# Patient Record
Sex: FEMALE | Race: OTHER | NOT HISPANIC OR LATINO
[De-identification: names, ages, dates, MRNs, and addresses within clinical notes are randomized per-mention and may not be internally consistent; named-entity substitution may affect disease eponyms.]

---

## 2023-04-10 PROBLEM — Z00.00 ENCOUNTER FOR PREVENTIVE HEALTH EXAMINATION: Status: ACTIVE | Noted: 2023-04-10

## 2023-04-11 ENCOUNTER — APPOINTMENT (OUTPATIENT)
Dept: HEMATOLOGY ONCOLOGY | Facility: CLINIC | Age: 43
End: 2023-04-11
Payer: COMMERCIAL

## 2023-04-11 ENCOUNTER — LABORATORY RESULT (OUTPATIENT)
Age: 43
End: 2023-04-11

## 2023-04-11 VITALS
OXYGEN SATURATION: 98 % | DIASTOLIC BLOOD PRESSURE: 83 MMHG | WEIGHT: 146 LBS | SYSTOLIC BLOOD PRESSURE: 122 MMHG | HEART RATE: 83 BPM | RESPIRATION RATE: 18 BRPM | HEIGHT: 68.11 IN | TEMPERATURE: 98.1 F | BODY MASS INDEX: 22.13 KG/M2

## 2023-04-11 LAB
ALBUMIN SERPL ELPH-MCNC: 3.7 G/DL
ALP BLD-CCNC: 76 U/L
ALT SERPL-CCNC: 25 U/L
ANION GAP SERPL CALC-SCNC: 11 MMOL/L
AST SERPL-CCNC: 29 U/L
BILIRUB SERPL-MCNC: 0.9 MG/DL
BUN SERPL-MCNC: 9 MG/DL
CALCIUM SERPL-MCNC: 9.9 MG/DL
CHLORIDE SERPL-SCNC: 106 MMOL/L
CO2 SERPL-SCNC: 26 MMOL/L
CREAT SERPL-MCNC: 1 MG/DL
EGFR: 71 ML/MIN/1.73M2
GLUCOSE SERPL-MCNC: 99 MG/DL
LDH SERPL-CCNC: 165 U/L
POTASSIUM SERPL-SCNC: 4.4 MMOL/L
PROT SERPL-MCNC: 8 G/DL
SODIUM SERPL-SCNC: 143 MMOL/L

## 2023-04-11 PROCEDURE — 99205 OFFICE O/P NEW HI 60 MIN: CPT

## 2023-04-11 NOTE — HISTORY OF PRESENT ILLNESS
[Disease: _____________________] : Disease: [unfilled] [M: ___] : M[unfilled] [AJCC Stage: ____] : AJCC Stage: [unfilled] [de-identified] : DIAGNOSIS:  M1a, stage IV, metastatic uveal melanoma, with liver involvement\par \par MOLECULAR FINDINGS:\par HLA A*02:01 and A*23:01\par NY-ESO-1 negative\par \par CURRENT THERAPY:\par Tebentafusp since 03/12/2023\par \par PRIOR THERAPIES:\par None\par \par HISTORY OF PRESENT ILLNESS:\par Mrs. Hoff is a 42 year old female with a history of primary uveal melanoma, now with newly diagnosed metastatic disease with liver only involvement.\par \par She initially presented in January 2018 with vision changes in her right eye, with later loss of temporal and upper visual field vision.  She was evaluated by Dr. Konstantin Barros at the [facility] and was found to have a subretinal mass in her right eye with a greated basal diameter of 14.43 mm and a maximal height of 7 mm.  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  She was treated with Ru-106 brachytherapy from 01/30/2018 until 02/06/2018.  She was subsequently placed on expectant observation for systemic recurrence.\par \par She did well until, on 01/29/2023, she underwent an abdominal ultrasound which was significant for a new focal liver lesion measuring 3 cm.  A subsequent chest, abdominal and pelvic CT scan, performed on 01/30/2023, demonstrated a segment 7 liver lesion with arterial enhancement and other subcentimeter hepatic lesions measuring up to 4 mm of unclear etiology.  \par \par On 02/01/2023, she underwent a liver biopsy, with pathology consistent with melanoma.  Immunohistochemistry was positive for SOX10 and MART1.  An abdominal MRI, performed on 02/16/2023, demonstrated a dominant segment 7 lesion in the liver, with multiple other smaller subcentimeter lesions bilaterally.\par \par She was evaluated at the [facility] on 02/20/2023 by Dr. Miguelito Truong, with recommendations for HLA testing and possible tebentafusp if appropriate or combination checkpoint blockade.  She was found to be HLA A*02:01 positive and initiated therapy with tebentafusp on 03/12/2023, and she has now completed her first three inpatient doses, with her most recent dose administered on 03/26/2023.  She experienced mild rash and periorbital edema, with IV fluids administered for mild hypotension with her first dose, but otherwise tolerated therapy without difficulty.\par \par She now presents to clinic today for a discussion of further management options.\par \par PAST MEDICAL HISTORY:\par None\par \par SOCIAL HISTORY:\par The patient has recently moved from [facility] and is now living in [place].  Her partner currently resides in the [place] but is considering relocating.  She works in the [employer].\par \par FAMILY HISTORY:\par Her maternal grandfather had mucosal melanoma arising from the stomach.\par

## 2023-04-11 NOTE — ASSESSMENT
[FreeTextEntry1] : This is a 42 year old HLA  positive female now with an M1a, stage IV, uveal melanoma with liver involvement.  She has initiated therapy with tebentafusp at [facility] but has recently moved to [place].\par \par I had a long discussion with the patient regarding her diagnosis, clinical course, prognosis and management options.  I once again reviewed systemic and regional treatment options available both as standard of care and on clinical trials.  I reviewed the data supporting the use of tebentafusp, the challenges associated with predicting benefit based upon imaging alone, and the risks of toxicity following an extended treatment break.\par \par After a long discussion, all of her questions and concerns were answered to her satisfaction.  We will plan on full restaging studies now as well as ctDNA testing.  We will arrange for her to resume therapy with tebentafusp.  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  I anticipate treatment next Tuesday morning.\par

## 2023-04-11 NOTE — PHYSICAL EXAM
Med rec updated and complete. Allergies reviewed. Met with pt   At bedside and dicussed current medications. No antibiotic use   In last 14 days.  Pt is currently on Ibrance. Medication currently  At bedside.  [facility]     [Fully active, able to carry on all pre-disease performance without restriction] : Status 0 - Fully active, able to carry on all pre-disease performance without restriction [Normal] : well developed, well nourished, in no acute distress complains of pain/discomfort

## 2023-04-14 ENCOUNTER — RESULT REVIEW (OUTPATIENT)
Age: 43
End: 2023-04-14

## 2023-04-17 ENCOUNTER — OUTPATIENT (OUTPATIENT)
Dept: OUTPATIENT SERVICES | Facility: HOSPITAL | Age: 43
LOS: 1 days | End: 2023-04-17

## 2023-04-17 ENCOUNTER — TRANSCRIPTION ENCOUNTER (OUTPATIENT)
Age: 43
End: 2023-04-17

## 2023-04-17 ENCOUNTER — APPOINTMENT (OUTPATIENT)
Dept: MRI IMAGING | Facility: CLINIC | Age: 43
End: 2023-04-17
Payer: COMMERCIAL

## 2023-04-17 ENCOUNTER — NON-APPOINTMENT (OUTPATIENT)
Age: 43
End: 2023-04-17

## 2023-04-17 PROCEDURE — 72193 CT PELVIS W/DYE: CPT | Mod: 26

## 2023-04-17 PROCEDURE — 74183 MRI ABD W/O CNTR FLWD CNTR: CPT | Mod: 26

## 2023-04-17 PROCEDURE — 71260 CT THORAX DX C+: CPT | Mod: 26

## 2023-04-17 RX ADMIN — SODIUM CHLORIDE 500 MILLILITER(S): 9 INJECTION INTRAMUSCULAR; INTRAVENOUS; SUBCUTANEOUS at 15:35

## 2023-04-18 ENCOUNTER — APPOINTMENT (OUTPATIENT)
Dept: INFUSION THERAPY | Facility: CLINIC | Age: 43
End: 2023-04-18

## 2023-04-18 ENCOUNTER — OUTPATIENT (OUTPATIENT)
Dept: OUTPATIENT SERVICES | Facility: HOSPITAL | Age: 43
LOS: 1 days | End: 2023-04-18
Payer: COMMERCIAL

## 2023-04-18 VITALS
OXYGEN SATURATION: 97 % | TEMPERATURE: 99 F | HEART RATE: 90 BPM | HEIGHT: 67 IN | DIASTOLIC BLOOD PRESSURE: 62 MMHG | RESPIRATION RATE: 15 BRPM | WEIGHT: 154.98 LBS | SYSTOLIC BLOOD PRESSURE: 102 MMHG

## 2023-04-18 VITALS
WEIGHT: 149.91 LBS | DIASTOLIC BLOOD PRESSURE: 72 MMHG | TEMPERATURE: 98 F | HEIGHT: 67 IN | RESPIRATION RATE: 18 BRPM | OXYGEN SATURATION: 98 % | SYSTOLIC BLOOD PRESSURE: 106 MMHG | HEART RATE: 78 BPM

## 2023-04-18 DIAGNOSIS — C69.90 MALIGNANT NEOPLASM OF UNSPECIFIED SITE OF UNSPECIFIED EYE: ICD-10-CM

## 2023-04-18 LAB
ALBUMIN SERPL ELPH-MCNC: 3.3 G/DL — SIGNIFICANT CHANGE UP (ref 3.3–5)
ALP SERPL-CCNC: 64 U/L — SIGNIFICANT CHANGE UP (ref 40–120)
ALT FLD-CCNC: 18 U/L — SIGNIFICANT CHANGE UP (ref 10–45)
ANION GAP SERPL CALC-SCNC: 5 MMOL/L — SIGNIFICANT CHANGE UP (ref 5–17)
AST SERPL-CCNC: 30 U/L — SIGNIFICANT CHANGE UP (ref 10–40)
BILIRUB SERPL-MCNC: 0.7 MG/DL — SIGNIFICANT CHANGE UP (ref 0.2–1.2)
BUN SERPL-MCNC: 8 MG/DL — SIGNIFICANT CHANGE UP (ref 7–23)
CALCIUM SERPL-MCNC: 8.6 MG/DL — SIGNIFICANT CHANGE UP (ref 8.4–10.5)
CHLORIDE SERPL-SCNC: 110 MMOL/L — HIGH (ref 96–108)
CO2 SERPL-SCNC: 25 MMOL/L — SIGNIFICANT CHANGE UP (ref 22–31)
CREAT SERPL-MCNC: 0.8 MG/DL — SIGNIFICANT CHANGE UP (ref 0.5–1.3)
EGFR: 93 ML/MIN/1.73M2 — SIGNIFICANT CHANGE UP
GLUCOSE SERPL-MCNC: 96 MG/DL — SIGNIFICANT CHANGE UP (ref 70–99)
HCT VFR BLD CALC: 40.1 % — SIGNIFICANT CHANGE UP (ref 34.5–45)
HGB BLD-MCNC: 13.5 G/DL — SIGNIFICANT CHANGE UP (ref 11.5–15.5)
LYMPHOCYTES # BLD AUTO: 1.8 K/UL — SIGNIFICANT CHANGE UP (ref 1–3.3)
LYMPHOCYTES # BLD AUTO: 20.8 % — SIGNIFICANT CHANGE UP (ref 13–44)
MCHC RBC-ENTMCNC: 29.9 PG — SIGNIFICANT CHANGE UP (ref 27–34)
MCHC RBC-ENTMCNC: 33.7 GM/DL — SIGNIFICANT CHANGE UP (ref 32–36)
MCV RBC AUTO: 88.7 FL — SIGNIFICANT CHANGE UP (ref 80–100)
NEUTROPHILS # BLD AUTO: 6.2 K/UL — SIGNIFICANT CHANGE UP (ref 1.8–7.4)
NEUTROPHILS NFR BLD AUTO: 69.9 % — SIGNIFICANT CHANGE UP (ref 43–77)
PLATELET # BLD AUTO: 299 K/UL — SIGNIFICANT CHANGE UP (ref 150–400)
POTASSIUM SERPL-MCNC: 4.1 MMOL/L — SIGNIFICANT CHANGE UP (ref 3.5–5.3)
POTASSIUM SERPL-SCNC: 4.1 MMOL/L — SIGNIFICANT CHANGE UP (ref 3.5–5.3)
PROT SERPL-MCNC: 7.5 G/DL — SIGNIFICANT CHANGE UP (ref 6–8.3)
RBC # BLD: 4.52 M/UL — SIGNIFICANT CHANGE UP (ref 3.8–5.2)
RBC # FLD: 13.4 % — SIGNIFICANT CHANGE UP (ref 10.3–14.5)
SODIUM SERPL-SCNC: 140 MMOL/L — SIGNIFICANT CHANGE UP (ref 135–145)
WBC # BLD: 8.8 K/UL — SIGNIFICANT CHANGE UP (ref 3.8–10.5)
WBC # FLD AUTO: 8.8 K/UL — SIGNIFICANT CHANGE UP (ref 3.8–10.5)

## 2023-04-18 PROCEDURE — 96413 CHEMO IV INFUSION 1 HR: CPT

## 2023-04-18 PROCEDURE — 85025 COMPLETE CBC W/AUTO DIFF WBC: CPT

## 2023-04-18 PROCEDURE — 80053 COMPREHEN METABOLIC PANEL: CPT

## 2023-04-18 PROCEDURE — 36415 COLL VENOUS BLD VENIPUNCTURE: CPT

## 2023-04-18 RX ORDER — ACETAMINOPHEN 500 MG
650 TABLET ORAL ONCE
Refills: 0 | Status: COMPLETED | OUTPATIENT
Start: 2023-04-18 | End: 2023-04-18

## 2023-04-18 RX ORDER — DIPHENHYDRAMINE HCL 50 MG
25 CAPSULE ORAL ONCE
Refills: 0 | Status: COMPLETED | OUTPATIENT
Start: 2023-04-18 | End: 2023-04-18

## 2023-04-18 RX ORDER — SODIUM CHLORIDE 9 MG/ML
500 INJECTION INTRAMUSCULAR; INTRAVENOUS; SUBCUTANEOUS ONCE
Refills: 0 | Status: COMPLETED | OUTPATIENT
Start: 2023-04-18 | End: 2023-04-18

## 2023-04-18 RX ADMIN — Medication 25 MILLIGRAM(S): at 11:39

## 2023-04-18 RX ADMIN — SODIUM CHLORIDE 100 MILLILITER(S): 9 INJECTION INTRAMUSCULAR; INTRAVENOUS; SUBCUTANEOUS at 10:34

## 2023-04-18 RX ADMIN — Medication 650 MILLIGRAM(S): at 13:09

## 2023-04-18 RX ADMIN — Medication 650 MILLIGRAM(S): at 11:39

## 2023-04-19 ENCOUNTER — NON-APPOINTMENT (OUTPATIENT)
Age: 43
End: 2023-04-19

## 2023-04-25 ENCOUNTER — APPOINTMENT (OUTPATIENT)
Dept: INFUSION THERAPY | Facility: CLINIC | Age: 43
End: 2023-04-25

## 2023-04-25 ENCOUNTER — OUTPATIENT (OUTPATIENT)
Dept: OUTPATIENT SERVICES | Facility: HOSPITAL | Age: 43
LOS: 1 days | End: 2023-04-25
Payer: COMMERCIAL

## 2023-04-25 ENCOUNTER — LABORATORY RESULT (OUTPATIENT)
Age: 43
End: 2023-04-25

## 2023-04-25 ENCOUNTER — APPOINTMENT (OUTPATIENT)
Dept: HEMATOLOGY ONCOLOGY | Facility: CLINIC | Age: 43
End: 2023-04-25
Payer: COMMERCIAL

## 2023-04-25 VITALS
BODY MASS INDEX: 22.43 KG/M2 | RESPIRATION RATE: 18 BRPM | HEIGHT: 68 IN | HEART RATE: 77 BPM | TEMPERATURE: 98 F | SYSTOLIC BLOOD PRESSURE: 117 MMHG | OXYGEN SATURATION: 96 % | DIASTOLIC BLOOD PRESSURE: 85 MMHG | WEIGHT: 148 LBS

## 2023-04-25 VITALS
DIASTOLIC BLOOD PRESSURE: 72 MMHG | HEART RATE: 72 BPM | SYSTOLIC BLOOD PRESSURE: 112 MMHG | RESPIRATION RATE: 18 BRPM | OXYGEN SATURATION: 98 % | TEMPERATURE: 98 F

## 2023-04-25 VITALS
OXYGEN SATURATION: 97 % | SYSTOLIC BLOOD PRESSURE: 117 MMHG | WEIGHT: 147.93 LBS | RESPIRATION RATE: 18 BRPM | TEMPERATURE: 98 F | HEART RATE: 77 BPM | DIASTOLIC BLOOD PRESSURE: 85 MMHG | HEIGHT: 67 IN

## 2023-04-25 DIAGNOSIS — C69.90 MALIGNANT NEOPLASM OF UNSPECIFIED SITE OF UNSPECIFIED EYE: ICD-10-CM

## 2023-04-25 PROCEDURE — 99213 OFFICE O/P EST LOW 20 MIN: CPT

## 2023-04-25 PROCEDURE — 96413 CHEMO IV INFUSION 1 HR: CPT

## 2023-04-25 NOTE — PHYSICAL EXAM
[Fully active, able to carry on all pre-disease performance without restriction] : Status 0 - Fully active, able to carry on all pre-disease performance without restriction [Normal] : affect appropriate [de-identified] : Some dry appearing skin particularly on the hands

## 2023-04-25 NOTE — HISTORY OF PRESENT ILLNESS
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 Hoff is a 42 year old female with a history of primary uveal melanoma, now with newly diagnosed metastatic disease with liver only involvement who presents for continued management of her disease.  She is HLA  positive.\par \par Briefly, she initially presented in January 2018 with vision changes in her right eye, with later loss of temporal and upper visual field vision.  She was evaluated by Dr. Konstantin Barros at the [facility] and was found to have a subretinal mass in her right eye with a greated basal diameter of 14.43 mm and a maximal height of 7 mm.  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  She was treated with Ru-106 brachytherapy from 01/30/2018 until 02/06/2018.  She was subsequently placed on expectant observation for systemic recurrence.\par \par She did well until, on 01/29/2023, she underwent an abdominal ultrasound which was significant for a new focal liver lesion measuring 3 cm.  A subsequent chest, abdominal and pelvic CT scan, performed on 01/30/2023, demonstrated a segment 7 liver lesion with arterial enhancement and other subcentimeter hepatic lesions measuring up to 4 mm of unclear etiology.  On 02/01/2023, she underwent a liver biopsy, with pathology consistent with melanoma.  Immunohistochemistry was positive for SOX10 and MART1.  An abdominal MRI, performed on 02/16/2023, demonstrated a dominant segment 7 lesion in the liver, with multiple other smaller subcentimeter lesions bilaterally.\par \par She was evaluated at the [facility] on 02/20/2023 by Dr. Miguelito Truong, with recommendations for HLA testing and possible tebentafusp if appropriate or combination checkpoint blockade.  She was found to be HLA A*02:01 positive and initiated therapy with tebentafusp on 03/12/2023, and she has now completed her first three inpatient doses, with her most recent dose administered on 03/26/2023.  She experienced mild rash and periorbital edema, with IV fluids administered for mild hypotension with her first dose, but otherwise tolerated therapy without difficulty.\par \par Last week, she resumed therapy with tebentafusp without significant hypotension. She did develop chills, low grade fevers, and cutaneous toxicities which resolved by Sunday.  Today she feels well.  She notes that one of her eye lashes may have turned white.\par \par On 04/17/2023, she underwent a new baseline chest and pelvis CT as well as a liver MRI.  The MRI demonstrated metastatic lesions affecting both lobes of the liver, with some worsening when compared with the prior scans from 02/16/2023 and with the largest lesion now measuring 4.5 x 3.5 cm in segment 7.\par \par PAST MEDICAL HISTORY:\par None\par \par SOCIAL HISTORY:\par The patient has recently moved from [facility] and is now living in [place].  Her partner currently resides in the [place] but is considering relocating.  She works in the [employer].\par \par FAMILY HISTORY:\par Her maternal grandfather had mucosal melanoma arising from the stomach.\par  [de-identified] : She presents today for weekly tebentafusp infusion.  She has some chills and low grade fever, skin redness/itching the day after last week's infusion.  Her 4/18/23 CT chest and pelvis showed 4.9 x 4.5 cm infiltrative mass within the posterior right lobe of the liver.  4/14/23 MRI abdomen showed numerous liver metastasis in the left and right lobes of liver.  FoundationOne CDx liquid is pending.

## 2023-04-25 NOTE — ASSESSMENT
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  She has initiated therapy with tebentafusp at [facility] but has more recently moved to [place] and has resumed therapy here.\par \par I reviewed the results of the imaging studies as well as ctDNA testing.  At this time, we will continue therapy with tebentafusp. We will have her undergo repeat imaging studies in approximately 6 weeks time, prior to a planned trip to [place].  She will return to clinic for her next dose of therapy next week.\par

## 2023-04-26 ENCOUNTER — NON-APPOINTMENT (OUTPATIENT)
Age: 43
End: 2023-04-26

## 2023-04-28 LAB
ALBUMIN SERPL ELPH-MCNC: 3.4 G/DL
ALP BLD-CCNC: 71 U/L
ALT SERPL-CCNC: 18 U/L
ANION GAP SERPL CALC-SCNC: 9 MMOL/L
AST SERPL-CCNC: 29 U/L
BILIRUB SERPL-MCNC: 0.7 MG/DL
BUN SERPL-MCNC: 9 MG/DL
CALCIUM SERPL-MCNC: 9.7 MG/DL
CHLORIDE SERPL-SCNC: 108 MMOL/L
CO2 SERPL-SCNC: 25 MMOL/L
CREAT SERPL-MCNC: 0.7 MG/DL
EGFR: 109 ML/MIN/1.73M2
GLUCOSE SERPL-MCNC: 98 MG/DL
POTASSIUM SERPL-SCNC: 4.1 MMOL/L
PROT SERPL-MCNC: 7.4 G/DL
SODIUM SERPL-SCNC: 142 MMOL/L

## 2023-05-02 ENCOUNTER — APPOINTMENT (OUTPATIENT)
Dept: INFUSION THERAPY | Facility: CLINIC | Age: 43
End: 2023-05-02

## 2023-05-02 ENCOUNTER — APPOINTMENT (OUTPATIENT)
Dept: HEMATOLOGY ONCOLOGY | Facility: CLINIC | Age: 43
End: 2023-05-02
Payer: COMMERCIAL

## 2023-05-02 ENCOUNTER — OUTPATIENT (OUTPATIENT)
Dept: OUTPATIENT SERVICES | Facility: HOSPITAL | Age: 43
LOS: 1 days | End: 2023-05-02
Payer: COMMERCIAL

## 2023-05-02 VITALS
TEMPERATURE: 99 F | OXYGEN SATURATION: 97 % | RESPIRATION RATE: 16 BRPM | SYSTOLIC BLOOD PRESSURE: 121 MMHG | HEART RATE: 68 BPM | DIASTOLIC BLOOD PRESSURE: 78 MMHG

## 2023-05-02 VITALS
OXYGEN SATURATION: 98 % | TEMPERATURE: 98.6 F | SYSTOLIC BLOOD PRESSURE: 110 MMHG | HEART RATE: 84 BPM | HEIGHT: 68 IN | BODY MASS INDEX: 22.88 KG/M2 | DIASTOLIC BLOOD PRESSURE: 74 MMHG | RESPIRATION RATE: 18 BRPM | WEIGHT: 151 LBS

## 2023-05-02 DIAGNOSIS — C69.90 MALIGNANT NEOPLASM OF UNSPECIFIED SITE OF UNSPECIFIED EYE: ICD-10-CM

## 2023-05-02 PROCEDURE — 99213 OFFICE O/P EST LOW 20 MIN: CPT

## 2023-05-02 PROCEDURE — 96413 CHEMO IV INFUSION 1 HR: CPT

## 2023-05-02 NOTE — PHYSICAL EXAM
[Fully active, able to carry on all pre-disease performance without restriction] : Status 0 - Fully active, able to carry on all pre-disease performance without restriction [Normal] : affect appropriate [de-identified] : Some dry appearing skin particularly on the hands

## 2023-05-02 NOTE — ASSESSMENT
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  She has initiated therapy with tebentafusp at [facility] but has more recently moved to [place] and has resumed therapy here.\par \par  At this time, we will continue therapy with tebentafusp. We will have her undergo repeat imaging studies in approximately 5 weeks time, prior to a planned trip to [place].  She will return to clinic for her next dose of therapy next week with pretreatment laboratory work performed.\par

## 2023-05-02 NOTE — HISTORY OF PRESENT ILLNESS
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 Hoff is a 42 year old female with a history of primary uveal melanoma, now with newly diagnosed metastatic disease with liver only involvement who presents for continued management of her disease.  She is HLA  positive.\par \par Briefly, she initially presented in January 2018 with vision changes in her right eye, with later loss of temporal and upper visual field vision.  She was evaluated by Dr. Konstantin Barros at the [facility] and was found to have a subretinal mass in her right eye with a greated basal diameter of 14.43 mm and a maximal height of 7 mm.  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  She was treated with Ru-106 brachytherapy from 01/30/2018 until 02/06/2018.  She was subsequently placed on expectant observation for systemic recurrence.\par \par She did well until, on 01/29/2023, she underwent an abdominal ultrasound which was significant for a new focal liver lesion measuring 3 cm.  A subsequent chest, abdominal and pelvic CT scan, performed on 01/30/2023, demonstrated a segment 7 liver lesion with arterial enhancement and other subcentimeter hepatic lesions measuring up to 4 mm of unclear etiology.  On 02/01/2023, she underwent a liver biopsy, with pathology consistent with melanoma.  Immunohistochemistry was positive for SOX10 and MART1.  An abdominal MRI, performed on 02/16/2023, demonstrated a dominant segment 7 lesion in the liver, with multiple other smaller subcentimeter lesions bilaterally.\par \par She was evaluated at the [facility] on 02/20/2023 by Dr. Miguelito Truong, with recommendations for HLA testing and possible tebentafusp if appropriate or combination checkpoint blockade.  She was found to be HLA A*02:01 positive and initiated therapy with tebentafusp on 03/12/2023, and she has now completed her first three inpatient doses, with her most recent dose administered on 03/26/2023.  She experienced mild rash and periorbital edema, with IV fluids administered for mild hypotension with her first dose, but otherwise tolerated therapy without difficulty.\par \par Last week, she resumed therapy with tebentafusp without significant hypotension. She did develop chills, low grade fevers, and cutaneous toxicities which resolved by Sunday.  Today she feels well.  She notes that one of her eye lashes may have turned white.\par \par On 04/17/2023, she underwent a new baseline chest and pelvis CT as well as a liver MRI.  The MRI demonstrated metastatic lesions affecting both lobes of the liver, with some worsening when compared with the prior scans from 02/16/2023 and with the largest lesion now measuring 4.5 x 3.5 cm in segment 7.\par \par She presents today for weekly tebentafusp infusion.  She has some fevers and chills beginning approximately 6 hours after infusion last week, with some skin toxicity that is now resolved.  \par \par PAST MEDICAL HISTORY:\par None\par \par SOCIAL HISTORY:\par The patient has recently moved from [place] and is now living in [place].  Her partner currently resides in the [place] but is considering relocating.  She works in the [employer].\par \par FAMILY HISTORY:\par Her maternal grandfather had mucosal melanoma arising from the stomach.\par

## 2023-05-09 ENCOUNTER — OUTPATIENT (OUTPATIENT)
Dept: OUTPATIENT SERVICES | Facility: HOSPITAL | Age: 43
LOS: 1 days | End: 2023-05-09
Payer: COMMERCIAL

## 2023-05-09 ENCOUNTER — APPOINTMENT (OUTPATIENT)
Dept: INFUSION THERAPY | Facility: CLINIC | Age: 43
End: 2023-05-09

## 2023-05-09 ENCOUNTER — APPOINTMENT (OUTPATIENT)
Dept: HEMATOLOGY ONCOLOGY | Facility: CLINIC | Age: 43
End: 2023-05-09
Payer: COMMERCIAL

## 2023-05-09 VITALS
HEART RATE: 70 BPM | HEIGHT: 67 IN | WEIGHT: 147.93 LBS | DIASTOLIC BLOOD PRESSURE: 77 MMHG | OXYGEN SATURATION: 99 % | SYSTOLIC BLOOD PRESSURE: 121 MMHG | RESPIRATION RATE: 18 BRPM | TEMPERATURE: 98 F

## 2023-05-09 VITALS
HEART RATE: 78 BPM | SYSTOLIC BLOOD PRESSURE: 124 MMHG | DIASTOLIC BLOOD PRESSURE: 74 MMHG | OXYGEN SATURATION: 99 % | TEMPERATURE: 98 F | RESPIRATION RATE: 18 BRPM

## 2023-05-09 VITALS
SYSTOLIC BLOOD PRESSURE: 110 MMHG | DIASTOLIC BLOOD PRESSURE: 71 MMHG | HEART RATE: 89 BPM | HEIGHT: 68 IN | OXYGEN SATURATION: 97 % | RESPIRATION RATE: 18 BRPM | WEIGHT: 152 LBS | TEMPERATURE: 98 F | BODY MASS INDEX: 23.04 KG/M2

## 2023-05-09 DIAGNOSIS — C69.90 MALIGNANT NEOPLASM OF UNSPECIFIED SITE OF UNSPECIFIED EYE: ICD-10-CM

## 2023-05-09 LAB
ALBUMIN SERPL ELPH-MCNC: 3.5 G/DL — SIGNIFICANT CHANGE UP (ref 3.3–5)
ALP SERPL-CCNC: 58 U/L — SIGNIFICANT CHANGE UP (ref 40–120)
ALT FLD-CCNC: 15 U/L — SIGNIFICANT CHANGE UP (ref 10–45)
ANION GAP SERPL CALC-SCNC: 1 MMOL/L — LOW (ref 5–17)
AST SERPL-CCNC: 25 U/L — SIGNIFICANT CHANGE UP (ref 10–40)
BILIRUB SERPL-MCNC: 0.9 MG/DL — SIGNIFICANT CHANGE UP (ref 0.2–1.2)
BUN SERPL-MCNC: 8 MG/DL — SIGNIFICANT CHANGE UP (ref 7–23)
CALCIUM SERPL-MCNC: 9.7 MG/DL — SIGNIFICANT CHANGE UP (ref 8.4–10.5)
CHLORIDE SERPL-SCNC: 109 MMOL/L — HIGH (ref 96–108)
CO2 SERPL-SCNC: 25 MMOL/L — SIGNIFICANT CHANGE UP (ref 22–31)
CREAT SERPL-MCNC: 1 MG/DL — SIGNIFICANT CHANGE UP (ref 0.5–1.3)
EGFR: 71 ML/MIN/1.73M2 — SIGNIFICANT CHANGE UP
GLUCOSE SERPL-MCNC: 112 MG/DL — HIGH (ref 70–99)
HCT VFR BLD CALC: 36.9 % — SIGNIFICANT CHANGE UP (ref 34.5–45)
HGB BLD-MCNC: 12.7 G/DL — SIGNIFICANT CHANGE UP (ref 11.5–15.5)
LYMPHOCYTES # BLD AUTO: 2.4 K/UL — SIGNIFICANT CHANGE UP (ref 1–3.3)
LYMPHOCYTES # BLD AUTO: 32.9 % — SIGNIFICANT CHANGE UP (ref 13–44)
MCHC RBC-ENTMCNC: 30.3 PG — SIGNIFICANT CHANGE UP (ref 27–34)
MCHC RBC-ENTMCNC: 34.4 GM/DL — SIGNIFICANT CHANGE UP (ref 32–36)
MCV RBC AUTO: 88.1 FL — SIGNIFICANT CHANGE UP (ref 80–100)
NEUTROPHILS # BLD AUTO: 4.2 K/UL — SIGNIFICANT CHANGE UP (ref 1.8–7.4)
NEUTROPHILS NFR BLD AUTO: 58.8 % — SIGNIFICANT CHANGE UP (ref 43–77)
PLATELET # BLD AUTO: 396 K/UL — SIGNIFICANT CHANGE UP (ref 150–400)
POTASSIUM SERPL-MCNC: 4.2 MMOL/L — SIGNIFICANT CHANGE UP (ref 3.5–5.3)
POTASSIUM SERPL-SCNC: 4.2 MMOL/L — SIGNIFICANT CHANGE UP (ref 3.5–5.3)
PROT SERPL-MCNC: 7 G/DL — SIGNIFICANT CHANGE UP (ref 6–8.3)
RBC # BLD: 4.19 M/UL — SIGNIFICANT CHANGE UP (ref 3.8–5.2)
RBC # FLD: 13.1 % — SIGNIFICANT CHANGE UP (ref 10.3–14.5)
SODIUM SERPL-SCNC: 135 MMOL/L — SIGNIFICANT CHANGE UP (ref 135–145)
WBC # BLD: 7.2 K/UL — SIGNIFICANT CHANGE UP (ref 3.8–10.5)
WBC # FLD AUTO: 7.2 K/UL — SIGNIFICANT CHANGE UP (ref 3.8–10.5)

## 2023-05-09 PROCEDURE — 36415 COLL VENOUS BLD VENIPUNCTURE: CPT

## 2023-05-09 PROCEDURE — 96413 CHEMO IV INFUSION 1 HR: CPT

## 2023-05-09 PROCEDURE — 99213 OFFICE O/P EST LOW 20 MIN: CPT

## 2023-05-09 PROCEDURE — 80053 COMPREHEN METABOLIC PANEL: CPT

## 2023-05-09 PROCEDURE — 85025 COMPLETE CBC W/AUTO DIFF WBC: CPT

## 2023-05-09 NOTE — ASSESSMENT
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  She has initiated therapy with tebentafusp at [facility] but has more recently moved to [place] and has resumed therapy here.\par \par  At this time, we will continue therapy with tebentafusp. We will have her undergo repeat imaging studies in approximately 4 weeks time, prior to a planned trip to [place].  She will return to clinic for her next dose of therapy next week with pretreatment laboratory work performed.\par

## 2023-05-09 NOTE — PHYSICAL EXAM
[Fully active, able to carry on all pre-disease performance without restriction] : Status 0 - Fully active, able to carry on all pre-disease performance without restriction [Normal] : affect appropriate [de-identified] : Some dry appearing skin particularly on the hands

## 2023-05-09 NOTE — HISTORY OF PRESENT ILLNESS
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 Hoff is a 42 year old female with a history of primary uveal melanoma, now with newly diagnosed metastatic disease with liver only involvement who presents for continued management of her disease.  She is HLA  positive.\par \par Briefly, she initially presented in January 2018 with vision changes in her right eye, with later loss of temporal and upper visual field vision.  She was evaluated by Dr. Konstantin Barros at the [facility] and was found to have a subretinal mass in her right eye with a greated basal diameter of 14.43 mm and a maximal height of 7 mm.  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  She was treated with Ru-106 brachytherapy from 01/30/2018 until 02/06/2018.  She was subsequently placed on expectant observation for systemic recurrence.\par \par She did well until, on 01/29/2023, she underwent an abdominal ultrasound which was significant for a new focal liver lesion measuring 3 cm.  A subsequent chest, abdominal and pelvic CT scan, performed on 01/30/2023, demonstrated a segment 7 liver lesion with arterial enhancement and other subcentimeter hepatic lesions measuring up to 4 mm of unclear etiology.  On 02/01/2023, she underwent a liver biopsy, with pathology consistent with melanoma.  Immunohistochemistry was positive for SOX10 and MART1.  An abdominal MRI, performed on 02/16/2023, demonstrated a dominant segment 7 lesion in the liver, with multiple other smaller subcentimeter lesions bilaterally.\par \par She was evaluated at the [facility] on 02/20/2023 by Dr. Miguelito Truong, with recommendations for HLA testing and possible tebentafusp if appropriate or combination checkpoint blockade.  She was found to be HLA A*02:01 positive and initiated therapy with tebentafusp on 03/12/2023, and she has now completed her first three inpatient doses, with her most recent dose administered on 03/26/2023.  She experienced mild rash and periorbital edema, with IV fluids administered for mild hypotension with her first dose, but otherwise tolerated therapy without difficulty.\par \par Last week, she resumed therapy with tebentafusp without significant hypotension. She did develop chills, low grade fevers, and cutaneous toxicities which resolved by Sunday.  Today she feels well.  She notes that one of her eye lashes may have turned white.\par \par On 04/17/2023, she underwent a new baseline chest and pelvis CT as well as a liver MRI.  The MRI demonstrated metastatic lesions affecting both lobes of the liver, with some worsening when compared with the prior scans from 02/16/2023 and with the largest lesion now measuring 4.5 x 3.5 cm in segment 7.\par \par She presents today for weekly tebentafusp infusion.  She has some fevers and chills beginning approximately 6 hours after infusion last week, with no skin toxicity.  \par \par PAST MEDICAL HISTORY:\par None\par \par SOCIAL HISTORY:\par The patient has recently moved from [place] and is now living in [place].  Her partner currently resides in the [place] but is considering relocating.  She works in the [employer].\par \par FAMILY HISTORY:\par Her maternal grandfather had mucosal melanoma arising from the stomach.\par

## 2023-05-16 ENCOUNTER — OUTPATIENT (OUTPATIENT)
Dept: OUTPATIENT SERVICES | Facility: HOSPITAL | Age: 43
LOS: 1 days | End: 2023-05-16
Payer: COMMERCIAL

## 2023-05-16 ENCOUNTER — APPOINTMENT (OUTPATIENT)
Dept: INFUSION THERAPY | Facility: CLINIC | Age: 43
End: 2023-05-16

## 2023-05-16 ENCOUNTER — APPOINTMENT (OUTPATIENT)
Dept: HEMATOLOGY ONCOLOGY | Facility: CLINIC | Age: 43
End: 2023-05-16
Payer: COMMERCIAL

## 2023-05-16 VITALS
RESPIRATION RATE: 18 BRPM | WEIGHT: 148 LBS | OXYGEN SATURATION: 97 % | HEART RATE: 85 BPM | BODY MASS INDEX: 22.43 KG/M2 | SYSTOLIC BLOOD PRESSURE: 101 MMHG | DIASTOLIC BLOOD PRESSURE: 70 MMHG | TEMPERATURE: 98.1 F | HEIGHT: 68 IN

## 2023-05-16 VITALS
HEIGHT: 67 IN | RESPIRATION RATE: 18 BRPM | HEART RATE: 85 BPM | DIASTOLIC BLOOD PRESSURE: 70 MMHG | TEMPERATURE: 98 F | SYSTOLIC BLOOD PRESSURE: 121 MMHG | WEIGHT: 147.93 LBS | OXYGEN SATURATION: 97 %

## 2023-05-16 DIAGNOSIS — C69.90 MALIGNANT NEOPLASM OF UNSPECIFIED SITE OF UNSPECIFIED EYE: ICD-10-CM

## 2023-05-16 LAB
ALBUMIN SERPL ELPH-MCNC: 3.9 G/DL — SIGNIFICANT CHANGE UP (ref 3.3–5)
ALP SERPL-CCNC: 62 U/L — SIGNIFICANT CHANGE UP (ref 40–120)
ALT FLD-CCNC: 21 U/L — SIGNIFICANT CHANGE UP (ref 10–45)
ANION GAP SERPL CALC-SCNC: 5 MMOL/L — SIGNIFICANT CHANGE UP (ref 5–17)
AST SERPL-CCNC: 27 U/L — SIGNIFICANT CHANGE UP (ref 10–40)
BILIRUB SERPL-MCNC: 0.7 MG/DL — SIGNIFICANT CHANGE UP (ref 0.2–1.2)
BUN SERPL-MCNC: 9 MG/DL — SIGNIFICANT CHANGE UP (ref 7–23)
CALCIUM SERPL-MCNC: 10 MG/DL — SIGNIFICANT CHANGE UP (ref 8.4–10.5)
CHLORIDE SERPL-SCNC: 106 MMOL/L — SIGNIFICANT CHANGE UP (ref 96–108)
CO2 SERPL-SCNC: 29 MMOL/L — SIGNIFICANT CHANGE UP (ref 22–31)
CREAT SERPL-MCNC: 0.9 MG/DL — SIGNIFICANT CHANGE UP (ref 0.5–1.3)
EGFR: 81 ML/MIN/1.73M2 — SIGNIFICANT CHANGE UP
GLUCOSE SERPL-MCNC: 107 MG/DL — HIGH (ref 70–99)
HCT VFR BLD CALC: 37.8 % — SIGNIFICANT CHANGE UP (ref 34.5–45)
HGB BLD-MCNC: 12.6 G/DL — SIGNIFICANT CHANGE UP (ref 11.5–15.5)
LYMPHOCYTES # BLD AUTO: 2.4 K/UL — SIGNIFICANT CHANGE UP (ref 1–3.3)
LYMPHOCYTES # BLD AUTO: 32.6 % — SIGNIFICANT CHANGE UP (ref 13–44)
MCHC RBC-ENTMCNC: 29.7 PG — SIGNIFICANT CHANGE UP (ref 27–34)
MCHC RBC-ENTMCNC: 33.3 GM/DL — SIGNIFICANT CHANGE UP (ref 32–36)
MCV RBC AUTO: 89.2 FL — SIGNIFICANT CHANGE UP (ref 80–100)
NEUTROPHILS # BLD AUTO: 4.2 K/UL — SIGNIFICANT CHANGE UP (ref 1.8–7.4)
NEUTROPHILS NFR BLD AUTO: 57.7 % — SIGNIFICANT CHANGE UP (ref 43–77)
PLATELET # BLD AUTO: 346 K/UL — SIGNIFICANT CHANGE UP (ref 150–400)
POTASSIUM SERPL-MCNC: 4.7 MMOL/L — SIGNIFICANT CHANGE UP (ref 3.5–5.3)
POTASSIUM SERPL-SCNC: 4.7 MMOL/L — SIGNIFICANT CHANGE UP (ref 3.5–5.3)
PROT SERPL-MCNC: 7.4 G/DL — SIGNIFICANT CHANGE UP (ref 6–8.3)
RBC # BLD: 4.24 M/UL — SIGNIFICANT CHANGE UP (ref 3.8–5.2)
RBC # FLD: 13.2 % — SIGNIFICANT CHANGE UP (ref 10.3–14.5)
SODIUM SERPL-SCNC: 140 MMOL/L — SIGNIFICANT CHANGE UP (ref 135–145)
WBC # BLD: 7.3 K/UL — SIGNIFICANT CHANGE UP (ref 3.8–10.5)
WBC # FLD AUTO: 7.3 K/UL — SIGNIFICANT CHANGE UP (ref 3.8–10.5)

## 2023-05-16 PROCEDURE — 96413 CHEMO IV INFUSION 1 HR: CPT

## 2023-05-16 PROCEDURE — 85025 COMPLETE CBC W/AUTO DIFF WBC: CPT

## 2023-05-16 PROCEDURE — 80053 COMPREHEN METABOLIC PANEL: CPT

## 2023-05-16 PROCEDURE — 99213 OFFICE O/P EST LOW 20 MIN: CPT

## 2023-05-16 PROCEDURE — 36415 COLL VENOUS BLD VENIPUNCTURE: CPT

## 2023-05-16 NOTE — RESULTS/DATA
Patient calling about refills for his thiamine and folic acid and wondering if he should just take his last 2 pills and not refill. Writer advised him to take the medicines as prescribed and if not instructed to stop them by his physician he should continue to take the medicine. Pt will request refills through his pharmacy.    KARLENE YUSUF RN      Reason for Disposition    Patient has refills remaining on their prescription    Additional Information    Negative: New-onset or worsening symptoms, see that protocol (e.g., diarrhea, runny nose, sore throat)    Negative: Medicine question not related to refill or renewal    Negative: Caller (e.g., patient or pharmacist) requesting information about a new medicine    Negative: Caller requesting information unrelated to medicine    Negative: Prescription refill request for ESSENTIAL medicine (i.e., likelihood of harm to patient if not taken) and triager unable to refill per department policy    Negative: Prescription not at pharmacy and was prescribed by PCP recently  (Exception: triager has access to EMR and prescription is recorded there. Go to Home Care and confirm for pharmacy.)    Negative: Pharmacy calling with prescription questions and triager unable to answer question    Negative: Caller requesting a CONTROLLED substance prescription refill (e.g., narcotics, ADHD medicines)    Negative: Prescription refill request for NON-ESSENTIAL medicine (i.e., no harm to patient if med not taken) and triager unable to refill per department policy    Negative: Caller has NON-URGENT medicine question about med that PCP prescribed and triager unable to answer question    Negative: Prescription request for new medicine (not a refill)    Negative: Prescription prescribed recently is not at pharmacy and triager has access to patient's EMR and prescription is recorded in the EMR    Negative: Caller requesting a prescription renewal (no refills left), no triage required, and  triager able to renew prescription per department policy    Protocols used: MEDICATION REFILL AND RENEWAL CALL-A-OH       [FreeTextEntry1] : I reviewed the recent imaging studies and agree with the above noted findings.

## 2023-05-16 NOTE — HISTORY OF PRESENT ILLNESS
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 Hoff is a 42 year old female with a history of primary uveal melanoma, now with newly diagnosed metastatic disease with liver only involvement who presents for continued management of her disease.  She is HLA  positive.\par \par Briefly, she initially presented in January 2018 with vision changes in her right eye, with later loss of temporal and upper visual field vision.  She was evaluated by Dr. Konstantin Barros at the [facility] and was found to have a subretinal mass in her right eye with a greated basal diameter of 14.43 mm and a maximal height of 7 mm.  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  She was treated with Ru-106 brachytherapy from 01/30/2018 until 02/06/2018.  She was subsequently placed on expectant observation for systemic recurrence.\par \par She did well until, on 01/29/2023, she underwent an abdominal ultrasound which was significant for a new focal liver lesion measuring 3 cm.  A subsequent chest, abdominal and pelvic CT scan, performed on 01/30/2023, demonstrated a segment 7 liver lesion with arterial enhancement and other subcentimeter hepatic lesions measuring up to 4 mm of unclear etiology.  On 02/01/2023, she underwent a liver biopsy, with pathology consistent with melanoma.  Immunohistochemistry was positive for SOX10 and MART1.  An abdominal MRI, performed on 02/16/2023, demonstrated a dominant segment 7 lesion in the liver, with multiple other smaller subcentimeter lesions bilaterally.\par \par She was evaluated at the [facility] on 02/20/2023 by Dr. Miguelito Truong, with recommendations for HLA testing and possible tebentafusp if appropriate or combination checkpoint blockade.  She was found to be HLA A*02:01 positive and initiated therapy with tebentafusp on 03/12/2023, and she has now completed her first three inpatient doses, with her most recent dose administered on 03/26/2023.  She experienced mild rash and periorbital edema, with IV fluids administered for mild hypotension with her first dose, but otherwise tolerated therapy without difficulty.\par \par On 04/17/2023, she underwent a new baseline chest and pelvis CT as well as a liver MRI.  The MRI demonstrated metastatic lesions affecting both lobes of the liver, with some worsening when compared with the prior scans from 02/16/2023 and with the largest lesion now measuring 4.5 x 3.5 cm in segment 7.\par \par She presents today for weekly tebentafusp infusion.  She tolerated last weeks therapy well.\par \par PAST MEDICAL HISTORY:\par None\par \par SOCIAL HISTORY:\par The patient has recently moved from [facility] and is now living in [place].  Her partner currently resides in the [place] but is considering relocating.  She works in the [employer].\par \par FAMILY HISTORY:\par Her maternal grandfather had mucosal melanoma arising from the stomach.\par

## 2023-05-16 NOTE — PHYSICAL EXAM
[Fully active, able to carry on all pre-disease performance without restriction] : Status 0 - Fully active, able to carry on all pre-disease performance without restriction [Normal] : affect appropriate [de-identified] : Some dry appearing skin particularly on the hands

## 2023-05-16 NOTE — ASSESSMENT
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  She has initiated therapy with tebentafusp at [facility] but has more recently moved to [place] and has resumed therapy here.\par \par  At this time, we will continue therapy with tebentafusp. We will have her undergo repeat imaging studies in approximately 4 weeks time, prior to a planned trip to [place].  She will return to clinic for her next dose of therapy next week with pretreatment laboratory work performed.\par

## 2023-05-23 ENCOUNTER — APPOINTMENT (OUTPATIENT)
Dept: HEMATOLOGY ONCOLOGY | Facility: CLINIC | Age: 43
End: 2023-05-23
Payer: COMMERCIAL

## 2023-05-23 ENCOUNTER — OUTPATIENT (OUTPATIENT)
Dept: OUTPATIENT SERVICES | Facility: HOSPITAL | Age: 43
LOS: 1 days | End: 2023-05-23
Payer: COMMERCIAL

## 2023-05-23 ENCOUNTER — APPOINTMENT (OUTPATIENT)
Dept: INFUSION THERAPY | Facility: CLINIC | Age: 43
End: 2023-05-23

## 2023-05-23 VITALS
HEART RATE: 74 BPM | OXYGEN SATURATION: 99 % | DIASTOLIC BLOOD PRESSURE: 70 MMHG | SYSTOLIC BLOOD PRESSURE: 102 MMHG | HEIGHT: 67 IN | RESPIRATION RATE: 17 BRPM | WEIGHT: 148.59 LBS | TEMPERATURE: 98 F

## 2023-05-23 DIAGNOSIS — C69.90 MALIGNANT NEOPLASM OF UNSPECIFIED SITE OF UNSPECIFIED EYE: ICD-10-CM

## 2023-05-23 DIAGNOSIS — Z80.9 FAMILY HISTORY OF MALIGNANT NEOPLASM, UNSPECIFIED: ICD-10-CM

## 2023-05-23 DIAGNOSIS — Z78.9 OTHER SPECIFIED HEALTH STATUS: ICD-10-CM

## 2023-05-23 LAB
ALBUMIN SERPL ELPH-MCNC: 3.6 G/DL — SIGNIFICANT CHANGE UP (ref 3.3–5)
ALP SERPL-CCNC: 55 U/L — SIGNIFICANT CHANGE UP (ref 40–120)
ALT FLD-CCNC: 18 U/L — SIGNIFICANT CHANGE UP (ref 10–45)
ANION GAP SERPL CALC-SCNC: 7 MMOL/L — SIGNIFICANT CHANGE UP (ref 5–17)
AST SERPL-CCNC: 25 U/L — SIGNIFICANT CHANGE UP (ref 10–40)
BILIRUB SERPL-MCNC: 0.6 MG/DL — SIGNIFICANT CHANGE UP (ref 0.2–1.2)
BUN SERPL-MCNC: 9 MG/DL — SIGNIFICANT CHANGE UP (ref 7–23)
CALCIUM SERPL-MCNC: 9.7 MG/DL — SIGNIFICANT CHANGE UP (ref 8.4–10.5)
CHLORIDE SERPL-SCNC: 107 MMOL/L — SIGNIFICANT CHANGE UP (ref 96–108)
CO2 SERPL-SCNC: 26 MMOL/L — SIGNIFICANT CHANGE UP (ref 22–31)
CREAT SERPL-MCNC: 0.8 MG/DL — SIGNIFICANT CHANGE UP (ref 0.5–1.3)
EGFR: 93 ML/MIN/1.73M2 — SIGNIFICANT CHANGE UP
GLUCOSE SERPL-MCNC: 92 MG/DL — SIGNIFICANT CHANGE UP (ref 70–99)
HCT VFR BLD CALC: 39.4 % — SIGNIFICANT CHANGE UP (ref 34.5–45)
HGB BLD-MCNC: 13.3 G/DL — SIGNIFICANT CHANGE UP (ref 11.5–15.5)
LYMPHOCYTES # BLD AUTO: 2.5 K/UL — SIGNIFICANT CHANGE UP (ref 1–3.3)
LYMPHOCYTES # BLD AUTO: 39.4 % — SIGNIFICANT CHANGE UP (ref 13–44)
MCHC RBC-ENTMCNC: 30.2 PG — SIGNIFICANT CHANGE UP (ref 27–34)
MCHC RBC-ENTMCNC: 33.8 GM/DL — SIGNIFICANT CHANGE UP (ref 32–36)
MCV RBC AUTO: 89.5 FL — SIGNIFICANT CHANGE UP (ref 80–100)
NEUTROPHILS # BLD AUTO: 3.3 K/UL — SIGNIFICANT CHANGE UP (ref 1.8–7.4)
NEUTROPHILS NFR BLD AUTO: 51.9 % — SIGNIFICANT CHANGE UP (ref 43–77)
PLATELET # BLD AUTO: 352 K/UL — SIGNIFICANT CHANGE UP (ref 150–400)
POTASSIUM SERPL-MCNC: 4.4 MMOL/L — SIGNIFICANT CHANGE UP (ref 3.5–5.3)
POTASSIUM SERPL-SCNC: 4.4 MMOL/L — SIGNIFICANT CHANGE UP (ref 3.5–5.3)
PROT SERPL-MCNC: 7.2 G/DL — SIGNIFICANT CHANGE UP (ref 6–8.3)
RBC # BLD: 4.4 M/UL — SIGNIFICANT CHANGE UP (ref 3.8–5.2)
RBC # FLD: 13 % — SIGNIFICANT CHANGE UP (ref 10.3–14.5)
SODIUM SERPL-SCNC: 140 MMOL/L — SIGNIFICANT CHANGE UP (ref 135–145)
WBC # BLD: 6.3 K/UL — SIGNIFICANT CHANGE UP (ref 3.8–10.5)
WBC # FLD AUTO: 6.3 K/UL — SIGNIFICANT CHANGE UP (ref 3.8–10.5)

## 2023-05-23 PROCEDURE — 85025 COMPLETE CBC W/AUTO DIFF WBC: CPT

## 2023-05-23 PROCEDURE — 36415 COLL VENOUS BLD VENIPUNCTURE: CPT

## 2023-05-23 PROCEDURE — 80053 COMPREHEN METABOLIC PANEL: CPT

## 2023-05-23 PROCEDURE — 99213 OFFICE O/P EST LOW 20 MIN: CPT

## 2023-05-23 PROCEDURE — 96413 CHEMO IV INFUSION 1 HR: CPT

## 2023-05-23 NOTE — HISTORY OF PRESENT ILLNESS
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 Hoff is a 42 year old female with a history of primary uveal melanoma, now with newly diagnosed metastatic disease with liver only involvement who presents for continued management of her disease.  She is HLA  positive.\par \par Briefly, she initially presented in January 2018 with vision changes in her right eye, with later loss of temporal and upper visual field vision.  She was evaluated by Dr. Konstantin Barros at the [facility] and was found to have a subretinal mass in her right eye with a greated basal diameter of 14.43 mm and a maximal height of 7 mm.  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  She was treated with Ru-106 brachytherapy from 01/30/2018 until 02/06/2018.  She was subsequently placed on expectant observation for systemic recurrence.\par \par She did well until, on 01/29/2023, she underwent an abdominal ultrasound which was significant for a new focal liver lesion measuring 3 cm.  A subsequent chest, abdominal and pelvic CT scan, performed on 01/30/2023, demonstrated a segment 7 liver lesion with arterial enhancement and other subcentimeter hepatic lesions measuring up to 4 mm of unclear etiology.  On 02/01/2023, she underwent a liver biopsy, with pathology consistent with melanoma.  Immunohistochemistry was positive for SOX10 and MART1.  An abdominal MRI, performed on 02/16/2023, demonstrated a dominant segment 7 lesion in the liver, with multiple other smaller subcentimeter lesions bilaterally.\par \par She was evaluated at the [facility] on 02/20/2023 by Dr. Miguelito Truong, with recommendations for HLA testing and possible tebentafusp if appropriate or combination checkpoint blockade.  She was found to be HLA A*02:01 positive and initiated therapy with tebentafusp on 03/12/2023, and she has now completed her first three inpatient doses, with her most recent dose administered on 03/26/2023.  She experienced mild rash and periorbital edema, with IV fluids administered for mild hypotension with her first dose, but otherwise tolerated therapy without difficulty.\par \par On 04/17/2023, she underwent a new baseline chest and pelvis CT as well as a liver MRI.  The MRI demonstrated metastatic lesions affecting both lobes of the liver, with some worsening when compared with the prior scans from 02/16/2023 and with the largest lesion now measuring 4.5 x 3.5 cm in segment 7.\par \par She presents today for weekly tebentafusp infusion.  She tolerated last weeks therapy well. Mild redness of the feet.\par \par PAST MEDICAL HISTORY:\par None\par \par SOCIAL HISTORY:\par The patient has recently moved from [facility] and is now living in [place].  Her partner currently resides in the [place] but is considering relocating.  She works in the [employer].\par \par FAMILY HISTORY:\par Her maternal grandfather had mucosal melanoma arising from the stomach.\par

## 2023-05-23 NOTE — REVIEW OF SYSTEMS
[Diarrhea: Grade 0] : Diarrhea: Grade 0 [Negative] : Allergic/Immunologic [de-identified] : mild redness of the feet

## 2023-05-23 NOTE — ASSESSMENT
[FreeTextEntry1] : This is a 42 year old HLA  positive female now with an M1a, stage IV, uveal melanoma with liver involvement.  She has initiated therapy with tebentafusp at [facility] but has recently moved to [place].\par \par I had a long discussion with the patient regarding her diagnosis, clinical course, prognosis and management options.  I once again reviewed systemic and regional treatment options available both as standard of care and on clinical trials.  I reviewed the data supporting the use of tebentafusp, the challenges associated with predicting benefit based upon imaging alone, and the risks of toxicity following an extended treatment break.  After a long discussion, all of her questions and concerns were answered to her satisfaction.  \par \par She has been on weekly tebentafusp, tolerating it well.  Reviewed CBC/CMP today.  F/u 1 week with treatment. Plan for re-imaging studies on June 12th. \par

## 2023-05-30 ENCOUNTER — APPOINTMENT (OUTPATIENT)
Dept: INFUSION THERAPY | Facility: CLINIC | Age: 43
End: 2023-05-30

## 2023-05-30 ENCOUNTER — APPOINTMENT (OUTPATIENT)
Dept: HEMATOLOGY ONCOLOGY | Facility: CLINIC | Age: 43
End: 2023-05-30
Payer: COMMERCIAL

## 2023-05-30 ENCOUNTER — OUTPATIENT (OUTPATIENT)
Dept: OUTPATIENT SERVICES | Facility: HOSPITAL | Age: 43
LOS: 1 days | End: 2023-05-30
Payer: COMMERCIAL

## 2023-05-30 VITALS
OXYGEN SATURATION: 99 % | SYSTOLIC BLOOD PRESSURE: 114 MMHG | RESPIRATION RATE: 18 BRPM | HEART RATE: 83 BPM | DIASTOLIC BLOOD PRESSURE: 74 MMHG | HEIGHT: 67 IN | WEIGHT: 147.93 LBS | TEMPERATURE: 99 F

## 2023-05-30 VITALS
RESPIRATION RATE: 18 BRPM | TEMPERATURE: 98.1 F | OXYGEN SATURATION: 98 % | HEIGHT: 68 IN | DIASTOLIC BLOOD PRESSURE: 76 MMHG | HEART RATE: 84 BPM | SYSTOLIC BLOOD PRESSURE: 115 MMHG | WEIGHT: 147 LBS | BODY MASS INDEX: 22.28 KG/M2

## 2023-05-30 DIAGNOSIS — C69.90 MALIGNANT NEOPLASM OF UNSPECIFIED SITE OF UNSPECIFIED EYE: ICD-10-CM

## 2023-05-30 LAB
ALBUMIN SERPL ELPH-MCNC: 3.6 G/DL — SIGNIFICANT CHANGE UP (ref 3.3–5)
ALP SERPL-CCNC: 64 U/L — SIGNIFICANT CHANGE UP (ref 40–120)
ALT FLD-CCNC: 23 U/L — SIGNIFICANT CHANGE UP (ref 10–45)
ANION GAP SERPL CALC-SCNC: 2 MMOL/L — LOW (ref 5–17)
AST SERPL-CCNC: 22 U/L — SIGNIFICANT CHANGE UP (ref 10–40)
BILIRUB SERPL-MCNC: 0.8 MG/DL — SIGNIFICANT CHANGE UP (ref 0.2–1.2)
BUN SERPL-MCNC: 11 MG/DL — SIGNIFICANT CHANGE UP (ref 7–23)
CALCIUM SERPL-MCNC: 9.7 MG/DL — SIGNIFICANT CHANGE UP (ref 8.4–10.5)
CHLORIDE SERPL-SCNC: 108 MMOL/L — SIGNIFICANT CHANGE UP (ref 96–108)
CO2 SERPL-SCNC: 28 MMOL/L — SIGNIFICANT CHANGE UP (ref 22–31)
CREAT SERPL-MCNC: 0.6 MG/DL — SIGNIFICANT CHANGE UP (ref 0.5–1.3)
EGFR: 113 ML/MIN/1.73M2 — SIGNIFICANT CHANGE UP
GLUCOSE SERPL-MCNC: 97 MG/DL — SIGNIFICANT CHANGE UP (ref 70–99)
HCT VFR BLD CALC: 37.8 % — SIGNIFICANT CHANGE UP (ref 34.5–45)
HGB BLD-MCNC: 12.7 G/DL — SIGNIFICANT CHANGE UP (ref 11.5–15.5)
LYMPHOCYTES # BLD AUTO: 2.7 K/UL — SIGNIFICANT CHANGE UP (ref 1–3.3)
LYMPHOCYTES # BLD AUTO: 36.9 % — SIGNIFICANT CHANGE UP (ref 13–44)
MCHC RBC-ENTMCNC: 29.5 PG — SIGNIFICANT CHANGE UP (ref 27–34)
MCHC RBC-ENTMCNC: 33.6 GM/DL — SIGNIFICANT CHANGE UP (ref 32–36)
MCV RBC AUTO: 87.7 FL — SIGNIFICANT CHANGE UP (ref 80–100)
NEUTROPHILS # BLD AUTO: 3.8 K/UL — SIGNIFICANT CHANGE UP (ref 1.8–7.4)
NEUTROPHILS NFR BLD AUTO: 52.5 % — SIGNIFICANT CHANGE UP (ref 43–77)
PLATELET # BLD AUTO: 345 K/UL — SIGNIFICANT CHANGE UP (ref 150–400)
POTASSIUM SERPL-MCNC: 4.4 MMOL/L — SIGNIFICANT CHANGE UP (ref 3.5–5.3)
POTASSIUM SERPL-SCNC: 4.4 MMOL/L — SIGNIFICANT CHANGE UP (ref 3.5–5.3)
PROT SERPL-MCNC: 7.4 G/DL — SIGNIFICANT CHANGE UP (ref 6–8.3)
RBC # BLD: 4.31 M/UL — SIGNIFICANT CHANGE UP (ref 3.8–5.2)
RBC # FLD: 13 % — SIGNIFICANT CHANGE UP (ref 10.3–14.5)
SODIUM SERPL-SCNC: 138 MMOL/L — SIGNIFICANT CHANGE UP (ref 135–145)
WBC # BLD: 7.3 K/UL — SIGNIFICANT CHANGE UP (ref 3.8–10.5)
WBC # FLD AUTO: 7.3 K/UL — SIGNIFICANT CHANGE UP (ref 3.8–10.5)

## 2023-05-30 PROCEDURE — 36415 COLL VENOUS BLD VENIPUNCTURE: CPT

## 2023-05-30 PROCEDURE — 96413 CHEMO IV INFUSION 1 HR: CPT

## 2023-05-30 PROCEDURE — 80053 COMPREHEN METABOLIC PANEL: CPT

## 2023-05-30 PROCEDURE — 85025 COMPLETE CBC W/AUTO DIFF WBC: CPT

## 2023-05-30 PROCEDURE — 99213 OFFICE O/P EST LOW 20 MIN: CPT

## 2023-05-30 NOTE — ASSESSMENT
[FreeTextEntry1] : This is a 42 year old HLA  positive female now with an M1a, stage IV, uveal melanoma with liver involvement.  She has initiated therapy with tebentafusp at [facility] but has recently moved to [place].\par \par I had a long discussion with the patient regarding her diagnosis, clinical course, prognosis and management options.  I once again reviewed systemic and regional treatment options available both as standard of care and on clinical trials.  I reviewed the data supporting the use of tebentafusp, the challenges associated with predicting benefit based upon imaging alone, and the risks of toxicity following an extended treatment break.  After a long discussion, all of her questions and concerns were answered to her satisfaction.  \par \par She has been on weekly tebentafusp, tolerating it well.  Reviewed CBC/CMP today.  F/u 1 week with treatment. Plan for re-imaging studies on June 12th. She will be traveling from June 20th - July 3rd.  We will plan to dose her on Monday June 19th prior to her departure.\par

## 2023-05-30 NOTE — HISTORY OF PRESENT ILLNESS
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 Hoff is a 42 year old female with a history of primary uveal melanoma, now with newly diagnosed metastatic disease with liver only involvement who presents for continued management of her disease.  She is HLA  positive.\par \par Briefly, she initially presented in January 2018 with vision changes in her right eye, with later loss of temporal and upper visual field vision.  She was evaluated by Dr. Konstantin Barros at the [facility] and was found to have a subretinal mass in her right eye with a greated basal diameter of 14.43 mm and a maximal height of 7 mm.  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  She was treated with Ru-106 brachytherapy from 01/30/2018 until 02/06/2018.  She was subsequently placed on expectant observation for systemic recurrence.\par \par She did well until, on 01/29/2023, she underwent an abdominal ultrasound which was significant for a new focal liver lesion measuring 3 cm.  A subsequent chest, abdominal and pelvic CT scan, performed on 01/30/2023, demonstrated a segment 7 liver lesion with arterial enhancement and other subcentimeter hepatic lesions measuring up to 4 mm of unclear etiology.  On 02/01/2023, she underwent a liver biopsy, with pathology consistent with melanoma.  Immunohistochemistry was positive for SOX10 and MART1.  An abdominal MRI, performed on 02/16/2023, demonstrated a dominant segment 7 lesion in the liver, with multiple other smaller subcentimeter lesions bilaterally.\par \par She was evaluated at the [facility] on 02/20/2023 by Dr. Miguelito Truong, with recommendations for HLA testing and possible tebentafusp if appropriate or combination checkpoint blockade.  She was found to be HLA A*02:01 positive and initiated therapy with tebentafusp on 03/12/2023, and she has now completed her first three inpatient doses, with her most recent dose administered on 03/26/2023.  She experienced mild rash and periorbital edema, with IV fluids administered for mild hypotension with her first dose, but otherwise tolerated therapy without difficulty.\par \par On 04/17/2023, she underwent a new baseline chest and pelvis CT as well as a liver MRI.  The MRI demonstrated metastatic lesions affecting both lobes of the liver, with some worsening when compared with the prior scans from 02/16/2023 and with the largest lesion now measuring 4.5 x 3.5 cm in segment 7.\par \par She presents today for weekly tebentafusp infusion.  \par \par PAST MEDICAL HISTORY:\par None\par \par SOCIAL HISTORY:\par The patient has recently moved from [facility] and is now living in [place].  Her partner currently resides in the [place] but is considering relocating.  She works in the [employer].\par \par FAMILY HISTORY:\par Her maternal grandfather had mucosal melanoma arising from the stomach.\par

## 2023-05-30 NOTE — REVIEW OF SYSTEMS
[Diarrhea: Grade 0] : Diarrhea: Grade 0 [Negative] : Allergic/Immunologic [de-identified] : mild redness of the feet

## 2023-05-30 NOTE — PHYSICAL EXAM
[Normal] : affect appropriate [Fully active, able to carry on all pre-disease performance without restriction] : Status 0 - Fully active, able to carry on all pre-disease performance without restriction [de-identified] : dry skin

## 2023-06-06 ENCOUNTER — APPOINTMENT (OUTPATIENT)
Dept: HEMATOLOGY ONCOLOGY | Facility: CLINIC | Age: 43
End: 2023-06-06
Payer: COMMERCIAL

## 2023-06-06 ENCOUNTER — APPOINTMENT (OUTPATIENT)
Dept: INFUSION THERAPY | Facility: CLINIC | Age: 43
End: 2023-06-06

## 2023-06-06 ENCOUNTER — OUTPATIENT (OUTPATIENT)
Dept: OUTPATIENT SERVICES | Facility: HOSPITAL | Age: 43
LOS: 1 days | End: 2023-06-06
Payer: COMMERCIAL

## 2023-06-06 VITALS
TEMPERATURE: 99 F | WEIGHT: 147.93 LBS | HEART RATE: 82 BPM | OXYGEN SATURATION: 98 % | SYSTOLIC BLOOD PRESSURE: 117 MMHG | RESPIRATION RATE: 18 BRPM | HEIGHT: 67 IN | DIASTOLIC BLOOD PRESSURE: 77 MMHG

## 2023-06-06 VITALS
HEART RATE: 76 BPM | OXYGEN SATURATION: 99 % | TEMPERATURE: 99 F | DIASTOLIC BLOOD PRESSURE: 76 MMHG | RESPIRATION RATE: 18 BRPM | SYSTOLIC BLOOD PRESSURE: 114 MMHG

## 2023-06-06 DIAGNOSIS — C69.90 MALIGNANT NEOPLASM OF UNSPECIFIED SITE OF UNSPECIFIED EYE: ICD-10-CM

## 2023-06-06 PROCEDURE — 99213 OFFICE O/P EST LOW 20 MIN: CPT

## 2023-06-06 PROCEDURE — 96413 CHEMO IV INFUSION 1 HR: CPT

## 2023-06-06 NOTE — REVIEW OF SYSTEMS
[Diarrhea: Grade 0] : Diarrhea: Grade 0 [Negative] : Allergic/Immunologic [de-identified] : mild redness of the feet

## 2023-06-06 NOTE — HISTORY OF PRESENT ILLNESS
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 Hoff is a 42 year old female with a history of primary uveal melanoma, now with newly diagnosed metastatic disease with liver only involvement who presents for continued management of her disease.  She is HLA  positive.\par \par Briefly, she initially presented in January 2018 with vision changes in her right eye, with later loss of temporal and upper visual field vision.  She was evaluated by Dr. Konstantin Barros at the [facility] and was found to have a subretinal mass in her right eye with a greated basal diameter of 14.43 mm and a maximal height of 7 mm.  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  She was treated with Ru-106 brachytherapy from 01/30/2018 until 02/06/2018.  She was subsequently placed on expectant observation for systemic recurrence.\par \par She did well until, on 01/29/2023, she underwent an abdominal ultrasound which was significant for a new focal liver lesion measuring 3 cm.  A subsequent chest, abdominal and pelvic CT scan, performed on 01/30/2023, demonstrated a segment 7 liver lesion with arterial enhancement and other subcentimeter hepatic lesions measuring up to 4 mm of unclear etiology.  On 02/01/2023, she underwent a liver biopsy, with pathology consistent with melanoma.  Immunohistochemistry was positive for SOX10 and MART1.  An abdominal MRI, performed on 02/16/2023, demonstrated a dominant segment 7 lesion in the liver, with multiple other smaller subcentimeter lesions bilaterally.\par \par She was evaluated at the [facility] on 02/20/2023 by Dr. Miguelito Truong, with recommendations for HLA testing and possible tebentafusp if appropriate or combination checkpoint blockade.  She was found to be HLA A*02:01 positive and initiated therapy with tebentafusp on 03/12/2023, and she has now completed her first three inpatient doses, with her most recent dose administered on 03/26/2023.  She experienced mild rash and periorbital edema, with IV fluids administered for mild hypotension with her first dose, but otherwise tolerated therapy without difficulty.\par \par On 04/17/2023, she underwent a new baseline chest and pelvis CT as well as a liver MRI.  The MRI demonstrated metastatic lesions affecting both lobes of the liver, with some worsening when compared with the prior scans from 02/16/2023 and with the largest lesion now measuring 4.5 x 3.5 cm in segment 7.\par \par She presents today for weekly tebentafusp infusion.  She is doing well.  She typically develops low grade fever around 6 hours after infusion.  She goes to sleep and it resolves.\par \par PAST MEDICAL HISTORY:\par None\par \par SOCIAL HISTORY:\par The patient has recently moved from [facility] and is now living in [place].  Her partner currently resides in the [place] but is considering relocating.  She works in the [employer].\par \par FAMILY HISTORY:\par Her maternal grandfather had mucosal melanoma arising from the stomach.\par

## 2023-06-06 NOTE — ASSESSMENT
[FreeTextEntry1] : This is a 42 year old HLA  positive female now with an M1a, stage IV, uveal melanoma with liver involvement.  She has initiated therapy with tebentafusp at [facility] but has recently moved to [place].\par \par I had a long discussion with the patient regarding her diagnosis, clinical course, prognosis and management options.  I once again reviewed systemic and regional treatment options available both as standard of care and on clinical trials.  I reviewed the data supporting the use of tebentafusp, the challenges associated with predicting benefit based upon imaging alone, and the risks of toxicity following an extended treatment break.  After a long discussion, all of her questions and concerns were answered to her satisfaction.  \par \par She has been on weekly tebentafusp, tolerating it well.  F/u 1 week with treatment. Plan for re-imaging studies on June 12th. She will be traveling from June 20th - July 3rd.  We will plan to dose her on Monday June 19th prior to her departure.\par

## 2023-06-12 ENCOUNTER — APPOINTMENT (OUTPATIENT)
Dept: CT IMAGING | Facility: CLINIC | Age: 43
End: 2023-06-12
Payer: COMMERCIAL

## 2023-06-12 ENCOUNTER — OUTPATIENT (OUTPATIENT)
Dept: OUTPATIENT SERVICES | Facility: HOSPITAL | Age: 43
LOS: 1 days | End: 2023-06-12

## 2023-06-12 ENCOUNTER — APPOINTMENT (OUTPATIENT)
Dept: MRI IMAGING | Facility: CLINIC | Age: 43
End: 2023-06-12
Payer: COMMERCIAL

## 2023-06-12 PROCEDURE — 71260 CT THORAX DX C+: CPT | Mod: 26

## 2023-06-12 PROCEDURE — 74183 MRI ABD W/O CNTR FLWD CNTR: CPT | Mod: 26

## 2023-06-12 PROCEDURE — 74177 CT ABD & PELVIS W/CONTRAST: CPT | Mod: 26

## 2023-06-13 ENCOUNTER — OUTPATIENT (OUTPATIENT)
Dept: OUTPATIENT SERVICES | Facility: HOSPITAL | Age: 43
LOS: 1 days | End: 2023-06-13
Payer: COMMERCIAL

## 2023-06-13 ENCOUNTER — APPOINTMENT (OUTPATIENT)
Dept: INFUSION THERAPY | Facility: CLINIC | Age: 43
End: 2023-06-13

## 2023-06-13 ENCOUNTER — APPOINTMENT (OUTPATIENT)
Dept: HEMATOLOGY ONCOLOGY | Facility: CLINIC | Age: 43
End: 2023-06-13

## 2023-06-13 VITALS
OXYGEN SATURATION: 100 % | RESPIRATION RATE: 16 BRPM | TEMPERATURE: 98 F | DIASTOLIC BLOOD PRESSURE: 77 MMHG | SYSTOLIC BLOOD PRESSURE: 119 MMHG | HEART RATE: 59 BPM

## 2023-06-13 VITALS
SYSTOLIC BLOOD PRESSURE: 110 MMHG | HEART RATE: 72 BPM | WEIGHT: 147.05 LBS | DIASTOLIC BLOOD PRESSURE: 73 MMHG | OXYGEN SATURATION: 97 % | HEIGHT: 67 IN | RESPIRATION RATE: 16 BRPM | TEMPERATURE: 98 F

## 2023-06-13 VITALS
WEIGHT: 147 LBS | RESPIRATION RATE: 18 BRPM | TEMPERATURE: 98.1 F | SYSTOLIC BLOOD PRESSURE: 110 MMHG | OXYGEN SATURATION: 97 % | DIASTOLIC BLOOD PRESSURE: 73 MMHG | HEIGHT: 68 IN | HEART RATE: 72 BPM | BODY MASS INDEX: 22.28 KG/M2

## 2023-06-13 DIAGNOSIS — C69.90 MALIGNANT NEOPLASM OF UNSPECIFIED SITE OF UNSPECIFIED EYE: ICD-10-CM

## 2023-06-13 PROCEDURE — 96413 CHEMO IV INFUSION 1 HR: CPT

## 2023-06-19 ENCOUNTER — OUTPATIENT (OUTPATIENT)
Dept: OUTPATIENT SERVICES | Facility: HOSPITAL | Age: 43
LOS: 1 days | End: 2023-06-19
Payer: COMMERCIAL

## 2023-06-19 ENCOUNTER — APPOINTMENT (OUTPATIENT)
Dept: HEMATOLOGY ONCOLOGY | Facility: CLINIC | Age: 43
End: 2023-06-19
Payer: COMMERCIAL

## 2023-06-19 ENCOUNTER — APPOINTMENT (OUTPATIENT)
Dept: INFUSION THERAPY | Facility: CLINIC | Age: 43
End: 2023-06-19

## 2023-06-19 VITALS
WEIGHT: 147.05 LBS | RESPIRATION RATE: 18 BRPM | TEMPERATURE: 98 F | SYSTOLIC BLOOD PRESSURE: 112 MMHG | HEIGHT: 67 IN | OXYGEN SATURATION: 99 % | HEART RATE: 70 BPM | DIASTOLIC BLOOD PRESSURE: 72 MMHG

## 2023-06-19 VITALS
HEART RATE: 80 BPM | SYSTOLIC BLOOD PRESSURE: 110 MMHG | DIASTOLIC BLOOD PRESSURE: 77 MMHG | OXYGEN SATURATION: 99 % | TEMPERATURE: 98.9 F

## 2023-06-19 DIAGNOSIS — C69.90 MALIGNANT NEOPLASM OF UNSPECIFIED SITE OF UNSPECIFIED EYE: ICD-10-CM

## 2023-06-19 LAB
ALBUMIN SERPL ELPH-MCNC: 3.5 G/DL — SIGNIFICANT CHANGE UP (ref 3.3–5)
ALP SERPL-CCNC: 56 U/L — SIGNIFICANT CHANGE UP (ref 40–120)
ALT FLD-CCNC: 19 U/L — SIGNIFICANT CHANGE UP (ref 10–45)
ANION GAP SERPL CALC-SCNC: 4 MMOL/L — LOW (ref 5–17)
AST SERPL-CCNC: 25 U/L — SIGNIFICANT CHANGE UP (ref 10–40)
BILIRUB SERPL-MCNC: 0.8 MG/DL — SIGNIFICANT CHANGE UP (ref 0.2–1.2)
BUN SERPL-MCNC: 9 MG/DL — SIGNIFICANT CHANGE UP (ref 7–23)
CALCIUM SERPL-MCNC: 9 MG/DL — SIGNIFICANT CHANGE UP (ref 8.4–10.5)
CHLORIDE SERPL-SCNC: 107 MMOL/L — SIGNIFICANT CHANGE UP (ref 96–108)
CO2 SERPL-SCNC: 28 MMOL/L — SIGNIFICANT CHANGE UP (ref 22–31)
CREAT SERPL-MCNC: 1.1 MG/DL — SIGNIFICANT CHANGE UP (ref 0.5–1.3)
EGFR: 64 ML/MIN/1.73M2 — SIGNIFICANT CHANGE UP
GLUCOSE SERPL-MCNC: 91 MG/DL — SIGNIFICANT CHANGE UP (ref 70–99)
HCT VFR BLD CALC: 38.5 % — SIGNIFICANT CHANGE UP (ref 34.5–45)
HGB BLD-MCNC: 13.1 G/DL — SIGNIFICANT CHANGE UP (ref 11.5–15.5)
LYMPHOCYTES # BLD AUTO: 2.6 K/UL — SIGNIFICANT CHANGE UP (ref 1–3.3)
LYMPHOCYTES # BLD AUTO: 35.8 % — SIGNIFICANT CHANGE UP (ref 13–44)
MCHC RBC-ENTMCNC: 30 PG — SIGNIFICANT CHANGE UP (ref 27–34)
MCHC RBC-ENTMCNC: 34 GM/DL — SIGNIFICANT CHANGE UP (ref 32–36)
MCV RBC AUTO: 88.1 FL — SIGNIFICANT CHANGE UP (ref 80–100)
NEUTROPHILS # BLD AUTO: 4.3 K/UL — SIGNIFICANT CHANGE UP (ref 1.8–7.4)
NEUTROPHILS NFR BLD AUTO: 60.1 % — SIGNIFICANT CHANGE UP (ref 43–77)
PLATELET # BLD AUTO: 334 K/UL — SIGNIFICANT CHANGE UP (ref 150–400)
POTASSIUM SERPL-MCNC: 4.8 MMOL/L — SIGNIFICANT CHANGE UP (ref 3.5–5.3)
POTASSIUM SERPL-SCNC: 4.8 MMOL/L — SIGNIFICANT CHANGE UP (ref 3.5–5.3)
PROT SERPL-MCNC: 7.6 G/DL — SIGNIFICANT CHANGE UP (ref 6–8.3)
RBC # BLD: 4.37 M/UL — SIGNIFICANT CHANGE UP (ref 3.8–5.2)
RBC # FLD: 12.9 % — SIGNIFICANT CHANGE UP (ref 10.3–14.5)
SODIUM SERPL-SCNC: 139 MMOL/L — SIGNIFICANT CHANGE UP (ref 135–145)
WBC # BLD: 7.2 K/UL — SIGNIFICANT CHANGE UP (ref 3.8–10.5)
WBC # FLD AUTO: 7.2 K/UL — SIGNIFICANT CHANGE UP (ref 3.8–10.5)

## 2023-06-19 PROCEDURE — 36415 COLL VENOUS BLD VENIPUNCTURE: CPT

## 2023-06-19 PROCEDURE — 99214 OFFICE O/P EST MOD 30 MIN: CPT

## 2023-06-19 PROCEDURE — 80053 COMPREHEN METABOLIC PANEL: CPT

## 2023-06-19 PROCEDURE — 85025 COMPLETE CBC W/AUTO DIFF WBC: CPT

## 2023-06-19 NOTE — HISTORY OF PRESENT ILLNESS
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 Hoff is a 42 year old female with a history of primary uveal melanoma, now with newly diagnosed metastatic disease with liver only involvement who presents for continued management of her disease.  She is HLA  positive.\par \par Briefly, she initially presented in January 2018 with vision changes in her right eye, with later loss of temporal and upper visual field vision.  She was evaluated by Dr. Konstantin Barros at the [facility] and was found to have a subretinal mass in her right eye with a greated basal diameter of 14.43 mm and a maximal height of 7 mm.  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  She was treated with Ru-106 brachytherapy from 01/30/2018 until 02/06/2018.  She was subsequently placed on expectant observation for systemic recurrence.\par \par She did well until, on 01/29/2023, she underwent an abdominal ultrasound which was significant for a new focal liver lesion measuring 3 cm.  A subsequent chest, abdominal and pelvic CT scan, performed on 01/30/2023, demonstrated a segment 7 liver lesion with arterial enhancement and other subcentimeter hepatic lesions measuring up to 4 mm of unclear etiology.  On 02/01/2023, she underwent a liver biopsy, with pathology consistent with melanoma.  Immunohistochemistry was positive for SOX10 and MART1.  An abdominal MRI, performed on 02/16/2023, demonstrated a dominant segment 7 lesion in the liver, with multiple other smaller subcentimeter lesions bilaterally.\par \par She was evaluated at the [facility] on 02/20/2023 by Dr. Miguelito Truong, with recommendations for HLA testing and possible tebentafusp if appropriate or combination checkpoint blockade.  She was found to be HLA A*02:01 positive and initiated therapy with tebentafusp on 03/12/2023, and she has now completed her first three inpatient doses, with her most recent dose administered on 03/26/2023.  She experienced mild rash and periorbital edema, with IV fluids administered for mild hypotension with her first dose, but otherwise tolerated therapy without difficulty.\par \par On 04/17/2023, she underwent a new baseline chest and pelvis CT as well as a liver MRI.  The MRI demonstrated metastatic lesions affecting both lobes of the liver, with some worsening when compared with the prior scans from 02/16/2023 and with the largest lesion now measuring 4.5 x 3.5 cm in segment 7.\par \par She presents today for weekly tebentafusp infusion.  She is doing well.  She typically develops low grade fever around 6 hours after infusion.  She goes to sleep and it resolves.  She had mild redness in the feet which went away.  She had MRI abdomen and CT CAP on 6/12/23, showing stable disease.\par \par PAST MEDICAL HISTORY:\par None\par \par SOCIAL HISTORY:\par The patient has recently moved from [facility] and is now living in [place].  Her partner currently resides in the [place] but is considering relocating.  She works in the [employer].\par \par FAMILY HISTORY:\par Her maternal grandfather had mucosal melanoma arising from the stomach.\par

## 2023-06-19 NOTE — ASSESSMENT
[FreeTextEntry1] : This is a 42 year old HLA  positive female now with an M1a, stage IV, uveal melanoma with liver involvement.  She has initiated therapy with tebentafusp at [facility] but has recently moved to [place].\par \par I had a long discussion with the patient regarding her diagnosis, clinical course, prognosis and management options.  I once again reviewed systemic and regional treatment options available both as standard of care and on clinical trials.  I reviewed the data supporting the use of tebentafusp, the challenges associated with predicting benefit based upon imaging alone, and the risks of toxicity following an extended treatment break.  After a long discussion, all of her questions and concerns were answered to her satisfaction.  \par \par She has been on weekly tebentafusp, tolerating it well.  6/12/23 imaging showed stable disease. CBC/CMP from today reviewed.  She will be traveling from June 20th - July 3rd.  She will return 7/5 for her next tebentafusp infusion.\par

## 2023-06-19 NOTE — REVIEW OF SYSTEMS
[Diarrhea: Grade 0] : Diarrhea: Grade 0 [Negative] : Allergic/Immunologic [de-identified] : transient mild redness of the feet

## 2023-07-05 ENCOUNTER — OUTPATIENT (OUTPATIENT)
Dept: OUTPATIENT SERVICES | Facility: HOSPITAL | Age: 43
LOS: 1 days | End: 2023-07-05
Payer: COMMERCIAL

## 2023-07-05 ENCOUNTER — APPOINTMENT (OUTPATIENT)
Dept: HEMATOLOGY ONCOLOGY | Facility: CLINIC | Age: 43
End: 2023-07-05
Payer: COMMERCIAL

## 2023-07-05 ENCOUNTER — APPOINTMENT (OUTPATIENT)
Dept: INFUSION THERAPY | Facility: CLINIC | Age: 43
End: 2023-07-05

## 2023-07-05 VITALS
WEIGHT: 147.05 LBS | HEART RATE: 70 BPM | RESPIRATION RATE: 18 BRPM | OXYGEN SATURATION: 99 % | TEMPERATURE: 97 F | DIASTOLIC BLOOD PRESSURE: 74 MMHG | SYSTOLIC BLOOD PRESSURE: 114 MMHG

## 2023-07-05 VITALS
DIASTOLIC BLOOD PRESSURE: 74 MMHG | TEMPERATURE: 98 F | SYSTOLIC BLOOD PRESSURE: 101 MMHG | OXYGEN SATURATION: 99 % | WEIGHT: 147.05 LBS | HEIGHT: 67 IN | RESPIRATION RATE: 18 BRPM

## 2023-07-05 DIAGNOSIS — C69.90 MALIGNANT NEOPLASM OF UNSPECIFIED SITE OF UNSPECIFIED EYE: ICD-10-CM

## 2023-07-05 PROCEDURE — 99214 OFFICE O/P EST MOD 30 MIN: CPT

## 2023-07-05 PROCEDURE — 96413 CHEMO IV INFUSION 1 HR: CPT

## 2023-07-05 NOTE — REVIEW OF SYSTEMS
[Diarrhea: Grade 0] : Diarrhea: Grade 0 [Negative] : Allergic/Immunologic [de-identified] : skin changes on the left index finger

## 2023-07-05 NOTE — PHYSICAL EXAM
[Fully active, able to carry on all pre-disease performance without restriction] : Status 0 - Fully active, able to carry on all pre-disease performance without restriction [Normal] : affect appropriate [de-identified] : mild hypopigmentation with macules on the left index finger

## 2023-07-05 NOTE — HISTORY OF PRESENT ILLNESS
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 Hoff is a 42 year old female with a history of primary uveal melanoma, now with newly diagnosed metastatic disease with liver only involvement who presents for continued management of her disease.  She is HLA  positive.\par \par Briefly, she initially presented in January 2018 with vision changes in her right eye, with later loss of temporal and upper visual field vision.  She was evaluated by Dr. Konstantin Barros at the [facility] and was found to have a subretinal mass in her right eye with a greated basal diameter of 14.43 mm and a maximal height of 7 mm.  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  She was treated with Ru-106 brachytherapy from 01/30/2018 until 02/06/2018.  She was subsequently placed on expectant observation for systemic recurrence.\par \par She did well until, on 01/29/2023, she underwent an abdominal ultrasound which was significant for a new focal liver lesion measuring 3 cm.  A subsequent chest, abdominal and pelvic CT scan, performed on 01/30/2023, demonstrated a segment 7 liver lesion with arterial enhancement and other subcentimeter hepatic lesions measuring up to 4 mm of unclear etiology.  On 02/01/2023, she underwent a liver biopsy, with pathology consistent with melanoma.  Immunohistochemistry was positive for SOX10 and MART1.  An abdominal MRI, performed on 02/16/2023, demonstrated a dominant segment 7 lesion in the liver, with multiple other smaller subcentimeter lesions bilaterally.\par \par She was evaluated at the [facility] on 02/20/2023 by Dr. Miguelito Truong, with recommendations for HLA testing and possible tebentafusp if appropriate or combination checkpoint blockade.  She was found to be HLA A*02:01 positive and initiated therapy with tebentafusp on 03/12/2023, and she has now completed her first three inpatient doses, with her most recent dose administered on 03/26/2023.  She experienced mild rash and periorbital edema, with IV fluids administered for mild hypotension with her first dose, but otherwise tolerated therapy without difficulty.\par \par On 04/17/2023, she underwent a new baseline chest and pelvis CT as well as a liver MRI.  The MRI demonstrated metastatic lesions affecting both lobes of the liver, with some worsening when compared with the prior scans from 02/16/2023 and with the largest lesion now measuring 4.5 x 3.5 cm in segment 7.\par \par She presents today for weekly tebentafusp infusion.  She is doing well.  She typically develops low grade fever around 6 hours after infusion.  She goes to sleep and it resolves.  She had MRI abdomen and CT CAP on 6/12/23, showing stable disease.  She has noted vitiligo like skin changes around her left index finger for the past week.\par \par PAST MEDICAL HISTORY:\par None\par \par SOCIAL HISTORY:\par The patient has recently moved from [facility] and is now living in [place].  Her partner currently resides in the [place] but is considering relocating.  She works in the [employer].\par \par FAMILY HISTORY:\par Her maternal grandfather had mucosal melanoma arising from the stomach.\par

## 2023-07-06 ENCOUNTER — RESULT REVIEW (OUTPATIENT)
Age: 43
End: 2023-07-06

## 2023-07-11 ENCOUNTER — OUTPATIENT (OUTPATIENT)
Dept: OUTPATIENT SERVICES | Facility: HOSPITAL | Age: 43
LOS: 1 days | End: 2023-07-11
Payer: COMMERCIAL

## 2023-07-11 ENCOUNTER — APPOINTMENT (OUTPATIENT)
Dept: HEMATOLOGY ONCOLOGY | Facility: CLINIC | Age: 43
End: 2023-07-11
Payer: COMMERCIAL

## 2023-07-11 ENCOUNTER — APPOINTMENT (OUTPATIENT)
Dept: INFUSION THERAPY | Facility: CLINIC | Age: 43
End: 2023-07-11

## 2023-07-11 VITALS
HEART RATE: 75 BPM | TEMPERATURE: 98 F | DIASTOLIC BLOOD PRESSURE: 72 MMHG | SYSTOLIC BLOOD PRESSURE: 112 MMHG | OXYGEN SATURATION: 98 % | RESPIRATION RATE: 18 BRPM

## 2023-07-11 VITALS
HEART RATE: 76 BPM | HEIGHT: 67 IN | WEIGHT: 149.91 LBS | TEMPERATURE: 98 F | DIASTOLIC BLOOD PRESSURE: 67 MMHG | RESPIRATION RATE: 18 BRPM | OXYGEN SATURATION: 98 % | SYSTOLIC BLOOD PRESSURE: 101 MMHG

## 2023-07-11 DIAGNOSIS — C69.90 MALIGNANT NEOPLASM OF UNSPECIFIED SITE OF UNSPECIFIED EYE: ICD-10-CM

## 2023-07-11 LAB
ALBUMIN SERPL ELPH-MCNC: 3.5 G/DL — SIGNIFICANT CHANGE UP (ref 3.3–5)
ALP SERPL-CCNC: 59 U/L — SIGNIFICANT CHANGE UP (ref 40–120)
ALT FLD-CCNC: 23 U/L — SIGNIFICANT CHANGE UP (ref 10–45)
ANION GAP SERPL CALC-SCNC: 5 MMOL/L — SIGNIFICANT CHANGE UP (ref 5–17)
AST SERPL-CCNC: 23 U/L — SIGNIFICANT CHANGE UP (ref 10–40)
BILIRUB SERPL-MCNC: 0.7 MG/DL — SIGNIFICANT CHANGE UP (ref 0.2–1.2)
BUN SERPL-MCNC: 11 MG/DL — SIGNIFICANT CHANGE UP (ref 7–23)
CALCIUM SERPL-MCNC: 9.6 MG/DL — SIGNIFICANT CHANGE UP (ref 8.4–10.5)
CHLORIDE SERPL-SCNC: 110 MMOL/L — HIGH (ref 96–108)
CO2 SERPL-SCNC: 28 MMOL/L — SIGNIFICANT CHANGE UP (ref 22–31)
CREAT SERPL-MCNC: 0.8 MG/DL — SIGNIFICANT CHANGE UP (ref 0.5–1.3)
EGFR: 93 ML/MIN/1.73M2 — SIGNIFICANT CHANGE UP
GLUCOSE SERPL-MCNC: 96 MG/DL — SIGNIFICANT CHANGE UP (ref 70–99)
HCT VFR BLD CALC: 38.8 % — SIGNIFICANT CHANGE UP (ref 34.5–45)
HGB BLD-MCNC: 12.9 G/DL — SIGNIFICANT CHANGE UP (ref 11.5–15.5)
LYMPHOCYTES # BLD AUTO: 2.3 K/UL — SIGNIFICANT CHANGE UP (ref 1–3.3)
LYMPHOCYTES # BLD AUTO: 34.2 % — SIGNIFICANT CHANGE UP (ref 13–44)
MCHC RBC-ENTMCNC: 29.6 PG — SIGNIFICANT CHANGE UP (ref 27–34)
MCHC RBC-ENTMCNC: 33.2 GM/DL — SIGNIFICANT CHANGE UP (ref 32–36)
MCV RBC AUTO: 89 FL — SIGNIFICANT CHANGE UP (ref 80–100)
NEUTROPHILS # BLD AUTO: 3.5 K/UL — SIGNIFICANT CHANGE UP (ref 1.8–7.4)
NEUTROPHILS NFR BLD AUTO: 51.4 % — SIGNIFICANT CHANGE UP (ref 43–77)
PLATELET # BLD AUTO: 311 K/UL — SIGNIFICANT CHANGE UP (ref 150–400)
POTASSIUM SERPL-MCNC: 4.8 MMOL/L — SIGNIFICANT CHANGE UP (ref 3.5–5.3)
POTASSIUM SERPL-SCNC: 4.8 MMOL/L — SIGNIFICANT CHANGE UP (ref 3.5–5.3)
PROT SERPL-MCNC: 7.6 G/DL — SIGNIFICANT CHANGE UP (ref 6–8.3)
RBC # BLD: 4.36 M/UL — SIGNIFICANT CHANGE UP (ref 3.8–5.2)
RBC # FLD: 12.9 % — SIGNIFICANT CHANGE UP (ref 10.3–14.5)
SODIUM SERPL-SCNC: 143 MMOL/L — SIGNIFICANT CHANGE UP (ref 135–145)
WBC # BLD: 6.8 K/UL — SIGNIFICANT CHANGE UP (ref 3.8–10.5)
WBC # FLD AUTO: 6.8 K/UL — SIGNIFICANT CHANGE UP (ref 3.8–10.5)

## 2023-07-11 PROCEDURE — 85025 COMPLETE CBC W/AUTO DIFF WBC: CPT

## 2023-07-11 PROCEDURE — 96413 CHEMO IV INFUSION 1 HR: CPT

## 2023-07-11 PROCEDURE — 99214 OFFICE O/P EST MOD 30 MIN: CPT

## 2023-07-11 PROCEDURE — 80053 COMPREHEN METABOLIC PANEL: CPT

## 2023-07-11 PROCEDURE — 36415 COLL VENOUS BLD VENIPUNCTURE: CPT

## 2023-07-12 NOTE — PHYSICAL EXAM
[Fully active, able to carry on all pre-disease performance without restriction] : Status 0 - Fully active, able to carry on all pre-disease performance without restriction [Normal] : affect appropriate [de-identified] : mild hypopigmentation with macules on the left index finger

## 2023-07-12 NOTE — ASSESSMENT
[FreeTextEntry1] : This is a 43 year old HLA  positive female now with an M1a, stage IV, uveal melanoma with liver involvement. She initiated therapy with tebentafusp at [facility] but then moved to [place].\par \par I had a long discussion with the patient regarding her diagnosis, clinical course, prognosis and management options. I once again reviewed systemic and regional treatment options available both as standard of care and on clinical trials. I reviewed the data supporting the use of tebentafusp, the challenges associated with predicting benefit based upon imaging alone, and the risks of toxicity following an extended treatment break. After a long discussion, all of her questions and concerns were answered to her satisfaction. \par \par She has been on weekly tebentafusp, tolerating it well. Reviewed CBC/CMP from today, WNL.  6/12/23 imaging showed stable disease. Plan [facility] liquid biopsy next week and repeat imaging in mid August.\par  \par

## 2023-07-12 NOTE — HISTORY OF PRESENT ILLNESS
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 Hoff is a 43 year old female with a history of primary uveal melanoma, now with newly diagnosed metastatic disease with liver only involvement who presents for continued management of her disease.  She is HLA  positive.\par \par Briefly, she initially presented in January 2018 with vision changes in her right eye, with later loss of temporal and upper visual field vision.  She was evaluated by Dr. 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  She was treated with Ru-106 brachytherapy from 01/30/2018 until 02/06/2018.  She was subsequently placed on expectant observation for systemic recurrence.\par \par She did well until, on 01/29/2023, she underwent an abdominal ultrasound which was significant for a new focal liver lesion measuring 3 cm.  A subsequent chest, abdominal and pelvic CT scan, performed on 01/30/2023, demonstrated a segment 7 liver lesion with arterial enhancement and other subcentimeter hepatic lesions measuring up to 4 mm of unclear etiology.  On 02/01/2023, she underwent a liver biopsy, with pathology consistent with melanoma.  Immunohistochemistry was positive for SOX10 and MART1.  An abdominal MRI, performed on 02/16/2023, demonstrated a dominant segment 7 lesion in the liver, with multiple other smaller subcentimeter lesions bilaterally.\par \par She was evaluated at the [facility] on 02/20/2023 by Dr. Miguelito Truong, with recommendations for HLA testing and possible tebentafusp if appropriate or combination checkpoint blockade.  She was found to be HLA A*02:01 positive and initiated therapy with tebentafusp on 03/12/2023, and she has now completed her first three inpatient doses, with her most recent dose administered on 03/26/2023.  She experienced mild rash and periorbital edema, with IV fluids administered for mild hypotension with her first dose, but otherwise tolerated therapy without difficulty.\par \par On 04/17/2023, she underwent a new baseline chest and pelvis CT as well as a liver MRI.  The MRI demonstrated metastatic lesions affecting both lobes of the liver, with some worsening when compared with the prior scans from 02/16/2023 and with the largest lesion now measuring 4.5 x 3.5 cm in segment 7.\par \par She presents today for weekly tebentafusp infusion.  She is doing well.  She typically develops low grade fever around 6 hours after infusion.  She goes to sleep and it resolves.  She had MRI abdomen and CT CAP on 6/12/23, showing stable disease.  She has noted vitiligo like skin changes around her left index finger.  She had transient erythema of the hands, feet and chest.\par \par PAST MEDICAL HISTORY:\par None\par \par SOCIAL HISTORY:\par The patient has recently moved from [place] and is now living in [place].  Her partner currently resides in the [place] but is considering relocating.  She works in the [employer].\par \par FAMILY HISTORY:\par Her maternal grandfather had mucosal melanoma arising from the stomach.\par

## 2023-07-12 NOTE — REVIEW OF SYSTEMS
[Diarrhea: Grade 0] : Diarrhea: Grade 0 [Negative] : Allergic/Immunologic [de-identified] : skin changes on the left index finger 2

## 2023-07-18 ENCOUNTER — APPOINTMENT (OUTPATIENT)
Dept: INFUSION THERAPY | Facility: CLINIC | Age: 43
End: 2023-07-18

## 2023-07-18 ENCOUNTER — APPOINTMENT (OUTPATIENT)
Dept: HEMATOLOGY ONCOLOGY | Facility: CLINIC | Age: 43
End: 2023-07-18
Payer: COMMERCIAL

## 2023-07-18 ENCOUNTER — OUTPATIENT (OUTPATIENT)
Dept: OUTPATIENT SERVICES | Facility: HOSPITAL | Age: 43
LOS: 1 days | End: 2023-07-18
Payer: COMMERCIAL

## 2023-07-18 VITALS
DIASTOLIC BLOOD PRESSURE: 72 MMHG | HEIGHT: 67 IN | OXYGEN SATURATION: 98 % | SYSTOLIC BLOOD PRESSURE: 112 MMHG | WEIGHT: 149.03 LBS | TEMPERATURE: 98 F | HEART RATE: 97 BPM | RESPIRATION RATE: 18 BRPM

## 2023-07-18 VITALS
SYSTOLIC BLOOD PRESSURE: 112 MMHG | OXYGEN SATURATION: 98 % | WEIGHT: 149 LBS | TEMPERATURE: 98.5 F | HEART RATE: 97 BPM | HEIGHT: 68 IN | BODY MASS INDEX: 22.58 KG/M2 | DIASTOLIC BLOOD PRESSURE: 72 MMHG | RESPIRATION RATE: 18 BRPM

## 2023-07-18 VITALS
HEART RATE: 75 BPM | DIASTOLIC BLOOD PRESSURE: 75 MMHG | OXYGEN SATURATION: 99 % | TEMPERATURE: 99 F | RESPIRATION RATE: 18 BRPM | SYSTOLIC BLOOD PRESSURE: 112 MMHG

## 2023-07-18 DIAGNOSIS — C69.90 MALIGNANT NEOPLASM OF UNSPECIFIED SITE OF UNSPECIFIED EYE: ICD-10-CM

## 2023-07-18 PROCEDURE — 96413 CHEMO IV INFUSION 1 HR: CPT

## 2023-07-18 PROCEDURE — 99213 OFFICE O/P EST LOW 20 MIN: CPT

## 2023-07-18 NOTE — ASSESSMENT
[FreeTextEntry1] : This is a 43 year old HLA  positive female now with an M1a, stage IV, uveal melanoma with liver involvement. She initiated therapy with tebentafusp at [facility] but then moved to [place].\par \par I had a long discussion with the patient regarding her diagnosis, clinical course, prognosis and management options. I once again reviewed systemic and regional treatment options available both as standard of care and on clinical trials. I reviewed the data supporting the use of tebentafusp, the challenges associated with predicting benefit based upon imaging alone, and the risks of toxicity following an extended treatment break. After a long discussion, all of her questions and concerns were answered to her satisfaction. \par \par She has been on weekly tebentafusp, tolerating it well. Reviewed CBC/CMP from today, WNL.  6/12/23 imaging showed stable disease. Plan [facility] liquid biopsy next week and repeat imaging in mid August.\par  \par

## 2023-07-18 NOTE — REVIEW OF SYSTEMS
[Diarrhea: Grade 0] : Diarrhea: Grade 0 [Negative] : Allergic/Immunologic [de-identified] : skin changes on the left index finger

## 2023-07-18 NOTE — HISTORY OF PRESENT ILLNESS
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 Hoff is a 43 year old female with a history of primary uveal melanoma, now with newly diagnosed metastatic disease with liver only involvement who presents for continued management of her disease.  She is HLA  positive.\par \par Briefly, she initially presented in January 2018 with vision changes in her right eye, with later loss of temporal and upper visual field vision.  She was evaluated by Dr. Konstantin Barros at the [facility] and was found to have a subretinal mass in her right eye with a greated basal diameter of 14.43 mm and a maximal height of 7 mm.  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  She was treated with Ru-106 brachytherapy from 01/30/2018 until 02/06/2018.  She was subsequently placed on expectant observation for systemic recurrence.\par \par She did well until, on 01/29/2023, she underwent an abdominal ultrasound which was significant for a new focal liver lesion measuring 3 cm.  A subsequent chest, abdominal and pelvic CT scan, performed on 01/30/2023, demonstrated a segment 7 liver lesion with arterial enhancement and other subcentimeter hepatic lesions measuring up to 4 mm of unclear etiology.  On 02/01/2023, she underwent a liver biopsy, with pathology consistent with melanoma.  Immunohistochemistry was positive for SOX10 and MART1.  An abdominal MRI, performed on 02/16/2023, demonstrated a dominant segment 7 lesion in the liver, with multiple other smaller subcentimeter lesions bilaterally.\par \par She was evaluated at the [facility] on 02/20/2023 by Dr. Miguelito Truong, with recommendations for HLA testing and possible tebentafusp if appropriate or combination checkpoint blockade.  She was found to be HLA A*02:01 positive and initiated therapy with tebentafusp on 03/12/2023, and she has now completed her first three inpatient doses, with her most recent dose administered on 03/26/2023.  She experienced mild rash and periorbital edema, with IV fluids administered for mild hypotension with her first dose, but otherwise tolerated therapy without difficulty.\par \par On 04/17/2023, she underwent a new baseline chest and pelvis CT as well as a liver MRI.  The MRI demonstrated metastatic lesions affecting both lobes of the liver, with some worsening when compared with the prior scans from 02/16/2023 and with the largest lesion now measuring 4.5 x 3.5 cm in segment 7.\par \par She presents today for weekly tebentafusp infusion.  She is doing well.  She typically develops low grade fever around 6 hours after infusion.  She goes to sleep and it resolves.  She had MRI abdomen and CT CAP on 6/12/23, showing stable disease.  She has noted vitiligo like skin changes around her left index finger.  She had transient erythema of the hands, feet and chest.\par \par PAST MEDICAL HISTORY:\par None\par \par SOCIAL HISTORY:\par The patient has recently moved from [place] and is now living in [place].  Her partner currently resides in the [place] but is considering relocating.  She works in the [employer].\par \par FAMILY HISTORY:\par Her maternal grandfather had mucosal melanoma arising from the stomach.\par

## 2023-07-18 NOTE — PHYSICAL EXAM
[Fully active, able to carry on all pre-disease performance without restriction] : Status 0 - Fully active, able to carry on all pre-disease performance without restriction [Normal] : affect appropriate [de-identified] : mild hypopigmentation with macules on the left index finger

## 2023-07-25 ENCOUNTER — APPOINTMENT (OUTPATIENT)
Dept: HEMATOLOGY ONCOLOGY | Facility: CLINIC | Age: 43
End: 2023-07-25
Payer: COMMERCIAL

## 2023-07-25 ENCOUNTER — APPOINTMENT (OUTPATIENT)
Dept: INFUSION THERAPY | Facility: CLINIC | Age: 43
End: 2023-07-25

## 2023-07-25 ENCOUNTER — OUTPATIENT (OUTPATIENT)
Dept: OUTPATIENT SERVICES | Facility: HOSPITAL | Age: 43
LOS: 1 days | End: 2023-07-25
Payer: COMMERCIAL

## 2023-07-25 VITALS
BODY MASS INDEX: 22.58 KG/M2 | HEIGHT: 68 IN | OXYGEN SATURATION: 97 % | RESPIRATION RATE: 18 BRPM | DIASTOLIC BLOOD PRESSURE: 73 MMHG | WEIGHT: 149 LBS | HEART RATE: 88 BPM | SYSTOLIC BLOOD PRESSURE: 115 MMHG

## 2023-07-25 VITALS
OXYGEN SATURATION: 99 % | HEART RATE: 78 BPM | SYSTOLIC BLOOD PRESSURE: 114 MMHG | RESPIRATION RATE: 18 BRPM | TEMPERATURE: 98 F | DIASTOLIC BLOOD PRESSURE: 74 MMHG

## 2023-07-25 DIAGNOSIS — C69.90 MALIGNANT NEOPLASM OF UNSPECIFIED SITE OF UNSPECIFIED EYE: ICD-10-CM

## 2023-07-25 PROCEDURE — 96413 CHEMO IV INFUSION 1 HR: CPT

## 2023-07-25 PROCEDURE — 99213 OFFICE O/P EST LOW 20 MIN: CPT

## 2023-07-26 NOTE — REVIEW OF SYSTEMS
[Diarrhea: Grade 0] : Diarrhea: Grade 0 [Negative] : Allergic/Immunologic [de-identified] : skin changes on the left index finger

## 2023-07-26 NOTE — ASSESSMENT
[FreeTextEntry1] : This is a 43 year old HLA  positive female now with an M1a, stage IV, uveal melanoma with liver involvement. She initiated therapy with tebentafusp at [facility] but then moved to [place].\par \par I had a long discussion with the patient regarding her diagnosis, clinical course, prognosis and management options. I once again reviewed systemic and regional treatment options available both as standard of care and on clinical trials. I reviewed the data supporting the use of tebentafusp, the challenges associated with predicting benefit based upon imaging alone, and the risks of toxicity following an extended treatment break. After a long discussion, all of her questions and concerns were answered to her satisfaction. \par \par She has been on weekly tebentafusp, tolerating it well. 6/12/23 imaging showed stable disease. Foundation liquid biopsy was drawn 7/11, results are pending. Plan repeat imaging in mid August.\par  \par

## 2023-07-26 NOTE — PHYSICAL EXAM
[Fully active, able to carry on all pre-disease performance without restriction] : Status 0 - Fully active, able to carry on all pre-disease performance without restriction [Normal] : affect appropriate [de-identified] : mild hypopigmentation with macules on the left index finger

## 2023-07-26 NOTE — HISTORY OF PRESENT ILLNESS
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 Hoff is a 43 year old female with a history of primary uveal melanoma, now with newly diagnosed metastatic disease with liver only involvement who presents for continued management of her disease.  She is HLA  positive.\par \par Briefly, she initially presented in January 2018 with vision changes in her right eye, with later loss of temporal and upper visual field vision.  She was evaluated by Dr. 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  She was treated with Ru-106 brachytherapy from 01/30/2018 until 02/06/2018.  She was subsequently placed on expectant observation for systemic recurrence.\par \par She did well until, on 01/29/2023, she underwent an abdominal ultrasound which was significant for a new focal liver lesion measuring 3 cm.  A subsequent chest, abdominal and pelvic CT scan, performed on 01/30/2023, demonstrated a segment 7 liver lesion with arterial enhancement and other subcentimeter hepatic lesions measuring up to 4 mm of unclear etiology.  On 02/01/2023, she underwent a liver biopsy, with pathology consistent with melanoma.  Immunohistochemistry was positive for SOX10 and MART1.  An abdominal MRI, performed on 02/16/2023, demonstrated a dominant segment 7 lesion in the liver, with multiple other smaller subcentimeter lesions bilaterally.\par \par She was evaluated at the [facility] on 02/20/2023 by Dr. Miguelito Truong, with recommendations for HLA testing and possible tebentafusp if appropriate or combination checkpoint blockade.  She was found to be HLA A*02:01 positive and initiated therapy with tebentafusp on 03/12/2023, and she has now completed her first three inpatient doses, with her most recent dose administered on 03/26/2023.  She experienced mild rash and periorbital edema, with IV fluids administered for mild hypotension with her first dose, but otherwise tolerated therapy without difficulty.\par \par On 04/17/2023, she underwent a new baseline chest and pelvis CT as well as a liver MRI.  The MRI demonstrated metastatic lesions affecting both lobes of the liver, with some worsening when compared with the prior scans from 02/16/2023 and with the largest lesion now measuring 4.5 x 3.5 cm in segment 7.\par \par She presents today for weekly tebentafusp infusion.  She is doing well.  She typically develops low grade fever around 6 hours after infusion.  She goes to sleep and it resolves.  She had MRI abdomen and CT CAP on 6/12/23, showing stable disease.  She has noted vitiligo like skin changes around her left index finger.  She had transient erythema of the hands, feet and chest.\par \par PAST MEDICAL HISTORY:\par None\par \par SOCIAL HISTORY:\par The patient has recently moved from [place] and is now living in [place].  Her partner currently resides in the [place] but is considering relocating.  She works in the [employer].\par \par FAMILY HISTORY:\par Her maternal grandfather had mucosal melanoma arising from the stomach.\par

## 2023-08-02 ENCOUNTER — OUTPATIENT (OUTPATIENT)
Dept: OUTPATIENT SERVICES | Facility: HOSPITAL | Age: 43
LOS: 1 days | End: 2023-08-02
Payer: COMMERCIAL

## 2023-08-02 ENCOUNTER — APPOINTMENT (OUTPATIENT)
Dept: INFUSION THERAPY | Facility: CLINIC | Age: 43
End: 2023-08-02

## 2023-08-02 VITALS
RESPIRATION RATE: 16 BRPM | OXYGEN SATURATION: 98 % | SYSTOLIC BLOOD PRESSURE: 111 MMHG | DIASTOLIC BLOOD PRESSURE: 74 MMHG | TEMPERATURE: 99 F | HEART RATE: 78 BPM

## 2023-08-02 VITALS
SYSTOLIC BLOOD PRESSURE: 105 MMHG | HEIGHT: 67 IN | OXYGEN SATURATION: 98 % | WEIGHT: 151.9 LBS | DIASTOLIC BLOOD PRESSURE: 69 MMHG | TEMPERATURE: 99 F | HEART RATE: 79 BPM | RESPIRATION RATE: 18 BRPM

## 2023-08-02 DIAGNOSIS — C69.90 MALIGNANT NEOPLASM OF UNSPECIFIED SITE OF UNSPECIFIED EYE: ICD-10-CM

## 2023-08-02 LAB
ALBUMIN SERPL ELPH-MCNC: 3.9 G/DL — SIGNIFICANT CHANGE UP (ref 3.3–5)
ALP SERPL-CCNC: 75 U/L — SIGNIFICANT CHANGE UP (ref 40–120)
ALT FLD-CCNC: 24 U/L — SIGNIFICANT CHANGE UP (ref 10–45)
ANION GAP SERPL CALC-SCNC: 4 MMOL/L — LOW (ref 5–17)
AST SERPL-CCNC: 30 U/L — SIGNIFICANT CHANGE UP (ref 10–40)
BILIRUB SERPL-MCNC: 0.7 MG/DL — SIGNIFICANT CHANGE UP (ref 0.2–1.2)
BUN SERPL-MCNC: 13 MG/DL — SIGNIFICANT CHANGE UP (ref 7–23)
CALCIUM SERPL-MCNC: 9.8 MG/DL — SIGNIFICANT CHANGE UP (ref 8.4–10.5)
CHLORIDE SERPL-SCNC: 109 MMOL/L — HIGH (ref 96–108)
CO2 SERPL-SCNC: 26 MMOL/L — SIGNIFICANT CHANGE UP (ref 22–31)
CREAT SERPL-MCNC: 0.9 MG/DL — SIGNIFICANT CHANGE UP (ref 0.5–1.3)
EGFR: 80 ML/MIN/1.73M2 — SIGNIFICANT CHANGE UP
GLUCOSE SERPL-MCNC: 108 MG/DL — HIGH (ref 70–99)
HCT VFR BLD CALC: 40 % — SIGNIFICANT CHANGE UP (ref 34.5–45)
HGB BLD-MCNC: 13.5 G/DL — SIGNIFICANT CHANGE UP (ref 11.5–15.5)
LYMPHOCYTES # BLD AUTO: 2 K/UL — SIGNIFICANT CHANGE UP (ref 1–3.3)
LYMPHOCYTES # BLD AUTO: 26.1 % — SIGNIFICANT CHANGE UP (ref 13–44)
MCHC RBC-ENTMCNC: 29.5 PG — SIGNIFICANT CHANGE UP (ref 27–34)
MCHC RBC-ENTMCNC: 33.8 GM/DL — SIGNIFICANT CHANGE UP (ref 32–36)
MCV RBC AUTO: 87.3 FL — SIGNIFICANT CHANGE UP (ref 80–100)
NEUTROPHILS # BLD AUTO: 5.1 K/UL — SIGNIFICANT CHANGE UP (ref 1.8–7.4)
NEUTROPHILS NFR BLD AUTO: 64.7 % — SIGNIFICANT CHANGE UP (ref 43–77)
PLATELET # BLD AUTO: 358 K/UL — SIGNIFICANT CHANGE UP (ref 150–400)
POTASSIUM SERPL-MCNC: 4.6 MMOL/L — SIGNIFICANT CHANGE UP (ref 3.5–5.3)
POTASSIUM SERPL-SCNC: 4.6 MMOL/L — SIGNIFICANT CHANGE UP (ref 3.5–5.3)
PROT SERPL-MCNC: 7.2 G/DL — SIGNIFICANT CHANGE UP (ref 6–8.3)
RBC # BLD: 4.58 M/UL — SIGNIFICANT CHANGE UP (ref 3.8–5.2)
RBC # FLD: 13 % — SIGNIFICANT CHANGE UP (ref 10.3–14.5)
SODIUM SERPL-SCNC: 139 MMOL/L — SIGNIFICANT CHANGE UP (ref 135–145)
WBC # BLD: 7.8 K/UL — SIGNIFICANT CHANGE UP (ref 3.8–10.5)
WBC # FLD AUTO: 7.8 K/UL — SIGNIFICANT CHANGE UP (ref 3.8–10.5)

## 2023-08-02 PROCEDURE — 36415 COLL VENOUS BLD VENIPUNCTURE: CPT

## 2023-08-02 PROCEDURE — 80053 COMPREHEN METABOLIC PANEL: CPT

## 2023-08-02 PROCEDURE — 85025 COMPLETE CBC W/AUTO DIFF WBC: CPT

## 2023-08-02 PROCEDURE — 96413 CHEMO IV INFUSION 1 HR: CPT

## 2023-08-09 ENCOUNTER — OUTPATIENT (OUTPATIENT)
Dept: OUTPATIENT SERVICES | Facility: HOSPITAL | Age: 43
LOS: 1 days | End: 2023-08-09
Payer: COMMERCIAL

## 2023-08-09 ENCOUNTER — APPOINTMENT (OUTPATIENT)
Dept: HEMATOLOGY ONCOLOGY | Facility: CLINIC | Age: 43
End: 2023-08-09
Payer: COMMERCIAL

## 2023-08-09 ENCOUNTER — APPOINTMENT (OUTPATIENT)
Dept: INFUSION THERAPY | Facility: CLINIC | Age: 43
End: 2023-08-09

## 2023-08-09 VITALS
WEIGHT: 150 LBS | HEIGHT: 68 IN | OXYGEN SATURATION: 98 % | SYSTOLIC BLOOD PRESSURE: 106 MMHG | HEART RATE: 91 BPM | BODY MASS INDEX: 22.73 KG/M2 | TEMPERATURE: 98 F | RESPIRATION RATE: 18 BRPM | DIASTOLIC BLOOD PRESSURE: 71 MMHG

## 2023-08-09 VITALS
TEMPERATURE: 98 F | RESPIRATION RATE: 17 BRPM | WEIGHT: 149.91 LBS | SYSTOLIC BLOOD PRESSURE: 104 MMHG | DIASTOLIC BLOOD PRESSURE: 70 MMHG | OXYGEN SATURATION: 99 % | HEART RATE: 76 BPM | HEIGHT: 67 IN

## 2023-08-09 VITALS
SYSTOLIC BLOOD PRESSURE: 108 MMHG | OXYGEN SATURATION: 98 % | DIASTOLIC BLOOD PRESSURE: 68 MMHG | RESPIRATION RATE: 16 BRPM | TEMPERATURE: 98 F | HEART RATE: 72 BPM

## 2023-08-09 DIAGNOSIS — C69.90 MALIGNANT NEOPLASM OF UNSPECIFIED SITE OF UNSPECIFIED EYE: ICD-10-CM

## 2023-08-09 PROCEDURE — 96413 CHEMO IV INFUSION 1 HR: CPT

## 2023-08-09 PROCEDURE — 99214 OFFICE O/P EST MOD 30 MIN: CPT

## 2023-08-10 NOTE — PHYSICAL EXAM
[Fully active, able to carry on all pre-disease performance without restriction] : Status 0 - Fully active, able to carry on all pre-disease performance without restriction [Normal] : affect appropriate [de-identified] : mild hypopigmentation with macules on the left index finger, stable.

## 2023-08-10 NOTE — ASSESSMENT
[FreeTextEntry1] : This is a 43 year old HLA  positive female now with an M1a, stage IV, uveal melanoma with liver involvement. She initiated therapy with tebentafusp at [facility] but then moved to [place].  I had a long discussion with the patient regarding her diagnosis, clinical course, prognosis and management options. I once again reviewed systemic and regional treatment options available both as standard of care and on clinical trials. I reviewed the data supporting the use of tebentafusp, the challenges associated with predicting benefit based upon imaging alone, and the risks of toxicity following an extended treatment break. After a long discussion, all of her questions and concerns were answered to her satisfaction.   She has been on weekly tebentafusp, tolerating it well. 6/12/23 imaging showed stable disease. 7/18/23 [facility] liquid biopsy showed 0 Muts/Mb, MSI-not detected, elevated tumor fraction not detected. Reviewed 8/2/23 CBC/CMP, WNL.  She is scheduled for repeat imaging on 8/18.  Will follow up on 8/22 to discuss results.  Last ophtho follow up in January 2023.  She has an appt on 8/28/23 to establish care with Dr. Osborn 8/28, to schedule sooner if symptoms worsen.

## 2023-08-10 NOTE — HISTORY OF PRESENT ILLNESS
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  CURRENT THERAPY: Tebentafusp since 03/12/2023  PRIOR THERAPIES: None  INTERVAL HISTORY: Mrs. Hoff is a 43 year old female with a history of primary uveal melanoma, now with newly diagnosed metastatic disease with liver only involvement who presents for continued management of her disease.  She is HLA  positive.  Briefly, she initially presented in January 2018 with vision changes in her right eye, with later loss of temporal and upper visual field vision.  She was evaluated by Dr. 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  She was treated with Ru-106 brachytherapy from 01/30/2018 until 02/06/2018.  She was subsequently placed on expectant observation for systemic recurrence.  She did well until, on 01/29/2023, she underwent an abdominal ultrasound which was significant for a new focal liver lesion measuring 3 cm.  A subsequent chest, abdominal and pelvic CT scan, performed on 01/30/2023, demonstrated a segment 7 liver lesion with arterial enhancement and other subcentimeter hepatic lesions measuring up to 4 mm of unclear etiology.  On 02/01/2023, she underwent a liver biopsy, with pathology consistent with melanoma.  Immunohistochemistry was positive for SOX10 and MART1.  An abdominal MRI, performed on 02/16/2023, demonstrated a dominant segment 7 lesion in the liver, with multiple other smaller subcentimeter lesions bilaterally.  She was evaluated at the [facility] on 02/20/2023 by Dr. Miguelito Truong, with recommendations for HLA testing and possible tebentafusp if appropriate or combination checkpoint blockade.  She was found to be HLA A*02:01 positive and initiated therapy with tebentafusp on 03/12/2023, and she has now completed her first three inpatient doses, with her most recent dose administered on 03/26/2023.  She experienced mild rash and periorbital edema, with IV fluids administered for mild hypotension with her first dose, but otherwise tolerated therapy without difficulty.  On 04/17/2023, she underwent a new baseline chest and pelvis CT as well as a liver MRI.  The MRI demonstrated metastatic lesions affecting both lobes of the liver, with some worsening when compared with the prior scans from 02/16/2023 and with the largest lesion now measuring 4.5 x 3.5 cm in segment 7.  She presents today for weekly tebentafusp infusion.  She is doing well.  She typically develops low grade fever around 6 hours after infusion.  She goes to sleep and it resolves.  She had MRI abdomen and CT CAP on 6/12/23, showing stable disease.  She is scheduled for repeat imaging on 8/18/23.  She has noted vitiligo like skin changes around her left index finger.  She had transient erythema of the feet and chest.  She notices her vision is blurrier the past week.  She has had orbital edema in the past, had Avastin injections and cataract surgery.  Her last ophtho appointment was in January.  She is scheduled to see Dr. Osborn on 8/28.  PAST MEDICAL HISTORY: None  SOCIAL HISTORY: The patient has recently moved from [place] and is now living in [place].  Her partner currently resides in the [place] but is considering relocating.  She works in the [employer].  FAMILY HISTORY: Her maternal grandfather had mucosal melanoma arising from the stomach.

## 2023-08-10 NOTE — REVIEW OF SYSTEMS
[Diarrhea: Grade 0] : Diarrhea: Grade 0 [Negative] : Allergic/Immunologic [Vision Problems] : vision problems [de-identified] : skin changes on the left index finger

## 2023-08-17 ENCOUNTER — APPOINTMENT (OUTPATIENT)
Dept: INFUSION THERAPY | Facility: CLINIC | Age: 43
End: 2023-08-17

## 2023-08-17 ENCOUNTER — OUTPATIENT (OUTPATIENT)
Dept: OUTPATIENT SERVICES | Facility: HOSPITAL | Age: 43
LOS: 1 days | End: 2023-08-17
Payer: COMMERCIAL

## 2023-08-17 VITALS
SYSTOLIC BLOOD PRESSURE: 112 MMHG | HEIGHT: 67 IN | HEART RATE: 74 BPM | RESPIRATION RATE: 17 BRPM | TEMPERATURE: 98 F | WEIGHT: 149.91 LBS | DIASTOLIC BLOOD PRESSURE: 72 MMHG | OXYGEN SATURATION: 99 %

## 2023-08-17 VITALS
HEART RATE: 76 BPM | SYSTOLIC BLOOD PRESSURE: 118 MMHG | RESPIRATION RATE: 16 BRPM | DIASTOLIC BLOOD PRESSURE: 74 MMHG | OXYGEN SATURATION: 100 % | TEMPERATURE: 98 F

## 2023-08-17 DIAGNOSIS — C69.90 MALIGNANT NEOPLASM OF UNSPECIFIED SITE OF UNSPECIFIED EYE: ICD-10-CM

## 2023-08-17 PROCEDURE — 96413 CHEMO IV INFUSION 1 HR: CPT

## 2023-08-18 ENCOUNTER — APPOINTMENT (OUTPATIENT)
Dept: MRI IMAGING | Facility: CLINIC | Age: 43
End: 2023-08-18
Payer: COMMERCIAL

## 2023-08-18 ENCOUNTER — APPOINTMENT (OUTPATIENT)
Dept: CT IMAGING | Facility: CLINIC | Age: 43
End: 2023-08-18
Payer: COMMERCIAL

## 2023-08-18 ENCOUNTER — OUTPATIENT (OUTPATIENT)
Dept: OUTPATIENT SERVICES | Facility: HOSPITAL | Age: 43
LOS: 1 days | End: 2023-08-18

## 2023-08-18 PROCEDURE — 74177 CT ABD & PELVIS W/CONTRAST: CPT | Mod: 26

## 2023-08-18 PROCEDURE — 74183 MRI ABD W/O CNTR FLWD CNTR: CPT | Mod: 26

## 2023-08-18 PROCEDURE — 71260 CT THORAX DX C+: CPT | Mod: 26

## 2023-08-22 ENCOUNTER — APPOINTMENT (OUTPATIENT)
Dept: HEMATOLOGY ONCOLOGY | Facility: CLINIC | Age: 43
End: 2023-08-22
Payer: COMMERCIAL

## 2023-08-22 PROCEDURE — 99215 OFFICE O/P EST HI 40 MIN: CPT | Mod: 95

## 2023-08-23 NOTE — REVIEW OF SYSTEMS
[Vision Problems] : vision problems [Diarrhea: Grade 0] : Diarrhea: Grade 0 [Negative] : Allergic/Immunologic [de-identified] : skin changes on the left index finger

## 2023-08-23 NOTE — HISTORY OF PRESENT ILLNESS
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  CURRENT THERAPY: Tebentafusp since 03/12/2023  PRIOR THERAPIES: None  INTERVAL HISTORY: Mrs. Hoff is a 43 year old female with a history of primary uveal melanoma, now with newly diagnosed metastatic disease with liver only involvement who presents for continued management of her disease.  She is HLA  positive.  Briefly, she initially presented in January 2018 with vision changes in her right eye, with later loss of temporal and upper visual field vision.  She was evaluated by Dr. Konstantin Barros at the [facility] and was found to have a subretinal mass in her right eye with a greated basal diameter of 14.43 mm and a maximal height of 7 mm.  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  She was treated with Ru-106 brachytherapy from 01/30/2018 until 02/06/2018.  She was subsequently placed on expectant observation for systemic recurrence.  She did well until, on 01/29/2023, she underwent an abdominal ultrasound which was significant for a new focal liver lesion measuring 3 cm.  A subsequent chest, abdominal and pelvic CT scan, performed on 01/30/2023, demonstrated a segment 7 liver lesion with arterial enhancement and other subcentimeter hepatic lesions measuring up to 4 mm of unclear etiology.  On 02/01/2023, she underwent a liver biopsy, with pathology consistent with melanoma.  Immunohistochemistry was positive for SOX10 and MART1.  An abdominal MRI, performed on 02/16/2023, demonstrated a dominant segment 7 lesion in the liver, with multiple other smaller subcentimeter lesions bilaterally.  She was evaluated at the [facility] on 02/20/2023 by Dr. Miguelito Truong, with recommendations for HLA testing and possible tebentafusp if appropriate or combination checkpoint blockade.  She was found to be HLA A*02:01 positive and initiated therapy with tebentafusp on 03/12/2023, and she has now completed her first three inpatient doses, with her most recent dose administered on 03/26/2023.  She experienced mild rash and periorbital edema, with IV fluids administered for mild hypotension with her first dose, but otherwise tolerated therapy without difficulty.  On 04/17/2023, she underwent a new baseline chest and pelvis CT as well as a liver MRI.  The MRI demonstrated metastatic lesions affecting both lobes of the liver, with some worsening when compared with the prior scans from 02/16/2023 and with the largest lesion now measuring 4.5 x 3.5 cm in segment 7.  She had MRI abdomen and CT CAP on 6/12/23, showing stable disease.    She continues on weekly tebentafusp.  She typically develops low grade fever around 6 hours after infusion. She goes to sleep and it resolves. She has noted vitiligo like skin changes around her left index finger.  She had transient erythema of the feet and chest.  She reported her vision is blurrier at her last visit.  She has had orbital edema in the past, had Avastin injections and cataract surgery.  Her last ophtho appointment was in January.  She is scheduled to see Dr. Osborn on 8/28.  She had repeat imaging done on 8/18/23 which showed interval increase in size of the hepatic segment 7 metastatic lesion and persistent numerous additional scattered small hemorrhagic liver metastases.   PAST MEDICAL HISTORY: None  SOCIAL HISTORY: The patient has recently moved from [facility] and is now living in [place].  Her partner currently resides in the [place] but is considering relocating.  She works in the [employer].  FAMILY HISTORY: Her maternal grandfather had mucosal melanoma arising from the stomach.

## 2023-08-23 NOTE — REASON FOR VISIT
[Home] : at home, [unfilled] , at the time of the visit. [Medical Office: ([facility])___] : at the medical office located in  [Patient] : the patient [Self] : self [Follow-Up Visit] : a follow-up [FreeTextEntry2] : metastatic uveal melanoma

## 2023-08-23 NOTE — ASSESSMENT
[FreeTextEntry1] : This is a 43 year old HLA  positive female now with an M1a, stage IV, uveal melanoma with liver involvement. She initiated therapy with tebentafusp at [facility] but then moved to [place].  Based upon history and review of imaging studies, she now has radiographic evidence of continued disease growth in the liver.  I have reviewed the imaging studies together with both radiology and interventional radiology.  I had a long discussion with the patient regarding her diagnosis, clinical course, prognosis and management options. I once again reviewed systemic and regional treatment options available both as standard of care and on clinical trials.   At this time, we will plan to proceed with biopsy of the dominant mass given its somewhat unusual radiographic appearance and growth pattern and anticipate subsequent radioembolization of the dominant site of disease.  She will be seeing Dr. Sj Viera of interventional radiology in the near future.  We will plan to continue tebentafusp for now.  Last ophtho follow up in January 2023.  She has an appt on 8/28/23 to establish care with Dr. Osborn 8/28, to schedule sooner if symptoms worsen.  We will discuss potential additional treatment options after completion of radioembolization, which may include immuno embolization, a change in systemic therapy to ipilimumab and nivolumab, or consideration of trial participation on a study such as the TriSalus study.  After a long discussion, all of her questions and concerns were answered to her satisfaction.

## 2023-08-24 ENCOUNTER — APPOINTMENT (OUTPATIENT)
Dept: INFUSION THERAPY | Facility: CLINIC | Age: 43
End: 2023-08-24

## 2023-08-24 ENCOUNTER — OUTPATIENT (OUTPATIENT)
Dept: OUTPATIENT SERVICES | Facility: HOSPITAL | Age: 43
LOS: 1 days | End: 2023-08-24
Payer: COMMERCIAL

## 2023-08-24 VITALS
TEMPERATURE: 98 F | HEART RATE: 79 BPM | WEIGHT: 149.91 LBS | DIASTOLIC BLOOD PRESSURE: 67 MMHG | SYSTOLIC BLOOD PRESSURE: 94 MMHG | OXYGEN SATURATION: 100 % | HEIGHT: 67 IN | RESPIRATION RATE: 16 BRPM

## 2023-08-24 VITALS
DIASTOLIC BLOOD PRESSURE: 72 MMHG | HEART RATE: 76 BPM | TEMPERATURE: 99 F | WEIGHT: 149.91 LBS | SYSTOLIC BLOOD PRESSURE: 114 MMHG | OXYGEN SATURATION: 98 % | HEIGHT: 67 IN | RESPIRATION RATE: 16 BRPM

## 2023-08-24 DIAGNOSIS — C69.90 MALIGNANT NEOPLASM OF UNSPECIFIED SITE OF UNSPECIFIED EYE: ICD-10-CM

## 2023-08-24 LAB
ALBUMIN SERPL ELPH-MCNC: 3.6 G/DL — SIGNIFICANT CHANGE UP (ref 3.3–5)
ALP SERPL-CCNC: 71 U/L — SIGNIFICANT CHANGE UP (ref 40–120)
ALT FLD-CCNC: 25 U/L — SIGNIFICANT CHANGE UP (ref 10–45)
ANION GAP SERPL CALC-SCNC: 10 MMOL/L — SIGNIFICANT CHANGE UP (ref 5–17)
APTT BLD: 31.9 SEC — SIGNIFICANT CHANGE UP (ref 24.5–35.6)
AST SERPL-CCNC: 24 U/L — SIGNIFICANT CHANGE UP (ref 10–40)
BILIRUB SERPL-MCNC: 0.7 MG/DL — SIGNIFICANT CHANGE UP (ref 0.2–1.2)
BUN SERPL-MCNC: 10 MG/DL — SIGNIFICANT CHANGE UP (ref 7–23)
CALCIUM SERPL-MCNC: 9.6 MG/DL — SIGNIFICANT CHANGE UP (ref 8.4–10.5)
CHLORIDE SERPL-SCNC: 107 MMOL/L — SIGNIFICANT CHANGE UP (ref 96–108)
CO2 SERPL-SCNC: 28 MMOL/L — SIGNIFICANT CHANGE UP (ref 22–31)
CREAT SERPL-MCNC: 0.8 MG/DL — SIGNIFICANT CHANGE UP (ref 0.5–1.3)
EGFR: 93 ML/MIN/1.73M2 — SIGNIFICANT CHANGE UP
GLUCOSE SERPL-MCNC: 117 MG/DL — HIGH (ref 70–99)
HCT VFR BLD CALC: 38.6 % — SIGNIFICANT CHANGE UP (ref 34.5–45)
HGB BLD-MCNC: 12.9 G/DL — SIGNIFICANT CHANGE UP (ref 11.5–15.5)
INR BLD: 0.92 — SIGNIFICANT CHANGE UP (ref 0.85–1.18)
LYMPHOCYTES # BLD AUTO: 2.2 K/UL — SIGNIFICANT CHANGE UP (ref 1–3.3)
LYMPHOCYTES # BLD AUTO: 29.5 % — SIGNIFICANT CHANGE UP (ref 13–44)
MCHC RBC-ENTMCNC: 29.2 PG — SIGNIFICANT CHANGE UP (ref 27–34)
MCHC RBC-ENTMCNC: 33.4 GM/DL — SIGNIFICANT CHANGE UP (ref 32–36)
MCV RBC AUTO: 87.3 FL — SIGNIFICANT CHANGE UP (ref 80–100)
NEUTROPHILS # BLD AUTO: 4.3 K/UL — SIGNIFICANT CHANGE UP (ref 1.8–7.4)
NEUTROPHILS NFR BLD AUTO: 58.9 % — SIGNIFICANT CHANGE UP (ref 43–77)
PLATELET # BLD AUTO: 383 K/UL — SIGNIFICANT CHANGE UP (ref 150–400)
POTASSIUM SERPL-MCNC: 4.2 MMOL/L — SIGNIFICANT CHANGE UP (ref 3.5–5.3)
POTASSIUM SERPL-SCNC: 4.2 MMOL/L — SIGNIFICANT CHANGE UP (ref 3.5–5.3)
PROT SERPL-MCNC: 7.4 G/DL — SIGNIFICANT CHANGE UP (ref 6–8.3)
PROTHROM AB SERPL-ACNC: 10.5 SEC — SIGNIFICANT CHANGE UP (ref 9.5–13)
RBC # BLD: 4.42 M/UL — SIGNIFICANT CHANGE UP (ref 3.8–5.2)
RBC # FLD: 12.6 % — SIGNIFICANT CHANGE UP (ref 10.3–14.5)
SODIUM SERPL-SCNC: 145 MMOL/L — SIGNIFICANT CHANGE UP (ref 135–145)
WBC # BLD: 7.3 K/UL — SIGNIFICANT CHANGE UP (ref 3.8–10.5)
WBC # FLD AUTO: 7.3 K/UL — SIGNIFICANT CHANGE UP (ref 3.8–10.5)

## 2023-08-24 PROCEDURE — 85025 COMPLETE CBC W/AUTO DIFF WBC: CPT

## 2023-08-24 PROCEDURE — 85610 PROTHROMBIN TIME: CPT

## 2023-08-24 PROCEDURE — 85730 THROMBOPLASTIN TIME PARTIAL: CPT

## 2023-08-24 PROCEDURE — 80053 COMPREHEN METABOLIC PANEL: CPT

## 2023-08-24 PROCEDURE — 96413 CHEMO IV INFUSION 1 HR: CPT

## 2023-08-24 PROCEDURE — 36415 COLL VENOUS BLD VENIPUNCTURE: CPT

## 2023-08-28 ENCOUNTER — APPOINTMENT (OUTPATIENT)
Dept: INTERVENTIONAL RADIOLOGY/VASCULAR | Facility: HOSPITAL | Age: 43
End: 2023-08-28

## 2023-08-30 ENCOUNTER — OUTPATIENT (OUTPATIENT)
Dept: OUTPATIENT SERVICES | Facility: HOSPITAL | Age: 43
LOS: 1 days | End: 2023-08-30
Payer: COMMERCIAL

## 2023-08-30 ENCOUNTER — RESULT REVIEW (OUTPATIENT)
Age: 43
End: 2023-08-30

## 2023-08-30 ENCOUNTER — APPOINTMENT (OUTPATIENT)
Dept: INTERVENTIONAL RADIOLOGY/VASCULAR | Facility: HOSPITAL | Age: 43
End: 2023-08-30

## 2023-08-30 PROCEDURE — XXXXX: CPT | Mod: 1L

## 2023-08-30 PROCEDURE — 88305 TISSUE EXAM BY PATHOLOGIST: CPT | Mod: 26

## 2023-08-30 PROCEDURE — 88173 CYTOPATH EVAL FNA REPORT: CPT

## 2023-08-30 PROCEDURE — 88305 TISSUE EXAM BY PATHOLOGIST: CPT

## 2023-08-30 PROCEDURE — 88173 CYTOPATH EVAL FNA REPORT: CPT | Mod: 26

## 2023-08-30 PROCEDURE — 88341 IMHCHEM/IMCYTCHM EA ADD ANTB: CPT

## 2023-08-30 PROCEDURE — 88341 IMHCHEM/IMCYTCHM EA ADD ANTB: CPT | Mod: 26

## 2023-08-30 PROCEDURE — 47000 NEEDLE BIOPSY OF LIVER PERQ: CPT

## 2023-08-30 PROCEDURE — 88342 IMHCHEM/IMCYTCHM 1ST ANTB: CPT | Mod: 26

## 2023-08-30 PROCEDURE — 76942 ECHO GUIDE FOR BIOPSY: CPT

## 2023-08-30 PROCEDURE — 76942 ECHO GUIDE FOR BIOPSY: CPT | Mod: 26

## 2023-08-31 ENCOUNTER — OUTPATIENT (OUTPATIENT)
Dept: OUTPATIENT SERVICES | Facility: HOSPITAL | Age: 43
LOS: 1 days | End: 2023-08-31
Payer: COMMERCIAL

## 2023-08-31 ENCOUNTER — NON-APPOINTMENT (OUTPATIENT)
Age: 43
End: 2023-08-31

## 2023-08-31 ENCOUNTER — APPOINTMENT (OUTPATIENT)
Dept: INFUSION THERAPY | Facility: CLINIC | Age: 43
End: 2023-08-31

## 2023-08-31 VITALS
WEIGHT: 147.93 LBS | RESPIRATION RATE: 18 BRPM | OXYGEN SATURATION: 98 % | DIASTOLIC BLOOD PRESSURE: 71 MMHG | SYSTOLIC BLOOD PRESSURE: 102 MMHG | TEMPERATURE: 98 F | HEIGHT: 67 IN | HEART RATE: 68 BPM

## 2023-08-31 VITALS
OXYGEN SATURATION: 99 % | RESPIRATION RATE: 18 BRPM | TEMPERATURE: 98 F | DIASTOLIC BLOOD PRESSURE: 66 MMHG | HEART RATE: 71 BPM | SYSTOLIC BLOOD PRESSURE: 101 MMHG

## 2023-08-31 DIAGNOSIS — C69.90 MALIGNANT NEOPLASM OF UNSPECIFIED SITE OF UNSPECIFIED EYE: ICD-10-CM

## 2023-08-31 PROCEDURE — 96413 CHEMO IV INFUSION 1 HR: CPT

## 2023-09-01 VITALS
RESPIRATION RATE: 20 BRPM | OXYGEN SATURATION: 100 % | HEART RATE: 65 BPM | DIASTOLIC BLOOD PRESSURE: 57 MMHG | SYSTOLIC BLOOD PRESSURE: 91 MMHG

## 2023-09-01 LAB — NON-GYNECOLOGICAL CYTOLOGY STUDY: SIGNIFICANT CHANGE UP

## 2023-09-01 NOTE — PHYSICAL EXAM
[Alert] : alert [No Acute Distress] : no acute distress [EOMI] : extra occular movement intact [Normal Outer Ear/Nose] : the ears and nose were normal in appearance [No Respiratory Distress] : no respiratory distress [No Accessory Muscle Use] : no accessory muscle use [Normal Rate] : heart rate was normal  [Regular Rhythm] : with a regular rhythm [Not Tender] : non-tender [Soft] : abdomen soft [Not Distended] : not distended [Normal Gait] : normal gait [No Involuntary Movements] : no involuntary movements were seen [No Rash] : no rash [No Motor Deficits] : the motor exam was normal [Oriented x3] : oriented to person, place, and time [Normal Insight/Judgement] : insight and judgment were intact [Fully active, able to carry on all pre-disease performance without restriction] : Fully active, able to carry on all pre-disease performance without restriction

## 2023-09-02 LAB — SURGICAL PATHOLOGY STUDY: SIGNIFICANT CHANGE UP

## 2023-09-07 ENCOUNTER — OUTPATIENT (OUTPATIENT)
Dept: OUTPATIENT SERVICES | Facility: HOSPITAL | Age: 43
LOS: 1 days | End: 2023-09-07
Payer: COMMERCIAL

## 2023-09-07 ENCOUNTER — APPOINTMENT (OUTPATIENT)
Dept: INTERVENTIONAL RADIOLOGY/VASCULAR | Facility: HOSPITAL | Age: 43
End: 2023-09-07

## 2023-09-07 ENCOUNTER — RESULT REVIEW (OUTPATIENT)
Age: 43
End: 2023-09-07

## 2023-09-07 VITALS — WEIGHT: 143.3 LBS

## 2023-09-07 PROCEDURE — C1887: CPT

## 2023-09-07 PROCEDURE — 75726 ARTERY X-RAYS ABDOMEN: CPT | Mod: 26,59

## 2023-09-07 PROCEDURE — 75774 ARTERY X-RAY EACH VESSEL: CPT | Mod: 59

## 2023-09-07 PROCEDURE — 36245 INS CATH ABD/L-EXT ART 1ST: CPT | Mod: 59

## 2023-09-07 PROCEDURE — C1760: CPT

## 2023-09-07 PROCEDURE — 77290 THER RAD SIMULAJ FIELD CPLX: CPT | Mod: 26

## 2023-09-07 PROCEDURE — 78830 RP LOCLZJ TUM SPECT W/CT 1: CPT | Mod: 26

## 2023-09-07 PROCEDURE — C1769: CPT

## 2023-09-07 PROCEDURE — 75726 ARTERY X-RAYS ABDOMEN: CPT

## 2023-09-07 PROCEDURE — 77290 THER RAD SIMULAJ FIELD CPLX: CPT

## 2023-09-07 PROCEDURE — 77300 RADIATION THERAPY DOSE PLAN: CPT | Mod: 26

## 2023-09-07 PROCEDURE — 77300 RADIATION THERAPY DOSE PLAN: CPT

## 2023-09-07 PROCEDURE — 76380 CAT SCAN FOLLOW-UP STUDY: CPT

## 2023-09-07 PROCEDURE — 76380 CAT SCAN FOLLOW-UP STUDY: CPT | Mod: 26

## 2023-09-07 PROCEDURE — 36247 INS CATH ABD/L-EXT ART 3RD: CPT

## 2023-09-07 PROCEDURE — 77263 THER RADIOLOGY TX PLNG CPLX: CPT

## 2023-09-07 PROCEDURE — 75774 ARTERY X-RAY EACH VESSEL: CPT | Mod: 26

## 2023-09-07 PROCEDURE — C1894: CPT

## 2023-09-07 PROCEDURE — C9399: CPT

## 2023-09-07 NOTE — PRE-ANESTHESIA EVALUATION ADULT - MALLAMPATI CLASS
See your MD for recheck as needed   Class I (easy) - visualization of the soft palate, fauces, uvula, and both anterior and posterior pillars

## 2023-09-08 ENCOUNTER — APPOINTMENT (OUTPATIENT)
Dept: INFUSION THERAPY | Facility: CLINIC | Age: 43
End: 2023-09-08

## 2023-09-08 ENCOUNTER — OUTPATIENT (OUTPATIENT)
Dept: OUTPATIENT SERVICES | Facility: HOSPITAL | Age: 43
LOS: 1 days | End: 2023-09-08
Payer: COMMERCIAL

## 2023-09-08 VITALS
SYSTOLIC BLOOD PRESSURE: 98 MMHG | OXYGEN SATURATION: 99 % | DIASTOLIC BLOOD PRESSURE: 65 MMHG | RESPIRATION RATE: 18 BRPM | HEART RATE: 70 BPM | TEMPERATURE: 97 F

## 2023-09-08 DIAGNOSIS — C69.90 MALIGNANT NEOPLASM OF UNSPECIFIED SITE OF UNSPECIFIED EYE: ICD-10-CM

## 2023-09-08 LAB
ALBUMIN SERPL ELPH-MCNC: 3.4 G/DL — SIGNIFICANT CHANGE UP (ref 3.3–5)
ALP SERPL-CCNC: 54 U/L — SIGNIFICANT CHANGE UP (ref 40–120)
ALT FLD-CCNC: 21 U/L — SIGNIFICANT CHANGE UP (ref 10–45)
ANION GAP SERPL CALC-SCNC: 9 MMOL/L — SIGNIFICANT CHANGE UP (ref 5–17)
AST SERPL-CCNC: 25 U/L — SIGNIFICANT CHANGE UP (ref 10–40)
BILIRUB SERPL-MCNC: 0.7 MG/DL — SIGNIFICANT CHANGE UP (ref 0.2–1.2)
BUN SERPL-MCNC: 11 MG/DL — SIGNIFICANT CHANGE UP (ref 7–23)
CALCIUM SERPL-MCNC: 9.4 MG/DL — SIGNIFICANT CHANGE UP (ref 8.4–10.5)
CHLORIDE SERPL-SCNC: 107 MMOL/L — SIGNIFICANT CHANGE UP (ref 96–108)
CO2 SERPL-SCNC: 27 MMOL/L — SIGNIFICANT CHANGE UP (ref 22–31)
CREAT SERPL-MCNC: 0.8 MG/DL — SIGNIFICANT CHANGE UP (ref 0.5–1.3)
EGFR: 93 ML/MIN/1.73M2 — SIGNIFICANT CHANGE UP
GLUCOSE SERPL-MCNC: 95 MG/DL — SIGNIFICANT CHANGE UP (ref 70–99)
HCT VFR BLD CALC: 37.3 % — SIGNIFICANT CHANGE UP (ref 34.5–45)
HGB BLD-MCNC: 12.3 G/DL — SIGNIFICANT CHANGE UP (ref 11.5–15.5)
LDH SERPL L TO P-CCNC: 165 U/L — SIGNIFICANT CHANGE UP (ref 50–242)
LYMPHOCYTES # BLD AUTO: 1.9 K/UL — SIGNIFICANT CHANGE UP (ref 1–3.3)
LYMPHOCYTES # BLD AUTO: 23 % — SIGNIFICANT CHANGE UP (ref 13–44)
MCHC RBC-ENTMCNC: 29.2 PG — SIGNIFICANT CHANGE UP (ref 27–34)
MCHC RBC-ENTMCNC: 33 GM/DL — SIGNIFICANT CHANGE UP (ref 32–36)
MCV RBC AUTO: 88.6 FL — SIGNIFICANT CHANGE UP (ref 80–100)
NEUTROPHILS # BLD AUTO: 5.3 K/UL — SIGNIFICANT CHANGE UP (ref 1.8–7.4)
NEUTROPHILS NFR BLD AUTO: 64.2 % — SIGNIFICANT CHANGE UP (ref 43–77)
PLATELET # BLD AUTO: 315 K/UL — SIGNIFICANT CHANGE UP (ref 150–400)
POTASSIUM SERPL-MCNC: 4.6 MMOL/L — SIGNIFICANT CHANGE UP (ref 3.5–5.3)
POTASSIUM SERPL-SCNC: 4.6 MMOL/L — SIGNIFICANT CHANGE UP (ref 3.5–5.3)
PROT SERPL-MCNC: 7 G/DL — SIGNIFICANT CHANGE UP (ref 6–8.3)
RBC # BLD: 4.21 M/UL — SIGNIFICANT CHANGE UP (ref 3.8–5.2)
RBC # FLD: 13 % — SIGNIFICANT CHANGE UP (ref 10.3–14.5)
SODIUM SERPL-SCNC: 143 MMOL/L — SIGNIFICANT CHANGE UP (ref 135–145)
WBC # BLD: 8.3 K/UL — SIGNIFICANT CHANGE UP (ref 3.8–10.5)
WBC # FLD AUTO: 8.3 K/UL — SIGNIFICANT CHANGE UP (ref 3.8–10.5)

## 2023-09-08 PROCEDURE — 78830 RP LOCLZJ TUM SPECT W/CT 1: CPT

## 2023-09-08 PROCEDURE — 80053 COMPREHEN METABOLIC PANEL: CPT

## 2023-09-08 PROCEDURE — 83615 LACTATE (LD) (LDH) ENZYME: CPT

## 2023-09-08 PROCEDURE — P9047: CPT

## 2023-09-08 PROCEDURE — 96413 CHEMO IV INFUSION 1 HR: CPT

## 2023-09-08 PROCEDURE — A9540: CPT

## 2023-09-08 PROCEDURE — 36415 COLL VENOUS BLD VENIPUNCTURE: CPT

## 2023-09-08 PROCEDURE — 85025 COMPLETE CBC W/AUTO DIFF WBC: CPT

## 2023-09-08 RX ORDER — TEBENTAFUSP 100 UG/.5ML
68 INJECTION, SOLUTION, CONCENTRATE INTRAVENOUS ONCE
Refills: 0 | Status: COMPLETED | OUTPATIENT
Start: 2023-09-08 | End: 2023-09-08

## 2023-09-08 RX ADMIN — TEBENTAFUSP 68 MICROGRAM(S): 100 INJECTION, SOLUTION, CONCENTRATE INTRAVENOUS at 13:54

## 2023-09-08 RX ADMIN — TEBENTAFUSP 68 MICROGRAM(S): 100 INJECTION, SOLUTION, CONCENTRATE INTRAVENOUS at 14:09

## 2023-09-19 ENCOUNTER — OUTPATIENT (OUTPATIENT)
Dept: OUTPATIENT SERVICES | Facility: HOSPITAL | Age: 43
LOS: 1 days | End: 2023-09-19
Payer: COMMERCIAL

## 2023-09-19 ENCOUNTER — APPOINTMENT (OUTPATIENT)
Dept: INFUSION THERAPY | Facility: CLINIC | Age: 43
End: 2023-09-19

## 2023-09-19 ENCOUNTER — LABORATORY RESULT (OUTPATIENT)
Age: 43
End: 2023-09-19

## 2023-09-19 ENCOUNTER — APPOINTMENT (OUTPATIENT)
Dept: HEMATOLOGY ONCOLOGY | Facility: CLINIC | Age: 43
End: 2023-09-19
Payer: COMMERCIAL

## 2023-09-19 VITALS
OXYGEN SATURATION: 98 % | DIASTOLIC BLOOD PRESSURE: 71 MMHG | SYSTOLIC BLOOD PRESSURE: 112 MMHG | TEMPERATURE: 98 F | HEART RATE: 76 BPM | RESPIRATION RATE: 16 BRPM

## 2023-09-19 VITALS
OXYGEN SATURATION: 98 % | DIASTOLIC BLOOD PRESSURE: 69 MMHG | TEMPERATURE: 98 F | HEART RATE: 81 BPM | WEIGHT: 149.91 LBS | HEIGHT: 67 IN | RESPIRATION RATE: 18 BRPM | SYSTOLIC BLOOD PRESSURE: 109 MMHG

## 2023-09-19 VITALS
TEMPERATURE: 98.5 F | HEIGHT: 68 IN | HEART RATE: 81 BPM | BODY MASS INDEX: 22.73 KG/M2 | WEIGHT: 150 LBS | OXYGEN SATURATION: 98 % | SYSTOLIC BLOOD PRESSURE: 109 MMHG | DIASTOLIC BLOOD PRESSURE: 69 MMHG | RESPIRATION RATE: 18 BRPM

## 2023-09-19 DIAGNOSIS — C69.90 MALIGNANT NEOPLASM OF UNSPECIFIED SITE OF UNSPECIFIED EYE: ICD-10-CM

## 2023-09-19 LAB
ALBUMIN SERPL ELPH-MCNC: 3.7 G/DL
ALP BLD-CCNC: 56 U/L
ALT SERPL-CCNC: 17 U/L
ANION GAP SERPL CALC-SCNC: 16 MMOL/L
AST SERPL-CCNC: 26 U/L
BILIRUB SERPL-MCNC: 0.8 MG/DL
BUN SERPL-MCNC: 12 MG/DL
CALCIUM SERPL-MCNC: 9.4 MG/DL
CHLORIDE SERPL-SCNC: 104 MMOL/L
CO2 SERPL-SCNC: 23 MMOL/L
CREAT SERPL-MCNC: 0.9 MG/DL
EGFR: 80 ML/MIN/1.73M2
GLUCOSE SERPL-MCNC: 107 MG/DL
LDH SERPL-CCNC: 200 U/L
POTASSIUM SERPL-SCNC: 3.9 MMOL/L
PROT SERPL-MCNC: 7.2 G/DL
SODIUM SERPL-SCNC: 143 MMOL/L

## 2023-09-19 PROCEDURE — 96409 CHEMO IV PUSH SNGL DRUG: CPT

## 2023-09-19 PROCEDURE — P9047: CPT

## 2023-09-19 PROCEDURE — 99214 OFFICE O/P EST MOD 30 MIN: CPT

## 2023-09-19 RX ORDER — TEBENTAFUSP 100 UG/.5ML
68 INJECTION, SOLUTION, CONCENTRATE INTRAVENOUS ONCE
Refills: 0 | Status: COMPLETED | OUTPATIENT
Start: 2023-09-19 | End: 2023-09-19

## 2023-09-19 RX ADMIN — TEBENTAFUSP 68 MICROGRAM(S): 100 INJECTION, SOLUTION, CONCENTRATE INTRAVENOUS at 16:01

## 2023-09-19 RX ADMIN — TEBENTAFUSP 68 MICROGRAM(S): 100 INJECTION, SOLUTION, CONCENTRATE INTRAVENOUS at 16:17

## 2023-09-19 NOTE — DISCHARGE INSTRUCTIONS: CHEMOTHERAPY - NSAPPTSTXDETAIL_HEME_A_AMB
Please call nurse practioner Elise Butcher at [phone] regarding this patient. Thank you.   Tebentafusp

## 2023-09-21 ENCOUNTER — APPOINTMENT (OUTPATIENT)
Dept: INTERVENTIONAL RADIOLOGY/VASCULAR | Facility: HOSPITAL | Age: 43
End: 2023-09-21
Payer: COMMERCIAL

## 2023-09-21 ENCOUNTER — RESULT REVIEW (OUTPATIENT)
Age: 43
End: 2023-09-21

## 2023-09-21 ENCOUNTER — OUTPATIENT (OUTPATIENT)
Dept: OUTPATIENT SERVICES | Facility: HOSPITAL | Age: 43
LOS: 1 days | End: 2023-09-21
Payer: COMMERCIAL

## 2023-09-21 PROCEDURE — 78830 RP LOCLZJ TUM SPECT W/CT 1: CPT | Mod: 26

## 2023-09-21 PROCEDURE — 36247 INS CATH ABD/L-EXT ART 3RD: CPT

## 2023-09-21 PROCEDURE — 37243 VASC EMBOLIZE/OCCLUDE ORGAN: CPT

## 2023-09-21 PROCEDURE — 79445 NUCLEAR RX INTRA-ARTERIAL: CPT | Mod: 26

## 2023-09-21 PROCEDURE — 36245 INS CATH ABD/L-EXT ART 1ST: CPT | Mod: 59

## 2023-09-21 PROCEDURE — 79445 NUCLEAR RX INTRA-ARTERIAL: CPT

## 2023-09-21 PROCEDURE — C1887: CPT

## 2023-09-21 PROCEDURE — C2616: CPT

## 2023-09-21 PROCEDURE — C1769: CPT

## 2023-09-21 PROCEDURE — C1894: CPT

## 2023-09-22 ENCOUNTER — NON-APPOINTMENT (OUTPATIENT)
Age: 43
End: 2023-09-22

## 2023-09-27 ENCOUNTER — TRANSCRIPTION ENCOUNTER (OUTPATIENT)
Age: 43
End: 2023-09-27

## 2023-09-27 ENCOUNTER — OUTPATIENT (OUTPATIENT)
Dept: OUTPATIENT SERVICES | Facility: HOSPITAL | Age: 43
LOS: 1 days | End: 2023-09-27
Payer: COMMERCIAL

## 2023-09-27 ENCOUNTER — APPOINTMENT (OUTPATIENT)
Dept: INFUSION THERAPY | Facility: CLINIC | Age: 43
End: 2023-09-27

## 2023-09-27 ENCOUNTER — APPOINTMENT (OUTPATIENT)
Dept: HEMATOLOGY ONCOLOGY | Facility: CLINIC | Age: 43
End: 2023-09-27
Payer: COMMERCIAL

## 2023-09-27 VITALS
TEMPERATURE: 98 F | HEART RATE: 78 BPM | SYSTOLIC BLOOD PRESSURE: 118 MMHG | RESPIRATION RATE: 18 BRPM | DIASTOLIC BLOOD PRESSURE: 80 MMHG | OXYGEN SATURATION: 99 %

## 2023-09-27 VITALS
RESPIRATION RATE: 18 BRPM | SYSTOLIC BLOOD PRESSURE: 126 MMHG | DIASTOLIC BLOOD PRESSURE: 82 MMHG | WEIGHT: 150 LBS | OXYGEN SATURATION: 98 % | HEART RATE: 93 BPM | HEIGHT: 68 IN | BODY MASS INDEX: 22.73 KG/M2

## 2023-09-27 DIAGNOSIS — C69.90 MALIGNANT NEOPLASM OF UNSPECIFIED SITE OF UNSPECIFIED EYE: ICD-10-CM

## 2023-09-27 LAB
ALBUMIN SERPL ELPH-MCNC: 3.8 G/DL — SIGNIFICANT CHANGE UP (ref 3.3–5)
ALP SERPL-CCNC: 88 U/L — SIGNIFICANT CHANGE UP (ref 40–120)
ALT FLD-CCNC: 29 U/L — SIGNIFICANT CHANGE UP (ref 10–45)
ANION GAP SERPL CALC-SCNC: 8 MMOL/L — SIGNIFICANT CHANGE UP (ref 5–17)
AST SERPL-CCNC: 34 U/L — SIGNIFICANT CHANGE UP (ref 10–40)
BILIRUB SERPL-MCNC: 0.6 MG/DL — SIGNIFICANT CHANGE UP (ref 0.2–1.2)
BUN SERPL-MCNC: 13 MG/DL — SIGNIFICANT CHANGE UP (ref 7–23)
CALCIUM SERPL-MCNC: 10.1 MG/DL — SIGNIFICANT CHANGE UP (ref 8.4–10.5)
CHLORIDE SERPL-SCNC: 108 MMOL/L — SIGNIFICANT CHANGE UP (ref 96–108)
CO2 SERPL-SCNC: 27 MMOL/L — SIGNIFICANT CHANGE UP (ref 22–31)
CREAT SERPL-MCNC: 0.9 MG/DL — SIGNIFICANT CHANGE UP (ref 0.5–1.3)
EGFR: 80 ML/MIN/1.73M2 — SIGNIFICANT CHANGE UP
GLUCOSE SERPL-MCNC: 117 MG/DL — HIGH (ref 70–99)
HCT VFR BLD CALC: 40.8 % — SIGNIFICANT CHANGE UP (ref 34.5–45)
HGB BLD-MCNC: 13.7 G/DL — SIGNIFICANT CHANGE UP (ref 11.5–15.5)
LDH SERPL L TO P-CCNC: 228 U/L — SIGNIFICANT CHANGE UP (ref 50–242)
LYMPHOCYTES # BLD AUTO: 1.3 K/UL — SIGNIFICANT CHANGE UP (ref 1–3.3)
LYMPHOCYTES # BLD AUTO: 18.5 % — SIGNIFICANT CHANGE UP (ref 13–44)
MCHC RBC-ENTMCNC: 29.6 PG — SIGNIFICANT CHANGE UP (ref 27–34)
MCHC RBC-ENTMCNC: 33.6 GM/DL — SIGNIFICANT CHANGE UP (ref 32–36)
MCV RBC AUTO: 88.1 FL — SIGNIFICANT CHANGE UP (ref 80–100)
NEUTROPHILS # BLD AUTO: 4.8 K/UL — SIGNIFICANT CHANGE UP (ref 1.8–7.4)
NEUTROPHILS NFR BLD AUTO: 68.1 % — SIGNIFICANT CHANGE UP (ref 43–77)
PLATELET # BLD AUTO: 369 K/UL — SIGNIFICANT CHANGE UP (ref 150–400)
POTASSIUM SERPL-MCNC: 5.4 MMOL/L — HIGH (ref 3.5–5.3)
POTASSIUM SERPL-SCNC: 5.4 MMOL/L — HIGH (ref 3.5–5.3)
PROT SERPL-MCNC: 8.2 G/DL — SIGNIFICANT CHANGE UP (ref 6–8.3)
RBC # BLD: 4.63 M/UL — SIGNIFICANT CHANGE UP (ref 3.8–5.2)
RBC # FLD: 12.8 % — SIGNIFICANT CHANGE UP (ref 10.3–14.5)
SODIUM SERPL-SCNC: 143 MMOL/L — SIGNIFICANT CHANGE UP (ref 135–145)
WBC # BLD: 7.1 K/UL — SIGNIFICANT CHANGE UP (ref 3.8–10.5)
WBC # FLD AUTO: 7.1 K/UL — SIGNIFICANT CHANGE UP (ref 3.8–10.5)

## 2023-09-27 PROCEDURE — 83615 LACTATE (LD) (LDH) ENZYME: CPT

## 2023-09-27 PROCEDURE — 96409 CHEMO IV PUSH SNGL DRUG: CPT

## 2023-09-27 PROCEDURE — 85025 COMPLETE CBC W/AUTO DIFF WBC: CPT

## 2023-09-27 PROCEDURE — 80053 COMPREHEN METABOLIC PANEL: CPT

## 2023-09-27 PROCEDURE — P9047: CPT

## 2023-09-27 PROCEDURE — 36415 COLL VENOUS BLD VENIPUNCTURE: CPT

## 2023-09-27 PROCEDURE — 99214 OFFICE O/P EST MOD 30 MIN: CPT

## 2023-09-27 RX ORDER — TEBENTAFUSP 100 UG/.5ML
68 INJECTION, SOLUTION, CONCENTRATE INTRAVENOUS ONCE
Refills: 0 | Status: COMPLETED | OUTPATIENT
Start: 2023-09-27 | End: 2023-09-27

## 2023-09-27 RX ADMIN — TEBENTAFUSP 68 MICROGRAM(S): 100 INJECTION, SOLUTION, CONCENTRATE INTRAVENOUS at 16:10

## 2023-09-27 RX ADMIN — TEBENTAFUSP 68 MICROGRAM(S): 100 INJECTION, SOLUTION, CONCENTRATE INTRAVENOUS at 15:51

## 2023-10-03 ENCOUNTER — APPOINTMENT (OUTPATIENT)
Dept: HEMATOLOGY ONCOLOGY | Facility: CLINIC | Age: 43
End: 2023-10-03
Payer: COMMERCIAL

## 2023-10-03 PROCEDURE — 99213 OFFICE O/P EST LOW 20 MIN: CPT | Mod: 95

## 2023-10-05 ENCOUNTER — APPOINTMENT (OUTPATIENT)
Dept: INFUSION THERAPY | Facility: CLINIC | Age: 43
End: 2023-10-05

## 2023-10-05 ENCOUNTER — OUTPATIENT (OUTPATIENT)
Dept: OUTPATIENT SERVICES | Facility: HOSPITAL | Age: 43
LOS: 1 days | End: 2023-10-05
Payer: COMMERCIAL

## 2023-10-05 VITALS
TEMPERATURE: 98 F | HEART RATE: 72 BPM | DIASTOLIC BLOOD PRESSURE: 68 MMHG | RESPIRATION RATE: 16 BRPM | OXYGEN SATURATION: 98 % | SYSTOLIC BLOOD PRESSURE: 105 MMHG | WEIGHT: 149.91 LBS | HEIGHT: 67 IN

## 2023-10-05 VITALS
HEART RATE: 78 BPM | RESPIRATION RATE: 15 BRPM | DIASTOLIC BLOOD PRESSURE: 62 MMHG | TEMPERATURE: 98 F | OXYGEN SATURATION: 99 % | SYSTOLIC BLOOD PRESSURE: 112 MMHG

## 2023-10-05 DIAGNOSIS — C69.90 MALIGNANT NEOPLASM OF UNSPECIFIED SITE OF UNSPECIFIED EYE: ICD-10-CM

## 2023-10-05 LAB
ALBUMIN SERPL ELPH-MCNC: 3.5 G/DL — SIGNIFICANT CHANGE UP (ref 3.3–5)
ALP SERPL-CCNC: 85 U/L — SIGNIFICANT CHANGE UP (ref 40–120)
ALT FLD-CCNC: 24 U/L — SIGNIFICANT CHANGE UP (ref 10–45)
ANION GAP SERPL CALC-SCNC: 6 MMOL/L — SIGNIFICANT CHANGE UP (ref 5–17)
AST SERPL-CCNC: 29 U/L — SIGNIFICANT CHANGE UP (ref 10–40)
BILIRUB SERPL-MCNC: 0.7 MG/DL — SIGNIFICANT CHANGE UP (ref 0.2–1.2)
BUN SERPL-MCNC: 13 MG/DL — SIGNIFICANT CHANGE UP (ref 7–23)
CALCIUM SERPL-MCNC: 9.4 MG/DL — SIGNIFICANT CHANGE UP (ref 8.4–10.5)
CHLORIDE SERPL-SCNC: 109 MMOL/L — HIGH (ref 96–108)
CO2 SERPL-SCNC: 27 MMOL/L — SIGNIFICANT CHANGE UP (ref 22–31)
CREAT SERPL-MCNC: 0.9 MG/DL — SIGNIFICANT CHANGE UP (ref 0.5–1.3)
EGFR: 80 ML/MIN/1.73M2 — SIGNIFICANT CHANGE UP
GLUCOSE SERPL-MCNC: 83 MG/DL — SIGNIFICANT CHANGE UP (ref 70–99)
HCT VFR BLD CALC: 36.7 % — SIGNIFICANT CHANGE UP (ref 34.5–45)
HGB BLD-MCNC: 12.4 G/DL — SIGNIFICANT CHANGE UP (ref 11.5–15.5)
LDH SERPL L TO P-CCNC: 240 U/L — SIGNIFICANT CHANGE UP (ref 50–242)
LYMPHOCYTES # BLD AUTO: 1 K/UL — SIGNIFICANT CHANGE UP (ref 1–3.3)
LYMPHOCYTES # BLD AUTO: 13.2 % — SIGNIFICANT CHANGE UP (ref 13–44)
MCHC RBC-ENTMCNC: 29.6 PG — SIGNIFICANT CHANGE UP (ref 27–34)
MCHC RBC-ENTMCNC: 33.8 GM/DL — SIGNIFICANT CHANGE UP (ref 32–36)
MCV RBC AUTO: 87.6 FL — SIGNIFICANT CHANGE UP (ref 80–100)
NEUTROPHILS # BLD AUTO: 5.5 K/UL — SIGNIFICANT CHANGE UP (ref 1.8–7.4)
NEUTROPHILS NFR BLD AUTO: 69.3 % — SIGNIFICANT CHANGE UP (ref 43–77)
PLATELET # BLD AUTO: 323 K/UL — SIGNIFICANT CHANGE UP (ref 150–400)
POTASSIUM SERPL-MCNC: 4.6 MMOL/L — SIGNIFICANT CHANGE UP (ref 3.5–5.3)
POTASSIUM SERPL-SCNC: 4.6 MMOL/L — SIGNIFICANT CHANGE UP (ref 3.5–5.3)
PROT SERPL-MCNC: 7.5 G/DL — SIGNIFICANT CHANGE UP (ref 6–8.3)
RBC # BLD: 4.19 M/UL — SIGNIFICANT CHANGE UP (ref 3.8–5.2)
RBC # FLD: 12.9 % — SIGNIFICANT CHANGE UP (ref 10.3–14.5)
SODIUM SERPL-SCNC: 142 MMOL/L — SIGNIFICANT CHANGE UP (ref 135–145)
WBC # BLD: 7.9 K/UL — SIGNIFICANT CHANGE UP (ref 3.8–10.5)
WBC # FLD AUTO: 7.9 K/UL — SIGNIFICANT CHANGE UP (ref 3.8–10.5)

## 2023-10-05 PROCEDURE — 83615 LACTATE (LD) (LDH) ENZYME: CPT

## 2023-10-05 PROCEDURE — 80053 COMPREHEN METABOLIC PANEL: CPT

## 2023-10-05 PROCEDURE — 36415 COLL VENOUS BLD VENIPUNCTURE: CPT

## 2023-10-05 PROCEDURE — 96375 TX/PRO/DX INJ NEW DRUG ADDON: CPT

## 2023-10-05 PROCEDURE — 85025 COMPLETE CBC W/AUTO DIFF WBC: CPT

## 2023-10-05 PROCEDURE — P9047: CPT

## 2023-10-05 PROCEDURE — 96409 CHEMO IV PUSH SNGL DRUG: CPT

## 2023-10-05 RX ORDER — DEXAMETHASONE 0.5 MG/5ML
12 ELIXIR ORAL ONCE
Refills: 0 | Status: COMPLETED | OUTPATIENT
Start: 2023-10-05 | End: 2023-10-05

## 2023-10-05 RX ORDER — TEBENTAFUSP 100 UG/.5ML
68 INJECTION, SOLUTION, CONCENTRATE INTRAVENOUS ONCE
Refills: 0 | Status: COMPLETED | OUTPATIENT
Start: 2023-10-05 | End: 2023-10-05

## 2023-10-05 RX ADMIN — Medication 212 MILLIGRAM(S): at 17:04

## 2023-10-05 RX ADMIN — TEBENTAFUSP 68 MICROGRAM(S): 100 INJECTION, SOLUTION, CONCENTRATE INTRAVENOUS at 17:20

## 2023-10-05 RX ADMIN — Medication 12 MILLIGRAM(S): at 17:19

## 2023-10-05 RX ADMIN — TEBENTAFUSP 68 MICROGRAM(S): 100 INJECTION, SOLUTION, CONCENTRATE INTRAVENOUS at 17:35

## 2023-10-10 ENCOUNTER — APPOINTMENT (OUTPATIENT)
Dept: INTERVENTIONAL RADIOLOGY/VASCULAR | Facility: HOSPITAL | Age: 43
End: 2023-10-10

## 2023-10-12 ENCOUNTER — APPOINTMENT (OUTPATIENT)
Dept: INFUSION THERAPY | Facility: CLINIC | Age: 43
End: 2023-10-12

## 2023-10-12 ENCOUNTER — OUTPATIENT (OUTPATIENT)
Dept: OUTPATIENT SERVICES | Facility: HOSPITAL | Age: 43
LOS: 1 days | End: 2023-10-12
Payer: COMMERCIAL

## 2023-10-12 VITALS
TEMPERATURE: 98 F | DIASTOLIC BLOOD PRESSURE: 67 MMHG | HEART RATE: 76 BPM | OXYGEN SATURATION: 97 % | SYSTOLIC BLOOD PRESSURE: 94 MMHG | RESPIRATION RATE: 17 BRPM

## 2023-10-12 DIAGNOSIS — C69.90 MALIGNANT NEOPLASM OF UNSPECIFIED SITE OF UNSPECIFIED EYE: ICD-10-CM

## 2023-10-12 LAB
ALBUMIN SERPL ELPH-MCNC: 4.1 G/DL — SIGNIFICANT CHANGE UP (ref 3.3–5)
ALP SERPL-CCNC: 109 U/L — SIGNIFICANT CHANGE UP (ref 40–120)
ALT FLD-CCNC: 30 U/L — SIGNIFICANT CHANGE UP (ref 10–45)
ANION GAP SERPL CALC-SCNC: 9 MMOL/L — SIGNIFICANT CHANGE UP (ref 5–17)
AST SERPL-CCNC: 28 U/L — SIGNIFICANT CHANGE UP (ref 10–40)
BASOPHILS # BLD AUTO: 0.09 K/UL — SIGNIFICANT CHANGE UP (ref 0–0.2)
BASOPHILS NFR BLD AUTO: 1.1 % — SIGNIFICANT CHANGE UP (ref 0–2)
BILIRUB SERPL-MCNC: 0.7 MG/DL — SIGNIFICANT CHANGE UP (ref 0.2–1.2)
BUN SERPL-MCNC: 10 MG/DL — SIGNIFICANT CHANGE UP (ref 7–23)
CALCIUM SERPL-MCNC: 9.5 MG/DL — SIGNIFICANT CHANGE UP (ref 8.4–10.5)
CHLORIDE SERPL-SCNC: 102 MMOL/L — SIGNIFICANT CHANGE UP (ref 96–108)
CO2 SERPL-SCNC: 25 MMOL/L — SIGNIFICANT CHANGE UP (ref 22–31)
CREAT SERPL-MCNC: 0.65 MG/DL — SIGNIFICANT CHANGE UP (ref 0.5–1.3)
EGFR: 111 ML/MIN/1.73M2 — SIGNIFICANT CHANGE UP
EOSINOPHIL # BLD AUTO: 0.48 K/UL — SIGNIFICANT CHANGE UP (ref 0–0.5)
EOSINOPHIL NFR BLD AUTO: 5.8 % — SIGNIFICANT CHANGE UP (ref 0–6)
GLUCOSE SERPL-MCNC: 93 MG/DL — SIGNIFICANT CHANGE UP (ref 70–99)
HCT VFR BLD CALC: 40 % — SIGNIFICANT CHANGE UP (ref 34.5–45)
HGB BLD-MCNC: 13.4 G/DL — SIGNIFICANT CHANGE UP (ref 11.5–15.5)
IMM GRANULOCYTES NFR BLD AUTO: 0.8 % — SIGNIFICANT CHANGE UP (ref 0–0.9)
LDH SERPL L TO P-CCNC: 217 U/L — SIGNIFICANT CHANGE UP (ref 50–242)
LYMPHOCYTES # BLD AUTO: 1.1 K/UL — SIGNIFICANT CHANGE UP (ref 1–3.3)
LYMPHOCYTES # BLD AUTO: 13.3 % — SIGNIFICANT CHANGE UP (ref 13–44)
MCHC RBC-ENTMCNC: 29.5 PG — SIGNIFICANT CHANGE UP (ref 27–34)
MCHC RBC-ENTMCNC: 33.5 GM/DL — SIGNIFICANT CHANGE UP (ref 32–36)
MCV RBC AUTO: 88.1 FL — SIGNIFICANT CHANGE UP (ref 80–100)
MONOCYTES # BLD AUTO: 0.73 K/UL — SIGNIFICANT CHANGE UP (ref 0–0.9)
MONOCYTES NFR BLD AUTO: 8.8 % — SIGNIFICANT CHANGE UP (ref 2–14)
NEUTROPHILS # BLD AUTO: 5.79 K/UL — SIGNIFICANT CHANGE UP (ref 1.8–7.4)
NEUTROPHILS NFR BLD AUTO: 70.2 % — SIGNIFICANT CHANGE UP (ref 43–77)
NRBC # BLD: 0 /100 WBCS — SIGNIFICANT CHANGE UP (ref 0–0)
PLATELET # BLD AUTO: 331 K/UL — SIGNIFICANT CHANGE UP (ref 150–400)
POTASSIUM SERPL-MCNC: 3.9 MMOL/L — SIGNIFICANT CHANGE UP (ref 3.5–5.3)
POTASSIUM SERPL-SCNC: 3.9 MMOL/L — SIGNIFICANT CHANGE UP (ref 3.5–5.3)
PROT SERPL-MCNC: 7.8 G/DL — SIGNIFICANT CHANGE UP (ref 6–8.3)
RBC # BLD: 4.54 M/UL — SIGNIFICANT CHANGE UP (ref 3.8–5.2)
RBC # FLD: 12.7 % — SIGNIFICANT CHANGE UP (ref 10.3–14.5)
SODIUM SERPL-SCNC: 136 MMOL/L — SIGNIFICANT CHANGE UP (ref 135–145)
WBC # BLD: 8.26 K/UL — SIGNIFICANT CHANGE UP (ref 3.8–10.5)
WBC # FLD AUTO: 8.26 K/UL — SIGNIFICANT CHANGE UP (ref 3.8–10.5)

## 2023-10-12 PROCEDURE — 36415 COLL VENOUS BLD VENIPUNCTURE: CPT

## 2023-10-12 PROCEDURE — P9047: CPT

## 2023-10-12 PROCEDURE — 80053 COMPREHEN METABOLIC PANEL: CPT

## 2023-10-12 PROCEDURE — 85025 COMPLETE CBC W/AUTO DIFF WBC: CPT

## 2023-10-12 PROCEDURE — 83615 LACTATE (LD) (LDH) ENZYME: CPT

## 2023-10-12 PROCEDURE — 96413 CHEMO IV INFUSION 1 HR: CPT

## 2023-10-12 PROCEDURE — 96375 TX/PRO/DX INJ NEW DRUG ADDON: CPT

## 2023-10-12 RX ORDER — DEXAMETHASONE 0.5 MG/5ML
12 ELIXIR ORAL ONCE
Refills: 0 | Status: COMPLETED | OUTPATIENT
Start: 2023-10-12 | End: 2023-10-12

## 2023-10-12 RX ORDER — TEBENTAFUSP 100 UG/.5ML
68 INJECTION, SOLUTION, CONCENTRATE INTRAVENOUS ONCE
Refills: 0 | Status: COMPLETED | OUTPATIENT
Start: 2023-10-12 | End: 2023-10-12

## 2023-10-12 RX ADMIN — Medication 212 MILLIGRAM(S): at 17:25

## 2023-10-12 RX ADMIN — TEBENTAFUSP 68 MICROGRAM(S): 100 INJECTION, SOLUTION, CONCENTRATE INTRAVENOUS at 17:50

## 2023-10-12 RX ADMIN — Medication 12 MILLIGRAM(S): at 17:40

## 2023-10-12 RX ADMIN — TEBENTAFUSP 68 MICROGRAM(S): 100 INJECTION, SOLUTION, CONCENTRATE INTRAVENOUS at 18:10

## 2023-10-24 ENCOUNTER — OUTPATIENT (OUTPATIENT)
Dept: OUTPATIENT SERVICES | Facility: HOSPITAL | Age: 43
LOS: 1 days | End: 2023-10-24
Payer: COMMERCIAL

## 2023-10-24 ENCOUNTER — APPOINTMENT (OUTPATIENT)
Dept: INFUSION THERAPY | Facility: CLINIC | Age: 43
End: 2023-10-24

## 2023-10-24 ENCOUNTER — APPOINTMENT (OUTPATIENT)
Dept: HEMATOLOGY ONCOLOGY | Facility: CLINIC | Age: 43
End: 2023-10-24
Payer: COMMERCIAL

## 2023-10-24 VITALS
HEART RATE: 88 BPM | WEIGHT: 149.91 LBS | HEIGHT: 67 IN | OXYGEN SATURATION: 98 % | TEMPERATURE: 98 F | DIASTOLIC BLOOD PRESSURE: 78 MMHG | RESPIRATION RATE: 18 BRPM | SYSTOLIC BLOOD PRESSURE: 110 MMHG

## 2023-10-24 VITALS
DIASTOLIC BLOOD PRESSURE: 74 MMHG | HEART RATE: 80 BPM | TEMPERATURE: 98 F | RESPIRATION RATE: 18 BRPM | SYSTOLIC BLOOD PRESSURE: 107 MMHG | OXYGEN SATURATION: 98 %

## 2023-10-24 VITALS
WEIGHT: 150 LBS | RESPIRATION RATE: 18 BRPM | DIASTOLIC BLOOD PRESSURE: 83 MMHG | HEART RATE: 71 BPM | OXYGEN SATURATION: 100 % | BODY MASS INDEX: 22.73 KG/M2 | TEMPERATURE: 98.4 F | HEIGHT: 68 IN | SYSTOLIC BLOOD PRESSURE: 120 MMHG

## 2023-10-24 DIAGNOSIS — C69.90 MALIGNANT NEOPLASM OF UNSPECIFIED SITE OF UNSPECIFIED EYE: ICD-10-CM

## 2023-10-24 LAB
ALBUMIN SERPL ELPH-MCNC: 3.3 G/DL — SIGNIFICANT CHANGE UP (ref 3.3–5)
ALBUMIN SERPL ELPH-MCNC: 3.3 G/DL — SIGNIFICANT CHANGE UP (ref 3.3–5)
ALP SERPL-CCNC: 107 U/L — SIGNIFICANT CHANGE UP (ref 40–120)
ALP SERPL-CCNC: 107 U/L — SIGNIFICANT CHANGE UP (ref 40–120)
ALT FLD-CCNC: 32 U/L — SIGNIFICANT CHANGE UP (ref 10–45)
ALT FLD-CCNC: 32 U/L — SIGNIFICANT CHANGE UP (ref 10–45)
ANION GAP SERPL CALC-SCNC: 3 MMOL/L — LOW (ref 5–17)
ANION GAP SERPL CALC-SCNC: 3 MMOL/L — LOW (ref 5–17)
AST SERPL-CCNC: 38 U/L — SIGNIFICANT CHANGE UP (ref 10–40)
AST SERPL-CCNC: 38 U/L — SIGNIFICANT CHANGE UP (ref 10–40)
BILIRUB SERPL-MCNC: 0.7 MG/DL — SIGNIFICANT CHANGE UP (ref 0.2–1.2)
BILIRUB SERPL-MCNC: 0.7 MG/DL — SIGNIFICANT CHANGE UP (ref 0.2–1.2)
BUN SERPL-MCNC: 12 MG/DL — SIGNIFICANT CHANGE UP (ref 7–23)
BUN SERPL-MCNC: 12 MG/DL — SIGNIFICANT CHANGE UP (ref 7–23)
CALCIUM SERPL-MCNC: 9.1 MG/DL — SIGNIFICANT CHANGE UP (ref 8.4–10.5)
CALCIUM SERPL-MCNC: 9.1 MG/DL — SIGNIFICANT CHANGE UP (ref 8.4–10.5)
CHLORIDE SERPL-SCNC: 105 MMOL/L — SIGNIFICANT CHANGE UP (ref 96–108)
CHLORIDE SERPL-SCNC: 105 MMOL/L — SIGNIFICANT CHANGE UP (ref 96–108)
CO2 SERPL-SCNC: 29 MMOL/L — SIGNIFICANT CHANGE UP (ref 22–31)
CO2 SERPL-SCNC: 29 MMOL/L — SIGNIFICANT CHANGE UP (ref 22–31)
CREAT SERPL-MCNC: 0.6 MG/DL — SIGNIFICANT CHANGE UP (ref 0.5–1.3)
CREAT SERPL-MCNC: 0.6 MG/DL — SIGNIFICANT CHANGE UP (ref 0.5–1.3)
EGFR: 113 ML/MIN/1.73M2 — SIGNIFICANT CHANGE UP
EGFR: 113 ML/MIN/1.73M2 — SIGNIFICANT CHANGE UP
GLUCOSE SERPL-MCNC: 99 MG/DL — SIGNIFICANT CHANGE UP (ref 70–99)
GLUCOSE SERPL-MCNC: 99 MG/DL — SIGNIFICANT CHANGE UP (ref 70–99)
HCT VFR BLD CALC: 37.4 % — SIGNIFICANT CHANGE UP (ref 34.5–45)
HCT VFR BLD CALC: 37.4 % — SIGNIFICANT CHANGE UP (ref 34.5–45)
HGB BLD-MCNC: 12.4 G/DL — SIGNIFICANT CHANGE UP (ref 11.5–15.5)
HGB BLD-MCNC: 12.4 G/DL — SIGNIFICANT CHANGE UP (ref 11.5–15.5)
LDH SERPL L TO P-CCNC: 205 U/L — SIGNIFICANT CHANGE UP (ref 50–242)
LDH SERPL L TO P-CCNC: 205 U/L — SIGNIFICANT CHANGE UP (ref 50–242)
LYMPHOCYTES # BLD AUTO: 1.2 K/UL — SIGNIFICANT CHANGE UP (ref 1–3.3)
LYMPHOCYTES # BLD AUTO: 1.2 K/UL — SIGNIFICANT CHANGE UP (ref 1–3.3)
LYMPHOCYTES # BLD AUTO: 18.2 % — SIGNIFICANT CHANGE UP (ref 13–44)
LYMPHOCYTES # BLD AUTO: 18.2 % — SIGNIFICANT CHANGE UP (ref 13–44)
MCHC RBC-ENTMCNC: 29.1 PG — SIGNIFICANT CHANGE UP (ref 27–34)
MCHC RBC-ENTMCNC: 29.1 PG — SIGNIFICANT CHANGE UP (ref 27–34)
MCHC RBC-ENTMCNC: 33.2 GM/DL — SIGNIFICANT CHANGE UP (ref 32–36)
MCHC RBC-ENTMCNC: 33.2 GM/DL — SIGNIFICANT CHANGE UP (ref 32–36)
MCV RBC AUTO: 87.8 FL — SIGNIFICANT CHANGE UP (ref 80–100)
MCV RBC AUTO: 87.8 FL — SIGNIFICANT CHANGE UP (ref 80–100)
NEUTROPHILS # BLD AUTO: 5 K/UL — SIGNIFICANT CHANGE UP (ref 1.8–7.4)
NEUTROPHILS # BLD AUTO: 5 K/UL — SIGNIFICANT CHANGE UP (ref 1.8–7.4)
NEUTROPHILS NFR BLD AUTO: 72.5 % — SIGNIFICANT CHANGE UP (ref 43–77)
NEUTROPHILS NFR BLD AUTO: 72.5 % — SIGNIFICANT CHANGE UP (ref 43–77)
PLATELET # BLD AUTO: 312 K/UL — SIGNIFICANT CHANGE UP (ref 150–400)
PLATELET # BLD AUTO: 312 K/UL — SIGNIFICANT CHANGE UP (ref 150–400)
POTASSIUM SERPL-MCNC: 4.3 MMOL/L — SIGNIFICANT CHANGE UP (ref 3.5–5.3)
POTASSIUM SERPL-MCNC: 4.3 MMOL/L — SIGNIFICANT CHANGE UP (ref 3.5–5.3)
POTASSIUM SERPL-SCNC: 4.3 MMOL/L — SIGNIFICANT CHANGE UP (ref 3.5–5.3)
POTASSIUM SERPL-SCNC: 4.3 MMOL/L — SIGNIFICANT CHANGE UP (ref 3.5–5.3)
PROT SERPL-MCNC: 7.2 G/DL — SIGNIFICANT CHANGE UP (ref 6–8.3)
PROT SERPL-MCNC: 7.2 G/DL — SIGNIFICANT CHANGE UP (ref 6–8.3)
RBC # BLD: 4.26 M/UL — SIGNIFICANT CHANGE UP (ref 3.8–5.2)
RBC # BLD: 4.26 M/UL — SIGNIFICANT CHANGE UP (ref 3.8–5.2)
RBC # FLD: 13.1 % — SIGNIFICANT CHANGE UP (ref 10.3–14.5)
RBC # FLD: 13.1 % — SIGNIFICANT CHANGE UP (ref 10.3–14.5)
SODIUM SERPL-SCNC: 137 MMOL/L — SIGNIFICANT CHANGE UP (ref 135–145)
SODIUM SERPL-SCNC: 137 MMOL/L — SIGNIFICANT CHANGE UP (ref 135–145)
WBC # BLD: 6.8 K/UL — SIGNIFICANT CHANGE UP (ref 3.8–10.5)
WBC # BLD: 6.8 K/UL — SIGNIFICANT CHANGE UP (ref 3.8–10.5)
WBC # FLD AUTO: 6.8 K/UL — SIGNIFICANT CHANGE UP (ref 3.8–10.5)
WBC # FLD AUTO: 6.8 K/UL — SIGNIFICANT CHANGE UP (ref 3.8–10.5)

## 2023-10-24 PROCEDURE — 96409 CHEMO IV PUSH SNGL DRUG: CPT

## 2023-10-24 PROCEDURE — 85025 COMPLETE CBC W/AUTO DIFF WBC: CPT

## 2023-10-24 PROCEDURE — 36415 COLL VENOUS BLD VENIPUNCTURE: CPT

## 2023-10-24 PROCEDURE — 99213 OFFICE O/P EST LOW 20 MIN: CPT

## 2023-10-24 PROCEDURE — 83615 LACTATE (LD) (LDH) ENZYME: CPT

## 2023-10-24 PROCEDURE — 80053 COMPREHEN METABOLIC PANEL: CPT

## 2023-10-24 PROCEDURE — P9047: CPT

## 2023-10-24 RX ORDER — TEBENTAFUSP 100 UG/.5ML
68 INJECTION, SOLUTION, CONCENTRATE INTRAVENOUS ONCE
Refills: 0 | Status: COMPLETED | OUTPATIENT
Start: 2023-10-24 | End: 2023-10-24

## 2023-10-24 RX ORDER — DEXAMETHASONE 0.5 MG/5ML
12 ELIXIR ORAL ONCE
Refills: 0 | Status: COMPLETED | OUTPATIENT
Start: 2023-10-24 | End: 2023-10-24

## 2023-10-24 RX ADMIN — TEBENTAFUSP 68 MICROGRAM(S): 100 INJECTION, SOLUTION, CONCENTRATE INTRAVENOUS at 16:02

## 2023-10-24 RX ADMIN — TEBENTAFUSP 68 MICROGRAM(S): 100 INJECTION, SOLUTION, CONCENTRATE INTRAVENOUS at 16:17

## 2023-10-25 ENCOUNTER — NON-APPOINTMENT (OUTPATIENT)
Age: 43
End: 2023-10-25

## 2023-10-25 DIAGNOSIS — G47.00 INSOMNIA, UNSPECIFIED: ICD-10-CM

## 2023-10-31 RX ORDER — TEBENTAFUSP 100 UG/.5ML
68 INJECTION, SOLUTION, CONCENTRATE INTRAVENOUS ONCE
Refills: 0 | Status: COMPLETED | OUTPATIENT
Start: 2023-11-08 | End: 2023-11-08

## 2023-11-01 ENCOUNTER — OUTPATIENT (OUTPATIENT)
Dept: OUTPATIENT SERVICES | Facility: HOSPITAL | Age: 43
LOS: 1 days | End: 2023-11-01
Payer: COMMERCIAL

## 2023-11-01 ENCOUNTER — APPOINTMENT (OUTPATIENT)
Dept: INFUSION THERAPY | Facility: CLINIC | Age: 43
End: 2023-11-01

## 2023-11-01 VITALS
DIASTOLIC BLOOD PRESSURE: 74 MMHG | WEIGHT: 149.91 LBS | HEIGHT: 67 IN | SYSTOLIC BLOOD PRESSURE: 108 MMHG | RESPIRATION RATE: 18 BRPM | HEART RATE: 95 BPM | TEMPERATURE: 98 F | OXYGEN SATURATION: 100 %

## 2023-11-01 VITALS
SYSTOLIC BLOOD PRESSURE: 106 MMHG | OXYGEN SATURATION: 99 % | RESPIRATION RATE: 18 BRPM | TEMPERATURE: 98 F | DIASTOLIC BLOOD PRESSURE: 70 MMHG | HEART RATE: 90 BPM

## 2023-11-01 DIAGNOSIS — C69.90 MALIGNANT NEOPLASM OF UNSPECIFIED SITE OF UNSPECIFIED EYE: ICD-10-CM

## 2023-11-01 PROCEDURE — P9047: CPT

## 2023-11-01 PROCEDURE — 96409 CHEMO IV PUSH SNGL DRUG: CPT

## 2023-11-01 RX ORDER — TEBENTAFUSP 100 UG/.5ML
68 INJECTION, SOLUTION, CONCENTRATE INTRAVENOUS ONCE
Refills: 0 | Status: COMPLETED | OUTPATIENT
Start: 2023-11-01 | End: 2023-11-01

## 2023-11-01 RX ADMIN — TEBENTAFUSP 68 MICROGRAM(S): 100 INJECTION, SOLUTION, CONCENTRATE INTRAVENOUS at 15:02

## 2023-11-01 RX ADMIN — TEBENTAFUSP 68 MICROGRAM(S): 100 INJECTION, SOLUTION, CONCENTRATE INTRAVENOUS at 14:47

## 2023-11-08 ENCOUNTER — APPOINTMENT (OUTPATIENT)
Dept: INFUSION THERAPY | Facility: CLINIC | Age: 43
End: 2023-11-08

## 2023-11-08 ENCOUNTER — OUTPATIENT (OUTPATIENT)
Dept: OUTPATIENT SERVICES | Facility: HOSPITAL | Age: 43
LOS: 1 days | End: 2023-11-08
Payer: COMMERCIAL

## 2023-11-08 VITALS
SYSTOLIC BLOOD PRESSURE: 118 MMHG | HEART RATE: 78 BPM | OXYGEN SATURATION: 99 % | RESPIRATION RATE: 18 BRPM | TEMPERATURE: 97 F | DIASTOLIC BLOOD PRESSURE: 80 MMHG

## 2023-11-08 DIAGNOSIS — C69.90 MALIGNANT NEOPLASM OF UNSPECIFIED SITE OF UNSPECIFIED EYE: ICD-10-CM

## 2023-11-08 PROCEDURE — 96413 CHEMO IV INFUSION 1 HR: CPT

## 2023-11-08 PROCEDURE — P9047: CPT

## 2023-11-08 RX ADMIN — TEBENTAFUSP 68 MICROGRAM(S): 100 INJECTION, SOLUTION, CONCENTRATE INTRAVENOUS at 14:30

## 2023-11-08 RX ADMIN — TEBENTAFUSP 68 MICROGRAM(S): 100 INJECTION, SOLUTION, CONCENTRATE INTRAVENOUS at 14:10

## 2023-11-16 ENCOUNTER — APPOINTMENT (OUTPATIENT)
Dept: INFUSION THERAPY | Facility: CLINIC | Age: 43
End: 2023-11-16

## 2023-11-16 ENCOUNTER — OUTPATIENT (OUTPATIENT)
Dept: OUTPATIENT SERVICES | Facility: HOSPITAL | Age: 43
LOS: 1 days | End: 2023-11-16
Payer: COMMERCIAL

## 2023-11-16 VITALS
DIASTOLIC BLOOD PRESSURE: 80 MMHG | SYSTOLIC BLOOD PRESSURE: 118 MMHG | RESPIRATION RATE: 18 BRPM | TEMPERATURE: 97 F | OXYGEN SATURATION: 99 % | HEART RATE: 78 BPM

## 2023-11-16 DIAGNOSIS — C69.90 MALIGNANT NEOPLASM OF UNSPECIFIED SITE OF UNSPECIFIED EYE: ICD-10-CM

## 2023-11-16 LAB
ALBUMIN SERPL ELPH-MCNC: 3.4 G/DL — SIGNIFICANT CHANGE UP (ref 3.3–5)
ALBUMIN SERPL ELPH-MCNC: 3.4 G/DL — SIGNIFICANT CHANGE UP (ref 3.3–5)
ALP SERPL-CCNC: 104 U/L — SIGNIFICANT CHANGE UP (ref 40–120)
ALP SERPL-CCNC: 104 U/L — SIGNIFICANT CHANGE UP (ref 40–120)
ALT FLD-CCNC: 43 U/L — SIGNIFICANT CHANGE UP (ref 10–45)
ALT FLD-CCNC: 43 U/L — SIGNIFICANT CHANGE UP (ref 10–45)
ANION GAP SERPL CALC-SCNC: 10 MMOL/L — SIGNIFICANT CHANGE UP (ref 5–17)
ANION GAP SERPL CALC-SCNC: 10 MMOL/L — SIGNIFICANT CHANGE UP (ref 5–17)
AST SERPL-CCNC: 44 U/L — HIGH (ref 10–40)
AST SERPL-CCNC: 44 U/L — HIGH (ref 10–40)
BILIRUB SERPL-MCNC: 0.8 MG/DL — SIGNIFICANT CHANGE UP (ref 0.2–1.2)
BILIRUB SERPL-MCNC: 0.8 MG/DL — SIGNIFICANT CHANGE UP (ref 0.2–1.2)
BUN SERPL-MCNC: 9 MG/DL — SIGNIFICANT CHANGE UP (ref 7–23)
BUN SERPL-MCNC: 9 MG/DL — SIGNIFICANT CHANGE UP (ref 7–23)
CALCIUM SERPL-MCNC: 9.3 MG/DL — SIGNIFICANT CHANGE UP (ref 8.4–10.5)
CALCIUM SERPL-MCNC: 9.3 MG/DL — SIGNIFICANT CHANGE UP (ref 8.4–10.5)
CHLORIDE SERPL-SCNC: 104 MMOL/L — SIGNIFICANT CHANGE UP (ref 96–108)
CHLORIDE SERPL-SCNC: 104 MMOL/L — SIGNIFICANT CHANGE UP (ref 96–108)
CO2 SERPL-SCNC: 27 MMOL/L — SIGNIFICANT CHANGE UP (ref 22–31)
CO2 SERPL-SCNC: 27 MMOL/L — SIGNIFICANT CHANGE UP (ref 22–31)
CREAT SERPL-MCNC: 0.6 MG/DL — SIGNIFICANT CHANGE UP (ref 0.5–1.3)
CREAT SERPL-MCNC: 0.6 MG/DL — SIGNIFICANT CHANGE UP (ref 0.5–1.3)
EGFR: 113 ML/MIN/1.73M2 — SIGNIFICANT CHANGE UP
EGFR: 113 ML/MIN/1.73M2 — SIGNIFICANT CHANGE UP
GLUCOSE SERPL-MCNC: 92 MG/DL — SIGNIFICANT CHANGE UP (ref 70–99)
GLUCOSE SERPL-MCNC: 92 MG/DL — SIGNIFICANT CHANGE UP (ref 70–99)
HCT VFR BLD CALC: 41.7 % — SIGNIFICANT CHANGE UP (ref 34.5–45)
HCT VFR BLD CALC: 41.7 % — SIGNIFICANT CHANGE UP (ref 34.5–45)
HGB BLD-MCNC: 13.8 G/DL — SIGNIFICANT CHANGE UP (ref 11.5–15.5)
HGB BLD-MCNC: 13.8 G/DL — SIGNIFICANT CHANGE UP (ref 11.5–15.5)
LDH SERPL L TO P-CCNC: 193 U/L — SIGNIFICANT CHANGE UP (ref 50–242)
LDH SERPL L TO P-CCNC: 193 U/L — SIGNIFICANT CHANGE UP (ref 50–242)
LYMPHOCYTES # BLD AUTO: 1.3 K/UL — SIGNIFICANT CHANGE UP (ref 1–3.3)
LYMPHOCYTES # BLD AUTO: 1.3 K/UL — SIGNIFICANT CHANGE UP (ref 1–3.3)
LYMPHOCYTES # BLD AUTO: 23.7 % — SIGNIFICANT CHANGE UP (ref 13–44)
LYMPHOCYTES # BLD AUTO: 23.7 % — SIGNIFICANT CHANGE UP (ref 13–44)
MCHC RBC-ENTMCNC: 29.1 PG — SIGNIFICANT CHANGE UP (ref 27–34)
MCHC RBC-ENTMCNC: 29.1 PG — SIGNIFICANT CHANGE UP (ref 27–34)
MCHC RBC-ENTMCNC: 33.1 GM/DL — SIGNIFICANT CHANGE UP (ref 32–36)
MCHC RBC-ENTMCNC: 33.1 GM/DL — SIGNIFICANT CHANGE UP (ref 32–36)
MCV RBC AUTO: 87.8 FL — SIGNIFICANT CHANGE UP (ref 80–100)
MCV RBC AUTO: 87.8 FL — SIGNIFICANT CHANGE UP (ref 80–100)
NEUTROPHILS # BLD AUTO: 3.8 K/UL — SIGNIFICANT CHANGE UP (ref 1.8–7.4)
NEUTROPHILS # BLD AUTO: 3.8 K/UL — SIGNIFICANT CHANGE UP (ref 1.8–7.4)
NEUTROPHILS NFR BLD AUTO: 66.9 % — SIGNIFICANT CHANGE UP (ref 43–77)
NEUTROPHILS NFR BLD AUTO: 66.9 % — SIGNIFICANT CHANGE UP (ref 43–77)
PLATELET # BLD AUTO: 365 K/UL — SIGNIFICANT CHANGE UP (ref 150–400)
PLATELET # BLD AUTO: 365 K/UL — SIGNIFICANT CHANGE UP (ref 150–400)
POTASSIUM SERPL-MCNC: 4.1 MMOL/L — SIGNIFICANT CHANGE UP (ref 3.5–5.3)
POTASSIUM SERPL-MCNC: 4.1 MMOL/L — SIGNIFICANT CHANGE UP (ref 3.5–5.3)
POTASSIUM SERPL-SCNC: 4.1 MMOL/L — SIGNIFICANT CHANGE UP (ref 3.5–5.3)
POTASSIUM SERPL-SCNC: 4.1 MMOL/L — SIGNIFICANT CHANGE UP (ref 3.5–5.3)
PROT SERPL-MCNC: 7.6 G/DL — SIGNIFICANT CHANGE UP (ref 6–8.3)
PROT SERPL-MCNC: 7.6 G/DL — SIGNIFICANT CHANGE UP (ref 6–8.3)
RBC # BLD: 4.75 M/UL — SIGNIFICANT CHANGE UP (ref 3.8–5.2)
RBC # BLD: 4.75 M/UL — SIGNIFICANT CHANGE UP (ref 3.8–5.2)
RBC # FLD: 13.1 % — SIGNIFICANT CHANGE UP (ref 10.3–14.5)
RBC # FLD: 13.1 % — SIGNIFICANT CHANGE UP (ref 10.3–14.5)
SODIUM SERPL-SCNC: 141 MMOL/L — SIGNIFICANT CHANGE UP (ref 135–145)
SODIUM SERPL-SCNC: 141 MMOL/L — SIGNIFICANT CHANGE UP (ref 135–145)
WBC # BLD: 5.6 K/UL — SIGNIFICANT CHANGE UP (ref 3.8–10.5)
WBC # BLD: 5.6 K/UL — SIGNIFICANT CHANGE UP (ref 3.8–10.5)
WBC # FLD AUTO: 5.6 K/UL — SIGNIFICANT CHANGE UP (ref 3.8–10.5)
WBC # FLD AUTO: 5.6 K/UL — SIGNIFICANT CHANGE UP (ref 3.8–10.5)

## 2023-11-16 PROCEDURE — 80053 COMPREHEN METABOLIC PANEL: CPT

## 2023-11-16 PROCEDURE — 36415 COLL VENOUS BLD VENIPUNCTURE: CPT

## 2023-11-16 PROCEDURE — 85025 COMPLETE CBC W/AUTO DIFF WBC: CPT

## 2023-11-16 PROCEDURE — 83615 LACTATE (LD) (LDH) ENZYME: CPT

## 2023-11-16 PROCEDURE — P9047: CPT

## 2023-11-16 PROCEDURE — 96413 CHEMO IV INFUSION 1 HR: CPT

## 2023-11-16 RX ORDER — TEBENTAFUSP 100 UG/.5ML
68 INJECTION, SOLUTION, CONCENTRATE INTRAVENOUS ONCE
Refills: 0 | Status: COMPLETED | OUTPATIENT
Start: 2023-11-16 | End: 2023-11-16

## 2023-11-16 RX ADMIN — TEBENTAFUSP 68 MICROGRAM(S): 100 INJECTION, SOLUTION, CONCENTRATE INTRAVENOUS at 15:22

## 2023-11-16 RX ADMIN — TEBENTAFUSP 68 MICROGRAM(S): 100 INJECTION, SOLUTION, CONCENTRATE INTRAVENOUS at 15:45

## 2023-11-17 ENCOUNTER — APPOINTMENT (OUTPATIENT)
Dept: MRI IMAGING | Facility: HOSPITAL | Age: 43
End: 2023-11-17

## 2023-11-17 ENCOUNTER — OUTPATIENT (OUTPATIENT)
Dept: OUTPATIENT SERVICES | Facility: HOSPITAL | Age: 43
LOS: 1 days | End: 2023-11-17
Payer: COMMERCIAL

## 2023-11-17 ENCOUNTER — RESULT REVIEW (OUTPATIENT)
Age: 43
End: 2023-11-17

## 2023-11-17 PROCEDURE — A9585: CPT

## 2023-11-17 PROCEDURE — 74183 MRI ABD W/O CNTR FLWD CNTR: CPT | Mod: 26

## 2023-11-17 PROCEDURE — 74183 MRI ABD W/O CNTR FLWD CNTR: CPT

## 2023-11-22 ENCOUNTER — APPOINTMENT (OUTPATIENT)
Dept: INTERVENTIONAL RADIOLOGY/VASCULAR | Facility: HOSPITAL | Age: 43
End: 2023-11-22

## 2023-11-22 PROCEDURE — XXXXX: CPT | Mod: 1L

## 2023-12-05 ENCOUNTER — APPOINTMENT (OUTPATIENT)
Dept: INFUSION THERAPY | Facility: CLINIC | Age: 43
End: 2023-12-05

## 2023-12-05 ENCOUNTER — OUTPATIENT (OUTPATIENT)
Dept: OUTPATIENT SERVICES | Facility: HOSPITAL | Age: 43
LOS: 1 days | End: 2023-12-05
Payer: COMMERCIAL

## 2023-12-05 ENCOUNTER — APPOINTMENT (OUTPATIENT)
Dept: HEMATOLOGY ONCOLOGY | Facility: CLINIC | Age: 43
End: 2023-12-05
Payer: COMMERCIAL

## 2023-12-05 VITALS
HEART RATE: 110 BPM | RESPIRATION RATE: 18 BRPM | TEMPERATURE: 100.1 F | BODY MASS INDEX: 22.88 KG/M2 | OXYGEN SATURATION: 98 % | WEIGHT: 151 LBS | HEIGHT: 68 IN | SYSTOLIC BLOOD PRESSURE: 123 MMHG | DIASTOLIC BLOOD PRESSURE: 87 MMHG

## 2023-12-05 VITALS
DIASTOLIC BLOOD PRESSURE: 90 MMHG | WEIGHT: 151.02 LBS | SYSTOLIC BLOOD PRESSURE: 127 MMHG | RESPIRATION RATE: 18 BRPM | OXYGEN SATURATION: 97 % | HEART RATE: 115 BPM | HEIGHT: 67 IN | TEMPERATURE: 98 F

## 2023-12-05 LAB
ALBUMIN SERPL ELPH-MCNC: 3.7 G/DL — SIGNIFICANT CHANGE UP (ref 3.3–5)
ALBUMIN SERPL ELPH-MCNC: 3.7 G/DL — SIGNIFICANT CHANGE UP (ref 3.3–5)
ALP SERPL-CCNC: 93 U/L — SIGNIFICANT CHANGE UP (ref 40–120)
ALP SERPL-CCNC: 93 U/L — SIGNIFICANT CHANGE UP (ref 40–120)
ALT FLD-CCNC: 16 U/L — SIGNIFICANT CHANGE UP (ref 10–45)
ALT FLD-CCNC: 16 U/L — SIGNIFICANT CHANGE UP (ref 10–45)
ANION GAP SERPL CALC-SCNC: 9 MMOL/L — SIGNIFICANT CHANGE UP (ref 5–17)
ANION GAP SERPL CALC-SCNC: 9 MMOL/L — SIGNIFICANT CHANGE UP (ref 5–17)
AST SERPL-CCNC: 24 U/L — SIGNIFICANT CHANGE UP (ref 10–40)
AST SERPL-CCNC: 24 U/L — SIGNIFICANT CHANGE UP (ref 10–40)
BILIRUB SERPL-MCNC: 0.8 MG/DL — SIGNIFICANT CHANGE UP (ref 0.2–1.2)
BILIRUB SERPL-MCNC: 0.8 MG/DL — SIGNIFICANT CHANGE UP (ref 0.2–1.2)
BUN SERPL-MCNC: 11 MG/DL — SIGNIFICANT CHANGE UP (ref 7–23)
BUN SERPL-MCNC: 11 MG/DL — SIGNIFICANT CHANGE UP (ref 7–23)
CALCIUM SERPL-MCNC: 9.6 MG/DL — SIGNIFICANT CHANGE UP (ref 8.4–10.5)
CALCIUM SERPL-MCNC: 9.6 MG/DL — SIGNIFICANT CHANGE UP (ref 8.4–10.5)
CHLORIDE SERPL-SCNC: 105 MMOL/L — SIGNIFICANT CHANGE UP (ref 96–108)
CHLORIDE SERPL-SCNC: 105 MMOL/L — SIGNIFICANT CHANGE UP (ref 96–108)
CO2 SERPL-SCNC: 25 MMOL/L — SIGNIFICANT CHANGE UP (ref 22–31)
CO2 SERPL-SCNC: 25 MMOL/L — SIGNIFICANT CHANGE UP (ref 22–31)
CREAT SERPL-MCNC: 0.9 MG/DL — SIGNIFICANT CHANGE UP (ref 0.5–1.3)
CREAT SERPL-MCNC: 0.9 MG/DL — SIGNIFICANT CHANGE UP (ref 0.5–1.3)
EGFR: 80 ML/MIN/1.73M2 — SIGNIFICANT CHANGE UP
EGFR: 80 ML/MIN/1.73M2 — SIGNIFICANT CHANGE UP
GLUCOSE SERPL-MCNC: 106 MG/DL — HIGH (ref 70–99)
GLUCOSE SERPL-MCNC: 106 MG/DL — HIGH (ref 70–99)
HCT VFR BLD CALC: 37.2 % — SIGNIFICANT CHANGE UP (ref 34.5–45)
HCT VFR BLD CALC: 37.2 % — SIGNIFICANT CHANGE UP (ref 34.5–45)
HGB BLD-MCNC: 12.6 G/DL — SIGNIFICANT CHANGE UP (ref 11.5–15.5)
HGB BLD-MCNC: 12.6 G/DL — SIGNIFICANT CHANGE UP (ref 11.5–15.5)
ISTAT TOTAL BETA HCG, PLASMA: <5 IU/L — SIGNIFICANT CHANGE UP
ISTAT TOTAL BETA HCG, PLASMA: <5 IU/L — SIGNIFICANT CHANGE UP
LDH SERPL L TO P-CCNC: 238 U/L — SIGNIFICANT CHANGE UP (ref 50–242)
LDH SERPL L TO P-CCNC: 238 U/L — SIGNIFICANT CHANGE UP (ref 50–242)
LYMPHOCYTES # BLD AUTO: 0.9 K/UL — LOW (ref 1–3.3)
LYMPHOCYTES # BLD AUTO: 0.9 K/UL — LOW (ref 1–3.3)
LYMPHOCYTES # BLD AUTO: 12.2 % — LOW (ref 13–44)
LYMPHOCYTES # BLD AUTO: 12.2 % — LOW (ref 13–44)
MCHC RBC-ENTMCNC: 29.2 PG — SIGNIFICANT CHANGE UP (ref 27–34)
MCHC RBC-ENTMCNC: 29.2 PG — SIGNIFICANT CHANGE UP (ref 27–34)
MCHC RBC-ENTMCNC: 33.9 GM/DL — SIGNIFICANT CHANGE UP (ref 32–36)
MCHC RBC-ENTMCNC: 33.9 GM/DL — SIGNIFICANT CHANGE UP (ref 32–36)
MCV RBC AUTO: 86.3 FL — SIGNIFICANT CHANGE UP (ref 80–100)
MCV RBC AUTO: 86.3 FL — SIGNIFICANT CHANGE UP (ref 80–100)
NEUTROPHILS # BLD AUTO: 5.5 K/UL — SIGNIFICANT CHANGE UP (ref 1.8–7.4)
NEUTROPHILS # BLD AUTO: 5.5 K/UL — SIGNIFICANT CHANGE UP (ref 1.8–7.4)
NEUTROPHILS NFR BLD AUTO: 74.8 % — SIGNIFICANT CHANGE UP (ref 43–77)
NEUTROPHILS NFR BLD AUTO: 74.8 % — SIGNIFICANT CHANGE UP (ref 43–77)
PLATELET # BLD AUTO: 257 K/UL — SIGNIFICANT CHANGE UP (ref 150–400)
PLATELET # BLD AUTO: 257 K/UL — SIGNIFICANT CHANGE UP (ref 150–400)
POTASSIUM SERPL-MCNC: 3.3 MMOL/L — LOW (ref 3.5–5.3)
POTASSIUM SERPL-MCNC: 3.3 MMOL/L — LOW (ref 3.5–5.3)
POTASSIUM SERPL-SCNC: 3.3 MMOL/L — LOW (ref 3.5–5.3)
POTASSIUM SERPL-SCNC: 3.3 MMOL/L — LOW (ref 3.5–5.3)
PROT SERPL-MCNC: 7.1 G/DL — SIGNIFICANT CHANGE UP (ref 6–8.3)
PROT SERPL-MCNC: 7.1 G/DL — SIGNIFICANT CHANGE UP (ref 6–8.3)
RBC # BLD: 4.31 M/UL — SIGNIFICANT CHANGE UP (ref 3.8–5.2)
RBC # BLD: 4.31 M/UL — SIGNIFICANT CHANGE UP (ref 3.8–5.2)
RBC # FLD: 13 % — SIGNIFICANT CHANGE UP (ref 10.3–14.5)
RBC # FLD: 13 % — SIGNIFICANT CHANGE UP (ref 10.3–14.5)
SODIUM SERPL-SCNC: 139 MMOL/L — SIGNIFICANT CHANGE UP (ref 135–145)
SODIUM SERPL-SCNC: 139 MMOL/L — SIGNIFICANT CHANGE UP (ref 135–145)
WBC # BLD: 7.3 K/UL — SIGNIFICANT CHANGE UP (ref 3.8–10.5)
WBC # BLD: 7.3 K/UL — SIGNIFICANT CHANGE UP (ref 3.8–10.5)
WBC # FLD AUTO: 7.3 K/UL — SIGNIFICANT CHANGE UP (ref 3.8–10.5)
WBC # FLD AUTO: 7.3 K/UL — SIGNIFICANT CHANGE UP (ref 3.8–10.5)

## 2023-12-05 PROCEDURE — 84702 CHORIONIC GONADOTROPIN TEST: CPT

## 2023-12-05 PROCEDURE — 36415 COLL VENOUS BLD VENIPUNCTURE: CPT

## 2023-12-05 PROCEDURE — 99214 OFFICE O/P EST MOD 30 MIN: CPT

## 2023-12-05 PROCEDURE — P9047: CPT

## 2023-12-05 PROCEDURE — 80053 COMPREHEN METABOLIC PANEL: CPT

## 2023-12-05 PROCEDURE — 96413 CHEMO IV INFUSION 1 HR: CPT

## 2023-12-05 PROCEDURE — 85025 COMPLETE CBC W/AUTO DIFF WBC: CPT

## 2023-12-05 PROCEDURE — 83615 LACTATE (LD) (LDH) ENZYME: CPT

## 2023-12-05 RX ORDER — TEBENTAFUSP 100 UG/.5ML
68 INJECTION, SOLUTION, CONCENTRATE INTRAVENOUS ONCE
Refills: 0 | Status: COMPLETED | OUTPATIENT
Start: 2023-12-05 | End: 2023-12-05

## 2023-12-05 RX ADMIN — TEBENTAFUSP 68 MICROGRAM(S): 100 INJECTION, SOLUTION, CONCENTRATE INTRAVENOUS at 15:49

## 2023-12-05 RX ADMIN — TEBENTAFUSP 68 MICROGRAM(S): 100 INJECTION, SOLUTION, CONCENTRATE INTRAVENOUS at 16:13

## 2023-12-07 DIAGNOSIS — C69.90 MALIGNANT NEOPLASM OF UNSPECIFIED SITE OF UNSPECIFIED EYE: ICD-10-CM

## 2023-12-12 ENCOUNTER — OUTPATIENT (OUTPATIENT)
Dept: OUTPATIENT SERVICES | Facility: HOSPITAL | Age: 43
LOS: 1 days | End: 2023-12-12
Payer: COMMERCIAL

## 2023-12-12 ENCOUNTER — APPOINTMENT (OUTPATIENT)
Dept: INFUSION THERAPY | Facility: CLINIC | Age: 43
End: 2023-12-12

## 2023-12-12 ENCOUNTER — APPOINTMENT (OUTPATIENT)
Dept: HEMATOLOGY ONCOLOGY | Facility: CLINIC | Age: 43
End: 2023-12-12
Payer: COMMERCIAL

## 2023-12-12 VITALS
TEMPERATURE: 98 F | DIASTOLIC BLOOD PRESSURE: 78 MMHG | SYSTOLIC BLOOD PRESSURE: 120 MMHG | RESPIRATION RATE: 17 BRPM | HEART RATE: 86 BPM | OXYGEN SATURATION: 99 %

## 2023-12-12 VITALS
OXYGEN SATURATION: 98 % | WEIGHT: 149.91 LBS | TEMPERATURE: 98 F | DIASTOLIC BLOOD PRESSURE: 82 MMHG | SYSTOLIC BLOOD PRESSURE: 122 MMHG | HEIGHT: 67 IN | RESPIRATION RATE: 16 BRPM | HEART RATE: 88 BPM

## 2023-12-12 PROCEDURE — 96413 CHEMO IV INFUSION 1 HR: CPT

## 2023-12-12 PROCEDURE — P9047: CPT

## 2023-12-12 PROCEDURE — 99214 OFFICE O/P EST MOD 30 MIN: CPT

## 2023-12-12 RX ORDER — TEBENTAFUSP 100 UG/.5ML
68 INJECTION, SOLUTION, CONCENTRATE INTRAVENOUS ONCE
Refills: 0 | Status: COMPLETED | OUTPATIENT
Start: 2023-12-12 | End: 2023-12-12

## 2023-12-12 RX ADMIN — TEBENTAFUSP 68 MICROGRAM(S): 100 INJECTION, SOLUTION, CONCENTRATE INTRAVENOUS at 15:06

## 2023-12-12 RX ADMIN — TEBENTAFUSP 68 MICROGRAM(S): 100 INJECTION, SOLUTION, CONCENTRATE INTRAVENOUS at 15:25

## 2023-12-12 NOTE — DISCHARGE INSTRUCTIONS: CHEMOTHERAPY - NSAPPTSFOLLOWUP_HEME_A_AMB
[facility]... [facility]...

## 2023-12-12 NOTE — DISCHARGE INSTRUCTIONS: CHEMOTHERAPY - NSFACILITYCONTAFTRHOUR_HEME_A_AMB
[facility] ([phone]) [facility] ([phone])

## 2023-12-13 DIAGNOSIS — C69.90 MALIGNANT NEOPLASM OF UNSPECIFIED SITE OF UNSPECIFIED EYE: ICD-10-CM

## 2023-12-14 NOTE — HISTORY OF PRESENT ILLNESS
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 FoundationOne Liquid CDx (11/27/23) - 3 Muts/Mb, MSI-high not detected, ctDNA tumor fraction low (<1%).  CURRENT THERAPY: Tebentafusp since 03/12/2023  PRIOR THERAPIES: 9/21/23 Y90 radioembolization of liver  INTERVAL HISTORY: Mrs. Hfof is a 43 year old female with a history of primary uveal melanoma, now with newly diagnosed metastatic disease with liver only involvement who presents for continued management of her disease.  She is HLA  positive.  Briefly, she initially presented in January 2018 with vision changes in her right eye, with later loss of temporal and upper visual field vision.  She was evaluated by Dr. Konstantin Barros at the [facility] and was found to have a subretinal mass in her right eye with a greated basal diameter of 14.43 mm and a maximal height of 7 mm.  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  She was treated with Ru-106 brachytherapy from 01/30/2018 until 02/06/2018.  She was subsequently placed on expectant observation for systemic recurrence.  She did well until, on 01/29/2023, she underwent an abdominal ultrasound which was significant for a new focal liver lesion measuring 3 cm.  A subsequent chest, abdominal and pelvic CT scan, performed on 01/30/2023, demonstrated a segment 7 liver lesion with arterial enhancement and other subcentimeter hepatic lesions measuring up to 4 mm of unclear etiology.  On 02/01/2023, she underwent a liver biopsy, with pathology consistent with melanoma.  Immunohistochemistry was positive for SOX10 and MART1.  An abdominal MRI, performed on 02/16/2023, demonstrated a dominant segment 7 lesion in the liver, with multiple other smaller subcentimeter lesions bilaterally.  She was evaluated at the [facility] on 02/20/2023 by Dr. Miguelito Truong, with recommendations for HLA testing and possible tebentafusp if appropriate or combination checkpoint blockade.  She was found to be HLA A*02:01 positive and initiated therapy with tebentafusp on 03/12/2023, and she has now completed her first three inpatient doses, with her most recent dose administered on 03/26/2023.  She experienced mild rash and periorbital edema, with IV fluids administered for mild hypotension with her first dose, but otherwise tolerated therapy without difficulty.  On 04/17/2023, she underwent a new baseline chest and pelvis CT as well as a liver MRI.  The MRI demonstrated metastatic lesions affecting both lobes of the liver, with some worsening when compared with the prior scans from 02/16/2023 and with the largest lesion now measuring 4.5 x 3.5 cm in segment 7.  She had MRI abdomen and CT CAP on 6/12/23, showing stable disease.    She continues on weekly tebentafusp.  She typically develops low grade fever around 6 hours after infusion. She goes to sleep and it resolves. She has noted vitiligo like skin changes around her left index finger.  She had transient erythema of the feet and chest.  She reported her vision is blurrier at her last visit.  She has had orbital edema in the past, had Avastin injections and cataract surgery.  She saw Dr. Osborn on 8/28, advised that eye exam was normal, retina healed well.  She will continue ophtho follow up every 6 months.  She had repeat imaging done on 8/18/23 which showed interval increase in size of the hepatic segment 7 metastatic lesion and persistent numerous additional scattered small hemorrhagic liver metastases. 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  She underwent mapping on 9/7/23 and Y90 radioembolization on 9/21/23 with JEROMY Viera. Repeat MRI abdomen performed on 11/17/23 showed post radiation changes and stable liver lesions.  She continues on weekly tebentafusp.  PAST MEDICAL HISTORY: None  SOCIAL HISTORY: The patient has recently moved from [facility] and is now living in [place].  Her partner currently resides in the [place] but is considering relocating.  She works in the [employer].  FAMILY HISTORY: Her maternal grandfather had mucosal melanoma arising from the stomach.  [de-identified] : She reports feeling well overall, although she admits that the side effects seem more pronounced after a treatment that has been delayed due to travel - with more fatigue, chills, fevers, which have resolved now that she has readjusted and resumed her weekly treatments. She is very interested in pursuing PHP, and has been reading about the treatment.

## 2023-12-19 ENCOUNTER — OUTPATIENT (OUTPATIENT)
Dept: OUTPATIENT SERVICES | Facility: HOSPITAL | Age: 43
LOS: 1 days | End: 2023-12-19
Payer: COMMERCIAL

## 2023-12-19 ENCOUNTER — APPOINTMENT (OUTPATIENT)
Dept: INFUSION THERAPY | Facility: CLINIC | Age: 43
End: 2023-12-19

## 2023-12-19 ENCOUNTER — APPOINTMENT (OUTPATIENT)
Dept: HEMATOLOGY ONCOLOGY | Facility: CLINIC | Age: 43
End: 2023-12-19
Payer: COMMERCIAL

## 2023-12-19 VITALS
DIASTOLIC BLOOD PRESSURE: 74 MMHG | SYSTOLIC BLOOD PRESSURE: 114 MMHG | RESPIRATION RATE: 18 BRPM | HEART RATE: 74 BPM | TEMPERATURE: 98 F | OXYGEN SATURATION: 99 %

## 2023-12-19 VITALS
RESPIRATION RATE: 18 BRPM | HEIGHT: 67 IN | WEIGHT: 149.91 LBS | TEMPERATURE: 97 F | SYSTOLIC BLOOD PRESSURE: 117 MMHG | OXYGEN SATURATION: 99 % | DIASTOLIC BLOOD PRESSURE: 74 MMHG | HEART RATE: 78 BPM

## 2023-12-19 LAB
ALBUMIN SERPL ELPH-MCNC: 3.3 G/DL — SIGNIFICANT CHANGE UP (ref 3.3–5)
ALBUMIN SERPL ELPH-MCNC: 3.3 G/DL — SIGNIFICANT CHANGE UP (ref 3.3–5)
ALP SERPL-CCNC: 93 U/L — SIGNIFICANT CHANGE UP (ref 40–120)
ALP SERPL-CCNC: 93 U/L — SIGNIFICANT CHANGE UP (ref 40–120)
ALT FLD-CCNC: 35 U/L — SIGNIFICANT CHANGE UP (ref 10–45)
ALT FLD-CCNC: 35 U/L — SIGNIFICANT CHANGE UP (ref 10–45)
ANION GAP SERPL CALC-SCNC: 1 MMOL/L — LOW (ref 5–17)
ANION GAP SERPL CALC-SCNC: 1 MMOL/L — LOW (ref 5–17)
AST SERPL-CCNC: 37 U/L — SIGNIFICANT CHANGE UP (ref 10–40)
AST SERPL-CCNC: 37 U/L — SIGNIFICANT CHANGE UP (ref 10–40)
BILIRUB SERPL-MCNC: 0.8 MG/DL — SIGNIFICANT CHANGE UP (ref 0.2–1.2)
BILIRUB SERPL-MCNC: 0.8 MG/DL — SIGNIFICANT CHANGE UP (ref 0.2–1.2)
BUN SERPL-MCNC: 13 MG/DL — SIGNIFICANT CHANGE UP (ref 7–23)
BUN SERPL-MCNC: 13 MG/DL — SIGNIFICANT CHANGE UP (ref 7–23)
CALCIUM SERPL-MCNC: 9.3 MG/DL — SIGNIFICANT CHANGE UP (ref 8.4–10.5)
CALCIUM SERPL-MCNC: 9.3 MG/DL — SIGNIFICANT CHANGE UP (ref 8.4–10.5)
CHLORIDE SERPL-SCNC: 110 MMOL/L — HIGH (ref 96–108)
CHLORIDE SERPL-SCNC: 110 MMOL/L — HIGH (ref 96–108)
CO2 SERPL-SCNC: 29 MMOL/L — SIGNIFICANT CHANGE UP (ref 22–31)
CO2 SERPL-SCNC: 29 MMOL/L — SIGNIFICANT CHANGE UP (ref 22–31)
CREAT SERPL-MCNC: 1 MG/DL — SIGNIFICANT CHANGE UP (ref 0.5–1.3)
CREAT SERPL-MCNC: 1 MG/DL — SIGNIFICANT CHANGE UP (ref 0.5–1.3)
EGFR: 71 ML/MIN/1.73M2 — SIGNIFICANT CHANGE UP
EGFR: 71 ML/MIN/1.73M2 — SIGNIFICANT CHANGE UP
GLUCOSE SERPL-MCNC: 98 MG/DL — SIGNIFICANT CHANGE UP (ref 70–99)
GLUCOSE SERPL-MCNC: 98 MG/DL — SIGNIFICANT CHANGE UP (ref 70–99)
HCT VFR BLD CALC: 40.8 % — SIGNIFICANT CHANGE UP (ref 34.5–45)
HCT VFR BLD CALC: 40.8 % — SIGNIFICANT CHANGE UP (ref 34.5–45)
HGB BLD-MCNC: 13.7 G/DL — SIGNIFICANT CHANGE UP (ref 11.5–15.5)
HGB BLD-MCNC: 13.7 G/DL — SIGNIFICANT CHANGE UP (ref 11.5–15.5)
LDH SERPL L TO P-CCNC: 218 U/L — SIGNIFICANT CHANGE UP (ref 50–242)
LDH SERPL L TO P-CCNC: 218 U/L — SIGNIFICANT CHANGE UP (ref 50–242)
LYMPHOCYTES # BLD AUTO: 1.9 K/UL — SIGNIFICANT CHANGE UP (ref 1–3.3)
LYMPHOCYTES # BLD AUTO: 1.9 K/UL — SIGNIFICANT CHANGE UP (ref 1–3.3)
LYMPHOCYTES # BLD AUTO: 23.7 % — SIGNIFICANT CHANGE UP (ref 13–44)
LYMPHOCYTES # BLD AUTO: 23.7 % — SIGNIFICANT CHANGE UP (ref 13–44)
MCHC RBC-ENTMCNC: 29.1 PG — SIGNIFICANT CHANGE UP (ref 27–34)
MCHC RBC-ENTMCNC: 29.1 PG — SIGNIFICANT CHANGE UP (ref 27–34)
MCHC RBC-ENTMCNC: 33.6 GM/DL — SIGNIFICANT CHANGE UP (ref 32–36)
MCHC RBC-ENTMCNC: 33.6 GM/DL — SIGNIFICANT CHANGE UP (ref 32–36)
MCV RBC AUTO: 86.6 FL — SIGNIFICANT CHANGE UP (ref 80–100)
MCV RBC AUTO: 86.6 FL — SIGNIFICANT CHANGE UP (ref 80–100)
NEUTROPHILS # BLD AUTO: 5.6 K/UL — SIGNIFICANT CHANGE UP (ref 1.8–7.4)
NEUTROPHILS # BLD AUTO: 5.6 K/UL — SIGNIFICANT CHANGE UP (ref 1.8–7.4)
NEUTROPHILS NFR BLD AUTO: 69.6 % — SIGNIFICANT CHANGE UP (ref 43–77)
NEUTROPHILS NFR BLD AUTO: 69.6 % — SIGNIFICANT CHANGE UP (ref 43–77)
PLATELET # BLD AUTO: 327 K/UL — SIGNIFICANT CHANGE UP (ref 150–400)
PLATELET # BLD AUTO: 327 K/UL — SIGNIFICANT CHANGE UP (ref 150–400)
POTASSIUM SERPL-MCNC: 4.6 MMOL/L — SIGNIFICANT CHANGE UP (ref 3.5–5.3)
POTASSIUM SERPL-MCNC: 4.6 MMOL/L — SIGNIFICANT CHANGE UP (ref 3.5–5.3)
POTASSIUM SERPL-SCNC: 4.6 MMOL/L — SIGNIFICANT CHANGE UP (ref 3.5–5.3)
POTASSIUM SERPL-SCNC: 4.6 MMOL/L — SIGNIFICANT CHANGE UP (ref 3.5–5.3)
PROT SERPL-MCNC: 7.5 G/DL — SIGNIFICANT CHANGE UP (ref 6–8.3)
PROT SERPL-MCNC: 7.5 G/DL — SIGNIFICANT CHANGE UP (ref 6–8.3)
RBC # BLD: 4.71 M/UL — SIGNIFICANT CHANGE UP (ref 3.8–5.2)
RBC # BLD: 4.71 M/UL — SIGNIFICANT CHANGE UP (ref 3.8–5.2)
RBC # FLD: 13 % — SIGNIFICANT CHANGE UP (ref 10.3–14.5)
RBC # FLD: 13 % — SIGNIFICANT CHANGE UP (ref 10.3–14.5)
SODIUM SERPL-SCNC: 140 MMOL/L — SIGNIFICANT CHANGE UP (ref 135–145)
SODIUM SERPL-SCNC: 140 MMOL/L — SIGNIFICANT CHANGE UP (ref 135–145)
WBC # BLD: 8 K/UL — SIGNIFICANT CHANGE UP (ref 3.8–10.5)
WBC # BLD: 8 K/UL — SIGNIFICANT CHANGE UP (ref 3.8–10.5)
WBC # FLD AUTO: 8 K/UL — SIGNIFICANT CHANGE UP (ref 3.8–10.5)
WBC # FLD AUTO: 8 K/UL — SIGNIFICANT CHANGE UP (ref 3.8–10.5)

## 2023-12-19 PROCEDURE — 83615 LACTATE (LD) (LDH) ENZYME: CPT

## 2023-12-19 PROCEDURE — 85025 COMPLETE CBC W/AUTO DIFF WBC: CPT

## 2023-12-19 PROCEDURE — P9047: CPT

## 2023-12-19 PROCEDURE — 36415 COLL VENOUS BLD VENIPUNCTURE: CPT

## 2023-12-19 PROCEDURE — 80053 COMPREHEN METABOLIC PANEL: CPT

## 2023-12-19 PROCEDURE — 99213 OFFICE O/P EST LOW 20 MIN: CPT

## 2023-12-19 PROCEDURE — 96413 CHEMO IV INFUSION 1 HR: CPT

## 2023-12-19 RX ORDER — TEBENTAFUSP 100 UG/.5ML
68 INJECTION, SOLUTION, CONCENTRATE INTRAVENOUS ONCE
Refills: 0 | Status: COMPLETED | OUTPATIENT
Start: 2023-12-19 | End: 2023-12-19

## 2023-12-19 RX ADMIN — TEBENTAFUSP 68 MICROGRAM(S): 100 INJECTION, SOLUTION, CONCENTRATE INTRAVENOUS at 15:29

## 2023-12-19 RX ADMIN — TEBENTAFUSP 68 MICROGRAM(S): 100 INJECTION, SOLUTION, CONCENTRATE INTRAVENOUS at 15:08

## 2023-12-19 NOTE — ASSESSMENT
[FreeTextEntry1] : This is a 43 year old HLA  positive female now with an M1a, stage IV, uveal melanoma with liver involvement. She initiated therapy with tebentafusp at [facility] but then moved to [place].  Due to radiographic progression of a dominant site of disease, she underwent Y90 mapping on 9/7 and Y90 radioembolization on 09/21/23 with no issue. Repeat MRI abdomen on November 17, 2023 showed diminished perfusion to the treated mass and likely post-radiation changes/edema and stable liver lesions.  Previously discussed potential additional treatment options, which may include immuno embolization, a change in systemic therapy to ipilimumab and nivolumab, or consideration of trial participation on a study such as the TriSalus study.  Given her interest in percutaneous hepatic perfusion, information regarding the ongoing trials at [facility], particularly Clinical Trial 63870, An Open-Label Expanded Access Study of the Melphalan/Hepatic Delivery System (HDS) in Patients with Hepatic Dominant Ocular Melanoma, was provided to her. Dr. Ruby will also reach out to the investigators of this trial to recommend Ms. Hoff for enrollment in the trial.  Plan: -proceed with tebentafusp treatment today, 12/19/23  -She has an appointment at [facility] on January 22nd -we will arrange for restaging studies prior to her visit to [facility] - All of her questions and concerns were answered to her satisfaction.

## 2023-12-19 NOTE — HISTORY OF PRESENT ILLNESS
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 FoundationOne Liquid CDx (11/27/23) - 3 Muts/Mb, MSI-high not detected, ctDNA tumor fraction low (<1%).  CURRENT THERAPY: Tebentafusp since 03/12/2023  PRIOR THERAPIES: 9/21/23 Y90 radioembolization of liver  INTERVAL HISTORY: Mrs. Hoff is a 43 year old female with a history of primary uveal melanoma, now with newly diagnosed metastatic disease with liver only involvement who presents for continued management of her disease.  She is HLA  positive.  Briefly, she initially presented in January 2018 with vision changes in her right eye, with later loss of temporal and upper visual field vision.  She was evaluated by Dr. Konstantin Barros at the [facility] and was found to have a subretinal mass in her right eye with a greated basal diameter of 14.43 mm and a maximal height of 7 mm.  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  She was treated with Ru-106 brachytherapy from 01/30/2018 until 02/06/2018.  She was subsequently placed on expectant observation for systemic recurrence.  She did well until, on 01/29/2023, she underwent an abdominal ultrasound which was significant for a new focal liver lesion measuring 3 cm.  A subsequent chest, abdominal and pelvic CT scan, performed on 01/30/2023, demonstrated a segment 7 liver lesion with arterial enhancement and other subcentimeter hepatic lesions measuring up to 4 mm of unclear etiology.  On 02/01/2023, she underwent a liver biopsy, with pathology consistent with melanoma.  Immunohistochemistry was positive for SOX10 and MART1.  An abdominal MRI, performed on 02/16/2023, demonstrated a dominant segment 7 lesion in the liver, with multiple other smaller subcentimeter lesions bilaterally.  She was evaluated at the [facility] on 02/20/2023 by Dr. Miguelito Truong, with recommendations for HLA testing and possible tebentafusp if appropriate or combination checkpoint blockade.  She was found to be HLA A*02:01 positive and initiated therapy with tebentafusp on 03/12/2023, and she has now completed her first three inpatient doses, with her most recent dose administered on 03/26/2023.  She experienced mild rash and periorbital edema, with IV fluids administered for mild hypotension with her first dose, but otherwise tolerated therapy without difficulty.  On 04/17/2023, she underwent a new baseline chest and pelvis CT as well as a liver MRI.  The MRI demonstrated metastatic lesions affecting both lobes of the liver, with some worsening when compared with the prior scans from 02/16/2023 and with the largest lesion now measuring 4.5 x 3.5 cm in segment 7.  She had MRI abdomen and CT CAP on 6/12/23, showing stable disease.    She continues on weekly tebentafusp.  She typically develops low grade fever around 6 hours after infusion. She goes to sleep and it resolves. She has noted vitiligo like skin changes around her left index finger.  She had transient erythema of the feet and chest.  She reported her vision is blurrier at her last visit.  She has had orbital edema in the past, had Avastin injections and cataract surgery.  She saw Dr. Osborn on 8/28, advised that eye exam was normal, retina healed well.  She will continue ophtho follow up every 6 months.  She had repeat imaging done on 8/18/23 which showed interval increase in size of the hepatic segment 7 metastatic lesion and persistent numerous additional scattered small hemorrhagic liver metastases. 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  She underwent mapping on 9/7/23 and Y90 radioembolization on 9/21/23 with JEROMY Viera. Repeat MRI abdomen performed on 11/17/23 showed post radiation changes and stable liver lesions.    She continues on weekly tebentafusp.  She is scheduled to see Dr. Miguelito Torres at [facility] on 01/22/2023 for consideration of percutaneous hepatic perfusion therapy.  PAST MEDICAL HISTORY: None  SOCIAL HISTORY: The patient has recently moved from [facility] and is now living in [place].  Her partner currently resides in the [place] but is considering relocating.  She works in the [employer].  FAMILY HISTORY: Her maternal grandfather had mucosal melanoma arising from the stomach.

## 2023-12-20 DIAGNOSIS — C69.90 MALIGNANT NEOPLASM OF UNSPECIFIED SITE OF UNSPECIFIED EYE: ICD-10-CM

## 2023-12-27 ENCOUNTER — RESULT REVIEW (OUTPATIENT)
Age: 43
End: 2023-12-27

## 2024-01-03 ENCOUNTER — APPOINTMENT (OUTPATIENT)
Dept: INFUSION THERAPY | Facility: CLINIC | Age: 44
End: 2024-01-03

## 2024-01-03 ENCOUNTER — OUTPATIENT (OUTPATIENT)
Dept: OUTPATIENT SERVICES | Facility: HOSPITAL | Age: 44
LOS: 1 days | End: 2024-01-03
Payer: COMMERCIAL

## 2024-01-03 VITALS
RESPIRATION RATE: 18 BRPM | DIASTOLIC BLOOD PRESSURE: 72 MMHG | HEART RATE: 71 BPM | OXYGEN SATURATION: 99 % | TEMPERATURE: 97 F | SYSTOLIC BLOOD PRESSURE: 114 MMHG

## 2024-01-03 VITALS
SYSTOLIC BLOOD PRESSURE: 118 MMHG | HEART RATE: 78 BPM | HEIGHT: 67 IN | OXYGEN SATURATION: 99 % | WEIGHT: 149.91 LBS | TEMPERATURE: 97 F | DIASTOLIC BLOOD PRESSURE: 80 MMHG | RESPIRATION RATE: 18 BRPM

## 2024-01-03 DIAGNOSIS — D69.9 HEMORRHAGIC CONDITION, UNSPECIFIED: ICD-10-CM

## 2024-01-03 DIAGNOSIS — C69.90 MALIGNANT NEOPLASM OF UNSPECIFIED SITE OF UNSPECIFIED EYE: ICD-10-CM

## 2024-01-03 LAB
ALBUMIN SERPL ELPH-MCNC: 3.8 G/DL — SIGNIFICANT CHANGE UP (ref 3.3–5)
ALBUMIN SERPL ELPH-MCNC: 3.8 G/DL — SIGNIFICANT CHANGE UP (ref 3.3–5)
ALP SERPL-CCNC: 76 U/L — SIGNIFICANT CHANGE UP (ref 40–120)
ALP SERPL-CCNC: 76 U/L — SIGNIFICANT CHANGE UP (ref 40–120)
ALT FLD-CCNC: 23 U/L — SIGNIFICANT CHANGE UP (ref 10–45)
ALT FLD-CCNC: 23 U/L — SIGNIFICANT CHANGE UP (ref 10–45)
ANION GAP SERPL CALC-SCNC: 1 MMOL/L — LOW (ref 5–17)
ANION GAP SERPL CALC-SCNC: 1 MMOL/L — LOW (ref 5–17)
AST SERPL-CCNC: 35 U/L — SIGNIFICANT CHANGE UP (ref 10–40)
AST SERPL-CCNC: 35 U/L — SIGNIFICANT CHANGE UP (ref 10–40)
BILIRUB SERPL-MCNC: 0.9 MG/DL — SIGNIFICANT CHANGE UP (ref 0.2–1.2)
BILIRUB SERPL-MCNC: 0.9 MG/DL — SIGNIFICANT CHANGE UP (ref 0.2–1.2)
BUN SERPL-MCNC: 10 MG/DL — SIGNIFICANT CHANGE UP (ref 7–23)
BUN SERPL-MCNC: 10 MG/DL — SIGNIFICANT CHANGE UP (ref 7–23)
CALCIUM SERPL-MCNC: 9.8 MG/DL — SIGNIFICANT CHANGE UP (ref 8.4–10.5)
CALCIUM SERPL-MCNC: 9.8 MG/DL — SIGNIFICANT CHANGE UP (ref 8.4–10.5)
CHLORIDE SERPL-SCNC: 110 MMOL/L — HIGH (ref 96–108)
CHLORIDE SERPL-SCNC: 110 MMOL/L — HIGH (ref 96–108)
CO2 SERPL-SCNC: 27 MMOL/L — SIGNIFICANT CHANGE UP (ref 22–31)
CO2 SERPL-SCNC: 27 MMOL/L — SIGNIFICANT CHANGE UP (ref 22–31)
CREAT SERPL-MCNC: 1 MG/DL — SIGNIFICANT CHANGE UP (ref 0.5–1.3)
CREAT SERPL-MCNC: 1 MG/DL — SIGNIFICANT CHANGE UP (ref 0.5–1.3)
EGFR: 71 ML/MIN/1.73M2 — SIGNIFICANT CHANGE UP
EGFR: 71 ML/MIN/1.73M2 — SIGNIFICANT CHANGE UP
GLUCOSE SERPL-MCNC: 97 MG/DL — SIGNIFICANT CHANGE UP (ref 70–99)
GLUCOSE SERPL-MCNC: 97 MG/DL — SIGNIFICANT CHANGE UP (ref 70–99)
HCT VFR BLD CALC: 40.7 % — SIGNIFICANT CHANGE UP (ref 34.5–45)
HCT VFR BLD CALC: 40.7 % — SIGNIFICANT CHANGE UP (ref 34.5–45)
HGB BLD-MCNC: 13.7 G/DL — SIGNIFICANT CHANGE UP (ref 11.5–15.5)
HGB BLD-MCNC: 13.7 G/DL — SIGNIFICANT CHANGE UP (ref 11.5–15.5)
LDH SERPL L TO P-CCNC: SIGNIFICANT CHANGE UP (ref 50–242)
LDH SERPL L TO P-CCNC: SIGNIFICANT CHANGE UP (ref 50–242)
LYMPHOCYTES # BLD AUTO: 2.1 K/UL — SIGNIFICANT CHANGE UP (ref 1–3.3)
LYMPHOCYTES # BLD AUTO: 2.1 K/UL — SIGNIFICANT CHANGE UP (ref 1–3.3)
LYMPHOCYTES # BLD AUTO: 25.9 % — SIGNIFICANT CHANGE UP (ref 13–44)
LYMPHOCYTES # BLD AUTO: 25.9 % — SIGNIFICANT CHANGE UP (ref 13–44)
MCHC RBC-ENTMCNC: 29.5 PG — SIGNIFICANT CHANGE UP (ref 27–34)
MCHC RBC-ENTMCNC: 29.5 PG — SIGNIFICANT CHANGE UP (ref 27–34)
MCHC RBC-ENTMCNC: 33.7 GM/DL — SIGNIFICANT CHANGE UP (ref 32–36)
MCHC RBC-ENTMCNC: 33.7 GM/DL — SIGNIFICANT CHANGE UP (ref 32–36)
MCV RBC AUTO: 87.5 FL — SIGNIFICANT CHANGE UP (ref 80–100)
MCV RBC AUTO: 87.5 FL — SIGNIFICANT CHANGE UP (ref 80–100)
NEUTROPHILS # BLD AUTO: 5.1 K/UL — SIGNIFICANT CHANGE UP (ref 1.8–7.4)
NEUTROPHILS # BLD AUTO: 5.1 K/UL — SIGNIFICANT CHANGE UP (ref 1.8–7.4)
NEUTROPHILS NFR BLD AUTO: 63.4 % — SIGNIFICANT CHANGE UP (ref 43–77)
NEUTROPHILS NFR BLD AUTO: 63.4 % — SIGNIFICANT CHANGE UP (ref 43–77)
PLATELET # BLD AUTO: 314 K/UL — SIGNIFICANT CHANGE UP (ref 150–400)
PLATELET # BLD AUTO: 314 K/UL — SIGNIFICANT CHANGE UP (ref 150–400)
POTASSIUM SERPL-MCNC: 5 MMOL/L — SIGNIFICANT CHANGE UP (ref 3.5–5.3)
POTASSIUM SERPL-MCNC: 5 MMOL/L — SIGNIFICANT CHANGE UP (ref 3.5–5.3)
POTASSIUM SERPL-SCNC: 5 MMOL/L — SIGNIFICANT CHANGE UP (ref 3.5–5.3)
POTASSIUM SERPL-SCNC: 5 MMOL/L — SIGNIFICANT CHANGE UP (ref 3.5–5.3)
PROT SERPL-MCNC: 7.5 G/DL — SIGNIFICANT CHANGE UP (ref 6–8.3)
PROT SERPL-MCNC: 7.5 G/DL — SIGNIFICANT CHANGE UP (ref 6–8.3)
RBC # BLD: 4.65 M/UL — SIGNIFICANT CHANGE UP (ref 3.8–5.2)
RBC # BLD: 4.65 M/UL — SIGNIFICANT CHANGE UP (ref 3.8–5.2)
RBC # FLD: 13.7 % — SIGNIFICANT CHANGE UP (ref 10.3–14.5)
RBC # FLD: 13.7 % — SIGNIFICANT CHANGE UP (ref 10.3–14.5)
SODIUM SERPL-SCNC: 138 MMOL/L — SIGNIFICANT CHANGE UP (ref 135–145)
SODIUM SERPL-SCNC: 138 MMOL/L — SIGNIFICANT CHANGE UP (ref 135–145)
WBC # BLD: 8.1 K/UL — SIGNIFICANT CHANGE UP (ref 3.8–10.5)
WBC # BLD: 8.1 K/UL — SIGNIFICANT CHANGE UP (ref 3.8–10.5)
WBC # FLD AUTO: 8.1 K/UL — SIGNIFICANT CHANGE UP (ref 3.8–10.5)
WBC # FLD AUTO: 8.1 K/UL — SIGNIFICANT CHANGE UP (ref 3.8–10.5)

## 2024-01-03 PROCEDURE — 36415 COLL VENOUS BLD VENIPUNCTURE: CPT

## 2024-01-03 PROCEDURE — 80053 COMPREHEN METABOLIC PANEL: CPT

## 2024-01-03 PROCEDURE — 83615 LACTATE (LD) (LDH) ENZYME: CPT

## 2024-01-03 PROCEDURE — P9047: CPT

## 2024-01-03 PROCEDURE — 96413 CHEMO IV INFUSION 1 HR: CPT

## 2024-01-03 PROCEDURE — 85025 COMPLETE CBC W/AUTO DIFF WBC: CPT

## 2024-01-03 RX ORDER — TEBENTAFUSP 100 UG/.5ML
68 INJECTION, SOLUTION, CONCENTRATE INTRAVENOUS ONCE
Refills: 0 | Status: COMPLETED | OUTPATIENT
Start: 2024-01-03 | End: 2024-01-03

## 2024-01-03 RX ADMIN — TEBENTAFUSP 68 MICROGRAM(S): 100 INJECTION, SOLUTION, CONCENTRATE INTRAVENOUS at 16:25

## 2024-01-03 RX ADMIN — TEBENTAFUSP 68 MICROGRAM(S): 100 INJECTION, SOLUTION, CONCENTRATE INTRAVENOUS at 16:53

## 2024-01-09 ENCOUNTER — APPOINTMENT (OUTPATIENT)
Dept: INFUSION THERAPY | Facility: CLINIC | Age: 44
End: 2024-01-09

## 2024-01-09 ENCOUNTER — OUTPATIENT (OUTPATIENT)
Dept: OUTPATIENT SERVICES | Facility: HOSPITAL | Age: 44
LOS: 1 days | End: 2024-01-09
Payer: COMMERCIAL

## 2024-01-09 ENCOUNTER — APPOINTMENT (OUTPATIENT)
Dept: HEMATOLOGY ONCOLOGY | Facility: CLINIC | Age: 44
End: 2024-01-09
Payer: COMMERCIAL

## 2024-01-09 VITALS
WEIGHT: 149.91 LBS | SYSTOLIC BLOOD PRESSURE: 116 MMHG | DIASTOLIC BLOOD PRESSURE: 75 MMHG | HEIGHT: 67 IN | RESPIRATION RATE: 16 BRPM | OXYGEN SATURATION: 97 % | HEART RATE: 78 BPM | TEMPERATURE: 98 F

## 2024-01-09 VITALS
HEART RATE: 94 BPM | BODY MASS INDEX: 22.73 KG/M2 | RESPIRATION RATE: 18 BRPM | TEMPERATURE: 96.3 F | OXYGEN SATURATION: 95 % | SYSTOLIC BLOOD PRESSURE: 119 MMHG | HEIGHT: 68 IN | WEIGHT: 150 LBS | DIASTOLIC BLOOD PRESSURE: 75 MMHG

## 2024-01-09 LAB
ALBUMIN SERPL ELPH-MCNC: 3.5 G/DL — SIGNIFICANT CHANGE UP (ref 3.3–5)
ALBUMIN SERPL ELPH-MCNC: 3.5 G/DL — SIGNIFICANT CHANGE UP (ref 3.3–5)
ALP SERPL-CCNC: 88 U/L — SIGNIFICANT CHANGE UP (ref 40–120)
ALP SERPL-CCNC: 88 U/L — SIGNIFICANT CHANGE UP (ref 40–120)
ALT FLD-CCNC: 21 U/L — SIGNIFICANT CHANGE UP (ref 10–45)
ALT FLD-CCNC: 21 U/L — SIGNIFICANT CHANGE UP (ref 10–45)
ANION GAP SERPL CALC-SCNC: 2 MMOL/L — LOW (ref 5–17)
ANION GAP SERPL CALC-SCNC: 2 MMOL/L — LOW (ref 5–17)
AST SERPL-CCNC: 30 U/L — SIGNIFICANT CHANGE UP (ref 10–40)
AST SERPL-CCNC: 30 U/L — SIGNIFICANT CHANGE UP (ref 10–40)
BILIRUB SERPL-MCNC: 0.6 MG/DL — SIGNIFICANT CHANGE UP (ref 0.2–1.2)
BILIRUB SERPL-MCNC: 0.6 MG/DL — SIGNIFICANT CHANGE UP (ref 0.2–1.2)
BUN SERPL-MCNC: 12 MG/DL — SIGNIFICANT CHANGE UP (ref 7–23)
BUN SERPL-MCNC: 12 MG/DL — SIGNIFICANT CHANGE UP (ref 7–23)
CALCIUM SERPL-MCNC: 9.6 MG/DL — SIGNIFICANT CHANGE UP (ref 8.4–10.5)
CALCIUM SERPL-MCNC: 9.6 MG/DL — SIGNIFICANT CHANGE UP (ref 8.4–10.5)
CHLORIDE SERPL-SCNC: 108 MMOL/L — SIGNIFICANT CHANGE UP (ref 96–108)
CHLORIDE SERPL-SCNC: 108 MMOL/L — SIGNIFICANT CHANGE UP (ref 96–108)
CO2 SERPL-SCNC: 26 MMOL/L — SIGNIFICANT CHANGE UP (ref 22–31)
CO2 SERPL-SCNC: 26 MMOL/L — SIGNIFICANT CHANGE UP (ref 22–31)
CREAT SERPL-MCNC: 1 MG/DL — SIGNIFICANT CHANGE UP (ref 0.5–1.3)
CREAT SERPL-MCNC: 1 MG/DL — SIGNIFICANT CHANGE UP (ref 0.5–1.3)
EGFR: 71 ML/MIN/1.73M2 — SIGNIFICANT CHANGE UP
EGFR: 71 ML/MIN/1.73M2 — SIGNIFICANT CHANGE UP
GLUCOSE SERPL-MCNC: 108 MG/DL — HIGH (ref 70–99)
GLUCOSE SERPL-MCNC: 108 MG/DL — HIGH (ref 70–99)
HCT VFR BLD CALC: 38.5 % — SIGNIFICANT CHANGE UP (ref 34.5–45)
HCT VFR BLD CALC: 38.5 % — SIGNIFICANT CHANGE UP (ref 34.5–45)
HGB BLD-MCNC: 12.9 G/DL — SIGNIFICANT CHANGE UP (ref 11.5–15.5)
HGB BLD-MCNC: 12.9 G/DL — SIGNIFICANT CHANGE UP (ref 11.5–15.5)
LDH SERPL L TO P-CCNC: 183 U/L — SIGNIFICANT CHANGE UP (ref 50–242)
LDH SERPL L TO P-CCNC: 183 U/L — SIGNIFICANT CHANGE UP (ref 50–242)
LYMPHOCYTES # BLD AUTO: 1.8 K/UL — SIGNIFICANT CHANGE UP (ref 1–3.3)
LYMPHOCYTES # BLD AUTO: 1.8 K/UL — SIGNIFICANT CHANGE UP (ref 1–3.3)
LYMPHOCYTES # BLD AUTO: 28.6 % — SIGNIFICANT CHANGE UP (ref 13–44)
LYMPHOCYTES # BLD AUTO: 28.6 % — SIGNIFICANT CHANGE UP (ref 13–44)
MCHC RBC-ENTMCNC: 29.2 PG — SIGNIFICANT CHANGE UP (ref 27–34)
MCHC RBC-ENTMCNC: 29.2 PG — SIGNIFICANT CHANGE UP (ref 27–34)
MCHC RBC-ENTMCNC: 33.5 GM/DL — SIGNIFICANT CHANGE UP (ref 32–36)
MCHC RBC-ENTMCNC: 33.5 GM/DL — SIGNIFICANT CHANGE UP (ref 32–36)
MCV RBC AUTO: 87.1 FL — SIGNIFICANT CHANGE UP (ref 80–100)
MCV RBC AUTO: 87.1 FL — SIGNIFICANT CHANGE UP (ref 80–100)
NEUTROPHILS # BLD AUTO: 3.7 K/UL — SIGNIFICANT CHANGE UP (ref 1.8–7.4)
NEUTROPHILS # BLD AUTO: 3.7 K/UL — SIGNIFICANT CHANGE UP (ref 1.8–7.4)
NEUTROPHILS NFR BLD AUTO: 58.1 % — SIGNIFICANT CHANGE UP (ref 43–77)
NEUTROPHILS NFR BLD AUTO: 58.1 % — SIGNIFICANT CHANGE UP (ref 43–77)
PLATELET # BLD AUTO: 296 K/UL — SIGNIFICANT CHANGE UP (ref 150–400)
PLATELET # BLD AUTO: 296 K/UL — SIGNIFICANT CHANGE UP (ref 150–400)
POTASSIUM SERPL-MCNC: 3.9 MMOL/L — SIGNIFICANT CHANGE UP (ref 3.5–5.3)
POTASSIUM SERPL-MCNC: 3.9 MMOL/L — SIGNIFICANT CHANGE UP (ref 3.5–5.3)
POTASSIUM SERPL-SCNC: 3.9 MMOL/L — SIGNIFICANT CHANGE UP (ref 3.5–5.3)
POTASSIUM SERPL-SCNC: 3.9 MMOL/L — SIGNIFICANT CHANGE UP (ref 3.5–5.3)
PROT SERPL-MCNC: 7.2 G/DL — SIGNIFICANT CHANGE UP (ref 6–8.3)
PROT SERPL-MCNC: 7.2 G/DL — SIGNIFICANT CHANGE UP (ref 6–8.3)
RBC # BLD: 4.42 M/UL — SIGNIFICANT CHANGE UP (ref 3.8–5.2)
RBC # BLD: 4.42 M/UL — SIGNIFICANT CHANGE UP (ref 3.8–5.2)
RBC # FLD: 13.4 % — SIGNIFICANT CHANGE UP (ref 10.3–14.5)
RBC # FLD: 13.4 % — SIGNIFICANT CHANGE UP (ref 10.3–14.5)
SODIUM SERPL-SCNC: 136 MMOL/L — SIGNIFICANT CHANGE UP (ref 135–145)
SODIUM SERPL-SCNC: 136 MMOL/L — SIGNIFICANT CHANGE UP (ref 135–145)
WBC # BLD: 6.4 K/UL — SIGNIFICANT CHANGE UP (ref 3.8–10.5)
WBC # BLD: 6.4 K/UL — SIGNIFICANT CHANGE UP (ref 3.8–10.5)
WBC # FLD AUTO: 6.4 K/UL — SIGNIFICANT CHANGE UP (ref 3.8–10.5)
WBC # FLD AUTO: 6.4 K/UL — SIGNIFICANT CHANGE UP (ref 3.8–10.5)

## 2024-01-09 PROCEDURE — 80053 COMPREHEN METABOLIC PANEL: CPT

## 2024-01-09 PROCEDURE — 36415 COLL VENOUS BLD VENIPUNCTURE: CPT

## 2024-01-09 PROCEDURE — P9047: CPT

## 2024-01-09 PROCEDURE — 83615 LACTATE (LD) (LDH) ENZYME: CPT

## 2024-01-09 PROCEDURE — 85025 COMPLETE CBC W/AUTO DIFF WBC: CPT

## 2024-01-09 PROCEDURE — 96413 CHEMO IV INFUSION 1 HR: CPT

## 2024-01-09 PROCEDURE — 99213 OFFICE O/P EST LOW 20 MIN: CPT

## 2024-01-09 RX ORDER — TEBENTAFUSP 100 UG/.5ML
68 INJECTION, SOLUTION, CONCENTRATE INTRAVENOUS ONCE
Refills: 0 | Status: COMPLETED | OUTPATIENT
Start: 2024-01-09 | End: 2024-01-09

## 2024-01-09 RX ADMIN — TEBENTAFUSP 68 MICROGRAM(S): 100 INJECTION, SOLUTION, CONCENTRATE INTRAVENOUS at 16:22

## 2024-01-09 RX ADMIN — TEBENTAFUSP 68 MICROGRAM(S): 100 INJECTION, SOLUTION, CONCENTRATE INTRAVENOUS at 16:40

## 2024-01-10 DIAGNOSIS — D45 POLYCYTHEMIA VERA: ICD-10-CM

## 2024-01-15 ENCOUNTER — APPOINTMENT (OUTPATIENT)
Dept: CT IMAGING | Facility: CLINIC | Age: 44
End: 2024-01-15
Payer: COMMERCIAL

## 2024-01-15 ENCOUNTER — OUTPATIENT (OUTPATIENT)
Dept: OUTPATIENT SERVICES | Facility: HOSPITAL | Age: 44
LOS: 1 days | End: 2024-01-15

## 2024-01-15 ENCOUNTER — APPOINTMENT (OUTPATIENT)
Dept: MRI IMAGING | Facility: CLINIC | Age: 44
End: 2024-01-15

## 2024-01-15 PROCEDURE — 72197 MRI PELVIS W/O & W/DYE: CPT | Mod: 26

## 2024-01-15 PROCEDURE — 74183 MRI ABD W/O CNTR FLWD CNTR: CPT | Mod: 26

## 2024-01-16 ENCOUNTER — APPOINTMENT (OUTPATIENT)
Dept: INFUSION THERAPY | Facility: CLINIC | Age: 44
End: 2024-01-16

## 2024-01-16 ENCOUNTER — APPOINTMENT (OUTPATIENT)
Dept: HEMATOLOGY ONCOLOGY | Facility: CLINIC | Age: 44
End: 2024-01-16
Payer: COMMERCIAL

## 2024-01-16 ENCOUNTER — OUTPATIENT (OUTPATIENT)
Dept: OUTPATIENT SERVICES | Facility: HOSPITAL | Age: 44
LOS: 1 days | End: 2024-01-16
Payer: COMMERCIAL

## 2024-01-16 VITALS
TEMPERATURE: 98.2 F | DIASTOLIC BLOOD PRESSURE: 81 MMHG | WEIGHT: 154 LBS | RESPIRATION RATE: 18 BRPM | HEIGHT: 68 IN | SYSTOLIC BLOOD PRESSURE: 117 MMHG | OXYGEN SATURATION: 97 % | BODY MASS INDEX: 23.34 KG/M2 | HEART RATE: 95 BPM

## 2024-01-16 VITALS
RESPIRATION RATE: 18 BRPM | OXYGEN SATURATION: 99 % | WEIGHT: 151.9 LBS | HEIGHT: 67 IN | SYSTOLIC BLOOD PRESSURE: 117 MMHG | HEART RATE: 78 BPM | DIASTOLIC BLOOD PRESSURE: 81 MMHG | TEMPERATURE: 98 F

## 2024-01-16 DIAGNOSIS — D45 POLYCYTHEMIA VERA: ICD-10-CM

## 2024-01-16 DIAGNOSIS — C69.40 MALIGNANT NEOPLASM OF UNSPECIFIED CILIARY BODY: ICD-10-CM

## 2024-01-16 PROCEDURE — 99213 OFFICE O/P EST LOW 20 MIN: CPT

## 2024-01-16 PROCEDURE — 96413 CHEMO IV INFUSION 1 HR: CPT

## 2024-01-16 PROCEDURE — P9047: CPT

## 2024-01-16 RX ORDER — TEBENTAFUSP 100 UG/.5ML
68 INJECTION, SOLUTION, CONCENTRATE INTRAVENOUS ONCE
Refills: 0 | Status: COMPLETED | OUTPATIENT
Start: 2024-01-16 | End: 2024-01-16

## 2024-01-16 RX ADMIN — TEBENTAFUSP 68 MICROGRAM(S): 100 INJECTION, SOLUTION, CONCENTRATE INTRAVENOUS at 17:05

## 2024-01-16 RX ADMIN — TEBENTAFUSP 68 MICROGRAM(S): 100 INJECTION, SOLUTION, CONCENTRATE INTRAVENOUS at 16:46

## 2024-01-17 ENCOUNTER — NON-APPOINTMENT (OUTPATIENT)
Age: 44
End: 2024-01-17

## 2024-01-18 NOTE — HISTORY OF PRESENT ILLNESS
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 FoundationOne Liquid CDx (11/27/23) - 3 Muts/Mb, MSI-high not detected, ctDNA tumor fraction low (<1%).  CURRENT THERAPY: Tebentafusp since 03/12/2023  PRIOR THERAPIES: 9/21/23 Y90 radioembolization of liver  INTERVAL HISTORY: Mrs. Hoff is a 43 year old female with a history of primary uveal melanoma, now with newly diagnosed metastatic disease with liver only involvement who presents for continued management of her disease.  She is HLA  positive.  Briefly, she initially presented in January 2018 with vision changes in her right eye, with later loss of temporal and upper visual field vision.  She was [facility]stantin [facility] and was found to have a subretinal mass in her right eye with a greated basal diameter of 14.43 mm and a maximal height of 7 mm.  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  She was treated with Ru-106 brachytherapy from 01/30/2018 until 02/06/2018.  She was subsequently placed on expectant observation for systemic recurrence.  She did well until, on 01/29/2023, she underwent an abdominal ultrasound which was significant for a new focal liver lesion measuring 3 cm.  A subsequent chest, abdominal and pelvic CT scan, performed on 01/30/2023, demonstrated a segment 7 liver lesion with arterial enhancement and other subcentimeter hepatic lesions measuring up to 4 mm of unclear etiology.  On 02/01/2023, she underwent a liver biopsy, with pathology consistent with melanoma.  Immunohistochemistry was positive for SOX10 and MART1.  An abdominal MRI, performed on 02/16/2023, demonstrated a dominant segment 7 lesion in the liver, with multiple other smaller subcentimeter lesions bilaterally.  She was evaluated at the [facility] on 02/20/2023 by Dr. Miguelito Truong, with recommendations for HLA testing and possible tebentafusp if appropriate or combination checkpoint blockade.  She was found to be HLA A*02:01 positive and initiated therapy with tebentafusp on 03/12/2023, and she has now completed her first three inpatient doses, with her most recent dose administered on 03/26/2023.  She experienced mild rash and periorbital edema, with IV fluids administered for mild hypotension with her first dose, but otherwise tolerated therapy without difficulty.  On 04/17/2023, she underwent a new baseline chest and pelvis CT as well as a liver MRI.  The MRI demonstrated metastatic lesions affecting both lobes of the liver, with some worsening when compared with the prior scans from 02/16/2023 and with the largest lesion now measuring 4.5 x 3.5 cm in segment 7.  She had MRI abdomen and CT CAP on 6/12/23, showing stable disease.    She continues on weekly tebentafusp.  She typically develops low grade fever around 6 hours after infusion. She goes to sleep and it resolves. She has noted vitiligo like skin changes around her left index finger.  She had transient erythema of the feet and chest.  She reported her vision is blurrier at her last visit.  She has had orbital edema in the past, had Avastin injections and cataract surgery.  She saw Dr. Osborn on 8/28, advised that eye exam was normal, retina healed well.  She will continue ophtho follow up every 6 months.  She had repeat imaging done on 8/18/23 which showed interval increase in size of the hepatic segment 7 metastatic lesion and persistent numerous additional scattered small hemorrhagic liver metastases. 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  She underwent mapping on 9/7/23 and Y90 radioembolization on 9/21/23 with JEROMY Shortk. Repeat MRI abdomen performed on 11/17/23 showed post radiation changes and stable liver lesions.    She continues on weekly tebentafusp.  She is scheduled to see Dr. Miguelito Torres at [facility] on 01/22/2023 for consideration of percutaneous hepatic perfusion therapy.  She had a repeat MRI of the abdomen and pelvis on 1/15/24.  PAST MEDICAL HISTORY: None  SOCIAL HISTORY: The patient has recently moved from [facility] and is now living in [place].  Her partner currently resides in the [place] but is considering relocating.  She works in the [employer].  FAMILY HISTORY: Her maternal grandfather had mucosal melanoma arising from the stomach.  [de-identified] : She has been feeling well, no complaints at this time.

## 2024-01-18 NOTE — ASSESSMENT
[FreeTextEntry1] : This is a 43 year old HLA  positive female now with an M1a, stage IV, uveal melanoma with liver involvement. She initiated therapy with tebentafusp at [facility] but then moved to [place].  Due to radiographic progression of a dominant site of disease, she underwent Y90 mapping on 9/7 and Y90 radioembolization on 09/21/23 with no issue. Repeat MRI abdomen on November 17, 2023 showed diminished perfusion to the treated mass and likely post-radiation changes/edema and stable liver lesions.  Previously discussed potential additional treatment options, which may include immuno embolization, a change in systemic therapy to ipilimumab and nivolumab, or consideration of trial participation on a study such as the TriSalus study.  Given her interest in percutaneous hepatic perfusion, information regarding the ongoing trials at [facility], particularly Clinical Trial 91402, An Open-Label Expanded Access Study of the Melphalan/Hepatic Delivery System (HDS) in Patients with Hepatic Dominant Ocular Melanoma, was provided to her. She has an appointment on 01/22/24  She has also expressed interest in discontinuing or spacing out tebentafusp infusions due to her work schedule - she would need to be absent for a period of months at a time - we briefly discussed potentially holding tebentafusp/ further Y90 embolization - she will make a decision following her appointment at [facility] and continue with tebentafusp for now.   MRI of the abdomen/pelvis on 1/15/24 with improvement of the hepatic lesions; however, official read still pending.   Plan: -proceed with tebentafusp treatment today, 1/16/24 -She has an appointment at [facility] on January 22nd - All of her questions and concerns were answered to her satisfaction.

## 2024-01-23 ENCOUNTER — OUTPATIENT (OUTPATIENT)
Dept: OUTPATIENT SERVICES | Facility: HOSPITAL | Age: 44
LOS: 1 days | End: 2024-01-23
Payer: COMMERCIAL

## 2024-01-23 ENCOUNTER — APPOINTMENT (OUTPATIENT)
Dept: HEMATOLOGY ONCOLOGY | Facility: CLINIC | Age: 44
End: 2024-01-23

## 2024-01-23 ENCOUNTER — APPOINTMENT (OUTPATIENT)
Dept: INFUSION THERAPY | Facility: CLINIC | Age: 44
End: 2024-01-23

## 2024-01-23 VITALS
HEART RATE: 91 BPM | OXYGEN SATURATION: 98 % | SYSTOLIC BLOOD PRESSURE: 109 MMHG | DIASTOLIC BLOOD PRESSURE: 73 MMHG | WEIGHT: 149.91 LBS | TEMPERATURE: 99 F | RESPIRATION RATE: 16 BRPM | HEIGHT: 67 IN

## 2024-01-23 PROCEDURE — 96413 CHEMO IV INFUSION 1 HR: CPT

## 2024-01-23 PROCEDURE — P9047: CPT

## 2024-01-23 RX ORDER — TEBENTAFUSP 100 UG/.5ML
68 INJECTION, SOLUTION, CONCENTRATE INTRAVENOUS ONCE
Refills: 0 | Status: COMPLETED | OUTPATIENT
Start: 2024-01-23 | End: 2024-01-23

## 2024-01-23 RX ADMIN — TEBENTAFUSP 68 MICROGRAM(S): 100 INJECTION, SOLUTION, CONCENTRATE INTRAVENOUS at 17:53

## 2024-01-23 RX ADMIN — TEBENTAFUSP 68 MICROGRAM(S): 100 INJECTION, SOLUTION, CONCENTRATE INTRAVENOUS at 18:20

## 2024-01-24 ENCOUNTER — OUTPATIENT (OUTPATIENT)
Dept: OUTPATIENT SERVICES | Facility: HOSPITAL | Age: 44
LOS: 1 days | Discharge: HOME | End: 2024-01-24

## 2024-01-24 DIAGNOSIS — D64.9 ANEMIA, UNSPECIFIED: ICD-10-CM

## 2024-01-24 DIAGNOSIS — C69.90 MALIGNANT NEOPLASM OF UNSPECIFIED SITE OF UNSPECIFIED EYE: ICD-10-CM

## 2024-01-25 ENCOUNTER — APPOINTMENT (OUTPATIENT)
Dept: CT IMAGING | Facility: CLINIC | Age: 44
End: 2024-01-25

## 2024-01-25 ENCOUNTER — APPOINTMENT (OUTPATIENT)
Dept: HEMATOLOGY ONCOLOGY | Facility: CLINIC | Age: 44
End: 2024-01-25
Payer: COMMERCIAL

## 2024-01-25 ENCOUNTER — APPOINTMENT (OUTPATIENT)
Dept: CT IMAGING | Facility: CLINIC | Age: 44
End: 2024-01-25
Payer: COMMERCIAL

## 2024-01-25 ENCOUNTER — OUTPATIENT (OUTPATIENT)
Dept: OUTPATIENT SERVICES | Facility: HOSPITAL | Age: 44
LOS: 1 days | End: 2024-01-25

## 2024-01-25 PROCEDURE — 99214 OFFICE O/P EST MOD 30 MIN: CPT | Mod: 95

## 2024-01-25 PROCEDURE — 71260 CT THORAX DX C+: CPT | Mod: 26

## 2024-01-25 PROCEDURE — 72193 CT PELVIS W/DYE: CPT | Mod: 26

## 2024-01-30 ENCOUNTER — OUTPATIENT (OUTPATIENT)
Dept: OUTPATIENT SERVICES | Facility: HOSPITAL | Age: 44
LOS: 1 days | End: 2024-01-30
Payer: COMMERCIAL

## 2024-01-30 ENCOUNTER — APPOINTMENT (OUTPATIENT)
Dept: INFUSION THERAPY | Facility: CLINIC | Age: 44
End: 2024-01-30

## 2024-01-30 VITALS
SYSTOLIC BLOOD PRESSURE: 103 MMHG | HEIGHT: 67 IN | WEIGHT: 151.9 LBS | HEART RATE: 89 BPM | DIASTOLIC BLOOD PRESSURE: 70 MMHG | OXYGEN SATURATION: 99 % | RESPIRATION RATE: 16 BRPM | TEMPERATURE: 98 F

## 2024-01-30 LAB
ALBUMIN SERPL ELPH-MCNC: 3.3 G/DL — SIGNIFICANT CHANGE UP (ref 3.3–5)
ALP SERPL-CCNC: 75 U/L — SIGNIFICANT CHANGE UP (ref 40–120)
ALT FLD-CCNC: 20 U/L — SIGNIFICANT CHANGE UP (ref 10–45)
ANION GAP SERPL CALC-SCNC: -2 MMOL/L — LOW (ref 5–17)
AST SERPL-CCNC: 25 U/L — SIGNIFICANT CHANGE UP (ref 10–40)
BILIRUB SERPL-MCNC: 0.7 MG/DL — SIGNIFICANT CHANGE UP (ref 0.2–1.2)
BUN SERPL-MCNC: 14 MG/DL — SIGNIFICANT CHANGE UP (ref 7–23)
CALCIUM SERPL-MCNC: 9.6 MG/DL — SIGNIFICANT CHANGE UP (ref 8.4–10.5)
CHLORIDE SERPL-SCNC: 108 MMOL/L — SIGNIFICANT CHANGE UP (ref 96–108)
CO2 SERPL-SCNC: 30 MMOL/L — SIGNIFICANT CHANGE UP (ref 22–31)
CREAT SERPL-MCNC: 0.7 MG/DL — SIGNIFICANT CHANGE UP (ref 0.5–1.3)
EGFR: 109 ML/MIN/1.73M2 — SIGNIFICANT CHANGE UP
GLUCOSE SERPL-MCNC: 89 MG/DL — SIGNIFICANT CHANGE UP (ref 70–99)
HCT VFR BLD CALC: 39.3 % — SIGNIFICANT CHANGE UP (ref 34.5–45)
HGB BLD-MCNC: 13 G/DL — SIGNIFICANT CHANGE UP (ref 11.5–15.5)
LDH SERPL L TO P-CCNC: 171 U/L — SIGNIFICANT CHANGE UP (ref 50–242)
LYMPHOCYTES # BLD AUTO: 1.6 K/UL — SIGNIFICANT CHANGE UP (ref 1–3.3)
LYMPHOCYTES # BLD AUTO: 27.6 % — SIGNIFICANT CHANGE UP (ref 13–44)
MCHC RBC-ENTMCNC: 28.8 PG — SIGNIFICANT CHANGE UP (ref 27–34)
MCHC RBC-ENTMCNC: 33.1 GM/DL — SIGNIFICANT CHANGE UP (ref 32–36)
MCV RBC AUTO: 87.1 FL — SIGNIFICANT CHANGE UP (ref 80–100)
NEUTROPHILS # BLD AUTO: 3.6 K/UL — SIGNIFICANT CHANGE UP (ref 1.8–7.4)
NEUTROPHILS NFR BLD AUTO: 62.3 % — SIGNIFICANT CHANGE UP (ref 43–77)
PLATELET # BLD AUTO: 286 K/UL — SIGNIFICANT CHANGE UP (ref 150–400)
POTASSIUM SERPL-MCNC: 5 MMOL/L — SIGNIFICANT CHANGE UP (ref 3.5–5.3)
POTASSIUM SERPL-SCNC: 5 MMOL/L — SIGNIFICANT CHANGE UP (ref 3.5–5.3)
PROT SERPL-MCNC: 7.1 G/DL — SIGNIFICANT CHANGE UP (ref 6–8.3)
RBC # BLD: 4.51 M/UL — SIGNIFICANT CHANGE UP (ref 3.8–5.2)
RBC # FLD: 13.1 % — SIGNIFICANT CHANGE UP (ref 10.3–14.5)
SODIUM SERPL-SCNC: 136 MMOL/L — SIGNIFICANT CHANGE UP (ref 135–145)
WBC # BLD: 5.8 K/UL — SIGNIFICANT CHANGE UP (ref 3.8–10.5)
WBC # FLD AUTO: 5.8 K/UL — SIGNIFICANT CHANGE UP (ref 3.8–10.5)

## 2024-01-30 PROCEDURE — 96409 CHEMO IV PUSH SNGL DRUG: CPT

## 2024-01-30 PROCEDURE — P9047: CPT

## 2024-01-30 PROCEDURE — 36415 COLL VENOUS BLD VENIPUNCTURE: CPT

## 2024-01-30 PROCEDURE — 80053 COMPREHEN METABOLIC PANEL: CPT

## 2024-01-30 PROCEDURE — 83615 LACTATE (LD) (LDH) ENZYME: CPT

## 2024-01-30 PROCEDURE — 85025 COMPLETE CBC W/AUTO DIFF WBC: CPT

## 2024-01-30 RX ORDER — TEBENTAFUSP 100 UG/.5ML
68 INJECTION, SOLUTION, CONCENTRATE INTRAVENOUS ONCE
Refills: 0 | Status: COMPLETED | OUTPATIENT
Start: 2024-01-30 | End: 2024-01-30

## 2024-01-30 RX ADMIN — TEBENTAFUSP 68 MICROGRAM(S): 100 INJECTION, SOLUTION, CONCENTRATE INTRAVENOUS at 14:22

## 2024-01-30 RX ADMIN — TEBENTAFUSP 68 MICROGRAM(S): 100 INJECTION, SOLUTION, CONCENTRATE INTRAVENOUS at 14:39

## 2024-01-31 DIAGNOSIS — C69.90 MALIGNANT NEOPLASM OF UNSPECIFIED SITE OF UNSPECIFIED EYE: ICD-10-CM

## 2024-02-06 ENCOUNTER — OUTPATIENT (OUTPATIENT)
Dept: OUTPATIENT SERVICES | Facility: HOSPITAL | Age: 44
LOS: 1 days | End: 2024-02-06
Payer: COMMERCIAL

## 2024-02-06 ENCOUNTER — APPOINTMENT (OUTPATIENT)
Dept: HEMATOLOGY ONCOLOGY | Facility: CLINIC | Age: 44
End: 2024-02-06
Payer: COMMERCIAL

## 2024-02-06 ENCOUNTER — APPOINTMENT (OUTPATIENT)
Dept: INFUSION THERAPY | Facility: CLINIC | Age: 44
End: 2024-02-06

## 2024-02-06 VITALS
HEIGHT: 67 IN | SYSTOLIC BLOOD PRESSURE: 119 MMHG | OXYGEN SATURATION: 99 % | RESPIRATION RATE: 18 BRPM | WEIGHT: 153 LBS | HEART RATE: 82 BPM | DIASTOLIC BLOOD PRESSURE: 79 MMHG | TEMPERATURE: 99 F

## 2024-02-06 VITALS
WEIGHT: 153 LBS | HEIGHT: 68 IN | BODY MASS INDEX: 23.19 KG/M2 | SYSTOLIC BLOOD PRESSURE: 119 MMHG | OXYGEN SATURATION: 98 % | HEART RATE: 82 BPM | DIASTOLIC BLOOD PRESSURE: 79 MMHG | RESPIRATION RATE: 18 BRPM | TEMPERATURE: 99 F

## 2024-02-06 PROCEDURE — 99214 OFFICE O/P EST MOD 30 MIN: CPT

## 2024-02-06 PROCEDURE — G2211 COMPLEX E/M VISIT ADD ON: CPT | Mod: NC,1L

## 2024-02-06 PROCEDURE — 96413 CHEMO IV INFUSION 1 HR: CPT

## 2024-02-06 PROCEDURE — P9047: CPT

## 2024-02-06 RX ORDER — TEBENTAFUSP 100 UG/.5ML
68 INJECTION, SOLUTION, CONCENTRATE INTRAVENOUS ONCE
Refills: 0 | Status: COMPLETED | OUTPATIENT
Start: 2024-02-06 | End: 2024-02-06

## 2024-02-06 RX ADMIN — TEBENTAFUSP 68 MICROGRAM(S): 100 INJECTION, SOLUTION, CONCENTRATE INTRAVENOUS at 17:15

## 2024-02-06 RX ADMIN — TEBENTAFUSP 68 MICROGRAM(S): 100 INJECTION, SOLUTION, CONCENTRATE INTRAVENOUS at 16:52

## 2024-02-07 DIAGNOSIS — C69.90 MALIGNANT NEOPLASM OF UNSPECIFIED SITE OF UNSPECIFIED EYE: ICD-10-CM

## 2024-02-07 NOTE — HISTORY OF PRESENT ILLNESS
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 FoundationOne Liquid CDx (11/27/23) - 3 Muts/Mb, MSI-high not detected, ctDNA tumor fraction low (<1%).  CURRENT THERAPY: Tebentafusp since 03/12/2023  PRIOR THERAPIES: 9/21/23 Y90 radioembolization of liver  INTERVAL HISTORY: Mrs. Hoff is a 43 year old female with a history of primary uveal melanoma, now with newly diagnosed metastatic disease with liver only involvement who presents for continued management of her disease.  She is followed by Dr. Hernan Osborn.  She is HLA  positive. Please see my prior notes for her complex history.  Briefly, she initially presented in January 2018 with vision changes in her right eye, with later loss of temporal and upper visual field vision.  She was evaluated by Dr. Konstantin Barros at the [facility] and was found to have a subretinal mass in her right eye with a greated basal diameter of 14.43 mm and a maximal height of 7 mm.  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  She was treated with Ru-106 brachytherapy from 01/30/2018 until 02/06/2018.  She was subsequently placed on expectant observation for systemic recurrence.  She did well until, on 01/29/2023, she underwent an abdominal ultrasound which was significant for a new focal liver lesion measuring 3 cm.  A subsequent chest, abdominal and pelvic CT scan, performed on 01/30/2023, demonstrated a segment 7 liver lesion with arterial enhancement and other subcentimeter hepatic lesions measuring up to 4 mm of unclear etiology.  On 02/01/2023, she underwent a liver biopsy, with pathology consistent with melanoma.  Immunohistochemistry was positive for SOX10 and MART1.  An abdominal MRI, performed on 02/16/2023, demonstrated a dominant segment 7 lesion in the liver, with multiple other smaller subcentimeter lesions bilaterally.  She was evaluated at the [facility] on 02/20/2023 by Dr. Miguelito Truong, with recommendations for HLA testing and possible tebentafusp if appropriate or combination checkpoint blockade.  She was found to be HLA A*02:01 positive and initiated therapy with tebentafusp on 03/12/2023, and she has now completed her first three inpatient doses, with her most recent dose administered on 03/26/2023.  She experienced mild rash and periorbital edema, with IV fluids administered for mild hypotension with her first dose, but otherwise tolerated therapy without difficulty.  On 04/17/2023, she underwent a new baseline chest and pelvis CT as well as a liver MRI.  The MRI demonstrated metastatic lesions affecting both lobes of the liver, with some worsening when compared with the prior scans from 02/16/2023 and with the largest lesion now measuring 4.5 x 3.5 cm in segment 7. She had repeat imaging done on 8/18/23 which showed interval increase in size of the hepatic segment 7 metastatic lesion and persistent numerous additional scattered small hemorrhagic liver metastases. 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  She underwent mapping on 9/7/23 and Y90 radioembolization on 9/21/23 with IR Sj Maximiliano.   She continues on weekly tebentafusp.  Although she has typically developed low grade fever around 6 hours after infusion as well as treatment associated fatigue, she has not had these symptoms with her two most recent doses.   She has developed progressive vitiligo.  On 01/15/2024, she underwent a repeat liver MRI.  This study, when compared with a prior dated 11/17/2023, demonstrated decrease in the size of the dominant segment 7 lesion, with no significant change in the other small bilateral lesions.  She was evaluated by Dr. Miguelito Torres at [facility] on 01/22/2023.  The Delcath PHP procedure was discussed at length and the patient tenatively scheduled for her first treatment in March.  PAST MEDICAL HISTORY: None  SOCIAL HISTORY: The patient has moved from [facility] and is now living in [place].  Her partner currently resides in the [place] but is considering relocating.  She works in the [employer].  FAMILY HISTORY: Her maternal grandfather had mucosal melanoma arising from the stomach.

## 2024-02-07 NOTE — ASSESSMENT
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 She initiated therapy with tebentafusp at [facility] but then moved to [place].  Due to radiographic progression of a dominant site of disease, she underwent Y90 mapping on 9/7 and Y90 radioembolization on 09/21/23 with no issue. Based upon her most recent imaging studies, she has achieved a treatment effect in the dominant mass treated with Y90, with stability elsewhere.    Plan: -continue weekly tebentafusp -restaging studies in March 2024 -given the complexities of PHP, she is considering either continuing tebe alone or adding an alternative regional therapy such as immuno-embolization or radioembolization, with plans to place her on a treatment break in the summer - All of her questions and concerns were answered to her satisfaction.

## 2024-02-07 NOTE — REVIEW OF SYSTEMS
[Diarrhea: Grade 0] : Diarrhea: Grade 0 [Negative] : Allergic/Immunologic [de-identified] : Vitiligo

## 2024-02-08 ENCOUNTER — APPOINTMENT (OUTPATIENT)
Dept: INTERVENTIONAL RADIOLOGY/VASCULAR | Facility: HOSPITAL | Age: 44
End: 2024-02-08

## 2024-02-13 ENCOUNTER — APPOINTMENT (OUTPATIENT)
Dept: INFUSION THERAPY | Facility: CLINIC | Age: 44
End: 2024-02-13

## 2024-02-13 ENCOUNTER — OUTPATIENT (OUTPATIENT)
Dept: OUTPATIENT SERVICES | Facility: HOSPITAL | Age: 44
LOS: 1 days | End: 2024-02-13
Payer: COMMERCIAL

## 2024-02-13 VITALS
TEMPERATURE: 99 F | DIASTOLIC BLOOD PRESSURE: 74 MMHG | RESPIRATION RATE: 16 BRPM | SYSTOLIC BLOOD PRESSURE: 116 MMHG | HEART RATE: 78 BPM | WEIGHT: 151.9 LBS | HEIGHT: 67 IN | OXYGEN SATURATION: 98 %

## 2024-02-13 VITALS
SYSTOLIC BLOOD PRESSURE: 108 MMHG | DIASTOLIC BLOOD PRESSURE: 70 MMHG | OXYGEN SATURATION: 99 % | HEART RATE: 74 BPM | TEMPERATURE: 98 F | RESPIRATION RATE: 17 BRPM

## 2024-02-13 PROCEDURE — 96413 CHEMO IV INFUSION 1 HR: CPT

## 2024-02-13 PROCEDURE — P9047: CPT

## 2024-02-13 RX ORDER — TEBENTAFUSP 100 UG/.5ML
68 INJECTION, SOLUTION, CONCENTRATE INTRAVENOUS ONCE
Refills: 0 | Status: COMPLETED | OUTPATIENT
Start: 2024-02-13 | End: 2024-02-13

## 2024-02-13 RX ADMIN — TEBENTAFUSP 68 MICROGRAM(S): 100 INJECTION, SOLUTION, CONCENTRATE INTRAVENOUS at 17:10

## 2024-02-13 RX ADMIN — TEBENTAFUSP 68 MICROGRAM(S): 100 INJECTION, SOLUTION, CONCENTRATE INTRAVENOUS at 16:54

## 2024-02-16 DIAGNOSIS — C69.90 MALIGNANT NEOPLASM OF UNSPECIFIED SITE OF UNSPECIFIED EYE: ICD-10-CM
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
## 2024-02-29 ENCOUNTER — OUTPATIENT (OUTPATIENT)
Dept: OUTPATIENT SERVICES | Facility: HOSPITAL | Age: 44
LOS: 1 days | End: 2024-02-29
Payer: COMMERCIAL

## 2024-02-29 ENCOUNTER — APPOINTMENT (OUTPATIENT)
Dept: INFUSION THERAPY | Facility: CLINIC | Age: 44
End: 2024-02-29

## 2024-02-29 VITALS
SYSTOLIC BLOOD PRESSURE: 114 MMHG | HEART RATE: 78 BPM | HEIGHT: 67 IN | OXYGEN SATURATION: 99 % | TEMPERATURE: 98 F | DIASTOLIC BLOOD PRESSURE: 74 MMHG | RESPIRATION RATE: 18 BRPM | WEIGHT: 149.91 LBS

## 2024-02-29 DIAGNOSIS — C69.90 MALIGNANT NEOPLASM OF UNSPECIFIED SITE OF UNSPECIFIED EYE: ICD-10-CM

## 2024-02-29 LAB
ALBUMIN SERPL ELPH-MCNC: 3.7 G/DL — SIGNIFICANT CHANGE UP (ref 3.3–5)
ALP SERPL-CCNC: 85 U/L — SIGNIFICANT CHANGE UP (ref 40–120)
ALT FLD-CCNC: 17 U/L — SIGNIFICANT CHANGE UP (ref 10–45)
ANION GAP SERPL CALC-SCNC: 4 MMOL/L — LOW (ref 5–17)
AST SERPL-CCNC: 23 U/L — SIGNIFICANT CHANGE UP (ref 10–40)
BILIRUB SERPL-MCNC: 0.6 MG/DL — SIGNIFICANT CHANGE UP (ref 0.2–1.2)
BUN SERPL-MCNC: 15 MG/DL — SIGNIFICANT CHANGE UP (ref 7–23)
CALCIUM SERPL-MCNC: 9.8 MG/DL — SIGNIFICANT CHANGE UP (ref 8.4–10.5)
CHLORIDE SERPL-SCNC: 108 MMOL/L — SIGNIFICANT CHANGE UP (ref 96–108)
CO2 SERPL-SCNC: 23 MMOL/L — SIGNIFICANT CHANGE UP (ref 22–31)
CREAT SERPL-MCNC: 1.1 MG/DL — SIGNIFICANT CHANGE UP (ref 0.5–1.3)
EGFR: 63 ML/MIN/1.73M2 — SIGNIFICANT CHANGE UP
GLUCOSE SERPL-MCNC: 95 MG/DL — SIGNIFICANT CHANGE UP (ref 70–99)
HCT VFR BLD CALC: 39.2 % — SIGNIFICANT CHANGE UP (ref 34.5–45)
HGB BLD-MCNC: 13.4 G/DL — SIGNIFICANT CHANGE UP (ref 11.5–15.5)
LDH SERPL L TO P-CCNC: 227 U/L — SIGNIFICANT CHANGE UP (ref 50–242)
LYMPHOCYTES # BLD AUTO: 2 K/UL — SIGNIFICANT CHANGE UP (ref 1–3.3)
LYMPHOCYTES # BLD AUTO: 27.4 % — SIGNIFICANT CHANGE UP (ref 13–44)
MCHC RBC-ENTMCNC: 29.6 PG — SIGNIFICANT CHANGE UP (ref 27–34)
MCHC RBC-ENTMCNC: 34.2 GM/DL — SIGNIFICANT CHANGE UP (ref 32–36)
MCV RBC AUTO: 86.7 FL — SIGNIFICANT CHANGE UP (ref 80–100)
NEUTROPHILS # BLD AUTO: 4.7 K/UL — SIGNIFICANT CHANGE UP (ref 1.8–7.4)
NEUTROPHILS NFR BLD AUTO: 63.2 % — SIGNIFICANT CHANGE UP (ref 43–77)
PLATELET # BLD AUTO: 292 K/UL — SIGNIFICANT CHANGE UP (ref 150–400)
POTASSIUM SERPL-MCNC: 4.2 MMOL/L — SIGNIFICANT CHANGE UP (ref 3.5–5.3)
POTASSIUM SERPL-SCNC: 4.2 MMOL/L — SIGNIFICANT CHANGE UP (ref 3.5–5.3)
PROT SERPL-MCNC: 7.4 G/DL — SIGNIFICANT CHANGE UP (ref 6–8.3)
RBC # BLD: 4.52 M/UL — SIGNIFICANT CHANGE UP (ref 3.8–5.2)
RBC # FLD: 13.2 % — SIGNIFICANT CHANGE UP (ref 10.3–14.5)
SODIUM SERPL-SCNC: 135 MMOL/L — SIGNIFICANT CHANGE UP (ref 135–145)
WBC # BLD: 7.4 K/UL — SIGNIFICANT CHANGE UP (ref 3.8–10.5)
WBC # FLD AUTO: 7.4 K/UL — SIGNIFICANT CHANGE UP (ref 3.8–10.5)

## 2024-02-29 PROCEDURE — 85025 COMPLETE CBC W/AUTO DIFF WBC: CPT

## 2024-02-29 PROCEDURE — 80053 COMPREHEN METABOLIC PANEL: CPT

## 2024-02-29 PROCEDURE — 36415 COLL VENOUS BLD VENIPUNCTURE: CPT

## 2024-02-29 PROCEDURE — 83615 LACTATE (LD) (LDH) ENZYME: CPT

## 2024-02-29 PROCEDURE — P9047: CPT

## 2024-02-29 PROCEDURE — 96413 CHEMO IV INFUSION 1 HR: CPT

## 2024-02-29 RX ORDER — TEBENTAFUSP 100 UG/.5ML
68 INJECTION, SOLUTION, CONCENTRATE INTRAVENOUS ONCE
Refills: 0 | Status: COMPLETED | OUTPATIENT
Start: 2024-02-29 | End: 2024-02-29

## 2024-02-29 RX ADMIN — TEBENTAFUSP 68 MICROGRAM(S): 100 INJECTION, SOLUTION, CONCENTRATE INTRAVENOUS at 17:55

## 2024-02-29 RX ADMIN — TEBENTAFUSP 68 MICROGRAM(S): 100 INJECTION, SOLUTION, CONCENTRATE INTRAVENOUS at 17:21

## 2024-03-05 ENCOUNTER — OUTPATIENT (OUTPATIENT)
Dept: OUTPATIENT SERVICES | Facility: HOSPITAL | Age: 44
LOS: 1 days | End: 2024-03-05
Payer: COMMERCIAL

## 2024-03-05 ENCOUNTER — APPOINTMENT (OUTPATIENT)
Dept: HEMATOLOGY ONCOLOGY | Facility: CLINIC | Age: 44
End: 2024-03-05
Payer: COMMERCIAL

## 2024-03-05 ENCOUNTER — APPOINTMENT (OUTPATIENT)
Dept: INFUSION THERAPY | Facility: CLINIC | Age: 44
End: 2024-03-05

## 2024-03-05 VITALS
HEART RATE: 95 BPM | HEIGHT: 67 IN | WEIGHT: 149.91 LBS | TEMPERATURE: 98 F | RESPIRATION RATE: 18 BRPM | SYSTOLIC BLOOD PRESSURE: 111 MMHG | DIASTOLIC BLOOD PRESSURE: 73 MMHG | OXYGEN SATURATION: 95 %

## 2024-03-05 DIAGNOSIS — C69.90 MALIGNANT NEOPLASM OF UNSPECIFIED SITE OF UNSPECIFIED EYE: ICD-10-CM

## 2024-03-05 PROCEDURE — 99205 OFFICE O/P NEW HI 60 MIN: CPT

## 2024-03-05 PROCEDURE — 96409 CHEMO IV PUSH SNGL DRUG: CPT

## 2024-03-05 PROCEDURE — 99215 OFFICE O/P EST HI 40 MIN: CPT

## 2024-03-05 PROCEDURE — P9047: CPT

## 2024-03-05 RX ORDER — TEBENTAFUSP 100 UG/.5ML
68 INJECTION, SOLUTION, CONCENTRATE INTRAVENOUS ONCE
Refills: 0 | Status: COMPLETED | OUTPATIENT
Start: 2024-03-05 | End: 2024-03-05

## 2024-03-05 RX ADMIN — TEBENTAFUSP 68 MICROGRAM(S): 100 INJECTION, SOLUTION, CONCENTRATE INTRAVENOUS at 18:05

## 2024-03-05 RX ADMIN — TEBENTAFUSP 68 MICROGRAM(S): 100 INJECTION, SOLUTION, CONCENTRATE INTRAVENOUS at 18:22

## 2024-03-05 NOTE — DISCHARGE INSTRUCTIONS: CHEMOTHERAPY - DC SYMPTOM 3
Daughter called to schedule and routed message to Qi to check with Dr. Srinivasan if he would enter order for port removal to be scheduled after procedure patient has on 8/23/22.  
Signs of bleeding (nose bleeds, bleeding gums, blackened stool, unusual bruising)

## 2024-03-07 PROBLEM — C69.90: Status: ACTIVE | Noted: 2023-04-11

## 2024-03-12 ENCOUNTER — OUTPATIENT (OUTPATIENT)
Dept: OUTPATIENT SERVICES | Facility: HOSPITAL | Age: 44
LOS: 1 days | End: 2024-03-12
Payer: COMMERCIAL

## 2024-03-12 ENCOUNTER — APPOINTMENT (OUTPATIENT)
Dept: INFUSION THERAPY | Facility: CLINIC | Age: 44
End: 2024-03-12

## 2024-03-12 PROCEDURE — P9047: CPT

## 2024-03-12 PROCEDURE — 96409 CHEMO IV PUSH SNGL DRUG: CPT

## 2024-03-12 RX ORDER — TEBENTAFUSP 100 UG/.5ML
68 INJECTION, SOLUTION, CONCENTRATE INTRAVENOUS ONCE
Refills: 0 | Status: COMPLETED | OUTPATIENT
Start: 2024-03-12 | End: 2024-03-12

## 2024-03-12 RX ADMIN — TEBENTAFUSP 68 MICROGRAM(S): 100 INJECTION, SOLUTION, CONCENTRATE INTRAVENOUS at 17:17

## 2024-03-12 RX ADMIN — TEBENTAFUSP 68 MICROGRAM(S): 100 INJECTION, SOLUTION, CONCENTRATE INTRAVENOUS at 17:32

## 2024-03-15 ENCOUNTER — APPOINTMENT (OUTPATIENT)
Dept: MRI IMAGING | Facility: CLINIC | Age: 44
End: 2024-03-15
Payer: COMMERCIAL

## 2024-03-15 ENCOUNTER — OUTPATIENT (OUTPATIENT)
Dept: OUTPATIENT SERVICES | Facility: HOSPITAL | Age: 44
LOS: 1 days | End: 2024-03-15

## 2024-03-15 ENCOUNTER — APPOINTMENT (OUTPATIENT)
Dept: CT IMAGING | Facility: CLINIC | Age: 44
End: 2024-03-15
Payer: COMMERCIAL

## 2024-03-15 PROCEDURE — 71260 CT THORAX DX C+: CPT | Mod: 26

## 2024-03-15 PROCEDURE — 74183 MRI ABD W/O CNTR FLWD CNTR: CPT | Mod: 26

## 2024-03-19 ENCOUNTER — APPOINTMENT (OUTPATIENT)
Dept: HEMATOLOGY ONCOLOGY | Facility: CLINIC | Age: 44
End: 2024-03-19
Payer: COMMERCIAL

## 2024-03-19 PROCEDURE — G2211 COMPLEX E/M VISIT ADD ON: CPT | Mod: NC,1L

## 2024-03-19 PROCEDURE — 99214 OFFICE O/P EST MOD 30 MIN: CPT

## 2024-03-19 NOTE — HISTORY OF PRESENT ILLNESS
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 FoundationOne Liquid CDx (11/27/23) - 3 Muts/Mb, MSI-high not detected, ctDNA tumor fraction low (<1%).  CURRENT THERAPY: Tebentafusp since 03/12/2023  PRIOR THERAPIES: 9/21/23 Y90 radioembolization of liver  INTERVAL HISTORY: Mrs. Hoff is a 43 year old female with a history of primary uveal melanoma, now with newly diagnosed metastatic disease with liver only involvement who presents for continued management of her disease.  She is followed by Dr. Hernan Osborn.  She is HLA  positive. Please see my prior notes for her complex history.  Briefly, she initially presented in January 2018 with vision changes in her right eye, with later loss of temporal and upper visual field vision.  She was evaluated by Dr. Konstantin Barros at the [facility] and was found to have a subretinal mass in her right eye with a greated basal diameter of 14.43 mm and a maximal height of 7 mm.  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  She was treated with Ru-106 brachytherapy from 01/30/2018 until 02/06/2018.  She was subsequently placed on expectant observation for systemic recurrence.  She did well until, on 01/29/2023, she underwent an abdominal ultrasound which was significant for a new focal liver lesion measuring 3 cm.  A subsequent chest, abdominal and pelvic CT scan, performed on 01/30/2023, demonstrated a segment 7 liver lesion with arterial enhancement and other subcentimeter hepatic lesions measuring up to 4 mm of unclear etiology.  On 02/01/2023, she underwent a liver biopsy, with pathology consistent with melanoma.  Immunohistochemistry was positive for SOX10 and MART1.  An abdominal MRI, performed on 02/16/2023, demonstrated a dominant segment 7 lesion in the liver, with multiple other smaller subcentimeter lesions bilaterally.  She was evaluated at the [facility] on 02/20/2023 by Dr. Miguelito Truong, with recommendations for HLA testing and possible tebentafusp if appropriate or combination checkpoint blockade.  She was found to be HLA A*02:01 positive and initiated therapy with tebentafusp on 03/12/2023, and she has now completed her first three inpatient doses, with her most recent dose administered on 03/26/2023.  She experienced mild rash and periorbital edema, with IV fluids administered for mild hypotension with her first dose, but otherwise tolerated therapy without difficulty.  On 04/17/2023, she underwent a new baseline chest and pelvis CT as well as a liver MRI.  The MRI demonstrated metastatic lesions affecting both lobes of the liver, with some worsening when compared with the prior scans from 02/16/2023 and with the largest lesion now measuring 4.5 x 3.5 cm in segment 7. She had repeat imaging done on 8/18/23 which showed interval increase in size of the hepatic segment 7 metastatic lesion and persistent numerous additional scattered small hemorrhagic liver metastases. 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  She underwent mapping on 9/7/23 and Y90 radioembolization on 9/21/23 with IR Gustafson Maximiliano.   She continues on weekly tebentafusp.  Although she has typically developed low grade fever around 6 hours after infusion as well as treatment associated fatigue, she has not had these symptoms with her two most recent doses.   She has developed progressive vitiligo.  On 01/15/2024, she underwent a repeat liver MRI.  This study, when compared with a prior dated 11/17/2023, demonstrated decrease in the size of the dominant segment 7 lesion, with no significant change in the other small bilateral lesions.  She was evaluated by Dr. Miguelito Torres at [facility] on 01/22/2023.  The Delcath PHP procedure was discussed at length and the patient has opted to not proceed.  Since she was last seen here, she has clinically done well.  She underwent a chest CT and abdominal MRI on 03/15/20345.  The CT study, when compared with a prior dated 01/25/2024, demonstrated no significant change with no overt evidence for progressive disease.  The MRI, when compared with a prior dated 01/15/2024, demonstrated interval decrease in the size of the hepatic segment 7 lesion. There is an increase in size and number of the small liver lesions.  PAST MEDICAL HISTORY: None  SOCIAL HISTORY: The patient has moved from [facility] and is now living in [place].  Her partner currently resides in the [place] but is considering relocating.  She works in the [employer].  FAMILY HISTORY: Her maternal grandfather had mucosal melanoma arising from the stomach.

## 2024-03-19 NOTE — ASSESSMENT
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 She initiated therapy with tebentafusp at [facility] but then moved to [place].  Due to radiographic progression of a dominant site of disease, she underwent Y90 mapping on 9/7 and Y90 radioembolization on 09/21/23 with no issue. Based upon her most recent imaging studies, she has achieved a treatment effect in the dominant mass treated with Y90, with some progression elsewhere.  I reviewed the images with Dr. Viera.    At this time, we will proceed with immunoembolization on Monday as scheduled, with tebentafusp later on next week should she be feeling well.  We will do a nursing toxicity check next Tuesday following the procedure.  We will see her back the following week for her next dose of tebentafusp and will obtain a followup ctDNA test at that time.  I anticipate repeat imaging prior to her third immunoembolization procedure.

## 2024-03-19 NOTE — REASON FOR VISIT
[Home] : at home, [unfilled] , at the time of the visit. [Medical Office: ([facility])___] : at the medical office located in  [Follow-Up Visit] : a follow-up

## 2024-03-20 ENCOUNTER — APPOINTMENT (OUTPATIENT)
Dept: INTERVENTIONAL RADIOLOGY/VASCULAR | Facility: HOSPITAL | Age: 44
End: 2024-03-20

## 2024-03-21 ENCOUNTER — APPOINTMENT (OUTPATIENT)
Dept: INFUSION THERAPY | Facility: CLINIC | Age: 44
End: 2024-03-21

## 2024-03-21 ENCOUNTER — OUTPATIENT (OUTPATIENT)
Dept: OUTPATIENT SERVICES | Facility: HOSPITAL | Age: 44
LOS: 1 days | End: 2024-03-21
Payer: COMMERCIAL

## 2024-03-21 ENCOUNTER — NON-APPOINTMENT (OUTPATIENT)
Age: 44
End: 2024-03-21

## 2024-03-21 VITALS
OXYGEN SATURATION: 98 % | HEART RATE: 89 BPM | SYSTOLIC BLOOD PRESSURE: 143 MMHG | RESPIRATION RATE: 16 BRPM | TEMPERATURE: 98 F | WEIGHT: 153 LBS | DIASTOLIC BLOOD PRESSURE: 82 MMHG | HEIGHT: 67 IN

## 2024-03-21 VITALS
TEMPERATURE: 98 F | SYSTOLIC BLOOD PRESSURE: 104 MMHG | DIASTOLIC BLOOD PRESSURE: 70 MMHG | OXYGEN SATURATION: 98 % | RESPIRATION RATE: 16 BRPM | HEART RATE: 75 BPM

## 2024-03-21 DIAGNOSIS — C69.90 MALIGNANT NEOPLASM OF UNSPECIFIED SITE OF UNSPECIFIED EYE: ICD-10-CM

## 2024-03-21 PROCEDURE — 96409 CHEMO IV PUSH SNGL DRUG: CPT

## 2024-03-21 PROCEDURE — P9047: CPT

## 2024-03-21 RX ORDER — TEBENTAFUSP 100 UG/.5ML
68 INJECTION, SOLUTION, CONCENTRATE INTRAVENOUS ONCE
Refills: 0 | Status: COMPLETED | OUTPATIENT
Start: 2024-03-21 | End: 2024-03-21

## 2024-03-21 RX ADMIN — TEBENTAFUSP 68 MICROGRAM(S): 100 INJECTION, SOLUTION, CONCENTRATE INTRAVENOUS at 17:53

## 2024-03-21 RX ADMIN — TEBENTAFUSP 68 MICROGRAM(S): 100 INJECTION, SOLUTION, CONCENTRATE INTRAVENOUS at 17:38

## 2024-03-25 ENCOUNTER — APPOINTMENT (OUTPATIENT)
Dept: INTERVENTIONAL RADIOLOGY/VASCULAR | Facility: HOSPITAL | Age: 44
End: 2024-03-25

## 2024-03-25 ENCOUNTER — RESULT REVIEW (OUTPATIENT)
Age: 44
End: 2024-03-25

## 2024-03-25 ENCOUNTER — OUTPATIENT (OUTPATIENT)
Dept: OUTPATIENT SERVICES | Facility: HOSPITAL | Age: 44
LOS: 1 days | End: 2024-03-25
Payer: COMMERCIAL

## 2024-03-25 PROCEDURE — 37243 VASC EMBOLIZE/OCCLUDE ORGAN: CPT

## 2024-03-25 PROCEDURE — 36245 INS CATH ABD/L-EXT ART 1ST: CPT | Mod: 59

## 2024-03-25 PROCEDURE — C1887: CPT

## 2024-03-25 PROCEDURE — 36247 INS CATH ABD/L-EXT ART 3RD: CPT

## 2024-03-25 PROCEDURE — C1769: CPT

## 2024-03-25 PROCEDURE — C1894: CPT

## 2024-03-25 RX ORDER — SARGRAMOSTIM 500 MCG/ML
1500 VIAL (ML) INJECTION ONCE
Refills: 0 | Status: DISCONTINUED | OUTPATIENT
Start: 2024-03-25 | End: 2024-04-08

## 2024-03-26 ENCOUNTER — APPOINTMENT (OUTPATIENT)
Dept: INFUSION THERAPY | Facility: CLINIC | Age: 44
End: 2024-03-26

## 2024-03-26 RX ORDER — OXYCODONE HYDROCHLORIDE 5 MG/1
1 TABLET ORAL
Qty: 10 | Refills: 0
Start: 2024-03-26 | End: 2024-03-28

## 2024-03-26 RX ORDER — ZOLPIDEM TARTRATE 10 MG/1
10 TABLET ORAL
Qty: 20 | Refills: 0 | Status: DISCONTINUED | COMMUNITY
Start: 2023-10-25 | End: 2024-03-26

## 2024-03-26 RX ORDER — OXYCODONE HYDROCHLORIDE 5 MG/1
1 TABLET ORAL
Qty: 10 | Refills: 0
Start: 2024-03-26 | End: 2024-04-21

## 2024-04-01 NOTE — REASON FOR VISIT
[Follow-Up Visit] : a follow-up [Home] : at home, [unfilled] , at the time of the visit. [Medical Office: ([facility])___] : at the medical office located in

## 2024-04-02 ENCOUNTER — OUTPATIENT (OUTPATIENT)
Dept: OUTPATIENT SERVICES | Facility: HOSPITAL | Age: 44
LOS: 1 days | End: 2024-04-02
Payer: COMMERCIAL

## 2024-04-02 ENCOUNTER — APPOINTMENT (OUTPATIENT)
Dept: HEMATOLOGY ONCOLOGY | Facility: CLINIC | Age: 44
End: 2024-04-02
Payer: COMMERCIAL

## 2024-04-02 ENCOUNTER — APPOINTMENT (OUTPATIENT)
Dept: INFUSION THERAPY | Facility: CLINIC | Age: 44
End: 2024-04-02

## 2024-04-02 VITALS
OXYGEN SATURATION: 99 % | WEIGHT: 151.9 LBS | RESPIRATION RATE: 18 BRPM | HEART RATE: 78 BPM | SYSTOLIC BLOOD PRESSURE: 110 MMHG | DIASTOLIC BLOOD PRESSURE: 74 MMHG | TEMPERATURE: 97 F | HEIGHT: 67 IN

## 2024-04-02 VITALS
HEIGHT: 68 IN | WEIGHT: 155 LBS | DIASTOLIC BLOOD PRESSURE: 88 MMHG | BODY MASS INDEX: 23.49 KG/M2 | OXYGEN SATURATION: 96 % | TEMPERATURE: 99.1 F | SYSTOLIC BLOOD PRESSURE: 124 MMHG | RESPIRATION RATE: 18 BRPM | HEART RATE: 87 BPM

## 2024-04-02 DIAGNOSIS — C69.90 MALIGNANT NEOPLASM OF UNSPECIFIED SITE OF UNSPECIFIED EYE: ICD-10-CM

## 2024-04-02 LAB
ALBUMIN SERPL ELPH-MCNC: 3.5 G/DL — SIGNIFICANT CHANGE UP (ref 3.3–5)
ALP SERPL-CCNC: 101 U/L — SIGNIFICANT CHANGE UP (ref 40–120)
ALT FLD-CCNC: 42 U/L — SIGNIFICANT CHANGE UP (ref 10–45)
ANION GAP SERPL CALC-SCNC: 1 MMOL/L — LOW (ref 5–17)
AST SERPL-CCNC: 28 U/L — SIGNIFICANT CHANGE UP (ref 10–40)
BILIRUB SERPL-MCNC: 0.7 MG/DL — SIGNIFICANT CHANGE UP (ref 0.2–1.2)
BUN SERPL-MCNC: 12 MG/DL — SIGNIFICANT CHANGE UP (ref 7–23)
CALCIUM SERPL-MCNC: 9.5 MG/DL — SIGNIFICANT CHANGE UP (ref 8.4–10.5)
CHLORIDE SERPL-SCNC: 110 MMOL/L — HIGH (ref 96–108)
CO2 SERPL-SCNC: 26 MMOL/L — SIGNIFICANT CHANGE UP (ref 22–31)
CREAT SERPL-MCNC: 0.9 MG/DL — SIGNIFICANT CHANGE UP (ref 0.5–1.3)
EGFR: 80 ML/MIN/1.73M2 — SIGNIFICANT CHANGE UP
GLUCOSE SERPL-MCNC: 98 MG/DL — SIGNIFICANT CHANGE UP (ref 70–99)
HCT VFR BLD CALC: 38.5 % — SIGNIFICANT CHANGE UP (ref 34.5–45)
HGB BLD-MCNC: 13.4 G/DL — SIGNIFICANT CHANGE UP (ref 11.5–15.5)
LDH SERPL L TO P-CCNC: 212 U/L — SIGNIFICANT CHANGE UP (ref 50–242)
LYMPHOCYTES # BLD AUTO: 2 K/UL — SIGNIFICANT CHANGE UP (ref 1–3.3)
LYMPHOCYTES # BLD AUTO: 22.8 % — SIGNIFICANT CHANGE UP (ref 13–44)
MCHC RBC-ENTMCNC: 30.4 PG — SIGNIFICANT CHANGE UP (ref 27–34)
MCHC RBC-ENTMCNC: 34.8 GM/DL — SIGNIFICANT CHANGE UP (ref 32–36)
MCV RBC AUTO: 87.3 FL — SIGNIFICANT CHANGE UP (ref 80–100)
NEUTROPHILS # BLD AUTO: 6.6 K/UL — SIGNIFICANT CHANGE UP (ref 1.8–7.4)
NEUTROPHILS NFR BLD AUTO: 75.6 % — SIGNIFICANT CHANGE UP (ref 43–77)
PLATELET # BLD AUTO: 306 K/UL — SIGNIFICANT CHANGE UP (ref 150–400)
POTASSIUM SERPL-MCNC: 4.4 MMOL/L — SIGNIFICANT CHANGE UP (ref 3.5–5.3)
POTASSIUM SERPL-SCNC: 4.4 MMOL/L — SIGNIFICANT CHANGE UP (ref 3.5–5.3)
PROT SERPL-MCNC: 7.3 G/DL — SIGNIFICANT CHANGE UP (ref 6–8.3)
RBC # BLD: 4.41 M/UL — SIGNIFICANT CHANGE UP (ref 3.8–5.2)
RBC # FLD: 13.1 % — SIGNIFICANT CHANGE UP (ref 10.3–14.5)
SODIUM SERPL-SCNC: 137 MMOL/L — SIGNIFICANT CHANGE UP (ref 135–145)
WBC # BLD: 8.7 K/UL — SIGNIFICANT CHANGE UP (ref 3.8–10.5)
WBC # FLD AUTO: 8.7 K/UL — SIGNIFICANT CHANGE UP (ref 3.8–10.5)

## 2024-04-02 PROCEDURE — 99214 OFFICE O/P EST MOD 30 MIN: CPT

## 2024-04-02 PROCEDURE — P9047: CPT

## 2024-04-02 PROCEDURE — 83615 LACTATE (LD) (LDH) ENZYME: CPT

## 2024-04-02 PROCEDURE — G2211 COMPLEX E/M VISIT ADD ON: CPT | Mod: NC,1L

## 2024-04-02 PROCEDURE — 85025 COMPLETE CBC W/AUTO DIFF WBC: CPT

## 2024-04-02 PROCEDURE — 36415 COLL VENOUS BLD VENIPUNCTURE: CPT

## 2024-04-02 PROCEDURE — 96413 CHEMO IV INFUSION 1 HR: CPT

## 2024-04-02 PROCEDURE — 80053 COMPREHEN METABOLIC PANEL: CPT

## 2024-04-02 RX ORDER — TEBENTAFUSP 100 UG/.5ML
68 INJECTION, SOLUTION, CONCENTRATE INTRAVENOUS ONCE
Refills: 0 | Status: COMPLETED | OUTPATIENT
Start: 2024-04-02 | End: 2024-04-02

## 2024-04-02 RX ADMIN — TEBENTAFUSP 68 MICROGRAM(S): 100 INJECTION, SOLUTION, CONCENTRATE INTRAVENOUS at 17:45

## 2024-04-02 RX ADMIN — TEBENTAFUSP 68 MICROGRAM(S): 100 INJECTION, SOLUTION, CONCENTRATE INTRAVENOUS at 17:18

## 2024-04-03 RX ORDER — ZOLPIDEM TARTRATE 10 MG/1
10 TABLET ORAL
Qty: 20 | Refills: 0 | Status: ACTIVE | COMMUNITY
Start: 2024-04-03 | End: 1900-01-01

## 2024-04-03 NOTE — HISTORY OF PRESENT ILLNESS
[Disease: _____________________] : Disease: [unfilled] [M: ___] : M[unfilled] [AJCC Stage: ____] : AJCC Stage: [unfilled] [de-identified] : On 3/25/2024, patient underwent right hepatic intra-arterial administration of leukine and lipiodol with a gelfoam slurry embolization of the right hepatic artery. Patient skipped Tebe last week due to above procedure.  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 FoundationOne Liquid CDx (11/27/23) - 3 Muts/Mb, MSI-high not detected, ctDNA tumor fraction low (<1%).  CURRENT THERAPY: Tebentafusp since 03/12/2023  PRIOR THERAPIES: 9/21/23 Y90 radioembolization of liver  INTERVAL HISTORY: Mrs. Hoff is a 43 year old female with a history of primary uveal melanoma, now with newly diagnosed metastatic disease with liver only involvement who presents for continued management of her disease.  She is followed by Dr. Hernan Osborn.  She is HLA  positive. Please see my prior notes for her complex history.  Briefly, she initially presented in January 2018 with vision changes in her right eye, with later loss of temporal and upper visual field vision.  She was evaluated by Dr. Konstantin Barros at the [facility] and was found to have a subretinal mass in her right eye with a greated basal diameter of 14.43 mm and a maximal height of 7 mm.  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  She was treated with Ru-106 brachytherapy from 01/30/2018 until 02/06/2018.  She was subsequently placed on expectant observation for systemic recurrence.  She did well until, on 01/29/2023, she underwent an abdominal ultrasound which was significant for a new focal liver lesion measuring 3 cm.  A subsequent chest, abdominal and pelvic CT scan, performed on 01/30/2023, demonstrated a segment 7 liver lesion with arterial enhancement and other subcentimeter hepatic lesions measuring up to 4 mm of unclear etiology.  On 02/01/2023, she underwent a liver biopsy, with pathology consistent with melanoma.  Immunohistochemistry was positive for SOX10 and MART1.  An abdominal MRI, performed on 02/16/2023, demonstrated a dominant segment 7 lesion in the liver, with multiple other smaller subcentimeter lesions bilaterally.  She was evaluated at the [facility] on 02/20/2023 by Dr. Miguelito Truong, with recommendations for HLA testing and possible tebentafusp if appropriate or combination checkpoint blockade.  She was found to be HLA A*02:01 positive and initiated therapy with tebentafusp on 03/12/2023, and she has now completed her first three inpatient doses, with her most recent dose administered on 03/26/2023.  She experienced mild rash and periorbital edema, with IV fluids administered for mild hypotension with her first dose, but otherwise tolerated therapy without difficulty.  On 04/17/2023, she underwent a new baseline chest and pelvis CT as well as a liver MRI.  The MRI demonstrated metastatic lesions affecting both lobes of the liver, with some worsening when compared with the prior scans from 02/16/2023 and with the largest lesion now measuring 4.5 x 3.5 cm in segment 7. She had repeat imaging done on 8/18/23 which showed interval increase in size of the hepatic segment 7 metastatic lesion and persistent numerous additional scattered small hemorrhagic liver metastases. 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  She underwent mapping on 9/7/23 and Y90 radioembolization on 9/21/23 with IR Gustafson Maximiliano.   She continues on weekly tebentafusp.  Although she has typically developed low grade fever around 6 hours after infusion as well as treatment associated fatigue, she has not had these symptoms with her two most recent doses.   She has developed progressive vitiligo.  On 01/15/2024, she underwent a repeat liver MRI.  This study, when compared with a prior dated 11/17/2023, demonstrated decrease in the size of the dominant segment 7 lesion, with no significant change in the other small bilateral lesions.  She was evaluated by Dr. Miguelito Torres at [facility] on 01/22/2023.  The Delcath PHP procedure was discussed at length and the patient has opted to not proceed.  She underwent a chest CT and abdominal MRI on 03/15/46882.  The CT study, when compared with a prior dated 01/25/2024, demonstrated no significant change with no overt evidence for progressive disease.  The MRI, when compared with a prior dated 01/15/2024, demonstrated interval decrease in the size of the hepatic segment 7 lesion. There is an increase in size and number of the small liver lesions.  On 3/25/2024, patient underwent right hepatic intra-arterial administration of leukine and lipiodol with a gelfoam slurry embolization of the right hepatic artery. Patient skipped Tebe last week due to above procedure and the development of right shoulder pain which has now resolved.  She is feeling well today without complaint.   PAST MEDICAL HISTORY: None  SOCIAL HISTORY: The patient has moved from [facility] and is now living in [place].  Her partner currently resides in the  but is considering relocating.  She works in the [employer].  FAMILY HISTORY: Her maternal grandfather had mucosal melanoma arising from the stomach.

## 2024-04-03 NOTE — ASSESSMENT
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 She initiated therapy with tebentafusp at [facility] but then moved to [place].  Due to radiographic progression of a dominant site of disease, she underwent Y90 mapping on 9/7 and Y90 radioembolization on 09/21/23 with no issue. Based upon her most recent imaging studies, she has achieved a treatment effect in the dominant mass treated with Y90, with some progression elsewhere.    Patient is s/p immunoembolization from 3/25/2024. Tebe held last week. ctDNA sent and was less than 1%. Imaging scheduled for May 10th.  We will obtain repeat bloodwork and plan on proceeding with tebentafusp today, on 04/09/2024 and 04/16/2024.  She will proceed with her next immunoembolization procedure with Dr. Gustafson on 04/19/2024.  We will plan to follow up with her post procedure on 04/23/2024.  She will be traveling for work in early May, and we will plan on restaging studies when иванifrah returns.

## 2024-04-11 ENCOUNTER — OUTPATIENT (OUTPATIENT)
Dept: OUTPATIENT SERVICES | Facility: HOSPITAL | Age: 44
LOS: 1 days | End: 2024-04-11
Payer: COMMERCIAL

## 2024-04-11 ENCOUNTER — APPOINTMENT (OUTPATIENT)
Dept: INFUSION THERAPY | Facility: CLINIC | Age: 44
End: 2024-04-11

## 2024-04-11 VITALS
DIASTOLIC BLOOD PRESSURE: 72 MMHG | HEART RATE: 76 BPM | WEIGHT: 154.98 LBS | HEIGHT: 67 IN | SYSTOLIC BLOOD PRESSURE: 132 MMHG | RESPIRATION RATE: 16 BRPM | TEMPERATURE: 98 F | OXYGEN SATURATION: 98 %

## 2024-04-11 DIAGNOSIS — C69.90 MALIGNANT NEOPLASM OF UNSPECIFIED SITE OF UNSPECIFIED EYE: ICD-10-CM

## 2024-04-11 LAB
ALBUMIN SERPL ELPH-MCNC: 4 G/DL — SIGNIFICANT CHANGE UP (ref 3.3–5)
ALP SERPL-CCNC: 97 U/L — SIGNIFICANT CHANGE UP (ref 40–120)
ALT FLD-CCNC: 31 U/L — SIGNIFICANT CHANGE UP (ref 10–45)
ANION GAP SERPL CALC-SCNC: 9 MMOL/L — SIGNIFICANT CHANGE UP (ref 5–17)
AST SERPL-CCNC: 30 U/L — SIGNIFICANT CHANGE UP (ref 10–40)
BASOPHILS # BLD AUTO: 0.04 K/UL — SIGNIFICANT CHANGE UP (ref 0–0.2)
BASOPHILS NFR BLD AUTO: 0.9 % — SIGNIFICANT CHANGE UP (ref 0–2)
BILIRUB SERPL-MCNC: 0.3 MG/DL — SIGNIFICANT CHANGE UP (ref 0.2–1.2)
BUN SERPL-MCNC: 12 MG/DL — SIGNIFICANT CHANGE UP (ref 7–23)
CALCIUM SERPL-MCNC: 9 MG/DL — SIGNIFICANT CHANGE UP (ref 8.4–10.5)
CHLORIDE SERPL-SCNC: 106 MMOL/L — SIGNIFICANT CHANGE UP (ref 96–108)
CO2 SERPL-SCNC: 23 MMOL/L — SIGNIFICANT CHANGE UP (ref 22–31)
CREAT SERPL-MCNC: 1.02 MG/DL — SIGNIFICANT CHANGE UP (ref 0.5–1.3)
EGFR: 69 ML/MIN/1.73M2 — SIGNIFICANT CHANGE UP
EOSINOPHIL # BLD AUTO: 0.31 K/UL — SIGNIFICANT CHANGE UP (ref 0–0.5)
EOSINOPHIL NFR BLD AUTO: 6.8 % — HIGH (ref 0–6)
GLUCOSE SERPL-MCNC: 96 MG/DL — SIGNIFICANT CHANGE UP (ref 70–99)
HCT VFR BLD CALC: 39.1 % — SIGNIFICANT CHANGE UP (ref 34.5–45)
HGB BLD-MCNC: 13 G/DL — SIGNIFICANT CHANGE UP (ref 11.5–15.5)
IMM GRANULOCYTES NFR BLD AUTO: 0.9 % — SIGNIFICANT CHANGE UP (ref 0–0.9)
LDH SERPL L TO P-CCNC: 167 U/L — SIGNIFICANT CHANGE UP (ref 50–242)
LYMPHOCYTES # BLD AUTO: 1.14 K/UL — SIGNIFICANT CHANGE UP (ref 1–3.3)
LYMPHOCYTES # BLD AUTO: 25.1 % — SIGNIFICANT CHANGE UP (ref 13–44)
MCHC RBC-ENTMCNC: 29.9 PG — SIGNIFICANT CHANGE UP (ref 27–34)
MCHC RBC-ENTMCNC: 33.2 GM/DL — SIGNIFICANT CHANGE UP (ref 32–36)
MCV RBC AUTO: 89.9 FL — SIGNIFICANT CHANGE UP (ref 80–100)
MONOCYTES # BLD AUTO: 0.6 K/UL — SIGNIFICANT CHANGE UP (ref 0–0.9)
MONOCYTES NFR BLD AUTO: 13.2 % — SIGNIFICANT CHANGE UP (ref 2–14)
NEUTROPHILS # BLD AUTO: 2.42 K/UL — SIGNIFICANT CHANGE UP (ref 1.8–7.4)
NEUTROPHILS NFR BLD AUTO: 53.1 % — SIGNIFICANT CHANGE UP (ref 43–77)
NRBC # BLD: 0 /100 WBCS — SIGNIFICANT CHANGE UP (ref 0–0)
PLATELET # BLD AUTO: 320 K/UL — SIGNIFICANT CHANGE UP (ref 150–400)
POTASSIUM SERPL-MCNC: 4 MMOL/L — SIGNIFICANT CHANGE UP (ref 3.5–5.3)
POTASSIUM SERPL-SCNC: 4 MMOL/L — SIGNIFICANT CHANGE UP (ref 3.5–5.3)
PROT SERPL-MCNC: 6.9 G/DL — SIGNIFICANT CHANGE UP (ref 6–8.3)
RBC # BLD: 4.35 M/UL — SIGNIFICANT CHANGE UP (ref 3.8–5.2)
RBC # FLD: 12.8 % — SIGNIFICANT CHANGE UP (ref 10.3–14.5)
SODIUM SERPL-SCNC: 138 MMOL/L — SIGNIFICANT CHANGE UP (ref 135–145)
WBC # BLD: 4.55 K/UL — SIGNIFICANT CHANGE UP (ref 3.8–10.5)
WBC # FLD AUTO: 4.55 K/UL — SIGNIFICANT CHANGE UP (ref 3.8–10.5)

## 2024-04-11 PROCEDURE — 80053 COMPREHEN METABOLIC PANEL: CPT

## 2024-04-11 PROCEDURE — 85025 COMPLETE CBC W/AUTO DIFF WBC: CPT

## 2024-04-11 PROCEDURE — 36415 COLL VENOUS BLD VENIPUNCTURE: CPT

## 2024-04-11 PROCEDURE — P9047: CPT

## 2024-04-11 PROCEDURE — 83615 LACTATE (LD) (LDH) ENZYME: CPT

## 2024-04-11 PROCEDURE — 96413 CHEMO IV INFUSION 1 HR: CPT

## 2024-04-11 RX ORDER — TEBENTAFUSP 100 UG/.5ML
68 INJECTION, SOLUTION, CONCENTRATE INTRAVENOUS ONCE
Refills: 0 | Status: COMPLETED | OUTPATIENT
Start: 2024-04-11 | End: 2024-04-11

## 2024-04-11 RX ADMIN — TEBENTAFUSP 68 MICROGRAM(S): 100 INJECTION, SOLUTION, CONCENTRATE INTRAVENOUS at 17:40

## 2024-04-11 RX ADMIN — TEBENTAFUSP 68 MICROGRAM(S): 100 INJECTION, SOLUTION, CONCENTRATE INTRAVENOUS at 17:18

## 2024-04-16 ENCOUNTER — OUTPATIENT (OUTPATIENT)
Dept: OUTPATIENT SERVICES | Facility: HOSPITAL | Age: 44
LOS: 1 days | End: 2024-04-16
Payer: COMMERCIAL

## 2024-04-16 ENCOUNTER — APPOINTMENT (OUTPATIENT)
Dept: INFUSION THERAPY | Facility: CLINIC | Age: 44
End: 2024-04-16

## 2024-04-16 DIAGNOSIS — C69.90 MALIGNANT NEOPLASM OF UNSPECIFIED SITE OF UNSPECIFIED EYE: ICD-10-CM

## 2024-04-16 PROCEDURE — 96413 CHEMO IV INFUSION 1 HR: CPT

## 2024-04-16 PROCEDURE — P9047: CPT

## 2024-04-16 RX ORDER — TEBENTAFUSP 100 UG/.5ML
68 INJECTION, SOLUTION, CONCENTRATE INTRAVENOUS ONCE
Refills: 0 | Status: COMPLETED | OUTPATIENT
Start: 2024-04-16 | End: 2024-04-16

## 2024-04-16 RX ADMIN — TEBENTAFUSP 68 MICROGRAM(S): 100 INJECTION, SOLUTION, CONCENTRATE INTRAVENOUS at 17:33

## 2024-04-16 RX ADMIN — TEBENTAFUSP 68 MICROGRAM(S): 100 INJECTION, SOLUTION, CONCENTRATE INTRAVENOUS at 17:08

## 2024-04-19 ENCOUNTER — APPOINTMENT (OUTPATIENT)
Dept: INTERVENTIONAL RADIOLOGY/VASCULAR | Facility: HOSPITAL | Age: 44
End: 2024-04-19

## 2024-04-19 ENCOUNTER — OUTPATIENT (OUTPATIENT)
Dept: OUTPATIENT SERVICES | Facility: HOSPITAL | Age: 44
LOS: 1 days | End: 2024-04-19
Payer: COMMERCIAL

## 2024-04-19 ENCOUNTER — RESULT REVIEW (OUTPATIENT)
Age: 44
End: 2024-04-19

## 2024-04-19 PROCEDURE — C1887: CPT

## 2024-04-19 PROCEDURE — 36248 INS CATH ABD/L-EXT ART ADDL: CPT

## 2024-04-19 PROCEDURE — C1894: CPT

## 2024-04-19 PROCEDURE — 37243 VASC EMBOLIZE/OCCLUDE ORGAN: CPT

## 2024-04-19 PROCEDURE — 36247 INS CATH ABD/L-EXT ART 3RD: CPT

## 2024-04-19 PROCEDURE — 36245 INS CATH ABD/L-EXT ART 1ST: CPT | Mod: 59

## 2024-04-19 PROCEDURE — C1769: CPT

## 2024-04-19 RX ORDER — OXYCODONE HYDROCHLORIDE 5 MG/1
5 TABLET ORAL ONCE
Refills: 0 | Status: DISCONTINUED | OUTPATIENT
Start: 2024-04-19 | End: 2024-04-19

## 2024-04-19 RX ORDER — SODIUM CHLORIDE 9 MG/ML
1000 INJECTION, SOLUTION INTRAVENOUS
Refills: 0 | Status: DISCONTINUED | OUTPATIENT
Start: 2024-04-19 | End: 2024-05-03

## 2024-04-19 RX ORDER — SARGRAMOSTIM 500 MCG/ML
1500 VIAL (ML) INJECTION ONCE
Refills: 0 | Status: DISCONTINUED | OUTPATIENT
Start: 2024-04-19 | End: 2024-05-03

## 2024-04-19 RX ORDER — ONDANSETRON 8 MG/1
4 TABLET, FILM COATED ORAL ONCE
Refills: 0 | Status: DISCONTINUED | OUTPATIENT
Start: 2024-04-19 | End: 2024-05-03

## 2024-04-19 RX ADMIN — OXYCODONE HYDROCHLORIDE 5 MILLIGRAM(S): 5 TABLET ORAL at 16:54

## 2024-04-19 NOTE — PACU DISCHARGE NOTE - AIRWAY PATENCY:
[FreeTextEntry1] : Unclear if pain is causing depression or if depression is worsening pain. Will try wellbutrin in addition to cymbalta to improve mood. No SI.\par She will try acupuncture, CBD oil. \par Follow up in 1 month.\par 
Satisfactory

## 2024-04-19 NOTE — PRE-ANESTHESIA EVALUATION ADULT - NSANTHPMHFT_GEN_ALL_CORE
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 S/p prior targeted therapy. No other issues. No CP/SOB/N/V/GERD. No numbness or weakness. METS > 4. All other chronic conditions stable.

## 2024-04-23 ENCOUNTER — APPOINTMENT (OUTPATIENT)
Dept: HEMATOLOGY ONCOLOGY | Facility: CLINIC | Age: 44
End: 2024-04-23
Payer: COMMERCIAL

## 2024-04-23 ENCOUNTER — OUTPATIENT (OUTPATIENT)
Dept: OUTPATIENT SERVICES | Facility: HOSPITAL | Age: 44
LOS: 1 days | End: 2024-04-23
Payer: COMMERCIAL

## 2024-04-23 ENCOUNTER — APPOINTMENT (OUTPATIENT)
Dept: INFUSION THERAPY | Facility: CLINIC | Age: 44
End: 2024-04-23

## 2024-04-23 VITALS
WEIGHT: 155 LBS | HEIGHT: 68 IN | SYSTOLIC BLOOD PRESSURE: 114 MMHG | DIASTOLIC BLOOD PRESSURE: 82 MMHG | RESPIRATION RATE: 18 BRPM | HEART RATE: 103 BPM | OXYGEN SATURATION: 97 % | TEMPERATURE: 97.7 F | BODY MASS INDEX: 23.49 KG/M2

## 2024-04-23 VITALS
RESPIRATION RATE: 15 BRPM | SYSTOLIC BLOOD PRESSURE: 136 MMHG | HEIGHT: 67 IN | WEIGHT: 154.1 LBS | OXYGEN SATURATION: 99 % | HEART RATE: 72 BPM | TEMPERATURE: 98 F | DIASTOLIC BLOOD PRESSURE: 76 MMHG

## 2024-04-23 VITALS
DIASTOLIC BLOOD PRESSURE: 72 MMHG | RESPIRATION RATE: 16 BRPM | TEMPERATURE: 98 F | OXYGEN SATURATION: 98 % | HEART RATE: 78 BPM | SYSTOLIC BLOOD PRESSURE: 126 MMHG

## 2024-04-23 DIAGNOSIS — C69.90 MALIGNANT NEOPLASM OF UNSPECIFIED SITE OF UNSPECIFIED EYE: ICD-10-CM

## 2024-04-23 LAB
ALBUMIN SERPL ELPH-MCNC: 4.4 G/DL — SIGNIFICANT CHANGE UP (ref 3.3–5)
ALP SERPL-CCNC: 124 U/L — HIGH (ref 40–120)
ALT FLD-CCNC: 48 U/L — HIGH (ref 10–45)
ANION GAP SERPL CALC-SCNC: 14 MMOL/L — SIGNIFICANT CHANGE UP (ref 5–17)
AST SERPL-CCNC: 36 U/L — SIGNIFICANT CHANGE UP (ref 10–40)
BASOPHILS # BLD AUTO: 0.07 K/UL — SIGNIFICANT CHANGE UP (ref 0–0.2)
BASOPHILS NFR BLD AUTO: 0.8 % — SIGNIFICANT CHANGE UP (ref 0–2)
BILIRUB SERPL-MCNC: 0.3 MG/DL — SIGNIFICANT CHANGE UP (ref 0.2–1.2)
BUN SERPL-MCNC: 8 MG/DL — SIGNIFICANT CHANGE UP (ref 7–23)
CALCIUM SERPL-MCNC: 9.8 MG/DL — SIGNIFICANT CHANGE UP (ref 8.4–10.5)
CHLORIDE SERPL-SCNC: 104 MMOL/L — SIGNIFICANT CHANGE UP (ref 96–108)
CO2 SERPL-SCNC: 24 MMOL/L — SIGNIFICANT CHANGE UP (ref 22–31)
CREAT SERPL-MCNC: 0.71 MG/DL — SIGNIFICANT CHANGE UP (ref 0.5–1.3)
EGFR: 107 ML/MIN/1.73M2 — SIGNIFICANT CHANGE UP
EOSINOPHIL # BLD AUTO: 0.38 K/UL — SIGNIFICANT CHANGE UP (ref 0–0.5)
EOSINOPHIL NFR BLD AUTO: 4.6 % — SIGNIFICANT CHANGE UP (ref 0–6)
GLUCOSE SERPL-MCNC: 99 MG/DL — SIGNIFICANT CHANGE UP (ref 70–99)
HCT VFR BLD CALC: 42.4 % — SIGNIFICANT CHANGE UP (ref 34.5–45)
HGB BLD-MCNC: 14.2 G/DL — SIGNIFICANT CHANGE UP (ref 11.5–15.5)
IMM GRANULOCYTES NFR BLD AUTO: 1.2 % — HIGH (ref 0–0.9)
LDH SERPL L TO P-CCNC: 221 U/L — SIGNIFICANT CHANGE UP (ref 50–242)
LYMPHOCYTES # BLD AUTO: 1.77 K/UL — SIGNIFICANT CHANGE UP (ref 1–3.3)
LYMPHOCYTES # BLD AUTO: 21.2 % — SIGNIFICANT CHANGE UP (ref 13–44)
MCHC RBC-ENTMCNC: 30.5 PG — SIGNIFICANT CHANGE UP (ref 27–34)
MCHC RBC-ENTMCNC: 33.5 GM/DL — SIGNIFICANT CHANGE UP (ref 32–36)
MCV RBC AUTO: 91.2 FL — SIGNIFICANT CHANGE UP (ref 80–100)
MONOCYTES # BLD AUTO: 0.53 K/UL — SIGNIFICANT CHANGE UP (ref 0–0.9)
MONOCYTES NFR BLD AUTO: 6.3 % — SIGNIFICANT CHANGE UP (ref 2–14)
NEUTROPHILS # BLD AUTO: 5.5 K/UL — SIGNIFICANT CHANGE UP (ref 1.8–7.4)
NEUTROPHILS NFR BLD AUTO: 65.9 % — SIGNIFICANT CHANGE UP (ref 43–77)
NRBC # BLD: 0 /100 WBCS — SIGNIFICANT CHANGE UP (ref 0–0)
PLATELET # BLD AUTO: 361 K/UL — SIGNIFICANT CHANGE UP (ref 150–400)
POTASSIUM SERPL-MCNC: 4.4 MMOL/L — SIGNIFICANT CHANGE UP (ref 3.5–5.3)
POTASSIUM SERPL-SCNC: 4.4 MMOL/L — SIGNIFICANT CHANGE UP (ref 3.5–5.3)
PROT SERPL-MCNC: 7.9 G/DL — SIGNIFICANT CHANGE UP (ref 6–8.3)
RBC # BLD: 4.65 M/UL — SIGNIFICANT CHANGE UP (ref 3.8–5.2)
RBC # FLD: 12.4 % — SIGNIFICANT CHANGE UP (ref 10.3–14.5)
SODIUM SERPL-SCNC: 142 MMOL/L — SIGNIFICANT CHANGE UP (ref 135–145)
WBC # BLD: 8.35 K/UL — SIGNIFICANT CHANGE UP (ref 3.8–10.5)
WBC # FLD AUTO: 8.35 K/UL — SIGNIFICANT CHANGE UP (ref 3.8–10.5)

## 2024-04-23 PROCEDURE — 96413 CHEMO IV INFUSION 1 HR: CPT

## 2024-04-23 PROCEDURE — 99213 OFFICE O/P EST LOW 20 MIN: CPT

## 2024-04-23 PROCEDURE — 83615 LACTATE (LD) (LDH) ENZYME: CPT

## 2024-04-23 PROCEDURE — 36415 COLL VENOUS BLD VENIPUNCTURE: CPT

## 2024-04-23 PROCEDURE — 85025 COMPLETE CBC W/AUTO DIFF WBC: CPT

## 2024-04-23 PROCEDURE — P9047: CPT

## 2024-04-23 PROCEDURE — 80053 COMPREHEN METABOLIC PANEL: CPT

## 2024-04-23 PROCEDURE — G2211 COMPLEX E/M VISIT ADD ON: CPT | Mod: NC,1L

## 2024-04-23 RX ORDER — TEBENTAFUSP 100 UG/.5ML
68 INJECTION, SOLUTION, CONCENTRATE INTRAVENOUS ONCE
Refills: 0 | Status: COMPLETED | OUTPATIENT
Start: 2024-04-23 | End: 2024-04-23

## 2024-04-23 RX ADMIN — TEBENTAFUSP 68 MICROGRAM(S): 100 INJECTION, SOLUTION, CONCENTRATE INTRAVENOUS at 17:00

## 2024-04-23 RX ADMIN — TEBENTAFUSP 68 MICROGRAM(S): 100 INJECTION, SOLUTION, CONCENTRATE INTRAVENOUS at 16:40

## 2024-04-23 NOTE — ASSESSMENT
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 She initiated therapy with tebentafusp at [facility] but then moved to [place].  Due to radiographic progression of a dominant site of disease, she underwent Y90 mapping on 9/7 and Y90 radioembolization on 09/21/23 with no issue. Based upon her most recent imaging studies, she has achieved a treatment effect in the dominant mass treated with Y90, with some progression elsewhere.  She has now received therapy with immunoembolization x 2 administered concurrently with tebentafusp.   We will check bloodwork today and plan to proceed with tebentafusp today and again next week on 04/30/2024.  Per discussions with Dr. Viera, she is scheduled for early imaging studies on 05/01/2024.  We will plan on reviewing the results by phone on 05/02/2024.  Due to her travel schedule, we will tentatively plan on resuming tebentafusp on 05/09/2024, with her next inperson visit on 05/14/2024.

## 2024-04-23 NOTE — HISTORY OF PRESENT ILLNESS
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 FoundationOne Liquid CDx (11/27/23) - 3 Muts/Mb, MSI-high not detected, ctDNA tumor fraction low (<1%).  CURRENT THERAPY: Tebentafusp since 03/12/2023 concurrently with immunoembolization (#1, right 03/25/2024; #2, left 04/19/2024)  PRIOR THERAPIES: 9/21/23 Y90 radioembolization of liver  INTERVAL HISTORY: Mrs. Hoff is a 43 year old female with a history of primary uveal melanoma, now with newly diagnosed metastatic disease with liver only involvement who presents for continued management of her disease.  She is followed by Dr. Hernan Osborn.  She is HLA  positive. Please see my prior notes for her complex history.  Briefly, she initially presented in January 2018 with vision changes in her right eye, with later loss of temporal and upper visual field vision.  She was evaluated by Dr. Konstantin Barros at the [facility] and was found to have a subretinal mass in her right eye with a greated basal diameter of 14.43 mm and a maximal height of 7 mm.  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  She was treated with Ru-106 brachytherapy from 01/30/2018 until 02/06/2018.  She was subsequently placed on expectant observation for systemic recurrence.  She did well until, on 01/29/2023, she underwent an abdominal ultrasound which was significant for a new focal liver lesion measuring 3 cm.  A subsequent chest, abdominal and pelvic CT scan, performed on 01/30/2023, demonstrated a segment 7 liver lesion with arterial enhancement and other subcentimeter hepatic lesions measuring up to 4 mm of unclear etiology.  On 02/01/2023, she underwent a liver biopsy, with pathology consistent with melanoma.  Immunohistochemistry was positive for SOX10 and MART1.  An abdominal MRI, performed on 02/16/2023, demonstrated a dominant segment 7 lesion in the liver, with multiple other smaller subcentimeter lesions bilaterally.  She was evaluated at the [facility] on 02/20/2023 by Dr. Miguelito Truong, with recommendations for HLA testing and possible tebentafusp if appropriate or combination checkpoint blockade.  She was found to be HLA A*02:01 positive and initiated therapy with tebentafusp on 03/12/2023, and she has now completed her first three inpatient doses, with her most recent dose administered on 03/26/2023.  She experienced mild rash and periorbital edema, with IV fluids administered for mild hypotension with her first dose, but otherwise tolerated therapy without difficulty.  On 04/17/2023, she underwent a new baseline chest and pelvis CT as well as a liver MRI.  The MRI demonstrated metastatic lesions affecting both lobes of the liver, with some worsening when compared with the prior scans from 02/16/2023 and with the largest lesion now measuring 4.5 x 3.5 cm in segment 7. She had repeat imaging done on 8/18/23 which showed interval increase in size of the hepatic segment 7 metastatic lesion and persistent numerous additional scattered small hemorrhagic liver metastases. 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  She underwent mapping on 9/7/23 and Y90 radioembolization on 9/21/23 with IR Sj Maximiliano.   She continues on weekly tebentafusp.  Although she has typically developed low grade fever around 6 hours after infusion as well as treatment associated fatigue, she has not had these symptoms with her two most recent doses.   She has developed progressive vitiligo.  On 01/15/2024, she underwent a repeat liver MRI.  This study, when compared with a prior dated 11/17/2023, demonstrated decrease in the size of the dominant segment 7 lesion, with no significant change in the other small bilateral lesions.  She was evaluated by Dr. Miguelito Torres at [facility] on 01/22/2023.  The Delcath PHP procedure was discussed at length and the patient has opted to not proceed.  She underwent a chest CT and abdominal MRI on 03/15/36017.  The CT study, when compared with a prior dated 01/25/2024, demonstrated no significant change with no overt evidence for progressive disease.  The MRI, when compared with a prior dated 01/15/2024, demonstrated interval decrease in the size of the hepatic segment 7 lesion. There is an increase in size and number of the small liver lesions.  On 3/25/2024, patient underwent right hepatic intra-arterial administration of leukine and lipiodol with a gelfoam slurry embolization of the right hepatic artery and more recently underwent immuno embolization of the left on 04/19/2024. She reports some abdominal distention and shoulder pain following the procedure, but is feeling well today.  PAST MEDICAL HISTORY: None  SOCIAL HISTORY: The patient has moved from [place] and is now living in [place].  Her partner currently resides in the [place] but is considering relocating.  She works in the [employer].  FAMILY HISTORY: Her maternal grandfather had mucosal melanoma arising from the stomach.  [de-identified] : Patient last seen on 4/3/2024.  Patient underwent immunoembolization on 4/19/2024 with Maximiliano Gustafson.

## 2024-04-23 NOTE — HISTORY OF PRESENT ILLNESS
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 FoundationOne Liquid CDx (11/27/23) - 3 Muts/Mb, MSI-high not detected, ctDNA tumor fraction low (<1%).  CURRENT THERAPY: Tebentafusp since 03/12/2023 concurrently with immunoembolization (#1, right 03/25/2024; #2, left 04/19/2024)  PRIOR THERAPIES: 9/21/23 Y90 radioembolization of liver  INTERVAL HISTORY: Mrs. Hoff is a 43 year old female with a history of primary uveal melanoma, now with newly diagnosed metastatic disease with liver only involvement who presents for continued management of her disease.  She is followed by Dr. Hernan Osborn.  She is HLA  positive. Please see my prior notes for her complex history.  Briefly, she initially presented in January 2018 with vision changes in her right eye, with later loss of temporal and upper visual field vision.  She was evaluated by Dr. Konstantin Barros at the [facility] and was found to have a subretinal mass in her right eye with a greated basal diameter of 14.43 mm and a maximal height of 7 mm.  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  She was treated with Ru-106 brachytherapy from 01/30/2018 until 02/06/2018.  She was subsequently placed on expectant observation for systemic recurrence.  She did well until, on 01/29/2023, she underwent an abdominal ultrasound which was significant for a new focal liver lesion measuring 3 cm.  A subsequent chest, abdominal and pelvic CT scan, performed on 01/30/2023, demonstrated a segment 7 liver lesion with arterial enhancement and other subcentimeter hepatic lesions measuring up to 4 mm of unclear etiology.  On 02/01/2023, she underwent a liver biopsy, with pathology consistent with melanoma.  Immunohistochemistry was positive for SOX10 and MART1.  An abdominal MRI, performed on 02/16/2023, demonstrated a dominant segment 7 lesion in the liver, with multiple other smaller subcentimeter lesions bilaterally.  She was evaluated at the [facility] on 02/20/2023 by Dr. Miguelito Truong, with recommendations for HLA testing and possible tebentafusp if appropriate or combination checkpoint blockade.  She was found to be HLA A*02:01 positive and initiated therapy with tebentafusp on 03/12/2023, and she has now completed her first three inpatient doses, with her most recent dose administered on 03/26/2023.  She experienced mild rash and periorbital edema, with IV fluids administered for mild hypotension with her first dose, but otherwise tolerated therapy without difficulty.  On 04/17/2023, she underwent a new baseline chest and pelvis CT as well as a liver MRI.  The MRI demonstrated metastatic lesions affecting both lobes of the liver, with some worsening when compared with the prior scans from 02/16/2023 and with the largest lesion now measuring 4.5 x 3.5 cm in segment 7. She had repeat imaging done on 8/18/23 which showed interval increase in size of the hepatic segment 7 metastatic lesion and persistent numerous additional scattered small hemorrhagic liver metastases. 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  She underwent mapping on 9/7/23 and Y90 radioembolization on 9/21/23 with IR Sj Maximiliano.   She continues on weekly tebentafusp.  Although she has typically developed low grade fever around 6 hours after infusion as well as treatment associated fatigue, she has not had these symptoms with her two most recent doses.   She has developed progressive vitiligo.  On 01/15/2024, she underwent a repeat liver MRI.  This study, when compared with a prior dated 11/17/2023, demonstrated decrease in the size of the dominant segment 7 lesion, with no significant change in the other small bilateral lesions.  She was evaluated by Dr. Miguelito Torres at [facility] on 01/22/2023.  The Delcath PHP procedure was discussed at length and the patient has opted to not proceed.  She underwent a chest CT and abdominal MRI on 03/15/51711.  The CT study, when compared with a prior dated 01/25/2024, demonstrated no significant change with no overt evidence for progressive disease.  The MRI, when compared with a prior dated 01/15/2024, demonstrated interval decrease in the size of the hepatic segment 7 lesion. There is an increase in size and number of the small liver lesions.  On 3/25/2024, patient underwent right hepatic intra-arterial administration of leukine and lipiodol with a gelfoam slurry embolization of the right hepatic artery and more recently underwent immuno embolization of the left on 04/19/2024. She reports some abdominal distention and shoulder pain following the procedure, but is feeling well today.  PAST MEDICAL HISTORY: None  SOCIAL HISTORY: The patient has moved from [place] and is now living in [place].  Her partner currently resides in the [place] but is considering relocating.  She works in the [employer].  FAMILY HISTORY: Her maternal grandfather had mucosal melanoma arising from the stomach.  [de-identified] : Patient last seen on 4/3/2024.  Patient underwent immunoembolization on 4/19/2024 with Maximiliano Gustafson.

## 2024-04-23 NOTE — ASSESSMENT
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 She initiated therapy with tebentafusp at [facility] but then moved to [place].  Due to radiographic progression of a dominant site of disease, she underwent Y90 mapping on 9/7 and Y90 radioembolization on 09/21/23 with no issue. Based upon her most recent imaging studies, she has achieved a treatment effect in the dominant mass treated with Y90, with some progression elsewhere.  She has now received therapy with immunoembolization x 2 administered concurrently with tebentafusp.   We will check bloodwork today and plan to proceed with tebentafusp today and again next week on 04/30/2024.  Per discussions with Dr. Viera, she is scheduled for early imaging studies on 05/01/2024.  We will plan on reviewing the results by phone on 05/02/2024.  Due to her travel schedule, we will tentatively plan on resuming tebentafusp on 05/09/2024, with her next inperson visit on 05/14/2024.

## 2024-04-30 ENCOUNTER — APPOINTMENT (OUTPATIENT)
Dept: INFUSION THERAPY | Facility: CLINIC | Age: 44
End: 2024-04-30

## 2024-04-30 ENCOUNTER — OUTPATIENT (OUTPATIENT)
Dept: OUTPATIENT SERVICES | Facility: HOSPITAL | Age: 44
LOS: 1 days | End: 2024-04-30
Payer: COMMERCIAL

## 2024-04-30 VITALS
SYSTOLIC BLOOD PRESSURE: 118 MMHG | RESPIRATION RATE: 17 BRPM | DIASTOLIC BLOOD PRESSURE: 76 MMHG | OXYGEN SATURATION: 99 % | HEART RATE: 76 BPM | WEIGHT: 154.98 LBS | HEIGHT: 67 IN | TEMPERATURE: 98 F

## 2024-04-30 DIAGNOSIS — C69.90 MALIGNANT NEOPLASM OF UNSPECIFIED SITE OF UNSPECIFIED EYE: ICD-10-CM

## 2024-04-30 PROCEDURE — 96413 CHEMO IV INFUSION 1 HR: CPT

## 2024-04-30 PROCEDURE — P9047: CPT

## 2024-04-30 RX ORDER — TEBENTAFUSP 100 UG/.5ML
68 INJECTION, SOLUTION, CONCENTRATE INTRAVENOUS ONCE
Refills: 0 | Status: COMPLETED | OUTPATIENT
Start: 2024-04-30 | End: 2024-04-30

## 2024-04-30 RX ADMIN — TEBENTAFUSP 68 MICROGRAM(S): 100 INJECTION, SOLUTION, CONCENTRATE INTRAVENOUS at 16:55

## 2024-04-30 RX ADMIN — TEBENTAFUSP 68 MICROGRAM(S): 100 INJECTION, SOLUTION, CONCENTRATE INTRAVENOUS at 17:10

## 2024-05-01 ENCOUNTER — APPOINTMENT (OUTPATIENT)
Dept: MRI IMAGING | Facility: HOSPITAL | Age: 44
End: 2024-05-01

## 2024-05-01 ENCOUNTER — NON-APPOINTMENT (OUTPATIENT)
Age: 44
End: 2024-05-01

## 2024-05-01 ENCOUNTER — OUTPATIENT (OUTPATIENT)
Dept: OUTPATIENT SERVICES | Facility: HOSPITAL | Age: 44
LOS: 1 days | End: 2024-05-01
Payer: COMMERCIAL

## 2024-05-01 PROCEDURE — 74183 MRI ABD W/O CNTR FLWD CNTR: CPT | Mod: 26

## 2024-05-01 PROCEDURE — 74183 MRI ABD W/O CNTR FLWD CNTR: CPT

## 2024-05-01 PROCEDURE — A9585: CPT

## 2024-05-14 ENCOUNTER — APPOINTMENT (OUTPATIENT)
Dept: INFUSION THERAPY | Facility: CLINIC | Age: 44
End: 2024-05-14

## 2024-05-14 ENCOUNTER — OUTPATIENT (OUTPATIENT)
Dept: OUTPATIENT SERVICES | Facility: HOSPITAL | Age: 44
LOS: 1 days | End: 2024-05-14

## 2024-05-14 ENCOUNTER — RESULT REVIEW (OUTPATIENT)
Age: 44
End: 2024-05-14

## 2024-05-14 ENCOUNTER — APPOINTMENT (OUTPATIENT)
Dept: HEMATOLOGY ONCOLOGY | Facility: CLINIC | Age: 44
End: 2024-05-14
Payer: COMMERCIAL

## 2024-05-14 ENCOUNTER — OUTPATIENT (OUTPATIENT)
Dept: OUTPATIENT SERVICES | Facility: HOSPITAL | Age: 44
LOS: 1 days | End: 2024-05-14
Payer: COMMERCIAL

## 2024-05-14 VITALS
DIASTOLIC BLOOD PRESSURE: 86 MMHG | HEART RATE: 101 BPM | HEIGHT: 68 IN | RESPIRATION RATE: 18 BRPM | BODY MASS INDEX: 23.34 KG/M2 | OXYGEN SATURATION: 96 % | SYSTOLIC BLOOD PRESSURE: 120 MMHG | WEIGHT: 154 LBS | TEMPERATURE: 97.9 F

## 2024-05-14 DIAGNOSIS — C69.90 MALIGNANT NEOPLASM OF UNSPECIFIED SITE OF UNSPECIFIED EYE: ICD-10-CM

## 2024-05-14 PROCEDURE — 99213 OFFICE O/P EST LOW 20 MIN: CPT

## 2024-05-14 PROCEDURE — 71046 X-RAY EXAM CHEST 2 VIEWS: CPT

## 2024-05-14 PROCEDURE — 71046 X-RAY EXAM CHEST 2 VIEWS: CPT | Mod: 26

## 2024-05-14 PROCEDURE — G2211 COMPLEX E/M VISIT ADD ON: CPT | Mod: NC,1L

## 2024-05-14 NOTE — HISTORY OF PRESENT ILLNESS
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 [facility]One Liquid CDx (11/27/23) - 3 Muts/Mb, MSI-high not detected, ctDNA tumor fraction low (<1%).  CURRENT THERAPY: Tebentafusp since 03/12/2023 concurrently with immunoembolization (#1, right 03/25/2024; #2, left 04/19/2024)  PRIOR THERAPIES: 9/21/23 Y90 radioembolization of liver  INTERVAL HISTORY: Mrs. Hoff is a 43 year old female with a history of primary uveal melanoma, now with newly diagnosed metastatic disease with liver only involvement who presents for continued management of her disease.  She is followed by Dr. Hernan Osborn.  She is HLA  positive. Please see my prior notes for her complex history.  Briefly, she initially presented in January 2018 with vision changes in her right eye, with later loss of temporal and upper visual field vision.  She was evaluated by Dr. Konstantin Barros at the [facility] and was found to have a subretinal mass in her right eye with a greated basal diameter of 14.43 mm and a maximal height of 7 mm.  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  She was treated with Ru-106 brachytherapy from 01/30/2018 until 02/06/2018.  She was subsequently placed on expectant observation for systemic recurrence.  She did well until, on 01/29/2023, she underwent an abdominal ultrasound which was significant for a new focal liver lesion measuring 3 cm.  A subsequent chest, abdominal and pelvic CT scan, performed on 01/30/2023, demonstrated a segment 7 liver lesion with arterial enhancement and other subcentimeter hepatic lesions measuring up to 4 mm of unclear etiology.  On 02/01/2023, she underwent a liver biopsy, with pathology consistent with melanoma.  Immunohistochemistry was positive for SOX10 and MART1.  An abdominal MRI, performed on 02/16/2023, demonstrated a dominant segment 7 lesion in the liver, with multiple other smaller subcentimeter lesions bilaterally.  She was evaluated at the [facility] on 02/20/2023 by Dr. Miguelito Truong, with recommendations for HLA testing and possible tebentafusp if appropriate or combination checkpoint blockade.  She was found to be HLA A*02:01 positive and initiated therapy with tebentafusp on 03/12/2023, and she has now completed her first three inpatient doses, with her most recent dose administered on 03/26/2023.  She experienced mild rash and periorbital edema, with IV fluids administered for mild hypotension with her first dose, but otherwise tolerated therapy without difficulty.  On 04/17/2023, she underwent a new baseline chest and pelvis CT as well as a liver MRI.  The MRI demonstrated metastatic lesions affecting both lobes of the liver, with some worsening when compared with the prior scans from 02/16/2023 and with the largest lesion now measuring 4.5 x 3.5 cm in segment 7. She had repeat imaging done on 8/18/23 which showed interval increase in size of the hepatic segment 7 metastatic lesion and persistent numerous additional scattered small hemorrhagic liver metastases. 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  She underwent mapping on 9/7/23 and Y90 radioembolization on 9/21/23 with IR Sj Maximiliano.   She continues on weekly tebentafusp.  Although she has typically developed low grade fever around 6 hours after infusion as well as treatment associated fatigue, she has not had these symptoms with her two most recent doses.   She has developed progressive vitiligo.  On 01/15/2024, she underwent a repeat liver MRI.  This study, when compared with a prior dated 11/17/2023, demonstrated decrease in the size of the dominant segment 7 lesion, with no significant change in the other small bilateral lesions.  She was evaluated by Dr. Miguelito Torres at [facility] on 01/22/2023.  The Delcath PHP procedure was discussed at length and the patient has opted to not proceed.  She underwent a chest CT and abdominal MRI on 03/15/94687.  The CT study, when compared with a prior dated 01/25/2024, demonstrated no significant change with no overt evidence for progressive disease.  The MRI, when compared with a prior dated 01/15/2024, demonstrated interval decrease in the size of the hepatic segment 7 lesion. There is an increase in size and number of the small liver lesions.  On 3/25/2024, patient underwent right hepatic intra-arterial administration of leukine and lipiodol with a gelfoam slurry embolization of the right hepatic artery and more recently underwent immuno embolization of the left on 04/19/2024. She reports some abdominal distention and shoulder pain following the procedure, but is feeling well today.  Patient last seen in clinic on 4/23/2024. Patient received Tebe on 4/23/2024 and 4/30/2024.  MRI Abdomen performed on 5/1/2024 which reported since 3/15/2024, no significant change in liver metastases.  Patient is scheduled for Y90 mapping 5/15/2024 and Y90 on 5/17/2024, ~3 weeks after patient will have Y90 to the left lobe.   Just came back from [facility], planning to travelling to [place] for 6 weeks again.  No acute symptoms  Has also discussed with Torsten regarding tebentafusp if this is needed as she travels to [place] as well.  PAST MEDICAL HISTORY: None  SOCIAL HISTORY: The patient has moved from [place] and is now living in [place].  Her partner currently resides in the [place] but is considering relocating.  She works in the [employer].  FAMILY HISTORY: Her maternal grandfather had mucosal melanoma arising from the stomach.

## 2024-05-14 NOTE — ASSESSMENT
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 She initiated therapy with tebentafusp at [facility] but then moved to [place].  Due to radiographic progression of a dominant site of disease, she underwent Y90 mapping on 9/7 and Y90 radioembolization on 09/21/23 with no issue. Based upon her most recent imaging studies, she has achieved a treatment effect in the dominant mass treated with Y90, with some progression elsewhere. She has now received therapy with immunoembolization x 2 administered concurrently with tebentafusp.   Discussed with patient that given that she had progression of disease while on treatment, it is unclear the true efficacy of tebentafusp at this time. While she appears to be tolerating the medication relatively well (with the exception of hyperpigmentation of hands), she does report that she has sweats/fevers when the treatments are stopped and started. As she will be traveling to [place] in June, we had discussed stopping tebentafusp at that time, though given unknown efficacy of treatment, decision was made today to monitor off infusions and proceed with Y90 embolization alone.   Consideration for concurrent immune checkpoint blockade could be given as well; however, the benefit of this is unclear and how to integrate systemic therapy with her travel plans would be challenging.  Ideally, ctDNA llevel would be obtained at this time to establish baseline for future treatment, and possibly provide a rationale for continuing/discontinuing tebentafusp, though patient's insurance unfortunately does not cover this. Furthermore, we discussed that most of the data for ctDNA use is established with patients on immunotherapy, and a consistent benefit has yet to be established on tebentafusp. Thus we will plan to clinically/radiographically monitor at this time.   Y90 mapping is scheduled for 5/15 with a procedure to the right hepatic lobe on 5/17 and the left hepatic lobe ~3 weeks later.   We will follow up in 1 week for routine care and follow up after Y90 treatment.  Discussed with Dr. Ruby.

## 2024-05-15 ENCOUNTER — APPOINTMENT (OUTPATIENT)
Dept: INTERVENTIONAL RADIOLOGY/VASCULAR | Facility: HOSPITAL | Age: 44
End: 2024-05-15
Payer: COMMERCIAL

## 2024-05-15 ENCOUNTER — RESULT REVIEW (OUTPATIENT)
Age: 44
End: 2024-05-15

## 2024-05-15 ENCOUNTER — OUTPATIENT (OUTPATIENT)
Dept: OUTPATIENT SERVICES | Facility: HOSPITAL | Age: 44
LOS: 1 days | End: 2024-05-15
Payer: COMMERCIAL

## 2024-05-15 ENCOUNTER — NON-APPOINTMENT (OUTPATIENT)
Age: 44
End: 2024-05-15

## 2024-05-15 VITALS
TEMPERATURE: 97 F | HEART RATE: 65 BPM | SYSTOLIC BLOOD PRESSURE: 123 MMHG | HEIGHT: 68 IN | RESPIRATION RATE: 17 BRPM | DIASTOLIC BLOOD PRESSURE: 76 MMHG | WEIGHT: 154.98 LBS | OXYGEN SATURATION: 99 %

## 2024-05-15 LAB
ALBUMIN SERPL ELPH-MCNC: 4 G/DL — SIGNIFICANT CHANGE UP (ref 3.3–5)
ALP SERPL-CCNC: 74 U/L — SIGNIFICANT CHANGE UP (ref 40–120)
ALT FLD-CCNC: 13 U/L — SIGNIFICANT CHANGE UP (ref 10–45)
ANION GAP SERPL CALC-SCNC: 6 MMOL/L — SIGNIFICANT CHANGE UP (ref 5–17)
AST SERPL-CCNC: 20 U/L — SIGNIFICANT CHANGE UP (ref 10–40)
BILIRUB SERPL-MCNC: 0.6 MG/DL — SIGNIFICANT CHANGE UP (ref 0.2–1.2)
BUN SERPL-MCNC: 11 MG/DL — SIGNIFICANT CHANGE UP (ref 7–23)
CALCIUM SERPL-MCNC: 9 MG/DL — SIGNIFICANT CHANGE UP (ref 8.4–10.5)
CHLORIDE SERPL-SCNC: 106 MMOL/L — SIGNIFICANT CHANGE UP (ref 96–108)
CO2 SERPL-SCNC: 23 MMOL/L — SIGNIFICANT CHANGE UP (ref 22–31)
CREAT SERPL-MCNC: 0.71 MG/DL — SIGNIFICANT CHANGE UP (ref 0.5–1.3)
EGFR: 107 ML/MIN/1.73M2 — SIGNIFICANT CHANGE UP
GLUCOSE SERPL-MCNC: 85 MG/DL — SIGNIFICANT CHANGE UP (ref 70–99)
POTASSIUM SERPL-MCNC: 4 MMOL/L — SIGNIFICANT CHANGE UP (ref 3.5–5.3)
POTASSIUM SERPL-SCNC: 4 MMOL/L — SIGNIFICANT CHANGE UP (ref 3.5–5.3)
PROT SERPL-MCNC: 7.2 G/DL — SIGNIFICANT CHANGE UP (ref 6–8.3)
SODIUM SERPL-SCNC: 135 MMOL/L — SIGNIFICANT CHANGE UP (ref 135–145)

## 2024-05-15 PROCEDURE — C1887: CPT

## 2024-05-15 PROCEDURE — C1769: CPT

## 2024-05-15 PROCEDURE — 77300 RADIATION THERAPY DOSE PLAN: CPT | Mod: 26

## 2024-05-15 PROCEDURE — 75726 ARTERY X-RAYS ABDOMEN: CPT | Mod: 26,59

## 2024-05-15 PROCEDURE — C1894: CPT

## 2024-05-15 PROCEDURE — 78830 RP LOCLZJ TUM SPECT W/CT 1: CPT | Mod: 26

## 2024-05-15 PROCEDURE — 77290 THER RAD SIMULAJ FIELD CPLX: CPT | Mod: 26

## 2024-05-15 PROCEDURE — 36245 INS CATH ABD/L-EXT ART 1ST: CPT | Mod: 59

## 2024-05-15 PROCEDURE — 77290 THER RAD SIMULAJ FIELD CPLX: CPT

## 2024-05-15 PROCEDURE — 78830 RP LOCLZJ TUM SPECT W/CT 1: CPT

## 2024-05-15 PROCEDURE — 36247 INS CATH ABD/L-EXT ART 3RD: CPT

## 2024-05-15 PROCEDURE — 77300 RADIATION THERAPY DOSE PLAN: CPT

## 2024-05-15 PROCEDURE — 75774 ARTERY X-RAY EACH VESSEL: CPT | Mod: 26

## 2024-05-15 PROCEDURE — 36415 COLL VENOUS BLD VENIPUNCTURE: CPT

## 2024-05-15 PROCEDURE — 75774 ARTERY X-RAY EACH VESSEL: CPT

## 2024-05-15 PROCEDURE — 77263 THER RADIOLOGY TX PLNG CPLX: CPT

## 2024-05-15 PROCEDURE — 75726 ARTERY X-RAYS ABDOMEN: CPT

## 2024-05-15 PROCEDURE — 80053 COMPREHEN METABOLIC PANEL: CPT

## 2024-05-17 ENCOUNTER — APPOINTMENT (OUTPATIENT)
Dept: INTERVENTIONAL RADIOLOGY/VASCULAR | Facility: HOSPITAL | Age: 44
End: 2024-05-17

## 2024-05-17 ENCOUNTER — OUTPATIENT (OUTPATIENT)
Dept: OUTPATIENT SERVICES | Facility: HOSPITAL | Age: 44
LOS: 1 days | End: 2024-05-17
Payer: COMMERCIAL

## 2024-05-17 ENCOUNTER — RESULT REVIEW (OUTPATIENT)
Age: 44
End: 2024-05-17

## 2024-05-17 PROCEDURE — 78830 RP LOCLZJ TUM SPECT W/CT 1: CPT

## 2024-05-17 PROCEDURE — 78830 RP LOCLZJ TUM SPECT W/CT 1: CPT | Mod: 26

## 2024-05-17 PROCEDURE — 79445 NUCLEAR RX INTRA-ARTERIAL: CPT | Mod: 26

## 2024-05-17 PROCEDURE — 37243 VASC EMBOLIZE/OCCLUDE ORGAN: CPT

## 2024-05-17 PROCEDURE — 36247 INS CATH ABD/L-EXT ART 3RD: CPT

## 2024-05-17 PROCEDURE — 79445 NUCLEAR RX INTRA-ARTERIAL: CPT

## 2024-05-17 PROCEDURE — C2616: CPT

## 2024-05-17 PROCEDURE — C1894: CPT

## 2024-05-17 PROCEDURE — C1887: CPT

## 2024-05-17 PROCEDURE — C1769: CPT

## 2024-05-17 RX ORDER — OXYCODONE HYDROCHLORIDE 5 MG/1
1 TABLET ORAL
Qty: 20 | Refills: 0
Start: 2024-05-17 | End: 2024-05-21

## 2024-05-17 NOTE — PACU DISCHARGE NOTE - THE ANESTHESIA ORDERS USED IN THE PACU ORDER SET WILL BE DISCONTINUED UPON TRANSFER OF THIS PATIENT
Statement Selected
POST-OP DIAGNOSIS:  Femoral neck fracture 07-Dec-2021 22:22:33  Sharath Morejon

## 2024-05-21 ENCOUNTER — APPOINTMENT (OUTPATIENT)
Dept: HEMATOLOGY ONCOLOGY | Facility: CLINIC | Age: 44
End: 2024-05-21

## 2024-05-21 ENCOUNTER — APPOINTMENT (OUTPATIENT)
Dept: INFUSION THERAPY | Facility: CLINIC | Age: 44
End: 2024-05-21

## 2024-05-24 ENCOUNTER — NON-APPOINTMENT (OUTPATIENT)
Age: 44
End: 2024-05-24

## 2024-05-28 ENCOUNTER — LABORATORY RESULT (OUTPATIENT)
Age: 44
End: 2024-05-28

## 2024-05-28 ENCOUNTER — APPOINTMENT (OUTPATIENT)
Dept: INFUSION THERAPY | Facility: CLINIC | Age: 44
End: 2024-05-28

## 2024-05-28 ENCOUNTER — APPOINTMENT (OUTPATIENT)
Dept: HEMATOLOGY ONCOLOGY | Facility: CLINIC | Age: 44
End: 2024-05-28
Payer: COMMERCIAL

## 2024-05-28 ENCOUNTER — OUTPATIENT (OUTPATIENT)
Dept: OUTPATIENT SERVICES | Facility: HOSPITAL | Age: 44
LOS: 1 days | End: 2024-05-28

## 2024-05-28 DIAGNOSIS — C69.90 MALIGNANT NEOPLASM OF UNSPECIFIED SITE OF UNSPECIFIED EYE: ICD-10-CM

## 2024-05-28 PROCEDURE — 99214 OFFICE O/P EST MOD 30 MIN: CPT

## 2024-05-28 PROCEDURE — G2211 COMPLEX E/M VISIT ADD ON: CPT | Mod: NC,1L

## 2024-05-28 NOTE — ASSESSMENT
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 She initiated therapy with tebentafusp at [facility] but then moved to [place].  Due to radiographic progression of a dominant site of disease, she underwent Y90 mapping on 9/7 and Y90 radioembolization on 09/21/23 with no issue. Based upon her most recent imaging studies, she has achieved a treatment effect in the dominant mass treated with Y90, with some progression elsewhere. She has now received therapy with immunoembolization x 2 administered concurrently with tebentafusp with evidence of disease stability.  Discussed with patient that given that she had progression of disease while on tebentafusp, it is unclear the true efficacy of single agent therapy at this time. While she appears to be tolerating the medication relatively well (with the exception of hyperpigmentation of hands), she does report that she has sweats/fevers when the treatments are stopped and started. As she will be traveling to [place] in June, we had discussed stopping tebentafusp at that time, though given unknown efficacy of treatment, decision was made to monitor off infusions and proceed with Y90 embolization alone.  Consideration for concurrent immune checkpoint blockade could be given as well; however, the benefit of this is unclear and how to integrate systemic therapy with her travel plans would be challenging.  Ideally, ctDNA llevel would be obtained at this time to establish baseline for future treatment, and possibly provide a rationale for continuing/discontinuing tebentafusp, though patient's insurance unfortunately does not cover this. Furthermore, we discussed that most of the data for ctDNA use is established with patients on immunotherapy, and a consistent benefit has yet to be established on tebentafusp. Thus we will plan to clinically/radiographically monitor at this time.   She received Y90 to the right hepatic lobe on 5/17 and plan for Y90 to the left hepatic lobe ~3 weeks later.  She will follow up with Dr. Gustafson as scheduled and will follow up in this clinic in August when she returns from her travels with restaging studies.

## 2024-05-28 NOTE — HISTORY OF PRESENT ILLNESS
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 FoundationOne Liquid CDx (11/27/23) - 3 Muts/Mb, MSI-high not detected, ctDNA tumor fraction low (<1%).  CURRENT THERAPY: Radioembolization (Right: 05/17/2024; Left: scheduled for 06/13/2024)  PRIOR THERAPIES: 03/12/2023-4/30/24 Tebentafusp  9/21/23 Y90 radioembolization of liver Immunoembolization (#1, right 03/25/2024; #2, left 04/19/2024) Y90 radioembolization (Right:5/17/24)  INTERVAL HISTORY: Mrs. Hoff is a 43 year-old female with a history of primary uveal melanoma, now with newly diagnosed metastatic disease with liver only involvement who presents for continued management of her disease.  She is followed by Dr. Hernan Osborn.  She is HLA  positive. Please see my prior notes for her complex history.  Briefly, she initially presented in January 2018 with vision changes in her right eye, with later loss of temporal and upper visual field vision.  She was evaluated by Dr. 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  She was treated with Ru-106 brachytherapy from 01/30/2018 until 02/06/2018.  She was subsequently placed on expectant observation for systemic recurrence.  She did well until, on 01/29/2023, she underwent an abdominal ultrasound which was significant for a new focal liver lesion measuring 3 cm.  A subsequent chest, abdominal and pelvic CT scan, performed on 01/30/2023, demonstrated a segment 7 liver lesion with arterial enhancement and other subcentimeter hepatic lesions measuring up to 4 mm of unclear etiology.  On 02/01/2023, she underwent a liver biopsy, with pathology consistent with melanoma.  Immunohistochemistry was positive for SOX10 and MART1.  An abdominal MRI, performed on 02/16/2023, demonstrated a dominant segment 7 lesion in the liver, with multiple other smaller subcentimeter lesions bilaterally.  She was evaluated at the [facility] on 02/20/2023 by Dr. Miguelito Truong, with recommendations for HLA testing and possible tebentafusp if appropriate or combination checkpoint blockade.  She was found to be HLA A*02:01 positive and initiated therapy with tebentafusp on 03/12/2023, and she has now completed her first three inpatient doses, with her most recent dose administered on 03/26/2023.  She experienced mild rash and periorbital edema, with IV fluids administered for mild hypotension with her first dose, but otherwise tolerated therapy without difficulty.  On 04/17/2023, she underwent a new baseline chest and pelvis CT as well as a liver MRI.  The MRI demonstrated metastatic lesions affecting both lobes of the liver, with some worsening when compared with the prior scans from 02/16/2023 and with the largest lesion now measuring 4.5 x 3.5 cm in segment 7. She had repeat imaging done on 8/18/23 which showed interval increase in size of the hepatic segment 7 metastatic lesion and persistent numerous additional scattered small hemorrhagic liver metastases. 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  She underwent mapping on 9/7/23 and Y90 radioembolization on 9/21/23 with JEROMY Viera.   She continues on weekly tebentafusp.  Although she has typically developed low grade fever around 6 hours after infusion as well as treatment associated fatigue, she has not had these symptoms with her two most recent doses.   She has developed progressive vitiligo.  On 01/15/2024, she underwent a repeat liver MRI.  This study, when compared with a prior dated 11/17/2023, demonstrated decrease in the size of the dominant segment 7 lesion, with no significant change in the other small bilateral lesions.  She was evaluated by Dr. Miguelito Torres at [facility] on 01/22/2023.  The Delcath PHP procedure was discussed at length and the patient has opted to not proceed.  She underwent a chest CT and abdominal MRI on 03/15/83455.  The CT study, when compared with a prior dated 01/25/2024, demonstrated no significant change with no overt evidence for progressive disease.  The MRI, when compared with a prior dated 01/15/2024, demonstrated interval decrease in the size of the hepatic segment 7 lesion. There is an increase in size and number of the small liver lesions.  On 3/25/2024, patient underwent right hepatic intra-arterial administration of leukine and lipiodol with a gelfoam slurry embolization of the right hepatic artery and more recently underwent immuno embolization of the left on 04/19/2024. She reports some abdominal distention and shoulder pain following the procedure, but is feeling well today.  Patient last received Tebe on 4/30/2024. Patient last seen in clinic on 5/14/24. Decision was made to monitor off infusions and proceed with Y90 alone.  MRI Abdomen performed on 5/1/2024 which reported since 3/15/2024, no significant change in liver metastases.  Patient is s/p Y90 (right) on 5/17/2024, with plan for Y90 to the left lobe 3 weeks later (06/13/2024). She experienced pain and fatigue with her most recent treatment.  PAST MEDICAL HISTORY: None  SOCIAL HISTORY: The patient has moved from [place] and is now living in [place].  Her partner currently resides in the [place] but is considering relocating.  She works in the [employer].  FAMILY HISTORY: Her maternal grandfather had mucosal melanoma arising from the stomach.

## 2024-05-28 NOTE — REVIEW OF SYSTEMS
[Diarrhea: Grade 0] : Diarrhea: Grade 0 [Negative] : Allergic/Immunologic [Abdominal Pain] : abdominal pain [FreeTextEntry7] : Improving abdominal pain

## 2024-05-31 ENCOUNTER — APPOINTMENT (OUTPATIENT)
Dept: INTERVENTIONAL RADIOLOGY/VASCULAR | Facility: HOSPITAL | Age: 44
End: 2024-05-31

## 2024-06-11 ENCOUNTER — APPOINTMENT (OUTPATIENT)
Dept: HEMATOLOGY ONCOLOGY | Facility: CLINIC | Age: 44
End: 2024-06-11
Payer: COMMERCIAL

## 2024-06-11 DIAGNOSIS — C78.7 MALIGNANT NEOPLASM OF UNSPECIFIED CILIARY BODY: ICD-10-CM

## 2024-06-11 DIAGNOSIS — C69.40 MALIGNANT NEOPLASM OF UNSPECIFIED CILIARY BODY: ICD-10-CM

## 2024-06-11 PROCEDURE — 99443: CPT

## 2024-06-11 NOTE — ASSESSMENT
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 She initiated therapy with tebentafusp at [facility] but then moved to [place].  Due to radiographic progression of a dominant site of disease, she underwent Y90 mapping on 9/7 and Y90 radioembolization on 09/21/23 with no issue. Based upon her most recent imaging studies, she has achieved a treatment effect in the dominant mass treated with Y90, with some progression elsewhere. She has now received therapy with immunoembolization x 2 administered concurrently with tebentafusp with evidence of disease stability.  Discussed with patient that given that she had progression of disease while on tebentafusp, it is unclear the true efficacy of single agent therapy at this time. While she appears to be tolerating the medication relatively well (with the exception of hyperpigmentation of hands), she does report that she has sweats/fevers when the treatments are stopped and started. As she will be traveling to [place] in June, we had discussed stopping tebentafusp at that time, though given unknown efficacy of treatment, decision was made to monitor off infusions and proceed with Y90 embolization alone.  Consideration for concurrent immune checkpoint blockade could be given as well; however, the benefit of this is unclear and how to integrate systemic therapy with her travel plans would be challenging.  Ideally, ctDNA llevel would be obtained at this time to establish baseline for future treatment, and possibly provide a rationale for continuing/discontinuing tebentafusp, though patient's insurance unfortunately does not cover this. Furthermore, we discussed that most of the data for ctDNA use is established with patients on immunotherapy, and a consistent benefit has yet to be established on tebentafusp. Thus we will plan to clinically/radiographically monitor at this time.   She received Y90 to the right hepatic lobe on 5/17 and plan for Y90 to the left hepatic lobe ~3 weeks later.  She will follow up with Dr. Gustafson as scheduled and will follow up in this clinic in August when she returns from her travels with restaging studies.  Today, we discussed the discoloration to her skin, which is most likely vitiligo. We discussed that, overall, we are unaware if this is secondary to side effects of treatment vs progression of disease.

## 2024-06-11 NOTE — REVIEW OF SYSTEMS
[Diarrhea: Grade 0] : Diarrhea: Grade 0 [Negative] : Allergic/Immunologic [FreeTextEntry2] : See interval history. [FreeTextEntry7] : Improving abdominal pain [de-identified] : Vitiligo

## 2024-06-11 NOTE — REASON FOR VISIT
[Follow-Up Visit] : a follow-up [Home] : at home, [unfilled] , at the time of the visit. [Medical Office: ([facility])___] : at the medical office located in  [Patient] : the patient [This encounter was initiated by telehealth (audio with video) and converted to telephone (audio only) due to technical difficulties.] : This encounter was initiated by telehealth (audio with video) and converted to telephone (audio only) due to technical difficulties.

## 2024-06-11 NOTE — PHYSICAL EXAM
[Fully active, able to carry on all pre-disease performance without restriction] : Status 0 - Fully active, able to carry on all pre-disease performance without restriction [Normal] : affect appropriate [de-identified] : No increased work of breathing. [de-identified] : Alert and Oriented x 3

## 2024-06-11 NOTE — HISTORY OF PRESENT ILLNESS
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 FoundationOne Liquid CDx (11/27/23) - 3 Muts/Mb, MSI-high not detected, ctDNA tumor fraction low (<1%).  CURRENT THERAPY: Radioembolization (Right: 05/17/2024; Left: scheduled for 06/13/2024)  PRIOR THERAPIES: 03/12/2023-4/30/24 Tebentafusp  9/21/23 Y90 radioembolization of liver Immunoembolization (#1, right 03/25/2024; #2, left 04/19/2024) Y90 radioembolization (Right:5/17/24)  INTERVAL HISTORY: Mrs. Hoff is a 43 year-old female with a history of primary uveal melanoma, now with newly diagnosed metastatic disease with liver only involvement who presents for continued management of her disease.  She is followed by Dr. Hernan Osborn.  She is HLA  positive. Please see my prior notes for her complex history.  Briefly, she initially presented in January 2018 with vision changes in her right eye, with later loss of temporal and upper visual field vision.  She was evaluated by Dr. Konstantin Barros at the [facility] and was found to have a subretinal mass in her right eye with a greatest basal diameter of 14.43 mm and a maximal height of 7 mm.  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  She was treated with Ru-106 brachytherapy from 01/30/2018 until 02/06/2018.  She was subsequently placed on expectant observation for systemic recurrence.  She did well until, on 01/29/2023, she underwent an abdominal ultrasound which was significant for a new focal liver lesion measuring 3 cm.  A subsequent chest, abdominal and pelvic CT scan, performed on 01/30/2023, demonstrated a segment 7 liver lesion with arterial enhancement and other subcentimeter hepatic lesions measuring up to 4 mm of unclear etiology.  On 02/01/2023, she underwent a liver biopsy, with pathology consistent with melanoma.  Immunohistochemistry was positive for SOX10 and MART1.  An abdominal MRI, performed on 02/16/2023, demonstrated a dominant segment 7 lesion in the liver, with multiple other smaller subcentimeter lesions bilaterally.  She was evaluated at the [facility] on 02/20/2023 by Dr. Miguelito Truong, with recommendations for HLA testing and possible tebentafusp if appropriate or combination checkpoint blockade.  She was found to be HLA A*02:01 positive and initiated therapy with tebentafusp on 03/12/2023, and she has now completed her first three inpatient doses, with her most recent dose administered on 03/26/2023.  She experienced mild rash and periorbital edema, with IV fluids administered for mild hypotension with her first dose, but otherwise tolerated therapy without difficulty.  On 04/17/2023, she underwent a new baseline chest and pelvis CT as well as a liver MRI.  The MRI demonstrated metastatic lesions affecting both lobes of the liver, with some worsening when compared with the prior scans from 02/16/2023 and with the largest lesion now measuring 4.5 x 3.5 cm in segment 7. She had repeat imaging done on 8/18/23 which showed interval increase in size of the hepatic segment 7 metastatic lesion and persistent numerous additional scattered small hemorrhagic liver metastases. 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  She underwent mapping on 9/7/23 and Y90 radioembolization on 9/21/23 with JEROMY Viera.   She continues on weekly tebentafusp.  Although she has typically developed low grade fever around 6 hours after infusion as well as treatment associated fatigue, she has not had these symptoms with her two most recent doses.   She has developed progressive vitiligo.  On 01/15/2024, she underwent a repeat liver MRI.  This study, when compared with a prior dated 11/17/2023, demonstrated decrease in the size of the dominant segment 7 lesion, with no significant change in the other small bilateral lesions.  She was evaluated by Dr. Miguelito Torres at [facility] on 01/22/2023.  The Delcath PHP procedure was discussed at length and the patient has opted to not proceed.  She underwent a chest CT and abdominal MRI on 03/15/56196.  The CT study, when compared with a prior dated 01/25/2024, demonstrated no significant change with no overt evidence for progressive disease.  The MRI, when compared with a prior dated 01/15/2024, demonstrated interval decrease in the size of the hepatic segment 7 lesion. There is an increase in size and number of the small liver lesions.  On 3/25/2024, patient underwent right hepatic intra-arterial administration of leukine and lipiodol with a gelfoam slurry embolization of the right hepatic artery and more recently underwent immuno embolization of the left on 04/19/2024. She reports some abdominal distention and shoulder pain following the procedure, but is feeling well today.  Patient last received Tebe on 4/30/2024. Patient last seen in clinic on 5/14/24. Decision was made to monitor off infusions and proceed with Y90 alone.  MRI Abdomen performed on 5/1/2024 which reported since 3/15/2024, no significant change in liver metastases.  Patient is s/p Y90 (right) on 5/17/2024, with plan for Y90 to the left lobe 3 weeks later (06/13/2024). She experienced pain and fatigue with her most recent treatment.  Patient is here today for a telephone call.  Patient reports that she is experiencing discoloration to her skin.  She will be sending images over to our team.   PAST MEDICAL HISTORY: None  SOCIAL HISTORY: The patient has moved from [place] and is now living in [place].  Her partner currently resides in the [place] but is considering relocating.  She works in the [employer].  FAMILY HISTORY: Her maternal grandfather had mucosal melanoma arising from the stomach.

## 2024-06-13 ENCOUNTER — APPOINTMENT (OUTPATIENT)
Dept: INTERVENTIONAL RADIOLOGY/VASCULAR | Facility: HOSPITAL | Age: 44
End: 2024-06-13

## 2024-06-13 ENCOUNTER — RESULT REVIEW (OUTPATIENT)
Age: 44
End: 2024-06-13

## 2024-06-13 ENCOUNTER — OUTPATIENT (OUTPATIENT)
Dept: OUTPATIENT SERVICES | Facility: HOSPITAL | Age: 44
LOS: 1 days | End: 2024-06-13
Payer: COMMERCIAL

## 2024-06-13 VITALS
HEART RATE: 92 BPM | HEIGHT: 68 IN | SYSTOLIC BLOOD PRESSURE: 129 MMHG | OXYGEN SATURATION: 99 % | WEIGHT: 149.91 LBS | DIASTOLIC BLOOD PRESSURE: 78 MMHG

## 2024-06-13 PROCEDURE — 37243 VASC EMBOLIZE/OCCLUDE ORGAN: CPT

## 2024-06-13 PROCEDURE — 36247 INS CATH ABD/L-EXT ART 3RD: CPT

## 2024-06-13 PROCEDURE — 78830 RP LOCLZJ TUM SPECT W/CT 1: CPT

## 2024-06-13 PROCEDURE — C1894: CPT

## 2024-06-13 PROCEDURE — C2616: CPT

## 2024-06-13 PROCEDURE — 36245 INS CATH ABD/L-EXT ART 1ST: CPT | Mod: 59

## 2024-06-13 PROCEDURE — C1769: CPT

## 2024-06-13 PROCEDURE — 78830 RP LOCLZJ TUM SPECT W/CT 1: CPT | Mod: 26

## 2024-06-13 PROCEDURE — C1887: CPT

## 2024-06-13 RX ORDER — BENZOCAINE AND MENTHOL 5; 1 G/100ML; G/100ML
1 LIQUID ORAL ONCE
Refills: 0 | Status: ACTIVE | OUTPATIENT
Start: 2024-06-13 | End: 2025-05-12

## 2024-07-02 ENCOUNTER — APPOINTMENT (OUTPATIENT)
Dept: INTERVENTIONAL RADIOLOGY/VASCULAR | Facility: HOSPITAL | Age: 44
End: 2024-07-02

## 2024-07-23 ENCOUNTER — NON-APPOINTMENT (OUTPATIENT)
Age: 44
End: 2024-07-23

## 2024-08-06 ENCOUNTER — NON-APPOINTMENT (OUTPATIENT)
Age: 44
End: 2024-08-06

## 2024-08-09 ENCOUNTER — OUTPATIENT (OUTPATIENT)
Dept: OUTPATIENT SERVICES | Facility: HOSPITAL | Age: 44
LOS: 1 days | End: 2024-08-09
Payer: COMMERCIAL

## 2024-08-09 ENCOUNTER — APPOINTMENT (OUTPATIENT)
Dept: MRI IMAGING | Facility: HOSPITAL | Age: 44
End: 2024-08-09

## 2024-08-09 ENCOUNTER — APPOINTMENT (OUTPATIENT)
Dept: CT IMAGING | Facility: HOSPITAL | Age: 44
End: 2024-08-09

## 2024-08-09 PROCEDURE — 71260 CT THORAX DX C+: CPT

## 2024-08-09 PROCEDURE — 74183 MRI ABD W/O CNTR FLWD CNTR: CPT

## 2024-08-09 PROCEDURE — 72197 MRI PELVIS W/O & W/DYE: CPT

## 2024-08-09 PROCEDURE — 71260 CT THORAX DX C+: CPT | Mod: 26

## 2024-08-09 PROCEDURE — 74183 MRI ABD W/O CNTR FLWD CNTR: CPT | Mod: 26

## 2024-08-09 PROCEDURE — 72197 MRI PELVIS W/O & W/DYE: CPT | Mod: 26

## 2024-08-09 PROCEDURE — A9581: CPT

## 2024-08-13 ENCOUNTER — APPOINTMENT (OUTPATIENT)
Dept: HEMATOLOGY ONCOLOGY | Facility: CLINIC | Age: 44
End: 2024-08-13
Payer: COMMERCIAL

## 2024-08-13 VITALS
HEART RATE: 92 BPM | SYSTOLIC BLOOD PRESSURE: 109 MMHG | HEIGHT: 68 IN | OXYGEN SATURATION: 99 % | WEIGHT: 149 LBS | DIASTOLIC BLOOD PRESSURE: 78 MMHG | BODY MASS INDEX: 22.58 KG/M2 | RESPIRATION RATE: 18 BRPM | TEMPERATURE: 98 F

## 2024-08-13 PROCEDURE — G2211 COMPLEX E/M VISIT ADD ON: CPT | Mod: NC

## 2024-08-13 PROCEDURE — 99214 OFFICE O/P EST MOD 30 MIN: CPT

## 2024-08-13 NOTE — HISTORY OF PRESENT ILLNESS
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 FoundationOne Liquid CDx (11/27/23) - 3 Muts/Mb, MSI-high not detected, ctDNA tumor fraction low (<1%).  CURRENT THERAPY: Radioembolization (Right: 05/17/2024; Left: 06/13/2024)  PRIOR THERAPIES: 03/12/2023-4/30/24 Tebentafusp  9/21/23 Y90 radioembolization of liver Immunoembolization (#1, right 03/25/2024; #2, left 04/19/2024) Y90 radioembolization (Right:5/17/24)  INTERVAL HISTORY: Mrs. Hoff is a 43 year-old female with a history of primary uveal melanoma, now with newly diagnosed metastatic disease with liver only involvement who presents for continued management of her disease.  She is followed by Dr. Hernan Osborn.  She is HLA  positive. Please see my prior notes for her complex history.  Briefly, she initially presented in January 2018 with vision changes in her right eye, with later loss of temporal and upper visual field vision.  She was evaluated by Dr. Konstantin Barros at the [facility] and was found to have a subretinal mass in her right eye with a greatest basal diameter of 14.43 mm and a maximal height of 7 mm.  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  She was treated with Ru-106 brachytherapy from 01/30/2018 until 02/06/2018.  She was subsequently placed on expectant observation for systemic recurrence.  She did well until, on 01/29/2023, she underwent an abdominal ultrasound which was significant for a new focal liver lesion measuring 3 cm.  A subsequent chest, abdominal and pelvic CT scan, performed on 01/30/2023, demonstrated a segment 7 liver lesion with arterial enhancement and other subcentimeter hepatic lesions measuring up to 4 mm of unclear etiology.  On 02/01/2023, she underwent a liver biopsy, with pathology consistent with melanoma.  Immunohistochemistry was positive for SOX10 and MART1.  An abdominal MRI, performed on 02/16/2023, demonstrated a dominant segment 7 lesion in the liver, with multiple other smaller subcentimeter lesions bilaterally.  She was evaluated at the [facility] on 02/20/2023 by Dr. Miguelito Truong, with recommendations for HLA testing and possible tebentafusp if appropriate or combination checkpoint blockade.  She was found to be HLA A*02:01 positive and initiated therapy with tebentafusp on 03/12/2023, and she has now completed her first three inpatient doses, with her most recent dose administered on 03/26/2023.  She experienced mild rash and periorbital edema, with IV fluids administered for mild hypotension with her first dose, but otherwise tolerated therapy without difficulty.  On 04/17/2023, she underwent a new baseline chest and pelvis CT as well as a liver MRI.  The MRI demonstrated metastatic lesions affecting both lobes of the liver, with some worsening when compared with the prior scans from 02/16/2023 and with the largest lesion now measuring 4.5 x 3.5 cm in segment 7. She had repeat imaging done on 8/18/23 which showed interval increase in size of the hepatic segment 7 metastatic lesion and persistent numerous additional scattered small hemorrhagic liver metastases. 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  She underwent mapping on 9/7/23 and Y90 radioembolization on 9/21/23 with JEROMY Gustafson Maximiliano.   She continues on weekly tebentafusp.  Although she has typically developed low grade fever around 6 hours after infusion as well as treatment associated fatigue, she has not had these symptoms with her two most recent doses.   She has developed progressive vitiligo.  On 01/15/2024, she underwent a repeat liver MRI.  This study, when compared with a prior dated 11/17/2023, demonstrated decrease in the size of the dominant segment 7 lesion, with no significant change in the other small bilateral lesions.  She was evaluated by Dr. Miguelito Torres at [facility] on 01/22/2023.  The Delcath PHP procedure was discussed at length and the patient has opted to not proceed.  She underwent a chest CT and abdominal MRI on 03/15/03730.  The CT study, when compared with a prior dated 01/25/2024, demonstrated no significant change with no overt evidence for progressive disease.  The MRI, when compared with a prior dated 01/15/2024, demonstrated interval decrease in the size of the hepatic segment 7 lesion. There is an increase in size and number of the small liver lesions.  On 3/25/2024, patient underwent right hepatic intra-arterial administration of leukine and lipiodol with a gelfoam slurry embolization of the right hepatic artery and more recently underwent immuno embolization of the left on 04/19/2024. She reports some abdominal distention and shoulder pain following the procedure, but is feeling well today.  Patient last received Tebe on 4/30/2024. Patient last seen in clinic on 5/14/24. Decision was made to monitor off infusions and proceed with Y90 alone.  MRI Abdomen performed on 5/1/2024 which reported since 3/15/2024, no significant change in liver metastases.  Patient is s/p Y90 (right) on 5/17/2024 andY90 to the left lobe (06/13/2024). She experienced pain and fatigue with her most recent treatment, now resolved.  Patient is here today for follow up. She recently returned from an extended trip in [place]. She still reports that she is experiencing discoloration to her skin, predominantly her hands. No other acute complaints. Had MRI Abdomen was performed on 8/09/2024, on preliminary review there may be a few lesions on liver that appear slightly large, but official read is still pending.  PAST MEDICAL HISTORY: None  SOCIAL HISTORY: The patient has moved from [place] and is now living in [place].  Her partner currently resides in the [place] but is considering relocating, currently here in [place].  She works in the [employer].  FAMILY HISTORY: Her maternal grandfather had mucosal melanoma arising from the stomach.  [de-identified] : Here after her travel in [place], no major issue during travel including HA, fatigue, low energy, CP, SOB, abd pain, diarrhea, or constipation. Having vitiligo in hands, slightly bigger over summer noticing after having a little sun.

## 2024-08-13 NOTE — ASSESSMENT
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 She initiated therapy with tebentafusp at [facility] but then moved to [place]. Due to radiographic progression of a dominant site of disease, she underwent Y90 mapping on 9/7 and Y90 radioembolization on 09/21/23 with no issue. Based upon her most recent imaging studies, she has achieved a treatment effect in the dominant mass treated with Y90, with some progression elsewhere. She has now received therapy with immunoembolization x 2 administered concurrently with tebentafusp with evidence of disease stability.  Discussed with patient that given that she had progression of disease while on tebentafusp, it is unclear the true efficacy of single agent therapy at this time. While she appears to have tolerated the medication relatively well (with the exception of hyperpigmentation of hands), she does report that she has sweats/fevers when the treatments are stopped and started. She is s/p Y90 to the right hepatic lobe on 5/17/24 and Y90 to the left hepatic lobe on 6/13/24, some slight abd pain after, but overall well tolerated. Has been off of tebentafusp (last dose 4/30/2024) with plan to monitor progression based on imaging after return from abroad.  She has now returned from traveling to [place]. MRI Abdomen was performed on 8/09/2024. Preliminarily review with some liver lesions appear slightly large, though official read is pending. If final read confirms increase in size of lesions or development of new lesions, we reviewed next steps in management including possible further loco-regional liver directed therapy, combination immunotherapy with ipilimumab/nivolumab, or clinical trial with darovasertib ([id number]) and crizotinib at [facility].   Plan: - Will schedule telemedicine visit in 1-2 weeks to review formal scan results and review with interventional radiology Dr. Viera.  If progression of disease, will refer to [facility] for the darovasertib/crizotinib trial. - Will plan for labs at next visit with CBC, CMP, and LDH - Patient with dermatology follow up for vitiligo - All questions answered to patient's satisfaction

## 2024-08-13 NOTE — ASSESSMENT
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 She initiated therapy with tebentafusp at [facility] but then moved to [place]. Due to radiographic progression of a dominant site of disease, she underwent Y90 mapping on 9/7 and Y90 radioembolization on 09/21/23 with no issue. Based upon her most recent imaging studies, she has achieved a treatment effect in the dominant mass treated with Y90, with some progression elsewhere. She has now received therapy with immunoembolization x 2 administered concurrently with tebentafusp with evidence of disease stability.  Discussed with patient that given that she had progression of disease while on tebentafusp, it is unclear the true efficacy of single agent therapy at this time. While she appears to have tolerated the medication relatively well (with the exception of hyperpigmentation of hands), she does report that she has sweats/fevers when the treatments are stopped and started. She is s/p Y90 to the right hepatic lobe on 5/17/24 and Y90 to the left hepatic lobe on 6/13/24, some slight abd pain after, but overall well tolerated. Has been off of tebentafusp (last dose 4/30/2024) with plan to monitor progression based on imaging after return from abroad.  She has now returned from traveling to [place]. MRI Abdomen was performed on 8/09/2024. Preliminarily review with some liver lesions appear slightly large, though official read is pending. If final read confirms increase in size of lesions or development of new lesions, we reviewed next steps in management including possible further loco-regional liver directed therapy, combination immunotherapy with ipilimumab/nivolumab, or clinical trial with darovasertib ([id number]) and crizotinib at [facility].   Plan: - Will schedule telemedicine visit in 1-2 weeks to review formal scan results and review with interventional radiology Dr. Viera.  If progression of disease, will refer to [facility] for the darovasertib/crizotinib trial. - Will plan for labs at next visit with CBC, CMP, and LDH - Patient with dermatology follow up for vitiligo - All questions answered to patient's satisfaction

## 2024-08-13 NOTE — PHYSICAL EXAM
[Normal] : grossly intact [de-identified] : No increased work of breathing. [de-identified] : vitiligo of bilateral hands, predominante on fingers up to wrist, slight patches on arms [de-identified] : Alert and Oriented x 3

## 2024-08-13 NOTE — HISTORY OF PRESENT ILLNESS
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 FoundationOne Liquid CDx (11/27/23) - 3 Muts/Mb, MSI-high not detected, ctDNA tumor fraction low (<1%).  CURRENT THERAPY: Radioembolization (Right: 05/17/2024; Left: 06/13/2024)  PRIOR THERAPIES: 03/12/2023-4/30/24 Tebentafusp  9/21/23 Y90 radioembolization of liver Immunoembolization (#1, right 03/25/2024; #2, left 04/19/2024) Y90 radioembolization (Right:5/17/24)  INTERVAL HISTORY: Mrs. Hoff is a 43 year-old female with a history of primary uveal melanoma, now with newly diagnosed metastatic disease with liver only involvement who presents for continued management of her disease.  She is followed by Dr. Hernan Osborn.  She is HLA  positive. Please see my prior notes for her complex history.  Briefly, she initially presented in January 2018 with vision changes in her right eye, with later loss of temporal and upper visual field vision.  She was evaluated by Dr. Konstantin Barros at the 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  She was treated with Ru-106 brachytherapy from 01/30/2018 until 02/06/2018.  She was subsequently placed on expectant observation for systemic recurrence.  She did well until, on 01/29/2023, she underwent an abdominal ultrasound which was significant for a new focal liver lesion measuring 3 cm.  A subsequent chest, abdominal and pelvic CT scan, performed on 01/30/2023, demonstrated a segment 7 liver lesion with arterial enhancement and other subcentimeter hepatic lesions measuring up to 4 mm of unclear etiology.  On 02/01/2023, she underwent a liver biopsy, with pathology consistent with melanoma.  Immunohistochemistry was positive for SOX10 and MART1.  An abdominal MRI, performed on 02/16/2023, demonstrated a dominant segment 7 lesion in the liver, with multiple other smaller subcentimeter lesions bilaterally.  She was evaluated at the [facility] on 02/20/2023 by Dr. Miguelito Truong, with recommendations for HLA testing and possible tebentafusp if appropriate or combination checkpoint blockade.  She was found to be HLA A*02:01 positive and initiated therapy with tebentafusp on 03/12/2023, and she has now completed her first three inpatient doses, with her most recent dose administered on 03/26/2023.  She experienced mild rash and periorbital edema, with IV fluids administered for mild hypotension with her first dose, but otherwise tolerated therapy without difficulty.  On 04/17/2023, she underwent a new baseline chest and pelvis CT as well as a liver MRI.  The MRI demonstrated metastatic lesions affecting both lobes of the liver, with some worsening when compared with the prior scans from 02/16/2023 and with the largest lesion now measuring 4.5 x 3.5 cm in segment 7. She had repeat imaging done on 8/18/23 which showed interval increase in size of the hepatic segment 7 metastatic lesion and persistent numerous additional scattered small hemorrhagic liver metastases. 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  She underwent mapping on 9/7/23 and Y90 radioembolization on 9/21/23 with JEROMY Gustfason Maximiliano.   She continues on weekly tebentafusp.  Although she has typically developed low grade fever around 6 hours after infusion as well as treatment associated fatigue, she has not had these symptoms with her two most recent doses.   She has developed progressive vitiligo.  On 01/15/2024, she underwent a repeat liver MRI.  This study, when compared with a prior dated 11/17/2023, demonstrated decrease in the size of the dominant segment 7 lesion, with no significant change in the other small bilateral lesions.  She was evaluated by Dr. Miguelito Torres at [facility] on 01/22/2023.  The Delcath PHP procedure was discussed at length and the patient has opted to not proceed.  She underwent a chest CT and abdominal MRI on 03/15/91027.  The CT study, when compared with a prior dated 01/25/2024, demonstrated no significant change with no overt evidence for progressive disease.  The MRI, when compared with a prior dated 01/15/2024, demonstrated interval decrease in the size of the hepatic segment 7 lesion. There is an increase in size and number of the small liver lesions.  On 3/25/2024, patient underwent right hepatic intra-arterial administration of leukine and lipiodol with a gelfoam slurry embolization of the right hepatic artery and more recently underwent immuno embolization of the left on 04/19/2024. She reports some abdominal distention and shoulder pain following the procedure, but is feeling well today.  Patient last received Tebe on 4/30/2024. Patient last seen in clinic on 5/14/24. Decision was made to monitor off infusions and proceed with Y90 alone.  MRI Abdomen performed on 5/1/2024 which reported since 3/15/2024, no significant change in liver metastases.  Patient is s/p Y90 (right) on 5/17/2024 andY90 to the left lobe (06/13/2024). She experienced pain and fatigue with her most recent treatment, now resolved.  Patient is here today for follow up. She recently returned from an extended trip in [place]. She still reports that she is experiencing discoloration to her skin, predominantly her hands. No other acute complaints. Had MRI Abdomen was performed on 8/09/2024, on preliminary review there may be a few lesions on liver that appear slightly large, but official read is still pending.  PAST MEDICAL HISTORY: None  SOCIAL HISTORY: The patient has moved from [place] and is now living in [place].  Her partner currently resides in the [place] but is considering relocating, currently here in [place].  She works in the [employer].  FAMILY HISTORY: Her maternal grandfather had mucosal melanoma arising from the stomach.  [de-identified] : Here after her travel in [place], no major issue during travel including HA, fatigue, low energy, CP, SOB, abd pain, diarrhea, or constipation. Having vitiligo in hands, slightly bigger over summer noticing after having a little sun.

## 2024-08-13 NOTE — REVIEW OF SYSTEMS
[FreeTextEntry7] : Improving abdominal pain [Negative] : Endocrine [FreeTextEntry2] : See interval history. [de-identified] : Vitiligo

## 2024-08-13 NOTE — REVIEW OF SYSTEMS
[FreeTextEntry7] : Improving abdominal pain [Negative] : Endocrine [FreeTextEntry2] : See interval history. [de-identified] : Vitiligo

## 2024-08-13 NOTE — PHYSICAL EXAM
[Normal] : grossly intact [de-identified] : No increased work of breathing. [de-identified] : vitiligo of bilateral hands, predominante on fingers up to wrist, slight patches on arms [de-identified] : Alert and Oriented x 3

## 2024-08-14 ENCOUNTER — APPOINTMENT (OUTPATIENT)
Dept: MRI IMAGING | Facility: HOSPITAL | Age: 44
End: 2024-08-14

## 2024-08-16 ENCOUNTER — APPOINTMENT (OUTPATIENT)
Dept: INTERVENTIONAL RADIOLOGY/VASCULAR | Facility: HOSPITAL | Age: 44
End: 2024-08-16

## 2024-08-22 ENCOUNTER — TRANSCRIPTION ENCOUNTER (OUTPATIENT)
Age: 44
End: 2024-08-22

## 2024-08-22 DIAGNOSIS — C69.90 MALIGNANT NEOPLASM OF UNSPECIFIED SITE OF UNSPECIFIED EYE: ICD-10-CM

## 2024-08-27 ENCOUNTER — APPOINTMENT (OUTPATIENT)
Dept: HEMATOLOGY ONCOLOGY | Facility: CLINIC | Age: 44
End: 2024-08-27
Payer: COMMERCIAL

## 2024-08-27 ENCOUNTER — TRANSCRIPTION ENCOUNTER (OUTPATIENT)
Age: 44
End: 2024-08-27

## 2024-08-27 VITALS
OXYGEN SATURATION: 98 % | HEART RATE: 99 BPM | SYSTOLIC BLOOD PRESSURE: 113 MMHG | RESPIRATION RATE: 18 BRPM | WEIGHT: 147 LBS | DIASTOLIC BLOOD PRESSURE: 80 MMHG | HEIGHT: 68 IN | TEMPERATURE: 97.6 F | BODY MASS INDEX: 22.28 KG/M2

## 2024-08-27 PROCEDURE — G2211 COMPLEX E/M VISIT ADD ON: CPT | Mod: NC

## 2024-08-27 PROCEDURE — 99214 OFFICE O/P EST MOD 30 MIN: CPT

## 2024-08-27 RX ORDER — PREDNISONE 20 MG/1
20 TABLET ORAL
Qty: 42 | Refills: 0 | Status: ACTIVE | COMMUNITY
Start: 2024-08-27 | End: 1900-01-01

## 2024-08-27 NOTE — ASSESSMENT
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 She initiated therapy with tebentafusp at [facility] but then moved to [place]. Due to radiographic progression of a dominant site of disease, she underwent Y90 mapping on 9/7 and Y90 radioembolization on 09/21/23 with no issue. Based upon her most recent imaging studies, she has achieved a treatment effect in the dominant mass treated with Y90, with some progression elsewhere. She has now received therapy with immunoembolization x 2 administered concurrently with tebentafusp with evidence of disease stability.  Discussed with patient that given that she had progression of disease while on tebentafusp, it is unclear the true efficacy of single agent therapy at this time. While she appears to have tolerated the medication relatively well (with the exception of hyperpigmentation of hands), she does report that she has sweats/fevers when the treatments are stopped and started. She is s/p Y90 to the right hepatic lobe on 5/17/24 and Y90 to the left hepatic lobe on 6/13/24, some slight abd pain after, but overall well tolerated. Has been off of tebentafusp (last dose 4/30/2024) with plan to monitor progression based on imaging [place].  Patient's disease in her liver has now progressed. We are now recommending radiation to the left sacral lesion and initiating patient on ipi/nivo. We had a long discussion regarding the rationale, logistics, and toxicity profile of ipilimumab and nivolumab.  She expressed interest in moving forward.  We will plan to initiate therapy this week, with weekly toxicity checks and bloodwork for the first 6 weeks.  We will see her back in 1 weeks time for a toxicity check.

## 2024-08-27 NOTE — HISTORY OF PRESENT ILLNESS
[Disease: _____________________] : Disease: [unfilled] [M: ___] : M[unfilled] [AJCC Stage: ____] : AJCC Stage: [unfilled] [de-identified] : Patient is here today for a follow up visit; last seen on 8/13/2024.  Patient's case discussed at Uveal Melanoma tumor board; the consensus was for patient to have radiation to her sacral lesion and initiate patient on systemic ipi/nivo. We also emailed Torsten to discuss our plan, at this time, we are currently awaiting a response.  Vitiligo to hands??   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 [facility] Liquid CDx (11/27/23) - 3 Muts/Mb, MSI-high not detected, ctDNA tumor fraction low (<1%).  CURRENT THERAPY: Radioembolization (Right: 05/17/2024; Left: 06/13/2024)  PRIOR THERAPIES: 03/12/2023-4/30/24 Tebentafusp  9/21/23 Y90 radioembolization of liver Immunoembolization (#1, right 03/25/2024; #2, left 04/19/2024) Y90 radioembolization (Right:5/17/24)  INTERVAL HISTORY: Mrs. Hoff is a 43 year-old female with a history of primary uveal melanoma, now with newly diagnosed metastatic disease with liver only involvement who presents for continued management of her disease.  She is followed by Dr. Hernan Osborn.  She is HLA  positive. Please see my prior notes for her complex history.  Briefly, she initially presented in January 2018 with vision changes in her right eye, with later loss of temporal and upper visual field vision.  She was evaluated by Dr. 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  She was treated with Ru-106 brachytherapy from 01/30/2018 until 02/06/2018.  She was subsequently placed on expectant observation for systemic recurrence.  She did well until, on 01/29/2023, she underwent an abdominal ultrasound which was significant for a new focal liver lesion measuring 3 cm.  A subsequent chest, abdominal and pelvic CT scan, performed on 01/30/2023, demonstrated a segment 7 liver lesion with arterial enhancement and other subcentimeter hepatic lesions measuring up to 4 mm of unclear etiology.  On 02/01/2023, she underwent a liver biopsy, with pathology consistent with melanoma.  Immunohistochemistry was positive for SOX10 and MART1.  An abdominal MRI, performed on 02/16/2023, demonstrated a dominant segment 7 lesion in the liver, with multiple other smaller subcentimeter lesions bilaterally.  She was evaluated at the [facility] on 02/20/2023 by Dr. Miguelito Truong, with recommendations for HLA testing and possible tebentafusp if appropriate or combination checkpoint blockade.  She was found to be HLA A*02:01 positive and initiated therapy with tebentafusp on 03/12/2023, and she has now completed her first three inpatient doses, with her most recent dose administered on 03/26/2023.  She experienced mild rash and periorbital edema, with IV fluids administered for mild hypotension with her first dose, but otherwise tolerated therapy without difficulty.  On 04/17/2023, she underwent a new baseline chest and pelvis CT as well as a liver MRI.  The MRI demonstrated metastatic lesions affecting both lobes of the liver, with some worsening when compared with the prior scans from 02/16/2023 and with the largest lesion now measuring 4.5 x 3.5 cm in segment 7. She had repeat imaging done on 8/18/23 which showed interval increase in size of the hepatic segment 7 metastatic lesion and persistent numerous additional scattered small hemorrhagic liver metastases. 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  She underwent mapping on 9/7/23 and Y90 radioembolization on 9/21/23 with JEROMY Gustafson Maximiliano.   She continues on weekly tebentafusp.  Although she has typically developed low grade fever around 6 hours after infusion as well as treatment associated fatigue, she has not had these symptoms with her two most recent doses.   She has developed progressive vitiligo.  On 01/15/2024, she underwent a repeat liver MRI.  This study, when compared with a prior dated 11/17/2023, demonstrated decrease in the size of the dominant segment 7 lesion, with no significant change in the other small bilateral lesions.  She was evaluated by Dr. Miguelito Torres at [facility] on 01/22/2023.  The Delcath PHP procedure was discussed at length and the patient has opted to not proceed.  She underwent a chest CT and abdominal MRI on 03/15/41543.  The CT study, when compared with a prior dated 01/25/2024, demonstrated no significant change with no overt evidence for progressive disease.  The MRI, when compared with a prior dated 01/15/2024, demonstrated interval decrease in the size of the hepatic segment 7 lesion. There is an increase in size and number of the small liver lesions.  On 3/25/2024, patient underwent right hepatic intra-arterial administration of leukine and lipiodol with a gelfoam slurry embolization of the right hepatic artery and more recently underwent immuno embolization of the left on 04/19/2024. She reports some abdominal distention and shoulder pain following the procedure, but is feeling well today.  Patient last received Tebe on 4/30/2024. Patient last seen in clinic on 5/14/24. Decision was made to monitor off infusions and proceed with Y90 alone.  MRI Abdomen performed on 5/1/2024 which reported since 3/15/2024, no significant change in liver metastases.  Patient is s/p Y90 (right) on 5/17/2024 andY90 to the left lobe (06/13/2024). She experienced pain and fatigue with her most recent treatment, now resolved.  Patient is here today for follow up. She recently returned from an extended trip in [place]. She still reports that she is experiencing discoloration to her skin, predominantly her hands. No other acute complaints. Had MRI Abdomen was performed on 8/09/2024.  Although the formal report states there is overall stable disease, on our review at our uveal melanoma tumor board, there appear be a few lesions on liver that appear slightly larger, with new lesions observed.  Patient is here today for a follow up visit; last seen on 8/13/2024.  Patient's case discussed at Uveal Melanoma tumor board; the consensus was for patient to have radiation to her sacral lesion and initiate patient on systemic ipi/nivo. We also emailed Torsten to discuss our plan, at this time, we are currently awaiting a response.   PAST MEDICAL HISTORY: None  SOCIAL HISTORY: The patient has moved from [place] and is now living in [place].  Her partner currently resides in the [place] but is considering relocating, currently here in [place].  She works in the [employer].  FAMILY HISTORY: Her maternal grandfather had mucosal melanoma arising from the stomach.

## 2024-08-27 NOTE — PHYSICAL EXAM
[Fully active, able to carry on all pre-disease performance without restriction] : Status 0 - Fully active, able to carry on all pre-disease performance without restriction [Normal] : affect appropriate [de-identified] : No increased work of breathing. [de-identified] : vitiligo of bilateral hands, predominante on fingers up to wrist, slight patches on arms [de-identified] : Alert and Oriented x 3

## 2024-08-27 NOTE — HISTORY OF PRESENT ILLNESS
[Disease: _____________________] : Disease: [unfilled] [M: ___] : M[unfilled] [AJCC Stage: ____] : AJCC Stage: [unfilled] [de-identified] : Patient is here today for a follow up visit; last seen on 8/13/2024.  Patient's case discussed at Uveal Melanoma tumor board; the consensus was for patient to have radiation to her sacral lesion and initiate patient on systemic ipi/nivo. We also emailed Torsten to discuss our plan, at this time, we are currently awaiting a response.  Vitiligo to hands??   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 [facility] Liquid CDx (11/27/23) - 3 Muts/Mb, MSI-high not detected, ctDNA tumor fraction low (<1%).  CURRENT THERAPY: Radioembolization (Right: 05/17/2024; Left: 06/13/2024)  PRIOR THERAPIES: 03/12/2023-4/30/24 Tebentafusp  9/21/23 Y90 radioembolization of liver Immunoembolization (#1, right 03/25/2024; #2, left 04/19/2024) Y90 radioembolization (Right:5/17/24)  INTERVAL HISTORY: Mrs. Hoff is a 43 year-old female with a history of primary uveal melanoma, now with newly diagnosed metastatic disease with liver only involvement who presents for continued management of her disease.  She is followed by Dr. Hernan Osborn.  She is HLA  positive. Please see my prior notes for her complex history.  Briefly, she initially presented in January 2018 with vision changes in her right eye, with later loss of temporal and upper visual field vision.  She was evaluated by Dr. 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  She was treated with Ru-106 brachytherapy from 01/30/2018 until 02/06/2018.  She was subsequently placed on expectant observation for systemic recurrence.  She did well until, on 01/29/2023, she underwent an abdominal ultrasound which was significant for a new focal liver lesion measuring 3 cm.  A subsequent chest, abdominal and pelvic CT scan, performed on 01/30/2023, demonstrated a segment 7 liver lesion with arterial enhancement and other subcentimeter hepatic lesions measuring up to 4 mm of unclear etiology.  On 02/01/2023, she underwent a liver biopsy, with pathology consistent with melanoma.  Immunohistochemistry was positive for SOX10 and MART1.  An abdominal MRI, performed on 02/16/2023, demonstrated a dominant segment 7 lesion in the liver, with multiple other smaller subcentimeter lesions bilaterally.  She was evaluated at the [facility] on 02/20/2023 by Dr. Miguelito Truong, with recommendations for HLA testing and possible tebentafusp if appropriate or combination checkpoint blockade.  She was found to be HLA A*02:01 positive and initiated therapy with tebentafusp on 03/12/2023, and she has now completed her first three inpatient doses, with her most recent dose administered on 03/26/2023.  She experienced mild rash and periorbital edema, with IV fluids administered for mild hypotension with her first dose, but otherwise tolerated therapy without difficulty.  On 04/17/2023, she underwent a new baseline chest and pelvis CT as well as a liver MRI.  The MRI demonstrated metastatic lesions affecting both lobes of the liver, with some worsening when compared with the prior scans from 02/16/2023 and with the largest lesion now measuring 4.5 x 3.5 cm in segment 7. She had repeat imaging done on 8/18/23 which showed interval increase in size of the hepatic segment 7 metastatic lesion and persistent numerous additional scattered small hemorrhagic liver metastases. 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  She underwent mapping on 9/7/23 and Y90 radioembolization on 9/21/23 with JEROMY Gustafson Maximiliano.   She continues on weekly tebentafusp.  Although she has typically developed low grade fever around 6 hours after infusion as well as treatment associated fatigue, she has not had these symptoms with her two most recent doses.   She has developed progressive vitiligo.  On 01/15/2024, she underwent a repeat liver MRI.  This study, when compared with a prior dated 11/17/2023, demonstrated decrease in the size of the dominant segment 7 lesion, with no significant change in the other small bilateral lesions.  She was evaluated by Dr. Miguelito Torres at [facility] on 01/22/2023.  The Delcath PHP procedure was discussed at length and the patient has opted to not proceed.  She underwent a chest CT and abdominal MRI on 03/15/72757.  The CT study, when compared with a prior dated 01/25/2024, demonstrated no significant change with no overt evidence for progressive disease.  The MRI, when compared with a prior dated 01/15/2024, demonstrated interval decrease in the size of the hepatic segment 7 lesion. There is an increase in size and number of the small liver lesions.  On 3/25/2024, patient underwent right hepatic intra-arterial administration of leukine and lipiodol with a gelfoam slurry embolization of the right hepatic artery and more recently underwent immuno embolization of the left on 04/19/2024. She reports some abdominal distention and shoulder pain following the procedure, but is feeling well today.  Patient last received Tebe on 4/30/2024. Patient last seen in clinic on 5/14/24. Decision was made to monitor off infusions and proceed with Y90 alone.  MRI Abdomen performed on 5/1/2024 which reported since 3/15/2024, no significant change in liver metastases.  Patient is s/p Y90 (right) on 5/17/2024 andY90 to the left lobe (06/13/2024). She experienced pain and fatigue with her most recent treatment, now resolved.  Patient is here today for follow up. She recently returned from an extended trip in [place]. She still reports that she is experiencing discoloration to her skin, predominantly her hands. No other acute complaints. Had MRI Abdomen was performed on 8/09/2024.  Although the formal report states there is overall stable disease, on our review at our uveal melanoma tumor board, there appear be a few lesions on liver that appear slightly larger, with new lesions observed.  Patient is here today for a follow up visit; last seen on 8/13/2024.  Patient's case discussed at Uveal Melanoma tumor board; the consensus was for patient to have radiation to her sacral lesion and initiate patient on systemic ipi/nivo. We also emailed Torsten to discuss our plan, at this time, we are currently awaiting a response.   PAST MEDICAL HISTORY: None  SOCIAL HISTORY: The patient has moved from [place] and is now living in [place].  Her partner currently resides in the [place] but is considering relocating, currently here in [place].  She works in the [employer].  FAMILY HISTORY: Her maternal grandfather had mucosal melanoma arising from the stomach.

## 2024-08-27 NOTE — ASSESSMENT
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 She initiated therapy with tebentafusp at [facility] but then moved to [place]. Due to radiographic progression of a dominant site of disease, she underwent Y90 mapping on 9/7 and Y90 radioembolization on 09/21/23 with no issue. Based upon her most recent imaging studies, she has achieved a treatment effect in the dominant mass treated with Y90, with some progression elsewhere. She has now received therapy with immunoembolization x 2 administered concurrently with tebentafusp with evidence of disease stability.  Discussed with patient that given that she had progression of disease while on tebentafusp, it is unclear the true efficacy of single agent therapy at this time. While she appears to have tolerated the medication relatively well (with the exception of hyperpigmentation of hands), she does report that she has sweats/fevers when the treatments are stopped and started. She is s/p Y90 to the right hepatic lobe on 5/17/24 and Y90 to the left hepatic lobe on 6/13/24, some slight abd pain after, but overall well tolerated. Has been off of tebentafusp (last dose 4/30/2024) with plan to monitor progression based on imaging after [place] from [place].  Patient's disease in her liver has now progressed. We are now recommending radiation to the left sacral lesion and initiating patient on ipi/nivo. We had a long discussion regarding the rationale, logistics, and toxicity profile of ipilimumab and nivolumab.  She expressed interest in moving forward.  We will plan to initiate therapy this week, with weekly toxicity checks and bloodwork for the first 6 weeks.  We will see her back in 1 weeks time for a toxicity check.

## 2024-08-27 NOTE — PHYSICAL EXAM
[Fully active, able to carry on all pre-disease performance without restriction] : Status 0 - Fully active, able to carry on all pre-disease performance without restriction [Normal] : affect appropriate [de-identified] : No increased work of breathing. [de-identified] : vitiligo of bilateral hands, predominante on fingers up to wrist, slight patches on arms [de-identified] : Alert and Oriented x 3

## 2024-08-28 ENCOUNTER — NON-APPOINTMENT (OUTPATIENT)
Age: 44
End: 2024-08-28

## 2024-08-28 LAB
HBV CORE IGG+IGM SER QL: REACTIVE
HBV CORE IGM SER QL: NONREACTIVE
HBV SURFACE AB SER QL: REACTIVE
HBV SURFACE AG SER QL: NONREACTIVE
TSH SERPL-ACNC: 0.7 UIU/ML

## 2024-08-29 RX ORDER — NIVOLUMAB 10 MG/ML
67 INJECTION INTRAVENOUS ONCE
Refills: 0 | Status: COMPLETED | OUTPATIENT
Start: 2024-08-30 | End: 2024-08-30

## 2024-08-29 RX ORDER — IPILIMUMAB 5 MG/ML
200 INJECTION INTRAVENOUS ONCE
Refills: 0 | Status: COMPLETED | OUTPATIENT
Start: 2024-08-30 | End: 2024-08-30

## 2024-08-30 ENCOUNTER — APPOINTMENT (OUTPATIENT)
Dept: INFUSION THERAPY | Facility: CLINIC | Age: 44
End: 2024-08-30

## 2024-08-30 ENCOUNTER — OUTPATIENT (OUTPATIENT)
Dept: OUTPATIENT SERVICES | Facility: HOSPITAL | Age: 44
LOS: 1 days | End: 2024-08-30
Payer: COMMERCIAL

## 2024-08-30 VITALS
RESPIRATION RATE: 18 BRPM | WEIGHT: 147.05 LBS | TEMPERATURE: 98 F | OXYGEN SATURATION: 99 % | HEIGHT: 67 IN | DIASTOLIC BLOOD PRESSURE: 74 MMHG | HEART RATE: 88 BPM | SYSTOLIC BLOOD PRESSURE: 110 MMHG

## 2024-08-30 VITALS
DIASTOLIC BLOOD PRESSURE: 86 MMHG | HEART RATE: 86 BPM | TEMPERATURE: 98 F | RESPIRATION RATE: 16 BRPM | OXYGEN SATURATION: 100 % | SYSTOLIC BLOOD PRESSURE: 119 MMHG

## 2024-08-30 DIAGNOSIS — C69.90 MALIGNANT NEOPLASM OF UNSPECIFIED SITE OF UNSPECIFIED EYE: ICD-10-CM

## 2024-08-30 LAB — LDH SERPL L TO P-CCNC: 193 U/L — SIGNIFICANT CHANGE UP (ref 50–242)

## 2024-08-30 PROCEDURE — 83615 LACTATE (LD) (LDH) ENZYME: CPT

## 2024-08-30 PROCEDURE — 96413 CHEMO IV INFUSION 1 HR: CPT

## 2024-08-30 PROCEDURE — 36415 COLL VENOUS BLD VENIPUNCTURE: CPT

## 2024-08-30 PROCEDURE — 96417 CHEMO IV INFUS EACH ADDL SEQ: CPT

## 2024-08-30 RX ADMIN — IPILIMUMAB 200 MILLIGRAM(S): 5 INJECTION INTRAVENOUS at 15:40

## 2024-08-30 RX ADMIN — IPILIMUMAB 200 MILLIGRAM(S): 5 INJECTION INTRAVENOUS at 16:10

## 2024-08-30 RX ADMIN — NIVOLUMAB 67 MILLIGRAM(S): 10 INJECTION INTRAVENOUS at 15:09

## 2024-08-30 RX ADMIN — NIVOLUMAB 67 MILLIGRAM(S): 10 INJECTION INTRAVENOUS at 15:39

## 2024-08-30 NOTE — DISCHARGE INSTRUCTIONS: CHEMOTHERAPY - DC SYMPTOM 1
Fever of 100.5F or above Drysol Pregnancy And Lactation Text: This medication is considered safe during pregnancy and breast feeding.

## 2024-09-03 ENCOUNTER — APPOINTMENT (OUTPATIENT)
Dept: HEMATOLOGY ONCOLOGY | Facility: CLINIC | Age: 44
End: 2024-09-03
Payer: COMMERCIAL

## 2024-09-03 PROCEDURE — G2211 COMPLEX E/M VISIT ADD ON: CPT | Mod: NC

## 2024-09-03 PROCEDURE — 99214 OFFICE O/P EST MOD 30 MIN: CPT

## 2024-09-03 NOTE — REASON FOR VISIT
[Follow-Up Visit] : a follow-up [Home] : at home, [unfilled] , at the time of the visit. [Medical Office: ([facility])___] : at the medical office located in  [Patient] : the patient

## 2024-09-03 NOTE — BEGINNING OF VISIT
[Other reason not done] : Other reason not done [de-identified] : Virtual visit [Pain Scale: ___] : On a scale of 1-10, today the patient's pain is a(n) [unfilled]. [Never] : Never [Reviewed, no changes] : Reviewed, no changes [Abdominal Pain] : no abdominal pain [Vomiting] : no vomiting [Constipation] : no constipation [Diarrhea Character] : Diarrhea: Grade 0

## 2024-09-03 NOTE — PHYSICAL EXAM
[Fully active, able to carry on all pre-disease performance without restriction] : Status 0 - Fully active, able to carry on all pre-disease performance without restriction [Normal] : affect appropriate [de-identified] : vitiligo of bilateral hands, predominante on fingers up to wrist, slight patches on arms [de-identified] : No increased work of breathing. [de-identified] : Alert and Oriented x 3

## 2024-09-03 NOTE — HISTORY OF PRESENT ILLNESS
[Disease: _____________________] : Disease: [unfilled] [M: ___] : M[unfilled] [AJCC Stage: ____] : AJCC Stage: [unfilled] [de-identified] : Patient is here for a follow up visit; last seen on 8/27/2024.  Last Friday, patient received Ipi/Nivo. She reports?????? Next Infusion, Friday, September 20th?? 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 [facility]One Liquid CDx (11/27/23) - 3 Muts/Mb, MSI-high not detected, ctDNA tumor fraction low (<1%).  CURRENT THERAPY: Ipi/Nivo (Cycle 1: 08/30/2024; C2: scheduled for 09/17/2024)  PRIOR THERAPIES: 03/12/2023-4/30/24 Tebentafusp  9/21/23 Y90 radioembolization of liver Immunoembolization (#1, right 03/25/2024; #2, left 04/19/2024) Y90 radioembolization (Right:5/17/24) Radioembolization (Right: 05/17/2024; Left: 06/13/2024)  INTERVAL HISTORY: Mrs. Hoff is a 44 year-old female with a history of primary uveal melanoma, now with newly diagnosed metastatic disease with liver only involvement who presents for continued management of her disease.  She is followed by Dr. Hernan Osborn.  She is HLA  positive. Please see my prior notes for her complex history.  Briefly, she initially presented in January 2018 with vision changes in her right eye, with later loss of temporal and upper visual field vision.  She was evaluated by Dr. 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  She was treated with Ru-106 brachytherapy from 01/30/2018 until 02/06/2018.  She was subsequently placed on expectant observation for systemic recurrence.  She did well until, on 01/29/2023, she underwent an abdominal ultrasound which was significant for a new focal liver lesion measuring 3 cm.  A subsequent chest, abdominal and pelvic CT scan, performed on 01/30/2023, demonstrated a segment 7 liver lesion with arterial enhancement and other subcentimeter hepatic lesions measuring up to 4 mm of unclear etiology.  On 02/01/2023, she underwent a liver biopsy, with pathology consistent with melanoma.  Immunohistochemistry was positive for SOX10 and MART1.  An abdominal MRI, performed on 02/16/2023, demonstrated a dominant segment 7 lesion in the liver, with multiple other smaller subcentimeter lesions bilaterally.  She was evaluated at the [facility] on 02/20/2023 by Dr. Miguelito Truong, with recommendations for HLA testing and possible tebentafusp if appropriate or combination checkpoint blockade.  She was found to be HLA A*02:01 positive and initiated therapy with tebentafusp on 03/12/2023, and she has now completed her first three inpatient doses, with her most recent dose administered on 03/26/2023.  She experienced mild rash and periorbital edema, with IV fluids administered for mild hypotension with her first dose, but otherwise tolerated therapy without difficulty.  On 04/17/2023, she underwent a new baseline chest and pelvis CT as well as a liver MRI.  The MRI demonstrated metastatic lesions affecting both lobes of the liver, with some worsening when compared with the prior scans from 02/16/2023 and with the largest lesion now measuring 4.5 x 3.5 cm in segment 7. She had repeat imaging done on 8/18/23 which showed interval increase in size of the hepatic segment 7 metastatic lesion and persistent numerous additional scattered small hemorrhagic liver metastases. 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  She underwent mapping on 9/7/23 and Y90 radioembolization on 9/21/23 with JEROMY Viera.   She continues on weekly tebentafusp.  Although she has typically developed low grade fever around 6 hours after infusion as well as treatment associated fatigue, she has not had these symptoms with her two most recent doses.   She has developed progressive vitiligo.  On 01/15/2024, she underwent a repeat liver MRI.  This study, when compared with a prior dated 11/17/2023, demonstrated decrease in the size of the dominant segment 7 lesion, with no significant change in the other small bilateral lesions.  She was evaluated by Dr. Miguelito Torres at [facility] on 01/22/2023.  The Delcath PHP procedure was discussed at length and the patient has opted to not proceed.  She underwent a chest CT and abdominal MRI on 03/15/70416.  The CT study, when compared with a prior dated 01/25/2024, demonstrated no significant change with no overt evidence for progressive disease.  The MRI, when compared with a prior dated 01/15/2024, demonstrated interval decrease in the size of the hepatic segment 7 lesion. There is an increase in size and number of the small liver lesions.  On 3/25/2024, patient underwent right hepatic intra-arterial administration of leukine and lipiodol with a gelfoam slurry embolization of the right hepatic artery and more recently underwent immuno embolization of the left on 04/19/2024. She received her last dose of tebentafusp on 04/30/2024.  Due to continued disease progression on MRI performed on 05/01/2024 as determined by review at our uveal melanoma tumor board (despite the reported stability on the formal read) as well as her planned travel schedule, her therapy was changed to radioembolization alone. Patient is s/p Y90 (right) on 5/17/2024 andY90 to the left lobe (06/13/2024). She experienced pain and fatigue with her most recent treatment, now resolved.  She underwent a followup MRI Abdomen on 8/09/2024.  Although the formal report states there is overall stable disease, on our review at our uveal melanoma tumor board, there appear be a few lesions on liver that appear slightly larger, with new lesions observed. Patient's case discussed at Uveal Melanoma tumor board; the consensus was for patient to have radiation to her sacral lesion and initiate patient on systemic ipi/nivo. She was also evaluated by the team at [facility] with recommendations for TACE.  Last Friday, patient received Ipi/Nivo. She reports that overall, she is feeling well with the exception of some "itchiness".  Patient will be going to [place] tomorrow to discuss her TACE for her liver lesion.  Next Infusion, Friday, September 17th, pending appointment.   PAST MEDICAL HISTORY: None  SOCIAL HISTORY: The patient has moved from [facility] and is now living in [place].  Her partner currently resides in the [place] but is considering relocating, currently here in [place].  She works in the [employer].  FAMILY HISTORY: Her maternal grandfather had mucosal melanoma arising from the stomach.

## 2024-09-03 NOTE — BEGINNING OF VISIT
[Other reason not done] : Other reason not done [de-identified] : Virtual visit [Pain Scale: ___] : On a scale of 1-10, today the patient's pain is a(n) [unfilled]. [Never] : Never [Reviewed, no changes] : Reviewed, no changes [Abdominal Pain] : no abdominal pain [Vomiting] : no vomiting [Constipation] : no constipation [Diarrhea Character] : Diarrhea: Grade 0

## 2024-09-03 NOTE — HISTORY OF PRESENT ILLNESS
[Disease: _____________________] : Disease: [unfilled] [M: ___] : M[unfilled] [AJCC Stage: ____] : AJCC Stage: [unfilled] [de-identified] : Patient is here for a follow up visit; last seen on 8/27/2024.  Last Friday, patient received Ipi/Nivo. She reports?????? Next Infusion, Friday, September 20th?? 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 FoundationOne Liquid CDx (11/27/23) - 3 Muts/Mb, MSI-high not detected, ctDNA tumor fraction low (<1%).  CURRENT THERAPY: Ipi/Nivo (Cycle 1: 08/30/2024; C2: scheduled for 09/17/2024)  PRIOR THERAPIES: 03/12/2023-4/30/24 Tebentafusp  9/21/23 Y90 radioembolization of liver Immunoembolization (#1, right 03/25/2024; #2, left 04/19/2024) Y90 radioembolization (Right:5/17/24) Radioembolization (Right: 05/17/2024; Left: 06/13/2024)  INTERVAL HISTORY: Mrs. Hoff is a 44 year-old female with a history of primary uveal melanoma, now with newly diagnosed metastatic disease with liver only involvement who presents for continued management of her disease.  She is followed by Dr. Hernan Osborn.  She is HLA  positive. Please see my prior notes for her complex history.  Briefly, she initially presented in January 2018 with vision changes in her right eye, with later loss of temporal and upper visual field vision.  She was evaluated by Dr. 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  She was treated with Ru-106 brachytherapy from 01/30/2018 until 02/06/2018.  She was subsequently placed on expectant observation for systemic recurrence.  She did well until, on 01/29/2023, she underwent an abdominal ultrasound which was significant for a new focal liver lesion measuring 3 cm.  A subsequent chest, abdominal and pelvic CT scan, performed on 01/30/2023, demonstrated a segment 7 liver lesion with arterial enhancement and other subcentimeter hepatic lesions measuring up to 4 mm of unclear etiology.  On 02/01/2023, she underwent a liver biopsy, with pathology consistent with melanoma.  Immunohistochemistry was positive for SOX10 and MART1.  An abdominal MRI, performed on 02/16/2023, demonstrated a dominant segment 7 lesion in the liver, with multiple other smaller subcentimeter lesions bilaterally.  She was evaluated at the [facility] on 02/20/2023 by Dr. Miguelito Truong, with recommendations for HLA testing and possible tebentafusp if appropriate or combination checkpoint blockade.  She was found to be HLA A*02:01 positive and initiated therapy with tebentafusp on 03/12/2023, and she has now completed her first three inpatient doses, with her most recent dose administered on 03/26/2023.  She experienced mild rash and periorbital edema, with IV fluids administered for mild hypotension with her first dose, but otherwise tolerated therapy without difficulty.  On 04/17/2023, she underwent a new baseline chest and pelvis CT as well as a liver MRI.  The MRI demonstrated metastatic lesions affecting both lobes of the liver, with some worsening when compared with the prior scans from 02/16/2023 and with the largest lesion now measuring 4.5 x 3.5 cm in segment 7. She had repeat imaging done on 8/18/23 which showed interval increase in size of the hepatic segment 7 metastatic lesion and persistent numerous additional scattered small hemorrhagic liver metastases. 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  She underwent mapping on 9/7/23 and Y90 radioembolization on 9/21/23 with JEROMY Viera.   She continues on weekly tebentafusp.  Although she has typically developed low grade fever around 6 hours after infusion as well as treatment associated fatigue, she has not had these symptoms with her two most recent doses.   She has developed progressive vitiligo.  On 01/15/2024, she underwent a repeat liver MRI.  This study, when compared with a prior dated 11/17/2023, demonstrated decrease in the size of the dominant segment 7 lesion, with no significant change in the other small bilateral lesions.  She was evaluated by Dr. Miguelito Torres at [facility] on 01/22/2023.  The Delcath PHP procedure was discussed at length and the patient has opted to not proceed.  She underwent a chest CT and abdominal MRI on 03/15/95476.  The CT study, when compared with a prior dated 01/25/2024, demonstrated no significant change with no overt evidence for progressive disease.  The MRI, when compared with a prior dated 01/15/2024, demonstrated interval decrease in the size of the hepatic segment 7 lesion. There is an increase in size and number of the small liver lesions.  On 3/25/2024, patient underwent right hepatic intra-arterial administration of leukine and lipiodol with a gelfoam slurry embolization of the right hepatic artery and more recently underwent immuno embolization of the left on 04/19/2024. She received her last dose of tebentafusp on 04/30/2024.  Due to continued disease progression on MRI performed on 05/01/2024 as determined by review at our uveal melanoma tumor board (despite the reported stability on the formal read) as well as her planned travel schedule, her therapy was changed to radioembolization alone. Patient is s/p Y90 (right) on 5/17/2024 andY90 to the left lobe (06/13/2024). She experienced pain and fatigue with her most recent treatment, now resolved.  She underwent a followup MRI Abdomen on 8/09/2024.  Although the formal report states there is overall stable disease, on our review at our uveal melanoma tumor board, there appear be a few lesions on liver that appear slightly larger, with new lesions observed. Patient's case discussed at Uveal Melanoma tumor board; the consensus was for patient to have radiation to her sacral lesion and initiate patient on systemic ipi/nivo. She was also evaluated by the team at [facility] with recommendations for TACE.  Last Friday, patient received Ipi/Nivo. She reports that overall, she is feeling well with the exception of some "itchiness".  Patient will be going to [place] tomorrow to discuss her TACE for her liver lesion.  Next Infusion, Friday, September 17th, pending appointment.   PAST MEDICAL HISTORY: None  SOCIAL HISTORY: The patient has moved from [facility] and is now living in [place].  Her partner currently resides in the [place] but is considering relocating, currently here in [place].  She works in the [employer].  FAMILY HISTORY: Her maternal grandfather had mucosal melanoma arising from the stomach.

## 2024-09-03 NOTE — PHYSICAL EXAM
[Fully active, able to carry on all pre-disease performance without restriction] : Status 0 - Fully active, able to carry on all pre-disease performance without restriction [Normal] : affect appropriate [de-identified] : vitiligo of bilateral hands, predominante on fingers up to wrist, slight patches on arms [de-identified] : No increased work of breathing. [de-identified] : Alert and Oriented x 3

## 2024-09-03 NOTE — ASSESSMENT
[FreeTextEntry1] : This is a 44 year old HLA  positive female now with an M1b, stage IV, uveal melanoma with liver involvement. She initiated therapy with tebentafusp at [facility] but then moved to [place]. Due to radiographic progression of a dominant site of disease, she underwent Y90 mapping on 9/7 and Y90 radioembolization on 09/21/23 with no issue. Based upon her most recent imaging studies, she has achieved a treatment effect in the dominant mass treated with Y90, with some progression elsewhere. She has now received therapy with immunoembolization x 2 administered concurrently with tebentafusp. Her therapy was changed to radioembolization and she is s/p Y90 to the right hepatic lobe on 5/17/24 and Y90 to the left hepatic lobe on 6/13/24, some slight abd pain after, but overall well tolerated. Due to disease progression, her therapy was changed to ipilimumab and nivolumab.  Patient began Ipi/Niv on 8/30/2024, next cycle will be 9/17/2024 (pending appointment).  Patient will have lab work drawn this week.  Next week,  patient will have a follow up appointment with Carri Birch NP. Patient's radiation oncology appointment is pending. Will ensure it is scheduled this week.  Additionally, ECHO is also pending at this time.

## 2024-09-03 NOTE — REVIEW OF SYSTEMS
[Diarrhea: Grade 0] : Diarrhea: Grade 0 [Negative] : Allergic/Immunologic [FreeTextEntry2] : See interval history. [de-identified] : Intermittent pruritus

## 2024-09-03 NOTE — REASON FOR VISIT
[Follow-Up Visit] : a follow-up [Home] : at home, [unfilled] , at the time of the visit. [Medical Office: ([facility])___] : at the medical office located in  [Patient] : the patient

## 2024-09-03 NOTE — PHYSICAL EXAM
[Fully active, able to carry on all pre-disease performance without restriction] : Status 0 - Fully active, able to carry on all pre-disease performance without restriction [Normal] : affect appropriate [de-identified] : vitiligo of bilateral hands, predominante on fingers up to wrist, slight patches on arms [de-identified] : No increased work of breathing. [de-identified] : Alert and Oriented x 3

## 2024-09-03 NOTE — REASON FOR VISIT
[Follow-Up Visit] : a follow-up [Home] : at home, [unfilled] , at the time of the visit. [Medical Office: ([facility])___] : at the medical office located in  [Patient] : the patient

## 2024-09-03 NOTE — REVIEW OF SYSTEMS
[Diarrhea: Grade 0] : Diarrhea: Grade 0 [Negative] : Allergic/Immunologic [FreeTextEntry2] : See interval history. [de-identified] : Intermittent pruritus

## 2024-09-03 NOTE — HISTORY OF PRESENT ILLNESS
[Disease: _____________________] : Disease: [unfilled] [M: ___] : M[unfilled] [AJCC Stage: ____] : AJCC Stage: [unfilled] [de-identified] : Patient is here for a follow up visit; last seen on 8/27/2024.  Last Friday, patient received Ipi/Nivo. She reports?????? Next Infusion, Friday, September 20th?? 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 FoundationOne Liquid CDx (11/27/23) - 3 Muts/Mb, MSI-high not detected, ctDNA tumor fraction low (<1%).  CURRENT THERAPY: Ipi/Nivo (Cycle 1: 08/30/2024; C2: scheduled for 09/17/2024)  PRIOR THERAPIES: 03/12/2023-4/30/24 Tebentafusp  9/21/23 Y90 radioembolization of liver Immunoembolization (#1, right 03/25/2024; #2, left 04/19/2024) Y90 radioembolization (Right:5/17/24) Radioembolization (Right: 05/17/2024; Left: 06/13/2024)  INTERVAL HISTORY: Mrs. Hoff is a 44 year-old female with a history of primary uveal melanoma, now with newly diagnosed metastatic disease with liver only involvement who presents for continued management of her disease.  She is followed by Dr. Hernan Osborn.  She is HLA  positive. Please see my prior notes for her complex history.  Briefly, she initially presented in January 2018 with vision changes in her right eye, with later loss of temporal and upper visual field vision.  She was evaluated by Dr. Konstantin Barros at the [facility] and was found to have a subretinal mass in her right eye with a greatest basal diameter of 14.43 mm and a maximal height of 7 mm.  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  She was treated with Ru-106 brachytherapy from 01/30/2018 until 02/06/2018.  She was subsequently placed on expectant observation for systemic recurrence.  She did well until, on 01/29/2023, she underwent an abdominal ultrasound which was significant for a new focal liver lesion measuring 3 cm.  A subsequent chest, abdominal and pelvic CT scan, performed on 01/30/2023, demonstrated a segment 7 liver lesion with arterial enhancement and other subcentimeter hepatic lesions measuring up to 4 mm of unclear etiology.  On 02/01/2023, she underwent a liver biopsy, with pathology consistent with melanoma.  Immunohistochemistry was positive for SOX10 and MART1.  An abdominal MRI, performed on 02/16/2023, demonstrated a dominant segment 7 lesion in the liver, with multiple other smaller subcentimeter lesions bilaterally.  She was evaluated at the [facility] on 02/20/2023 by Dr. Miguelito Truong, with recommendations for HLA testing and possible tebentafusp if appropriate or combination checkpoint blockade.  She was found to be HLA A*02:01 positive and initiated therapy with tebentafusp on 03/12/2023, and she has now completed her first three inpatient doses, with her most recent dose administered on 03/26/2023.  She experienced mild rash and periorbital edema, with IV fluids administered for mild hypotension with her first dose, but otherwise tolerated therapy without difficulty.  On 04/17/2023, she underwent a new baseline chest and pelvis CT as well as a liver MRI.  The MRI demonstrated metastatic lesions affecting both lobes of the liver, with some worsening when compared with the prior scans from 02/16/2023 and with the largest lesion now measuring 4.5 x 3.5 cm in segment 7. She had repeat imaging done on 8/18/23 which showed interval increase in size of the hepatic segment 7 metastatic lesion and persistent numerous additional scattered small hemorrhagic liver metastases. 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  She underwent mapping on 9/7/23 and Y90 radioembolization on 9/21/23 with JEROMY Viera.   She continues on weekly tebentafusp.  Although she has typically developed low grade fever around 6 hours after infusion as well as treatment associated fatigue, she has not had these symptoms with her two most recent doses.   She has developed progressive vitiligo.  On 01/15/2024, she underwent a repeat liver MRI.  This study, when compared with a prior dated 11/17/2023, demonstrated decrease in the size of the dominant segment 7 lesion, with no significant change in the other small bilateral lesions.  She was evaluated by Dr. Miguelito Torres at [facility] on 01/22/2023.  The Delcath PHP procedure was discussed at length and the patient has opted to not proceed.  She underwent a chest CT and abdominal MRI on 03/15/29719.  The CT study, when compared with a prior dated 01/25/2024, demonstrated no significant change with no overt evidence for progressive disease.  The MRI, when compared with a prior dated 01/15/2024, demonstrated interval decrease in the size of the hepatic segment 7 lesion. There is an increase in size and number of the small liver lesions.  On 3/25/2024, patient underwent right hepatic intra-arterial administration of leukine and lipiodol with a gelfoam slurry embolization of the right hepatic artery and more recently underwent immuno embolization of the left on 04/19/2024. She received her last dose of tebentafusp on 04/30/2024.  Due to continued disease progression on MRI performed on 05/01/2024 as determined by review at our uveal melanoma tumor board (despite the reported stability on the formal read) as well as her planned travel schedule, her therapy was changed to radioembolization alone. Patient is s/p Y90 (right) on 5/17/2024 andY90 to the left lobe (06/13/2024). She experienced pain and fatigue with her most recent treatment, now resolved.  She underwent a followup MRI Abdomen on 8/09/2024.  Although the formal report states there is overall stable disease, on our review at our uveal melanoma tumor board, there appear be a few lesions on liver that appear slightly larger, with new lesions observed. Patient's case discussed at Uveal Melanoma tumor board; the consensus was for patient to have radiation to her sacral lesion and initiate patient on systemic ipi/nivo. She was also evaluated by the team at [facility] with recommendations for TACE.  Last Friday, patient received Ipi/Nivo. She reports that overall, she is feeling well with the exception of some "itchiness".  Patient will be going to [place] tomorrow to discuss her TACE for her liver lesion.  Next Infusion, Friday, September 17th, pending appointment.   PAST MEDICAL HISTORY: None  SOCIAL HISTORY: The patient has moved from [facility] and is now living in [place].  Her partner currently resides in the [place] but is considering relocating, currently here in [place].  She works in the [employer].  FAMILY HISTORY: Her maternal grandfather had mucosal melanoma arising from the stomach.

## 2024-09-03 NOTE — ASSESSMENT
[FreeTextEntry1] : This is a 44 year old HLA  positive female now with an M1b, stage IV, uveal melanoma with liver involvement. She initiated therapy with tebentafusp at [facility] but then moved to [place]. Due to radiographic progression of a dominant site of disease, she underwent Y90 mapping on 9/7 and Y90 radioembolization on 09/21/23 with no issue. Based upon her most recent imaging studies, she has achieved a treatment effect in the dominant mass treated with Y90, with some progression elsewhere. She has now received therapy with immunoembolization x 2 administered concurrently with tebentafusp. Her therapy was changed to radioembolization and she is s/p Y90 to the right hepatic lobe on 5/17/24 and Y90 to the left hepatic lobe on 6/13/24, some slight abd pain after, but overall well tolerated. Due to disease progression, her therapy was changed to ipilimumab and nivolumab.  Patient began Ipi/Niv on 8/30/2024, next cycle will be 9/17/2024 (pending appointment).  Patient will have lab work drawn this week.  Next week,  patient will have a follow up appointment with Carri Birch NP. Patient's radiation oncology appointment is pending. Will ensure it is scheduled this week.  Additionally, ECHO is also pending at this time.

## 2024-09-03 NOTE — ASSESSMENT
[FreeTextEntry1] : This is a 44 year old HLA  positive female now with an M1b, stage IV, uveal melanoma with liver involvement. She initiated therapy with tebentafusp at [facility] but then moved to [place]. Due to radiographic progression of a dominant site of disease, she underwent Y90 mapping on 9/7 and Y90 radioembolization on 09/21/23 with no issue. Based upon her most recent imaging studies, she has achieved a treatment effect in the dominant mass treated with Y90, with some progression elsewhere. She has now received therapy with immunoembolization x 2 administered concurrently with tebentafusp. Her therapy was changed to radioembolization and she is s/p Y90 to the right hepatic lobe on 5/17/24 and Y90 to the left hepatic lobe on 6/13/24, some slight abd pain after, but overall well tolerated. Due to disease progression, her therapy was changed to ipilimumab and nivolumab.  Patient began Ipi/Niv on 8/30/2024, next cycle will be 9/17/2024 (pending appointment).  Patient will have lab work drawn this week.  Next week,  patient will have a follow up appointment with Carri Birch NP. Patient's radiation oncology appointment is pending. Will ensure it is scheduled this week.  Additionally, ECHO is also pending at this time.

## 2024-09-03 NOTE — REVIEW OF SYSTEMS
New HomeCare Referral needed:    Respiratory changes, general decline - would recommend services of:    Skilled Nursing, Home Health Aide, PT and OT      Martin RN - [facility]    Melinda Roberson XRO/   [Diarrhea: Grade 0] : Diarrhea: Grade 0 [Negative] : Allergic/Immunologic [FreeTextEntry2] : See interval history. [de-identified] : Intermittent pruritus

## 2024-09-03 NOTE — BEGINNING OF VISIT
[Other reason not done] : Other reason not done [de-identified] : Virtual visit [Pain Scale: ___] : On a scale of 1-10, today the patient's pain is a(n) [unfilled]. [Never] : Never [Reviewed, no changes] : Reviewed, no changes [Abdominal Pain] : no abdominal pain [Vomiting] : no vomiting [Constipation] : no constipation [Diarrhea Character] : Diarrhea: Grade 0

## 2024-09-06 ENCOUNTER — NON-APPOINTMENT (OUTPATIENT)
Age: 44
End: 2024-09-06

## 2024-09-06 LAB
BASOPHILS # BLD AUTO: 0.07 K/UL
BASOPHILS NFR BLD AUTO: 0.9 %
EOSINOPHIL # BLD AUTO: 0.42 K/UL
EOSINOPHIL NFR BLD AUTO: 5.6 %
HCT VFR BLD CALC: 41.5 %
HGB BLD-MCNC: 14.3 G/DL
IMM GRANULOCYTES NFR BLD AUTO: 0.5 %
LYMPHOCYTES # BLD AUTO: 1.5 K/UL
LYMPHOCYTES NFR BLD AUTO: 19.9 %
MAN DIFF?: NORMAL
MCHC RBC-ENTMCNC: 30.6 PG
MCHC RBC-ENTMCNC: 34.5 GM/DL
MCV RBC AUTO: 88.9 FL
MONOCYTES # BLD AUTO: 0.76 K/UL
MONOCYTES NFR BLD AUTO: 10.1 %
NEUTROPHILS # BLD AUTO: 4.73 K/UL
NEUTROPHILS NFR BLD AUTO: 63 %
PLATELET # BLD AUTO: 238 K/UL
RBC # BLD: 4.67 M/UL
RBC # FLD: 12.8 %
WBC # FLD AUTO: 7.52 K/UL

## 2024-09-08 LAB
CORTICOSTEROID BIND GLOBULIN: 2.8 MG/DL
CORTIS SERPL-MCNC: 7.5 UG/DL
CORTISOL, FREE: 0.22 UG/DL
PFCX: 2.9 %

## 2024-09-09 ENCOUNTER — NON-APPOINTMENT (OUTPATIENT)
Age: 44
End: 2024-09-09

## 2024-09-09 LAB
ALBUMIN SERPL ELPH-MCNC: 4 G/DL
ALP BLD-CCNC: 172 U/L
ALT SERPL-CCNC: 73 U/L
ANION GAP SERPL CALC-SCNC: 15 MMOL/L
AST SERPL-CCNC: 65 U/L
BILIRUB SERPL-MCNC: 0.4 MG/DL
BUN SERPL-MCNC: 12 MG/DL
CALCIUM SERPL-MCNC: 9.4 MG/DL
CHLORIDE SERPL-SCNC: 104 MMOL/L
CO2 SERPL-SCNC: 17 MMOL/L
CREAT SERPL-MCNC: 0.69 MG/DL
EGFR: 108 ML/MIN/1.73M2
GLUCOSE SERPL-MCNC: 69 MG/DL
LDH SERPL-CCNC: 259 U/L
POTASSIUM SERPL-SCNC: 4.3 MMOL/L
PROT SERPL-MCNC: 7.5 G/DL
SODIUM SERPL-SCNC: 136 MMOL/L
TSH SERPL-ACNC: 0.9 UIU/ML

## 2024-09-10 ENCOUNTER — APPOINTMENT (OUTPATIENT)
Dept: HEMATOLOGY ONCOLOGY | Facility: CLINIC | Age: 44
End: 2024-09-10
Payer: COMMERCIAL

## 2024-09-10 DIAGNOSIS — R74.01 ELEVATION OF LEVELS OF LIVER TRANSAMINASE LEVELS: ICD-10-CM

## 2024-09-10 PROCEDURE — 99214 OFFICE O/P EST MOD 30 MIN: CPT

## 2024-09-10 NOTE — HISTORY OF PRESENT ILLNESS
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 [facility] CDx (11/27/23) - 3 Muts/Mb, MSI-high not detected, ctDNA tumor fraction low (<1%).  CURRENT THERAPY: Ipi/Nivo (Cycle 1: 08/30/2024; C2:  09/17/2024)  PRIOR THERAPIES: 03/12/2023-4/30/24 Tebentafusp  9/21/23 Y90 radioembolization of liver Immunoembolization (#1, right 03/25/2024; #2, left 04/19/2024) Y90 radioembolization (Right:5/17/24) Radioembolization (Right: 05/17/2024; Left: 06/13/2024)  INTERVAL HISTORY: Mrs. Hoff is a 44 year-old female with a history of primary uveal melanoma, now with newly diagnosed metastatic disease with liver only involvement who presents for continued management of her disease.  She is followed by Dr. Hernan Osborn.  She is HLA  positive. Please see my prior notes for her complex history.  Briefly, she initially presented in January 2018 with vision changes in her right eye, with later loss of temporal and upper visual field vision.  She was evaluated by Dr. 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  She was treated with Ru-106 brachytherapy from 01/30/2018 until 02/06/2018.  She was subsequently placed on expectant observation for systemic recurrence.  She did well until, on 01/29/2023, she underwent an abdominal ultrasound which was significant for a new focal liver lesion measuring 3 cm.  A subsequent chest, abdominal and pelvic CT scan, performed on 01/30/2023, demonstrated a segment 7 liver lesion with arterial enhancement and other subcentimeter hepatic lesions measuring up to 4 mm of unclear etiology.  On 02/01/2023, she underwent a liver biopsy, with pathology consistent with melanoma.  Immunohistochemistry was positive for SOX10 and MART1.  An abdominal MRI, performed on 02/16/2023, demonstrated a dominant segment 7 lesion in the liver, with multiple other smaller subcentimeter lesions bilaterally.  She was evaluated at the [facility] on 02/20/2023 by Dr. Miguelito Truong, with recommendations for HLA testing and possible tebentafusp if appropriate or combination checkpoint blockade.  She was found to be HLA A*02:01 positive and initiated therapy with tebentafusp on 03/12/2023, and she has now completed her first three inpatient doses, with her most recent dose administered on 03/26/2023.  She experienced mild rash and periorbital edema, with IV fluids administered for mild hypotension with her first dose, but otherwise tolerated therapy without difficulty.  On 04/17/2023, she underwent a new baseline chest and pelvis CT as well as a liver MRI.  The MRI demonstrated metastatic lesions affecting both lobes of the liver, with some worsening when compared with the prior scans from 02/16/2023 and with the largest lesion now measuring 4.5 x 3.5 cm in segment 7. She had repeat imaging done on 8/18/23 which showed interval increase in size of the hepatic segment 7 metastatic lesion and persistent numerous additional scattered small hemorrhagic liver metastases. 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  She underwent mapping on 9/7/23 and Y90 radioembolization on 9/21/23 with JEROMY Viera.   She continues on weekly tebentafusp.  Although she has typically developed low grade fever around 6 hours after infusion as well as treatment associated fatigue, she has not had these symptoms with her two most recent doses.   She has developed progressive vitiligo.  On 01/15/2024, she underwent a repeat liver MRI.  This study, when compared with a prior dated 11/17/2023, demonstrated decrease in the size of the dominant segment 7 lesion, with no significant change in the other small bilateral lesions.  She was evaluated by Dr. Miguelito Torres at [facility] on 01/22/2023.  The Delcath PHP procedure was discussed at length and the patient has opted to not proceed.  She underwent a chest CT and abdominal MRI on 03/15/38260.  The CT study, when compared with a prior dated 01/25/2024, demonstrated no significant change with no overt evidence for progressive disease.  The MRI, when compared with a prior dated 01/15/2024, demonstrated interval decrease in the size of the hepatic segment 7 lesion. There is an increase in size and number of the small liver lesions.  On 3/25/2024, patient underwent right hepatic intra-arterial administration of leukine and lipiodol with a gelfoam slurry embolization of the right hepatic artery and more recently underwent immuno embolization of the left on 04/19/2024. She received her last dose of tebentafusp on 04/30/2024.  Due to continued disease progression on MRI performed on 05/01/2024 as determined by review at our uveal melanoma tumor board (despite the reported stability on the formal read) as well as her planned travel schedule, her therapy was changed to radioembolization alone. Patient is s/p Y90 (right) on 5/17/2024 andY90 to the left lobe (06/13/2024). She experienced pain and fatigue with her most recent treatment, now resolved.  She underwent a followup MRI Abdomen on 8/09/2024.  Although the formal report states there is overall stable disease, on our review at our uveal melanoma tumor board, there appear be a few lesions on liver that appear slightly larger, with new lesions observed. Patient's case discussed at Uveal Melanoma tumor board; the consensus was for patient to have radiation to her sacral lesion and initiate patient on systemic ipi/nivo. She was also evaluated by the team at [facility] with recommendations for TACE.  Last Friday, patient received Ipi/Nivo. She reports that overall, she is feeling well with the exception of some "itchiness".  Patient will be going to [place] tomorrow to discuss her TACE for her liver lesion.  Next Infusion, Friday, September 17th, pending appointment.   PAST MEDICAL HISTORY: None  SOCIAL HISTORY: The patient has moved from [place] and is now living in [place].  Her partner currently resides in the [place] but is considering relocating, currently here in [place].  She works in the [employer].  FAMILY HISTORY: Her maternal grandfather had mucosal melanoma arising from the stomach.  [de-identified] : Patient is here for a follow up visit; last seen via telehealth 9/3/2024.  Labs last week revealed abnormal LFTs. She reports that overall, she is feeling well. She had intermittent "red dots" on her skin which are itchy, but they dissipate quickly with use of antihistamines.  In addition, patient stated that she went to [place] last week and reports that she most likely will be doing PHP at the end of October.  She also inquired about doing a MRI Brain at [facility], as this step is necessary prior to doing PHP.

## 2024-09-10 NOTE — PHYSICAL EXAM
[Fully active, able to carry on all pre-disease performance without restriction] : Status 0 - Fully active, able to carry on all pre-disease performance without restriction [Normal] : affect appropriate [de-identified] : Alert and Oriented x 3 [de-identified] : No increased work of breathing.

## 2024-09-10 NOTE — REASON FOR VISIT
[Follow-Up Visit] : a follow-up [Medical Office: ([facility])___] : at the medical office located in  [Patient] : the patient [Other Location: e.g. School (Enter Location, City,State)___] : at [unfilled], at the time of the visit.

## 2024-09-10 NOTE — ASSESSMENT
[FreeTextEntry1] : This is a 44 year old HLA  positive female now with an M1b, stage IV, uveal melanoma with liver involvement. She initiated therapy with tebentafusp at [facility] but then moved to [place]. Due to radiographic progression of a dominant site of disease, she underwent Y90 mapping on 9/7 and Y90 radioembolization on 09/21/23 with no issue. Based upon her most recent imaging studies, she has achieved a treatment effect in the dominant mass treated with Y90, with some progression elsewhere. She has now received therapy with immunoembolization x 2 administered concurrently with tebentafusp. Her therapy was changed to radioembolization and she is s/p Y90 to the right hepatic lobe on 5/17/24 and Y90 to the left hepatic lobe on 6/13/24, some slight abd pain after, but overall well tolerated. Due to disease progression, her therapy was changed to ipilimumab and nivolumab.  Plan: - Patient began Ipi/Niv on 8/30/2024, next cycle will be 9/17/2024 (appointment scheduled).  - Patient will have lab work drawn tomorrow. If LFTs continue to trend up, we will start patient on steroids.   - Patient will be seeing Dr. Henderson this week on 9/13/2024 .  - ECHO scheduled for tomorrow.  - Imaging of Chest, Abdomen, Pelvis and Brain to be performed on 10/4/2024 at [facility] location (appointments pending, however, coordinator, Kar mattson).

## 2024-09-12 ENCOUNTER — OUTPATIENT (OUTPATIENT)
Dept: OUTPATIENT SERVICES | Facility: HOSPITAL | Age: 44
LOS: 1 days | End: 2024-09-12
Payer: COMMERCIAL

## 2024-09-12 ENCOUNTER — RESULT REVIEW (OUTPATIENT)
Age: 44
End: 2024-09-12

## 2024-09-12 DIAGNOSIS — C69.40 MALIGNANT NEOPLASM OF UNSPECIFIED CILIARY BODY: ICD-10-CM

## 2024-09-12 PROCEDURE — 93306 TTE W/DOPPLER COMPLETE: CPT | Mod: 26

## 2024-09-12 PROCEDURE — 93306 TTE W/DOPPLER COMPLETE: CPT

## 2024-09-13 ENCOUNTER — NON-APPOINTMENT (OUTPATIENT)
Age: 44
End: 2024-09-13

## 2024-09-13 ENCOUNTER — APPOINTMENT (OUTPATIENT)
Dept: RADIATION ONCOLOGY | Facility: CLINIC | Age: 44
End: 2024-09-13
Payer: COMMERCIAL

## 2024-09-13 VITALS
HEART RATE: 87 BPM | SYSTOLIC BLOOD PRESSURE: 116 MMHG | BODY MASS INDEX: 23.07 KG/M2 | OXYGEN SATURATION: 98 % | TEMPERATURE: 98.3 F | DIASTOLIC BLOOD PRESSURE: 78 MMHG | WEIGHT: 151.7 LBS

## 2024-09-13 PROCEDURE — G2211 COMPLEX E/M VISIT ADD ON: CPT | Mod: NC

## 2024-09-13 PROCEDURE — 99205 OFFICE O/P NEW HI 60 MIN: CPT

## 2024-09-16 ENCOUNTER — NON-APPOINTMENT (OUTPATIENT)
Age: 44
End: 2024-09-16

## 2024-09-16 ENCOUNTER — APPOINTMENT (OUTPATIENT)
Dept: HEMATOLOGY ONCOLOGY | Facility: CLINIC | Age: 44
End: 2024-09-16

## 2024-09-16 DIAGNOSIS — C69.40 MALIGNANT NEOPLASM OF UNSPECIFIED CILIARY BODY: ICD-10-CM

## 2024-09-16 DIAGNOSIS — C78.7 MALIGNANT NEOPLASM OF UNSPECIFIED CILIARY BODY: ICD-10-CM

## 2024-09-16 LAB
ALBUMIN SERPL ELPH-MCNC: 3.8 G/DL
ALP BLD-CCNC: 143 U/L
ALT SERPL-CCNC: 41 U/L
ANION GAP SERPL CALC-SCNC: 4 MMOL/L
AST SERPL-CCNC: 40 U/L
BILIRUB SERPL-MCNC: 0.8 MG/DL
BUN SERPL-MCNC: 9 MG/DL
CALCIUM SERPL-MCNC: 9.8 MG/DL
CHLORIDE SERPL-SCNC: 103 MMOL/L
CO2 SERPL-SCNC: 28 MMOL/L
CREAT SERPL-MCNC: 0.7 MG/DL
EGFR: 108 ML/MIN/1.73M2
GLUCOSE SERPL-MCNC: 133 MG/DL
HCT VFR BLD CALC: 42.3 %
HGB BLD-MCNC: 14.5 G/DL
LDH SERPL-CCNC: 195 U/L
LYMPHOCYTES # BLD AUTO: 1.5 K/UL
LYMPHOCYTES NFR BLD AUTO: 19.1 %
MAN DIFF?: NO
MCHC RBC-ENTMCNC: 30.6 PG
MCHC RBC-ENTMCNC: 34.3 GM/DL
MCV RBC AUTO: 89.2 FL
NEUTROPHILS # BLD AUTO: 5.8 K/UL
NEUTROPHILS NFR BLD AUTO: 71.1 %
PLATELET # BLD AUTO: 240 K/UL
POTASSIUM SERPL-SCNC: 4.8 MMOL/L
PROT SERPL-MCNC: 7.5 G/DL
RBC # BLD: 4.74 M/UL
RBC # FLD: 12.8 %
SODIUM SERPL-SCNC: 135 MMOL/L
TSH SERPL-ACNC: 1.39 UIU/ML
WBC # FLD AUTO: 8.1 K/UL

## 2024-09-16 NOTE — REASON FOR VISIT
[Follow-Up Visit] : a follow-up [Other Location: e.g. School (Enter Location, City,State)___] : at [unfilled], at the time of the visit. [Medical Office: ([facility])___] : at the medical office located in  [Patient] : the patient

## 2024-09-16 NOTE — REASON FOR VISIT
[Follow-Up Visit] : a follow-up [Other Location: e.g. School (Enter Location, City,State)___] : at [unfilled], at the time of the visit. [Medical Office: ([facility])___] : at the medical office located in  [Patient] : the patient

## 2024-09-16 NOTE — REASON FOR VISIT
[Follow-Up Visit] : a follow-up [Other Location: e.g. School (Enter Location, City,State)___] : at [unfilled], at the time of the visit. [Medical Office: ([facility])___] : at the medical office located in  [Patient] : the patient

## 2024-09-17 ENCOUNTER — APPOINTMENT (OUTPATIENT)
Dept: INFUSION THERAPY | Facility: CLINIC | Age: 44
End: 2024-09-17

## 2024-09-17 ENCOUNTER — OUTPATIENT (OUTPATIENT)
Dept: OUTPATIENT SERVICES | Facility: HOSPITAL | Age: 44
LOS: 1 days | End: 2024-09-17
Payer: COMMERCIAL

## 2024-09-17 ENCOUNTER — NON-APPOINTMENT (OUTPATIENT)
Age: 44
End: 2024-09-17

## 2024-09-17 ENCOUNTER — APPOINTMENT (OUTPATIENT)
Dept: HEMATOLOGY ONCOLOGY | Facility: CLINIC | Age: 44
End: 2024-09-17
Payer: COMMERCIAL

## 2024-09-17 VITALS
HEART RATE: 76 BPM | RESPIRATION RATE: 18 BRPM | OXYGEN SATURATION: 97 % | TEMPERATURE: 98 F | DIASTOLIC BLOOD PRESSURE: 80 MMHG | SYSTOLIC BLOOD PRESSURE: 124 MMHG

## 2024-09-17 VITALS
HEART RATE: 100 BPM | WEIGHT: 149.03 LBS | HEIGHT: 67 IN | RESPIRATION RATE: 18 BRPM | OXYGEN SATURATION: 97 % | TEMPERATURE: 98 F | DIASTOLIC BLOOD PRESSURE: 80 MMHG | SYSTOLIC BLOOD PRESSURE: 120 MMHG

## 2024-09-17 VITALS
BODY MASS INDEX: 22.58 KG/M2 | RESPIRATION RATE: 18 BRPM | HEART RATE: 100 BPM | SYSTOLIC BLOOD PRESSURE: 120 MMHG | OXYGEN SATURATION: 97 % | HEIGHT: 68 IN | WEIGHT: 149 LBS | TEMPERATURE: 98 F | DIASTOLIC BLOOD PRESSURE: 80 MMHG

## 2024-09-17 DIAGNOSIS — C69.90 MALIGNANT NEOPLASM OF UNSPECIFIED SITE OF UNSPECIFIED EYE: ICD-10-CM

## 2024-09-17 LAB — TSH SERPL-ACNC: 0.9 UIU/ML

## 2024-09-17 PROCEDURE — G2211 COMPLEX E/M VISIT ADD ON: CPT | Mod: NC

## 2024-09-17 PROCEDURE — 96417 CHEMO IV INFUS EACH ADDL SEQ: CPT

## 2024-09-17 PROCEDURE — 96413 CHEMO IV INFUSION 1 HR: CPT

## 2024-09-17 PROCEDURE — 99214 OFFICE O/P EST MOD 30 MIN: CPT

## 2024-09-17 RX ORDER — NIVOLUMAB 10 MG/ML
67 INJECTION INTRAVENOUS ONCE
Refills: 0 | Status: COMPLETED | OUTPATIENT
Start: 2024-09-17 | End: 2024-09-17

## 2024-09-17 RX ORDER — IPILIMUMAB 5 MG/ML
200 INJECTION INTRAVENOUS ONCE
Refills: 0 | Status: COMPLETED | OUTPATIENT
Start: 2024-09-17 | End: 2024-09-17

## 2024-09-17 RX ADMIN — IPILIMUMAB 200 MILLIGRAM(S): 5 INJECTION INTRAVENOUS at 16:39

## 2024-09-17 RX ADMIN — NIVOLUMAB 67 MILLIGRAM(S): 10 INJECTION INTRAVENOUS at 16:41

## 2024-09-17 RX ADMIN — NIVOLUMAB 67 MILLIGRAM(S): 10 INJECTION INTRAVENOUS at 17:11

## 2024-09-17 RX ADMIN — IPILIMUMAB 200 MILLIGRAM(S): 5 INJECTION INTRAVENOUS at 16:09

## 2024-09-18 NOTE — HISTORY OF PRESENT ILLNESS
[FreeTextEntry1] : Layla Hoff is a 43 yo F with Stage IV (M1) uveal melanoma metastatic to the liver s/p Y 90 radioembolization and tebentafusp, most recently on ipilubinab and nivolubinab (1st dose 8/30/24), and sacral spine who is referred by Dr. Ruby for discussion of radiation to the spine.   9/13/24: Consultation  Ms. Hoff presents for discussion of radiation. History of brachytherapy to the Right eye. No history of PPM/ICD, or connective tissue disorders. She lives with her partner and is independent in ADLs. No c/o pain at this tiime.  History of Present Illness  ___________________________________  Layla Hoff is a 43 yo F with pmh primary uveal melanoma treated with Ru-106 brachytherapy (1/30/18-2-6-18) now metastatic to the liver s/p Y90 radioembolization and tebentafusp, most recently on ipilubinab and nivolubinab.    Briefly, she initially presented in January 2018 with vision changes in her right eye, with later loss of temporal and upper visual field vision. She was evaluated by Dr. 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 She was treated with Ru-106 brachytherapy from 01/30/2018 until 02/06/2018. She was subsequently placed on expectant observation for systemic recurrence.   She did well until, on 01/29/2023, she underwent an abdominal ultrasound which was significant for a new focal liver lesion measuring 3 cm. A subsequent chest, abdominal and pelvic CT scan, performed on 01/30/2023, demonstrated a segment 7 liver lesion with arterial enhancement and other subcentimeter hepatic lesions measuring up to 4 mm of unclear etiology. On 02/01/2023, she underwent a liver biopsy, with pathology consistent with melanoma. Immunohistochemistry was positive for SOX10 and MART1. An abdominal MRI, performed on 02/16/2023, demonstrated a dominant segment 7 lesion in the liver, with multiple other smaller subcentimeter lesions bilaterally.   She was evaluated at the [facility] on 02/20/2023 by Dr. Miguelito Truong, with recommendations for HLA testing and possible tebentafusp if appropriate or combination checkpoint blockade. She was found to be HLA A*02:01 positive and initiated therapy with tebentafusp on 03/12/2023, and she has now completed her first three inpatient doses, with her most recent dose administered on 03/26/2023. She experienced mild rash and periorbital edema, with IV fluids administered for mild hypotension with her first dose, but otherwise tolerated therapy without difficulty.   On 04/17/2023, she underwent a new baseline chest and pelvis CT as well as a liver MRI. The MRI demonstrated metastatic lesions affecting both lobes of the liver, with some worsening when compared with the prior scans from 02/16/2023 and with the largest lesion now measuring 4.5 x 3.5 cm in segment 7. She had repeat imaging done on 8/18/23 which showed interval increase in size of the hepatic segment 7 metastatic lesion and persistent numerous additional scattered small hemorrhagic liver metastases. 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 She underwent mapping on 9/7/23 and Y90 radioembolization on 9/21/23 with IR Gustafson Maximiliano.   She continues on weekly tebentafusp. Although she has typically developed low grade fever around 6 hours after infusion as well as treatment associated fatigue, she has not had these symptoms with her two most recent doses. She has developed progressive vitiligo.   On 01/15/2024, she underwent a repeat liver MRI. This study, when compared with a prior dated 11/17/2023, demonstrated decrease in the size of the dominant segment 7 lesion, with no significant change in the other small bilateral lesions. She was evaluated by Dr. Miguelito Torres at [facility] on 01/22/2023. The Delcath PHP procedure was discussed at length and the patient has opted to not proceed.   She underwent a chest CT and abdominal MRI on 03/15/48572. The CT study, when compared with a prior dated 01/25/2024, demonstrated no significant change with no overt evidence for progressive disease. The MRI, when compared with a prior dated 01/15/2024, demonstrated interval decrease in the size of the hepatic segment 7 lesion. There is an increase in size and number of the small liver lesions.   On 3/25/2024, patient underwent right hepatic intra-arterial administration of leukine and lipiodol with a gelfoam slurry embolization of the right hepatic artery and more recently underwent immuno embolization of the left on 04/19/2024. She received her last dose of tebentafusp on 04/30/2024. Due to continued disease progression on MRI performed on 05/01/2024 as determined by review at our uveal melanoma tumor board (despite the reported stability on the formal read) as well as her planned travel schedule, her therapy was changed to radioembolization alone. Patient is s/p Y90 (right) on 5/17/2024 andY90 to the left lobe (06/13/2024). She experienced pain and fatigue with her most recent treatment, now resolved.   She underwent a followup MRI Abdomen on 8/09/2024. Although the formal report states there is overall stable disease, on our review at our uveal melanoma tumor board, there appear be a few lesions on liver that appear slightly larger, with new lesions observed. Patient's case discussed at Uveal Melanoma tumor board; the consensus was for patient to have radiation to her sacral lesion and initiate patient on systemic ipi/nivo. She was also evaluated by the team at [facility] with recommendations for TACE.   Last Friday, patient received Ipi/Nivo. She reports that overall, she is feeling well with the exception of some "itchiness".  Patient will be going to [place] tomorrow to discuss her TACE for her liver lesion.  Next Infusion, Friday, September 17th, pending appointment.   8/9/24: MR abdomen and pelvis Mixed response. Dominant liver metastasis in segment 7 intervally demonstrates decreased vascularity and decreased restricted diffusion. Numerous additional small liver metastases have not significantly changed. Bony metastasis at left sacrum.

## 2024-09-18 NOTE — VITALS
[Date: ____________] : Patient's last distress assessment performed on [unfilled]. [Maximal Pain Intensity: 0/10] : 0/10 [Least Pain Intensity: 0/10] : 0/10 [90: Able to carry normal activity; minor signs or symptoms of disease.] : 90: Able to carry normal activity; minor signs or symptoms of disease.  [ECOG Performance Status: 1 - Restricted in physically strenuous activity but ambulatory and able to carry out work of a light or sedentary nature] : Performance Status: 1 - Restricted in physically strenuous activity but ambulatory and able to carry out work of a light or sedentary nature, e.g., light house work, office work [0 - No Distress] : Distress Level: 0

## 2024-09-18 NOTE — HISTORY OF PRESENT ILLNESS
[FreeTextEntry1] : Layla Hoff is a 43 yo F with Stage IV (M1) uveal melanoma metastatic to the liver s/p Y 90 radioembolization and tebentafusp, most recently on ipilubinab and nivolubinab (1st dose 8/30/24), and sacral spine who is referred by Dr. Ruby for discussion of radiation to the spine.   9/13/24: Consultation  Ms. Hoff presents for discussion of radiation. History of brachytherapy to the Right eye. No history of PPM/ICD, or connective tissue disorders. She lives with her partner and is independent in ADLs. No c/o pain at this tiime.  History of Present Illness  ___________________________________  Layla Hoff is a 43 yo F with pmh primary uveal melanoma treated with Ru-106 brachytherapy (1/30/18-2-6-18) now metastatic to the liver s/p Y90 radioembolization and tebentafusp, most recently on ipilubinab and nivolubinab.    Briefly, she initially presented in January 2018 with vision changes in her right eye, with later loss of temporal and upper visual field vision. She was evaluated by Dr. Konstantin Barros at the [facility] and was found to have a subretinal mass in her right eye with a greatest basal diameter of 14.43 mm and a maximal height of 7 mm. 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 She was treated with Ru-106 brachytherapy from 01/30/2018 until 02/06/2018. She was subsequently placed on expectant observation for systemic recurrence.   She did well until, on 01/29/2023, she underwent an abdominal ultrasound which was significant for a new focal liver lesion measuring 3 cm. A subsequent chest, abdominal and pelvic CT scan, performed on 01/30/2023, demonstrated a segment 7 liver lesion with arterial enhancement and other subcentimeter hepatic lesions measuring up to 4 mm of unclear etiology. On 02/01/2023, she underwent a liver biopsy, with pathology consistent with melanoma. Immunohistochemistry was positive for SOX10 and MART1. An abdominal MRI, performed on 02/16/2023, demonstrated a dominant segment 7 lesion in the liver, with multiple other smaller subcentimeter lesions bilaterally.   She was evaluated at the [facility] on 02/20/2023 by Dr. Miguelito Truong, with recommendations for HLA testing and possible tebentafusp if appropriate or combination checkpoint blockade. She was found to be HLA A*02:01 positive and initiated therapy with tebentafusp on 03/12/2023, and she has now completed her first three inpatient doses, with her most recent dose administered on 03/26/2023. She experienced mild rash and periorbital edema, with IV fluids administered for mild hypotension with her first dose, but otherwise tolerated therapy without difficulty.   On 04/17/2023, she underwent a new baseline chest and pelvis CT as well as a liver MRI. The MRI demonstrated metastatic lesions affecting both lobes of the liver, with some worsening when compared with the prior scans from 02/16/2023 and with the largest lesion now measuring 4.5 x 3.5 cm in segment 7. She had repeat imaging done on 8/18/23 which showed interval increase in size of the hepatic segment 7 metastatic lesion and persistent numerous additional scattered small hemorrhagic liver metastases. 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 She underwent mapping on 9/7/23 and Y90 radioembolization on 9/21/23 with IR Gustafson Maximiliano.   She continues on weekly tebentafusp. Although she has typically developed low grade fever around 6 hours after infusion as well as treatment associated fatigue, she has not had these symptoms with her two most recent doses. She has developed progressive vitiligo.   On 01/15/2024, she underwent a repeat liver MRI. This study, when compared with a prior dated 11/17/2023, demonstrated decrease in the size of the dominant segment 7 lesion, with no significant change in the other small bilateral lesions. She was evaluated by Dr. Miguelito Torres at [facility] on 01/22/2023. The Delcath PHP procedure was discussed at length and the patient has opted to not proceed.   She underwent a chest CT and abdominal MRI on 03/15/75211. The CT study, when compared with a prior dated 01/25/2024, demonstrated no significant change with no overt evidence for progressive disease. The MRI, when compared with a prior dated 01/15/2024, demonstrated interval decrease in the size of the hepatic segment 7 lesion. There is an increase in size and number of the small liver lesions.   On 3/25/2024, patient underwent right hepatic intra-arterial administration of leukine and lipiodol with a gelfoam slurry embolization of the right hepatic artery and more recently underwent immuno embolization of the left on 04/19/2024. She received her last dose of tebentafusp on 04/30/2024. Due to continued disease progression on MRI performed on 05/01/2024 as determined by review at our uveal melanoma tumor board (despite the reported stability on the formal read) as well as her planned travel schedule, her therapy was changed to radioembolization alone. Patient is s/p Y90 (right) on 5/17/2024 andY90 to the left lobe (06/13/2024). She experienced pain and fatigue with her most recent treatment, now resolved.   She underwent a followup MRI Abdomen on 8/09/2024. Although the formal report states there is overall stable disease, on our review at our uveal melanoma tumor board, there appear be a few lesions on liver that appear slightly larger, with new lesions observed. Patient's case discussed at Uveal Melanoma tumor board; the consensus was for patient to have radiation to her sacral lesion and initiate patient on systemic ipi/nivo. She was also evaluated by the team at [facility] with recommendations for TACE.   Last Friday, patient received Ipi/Nivo. She reports that overall, she is feeling well with the exception of some "itchiness".  Patient will be going to [place] tomorrow to discuss her TACE for her liver lesion.  Next Infusion, Friday, September 17th, pending appointment.   8/9/24: MR abdomen and pelvis Mixed response. Dominant liver metastasis in segment 7 intervally demonstrates decreased vascularity and decreased restricted diffusion. Numerous additional small liver metastases have not significantly changed. Bony metastasis at left sacrum.

## 2024-09-18 NOTE — OB/GYN HISTORY
[Definite:  ___ (Date)] : the last menstrual period was [unfilled] [Currently In Menopause] : not currently in menopause

## 2024-09-18 NOTE — HISTORY OF PRESENT ILLNESS
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 Ruby for discussion of radiation to the spine.   9/13/24: Consultation  Ms. Hoff presents for discussion of radiation. History of brachytherapy to the Right eye. No history of PPM/ICD, or connective tissue disorders. She lives with her partner and is independent in ADLs. No c/o pain at this tiime.  History of Present Illness  ___________________________________  Layla Hoff is a 45 yo F with pmh primary uveal melanoma treated with Ru-106 brachytherapy (1/30/18-2-6-18) now metastatic to the liver s/p Y90 radioembolization and tebentafusp, most recently on ipilubinab and nivolubinab.    Briefly, she initially presented in January 2018 with vision changes in her right eye, with later loss of temporal and upper visual field vision. She was evaluated by Dr. 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 She was treated with Ru-106 brachytherapy from 01/30/2018 until 02/06/2018. She was subsequently placed on expectant observation for systemic recurrence.   She did well until, on 01/29/2023, she underwent an abdominal ultrasound which was significant for a new focal liver lesion measuring 3 cm. A subsequent chest, abdominal and pelvic CT scan, performed on 01/30/2023, demonstrated a segment 7 liver lesion with arterial enhancement and other subcentimeter hepatic lesions measuring up to 4 mm of unclear etiology. On 02/01/2023, she underwent a liver biopsy, with pathology consistent with melanoma. Immunohistochemistry was positive for SOX10 and MART1. An abdominal MRI, performed on 02/16/2023, demonstrated a dominant segment 7 lesion in the liver, with multiple other smaller subcentimeter lesions bilaterally.   She was evaluated at the [facility] on 02/20/2023 by Dr. Miguelito Truong, with recommendations for HLA testing and possible tebentafusp if appropriate or combination checkpoint blockade. She was found to be HLA A*02:01 positive and initiated therapy with tebentafusp on 03/12/2023, and she has now completed her first three inpatient doses, with her most recent dose administered on 03/26/2023. She experienced mild rash and periorbital edema, with IV fluids administered for mild hypotension with her first dose, but otherwise tolerated therapy without difficulty.   On 04/17/2023, she underwent a new baseline chest and pelvis CT as well as a liver MRI. The MRI demonstrated metastatic lesions affecting both lobes of the liver, with some worsening when compared with the prior scans from 02/16/2023 and with the largest lesion now measuring 4.5 x 3.5 cm in segment 7. She had repeat imaging done on 8/18/23 which showed interval increase in size of the hepatic segment 7 metastatic lesion and persistent numerous additional scattered small hemorrhagic liver metastases. 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 She underwent mapping on 9/7/23 and Y90 radioembolization on 9/21/23 with IR Gustafson Maximiliano.   She continues on weekly tebentafusp. Although she has typically developed low grade fever around 6 hours after infusion as well as treatment associated fatigue, she has not had these symptoms with her two most recent doses. She has developed progressive vitiligo.   On 01/15/2024, she underwent a repeat liver MRI. This study, when compared with a prior dated 11/17/2023, demonstrated decrease in the size of the dominant segment 7 lesion, with no significant change in the other small bilateral lesions. She was evaluated by Dr. Miguelito Torres at [facility] on 01/22/2023. The Delcath PHP procedure was discussed at length and the patient has opted to not proceed.   She underwent a chest CT and abdominal MRI on 03/15/07358. The CT study, when compared with a prior dated 01/25/2024, demonstrated no significant change with no overt evidence for progressive disease. The MRI, when compared with a prior dated 01/15/2024, demonstrated interval decrease in the size of the hepatic segment 7 lesion. There is an increase in size and number of the small liver lesions.   On 3/25/2024, patient underwent right hepatic intra-arterial administration of leukine and lipiodol with a gelfoam slurry embolization of the right hepatic artery and more recently underwent immuno embolization of the left on 04/19/2024. She received her last dose of tebentafusp on 04/30/2024. Due to continued disease progression on MRI performed on 05/01/2024 as determined by review at our uveal melanoma tumor board (despite the reported stability on the formal read) as well as her planned travel schedule, her therapy was changed to radioembolization alone. Patient is s/p Y90 (right) on 5/17/2024 andY90 to the left lobe (06/13/2024). She experienced pain and fatigue with her most recent treatment, now resolved.   She underwent a followup MRI Abdomen on 8/09/2024. Although the formal report states there is overall stable disease, on our review at our uveal melanoma tumor board, there appear be a few lesions on liver that appear slightly larger, with new lesions observed. Patient's case discussed at Uveal Melanoma tumor board; the consensus was for patient to have radiation to her sacral lesion and initiate patient on systemic ipi/nivo. She was also evaluated by the team at [facility] with recommendations for TACE.   Last Friday, patient received Ipi/Nivo. She reports that overall, she is feeling well with the exception of some "itchiness".  Patient will be going to [place] tomorrow to discuss her TACE for her liver lesion.  Next Infusion, Friday, September 17th, pending appointment.   8/9/24: MR abdomen and pelvis Mixed response. Dominant liver metastasis in segment 7 intervally demonstrates decreased vascularity and decreased restricted diffusion. Numerous additional small liver metastases have not significantly changed. Bony metastasis at left sacrum.

## 2024-09-19 NOTE — REVIEW OF SYSTEMS
[Diarrhea: Grade 0] : Diarrhea: Grade 0 [Negative] : Allergic/Immunologic [FreeTextEntry2] : See interval history. [de-identified] : Intermittent pruritus

## 2024-09-19 NOTE — HISTORY OF PRESENT ILLNESS
[Disease: _____________________] : Disease: [unfilled] [M: ___] : M[unfilled] [AJCC Stage: ____] : AJCC Stage: [unfilled] [de-identified] : Patient is here for a follow up visit; last seen via telehealth 910/2024. Patient reports intermittent pruritus and states that she takes allergy medication when this occurs. She also reports watery eyes as well intermittently.  Lab work last week abnormal LFTs, but they since have trended down.  On 9/12/2024, patient underwent ECHO. The only abnormal finding was trace pulmonic regurgitation.  The following day, patient saw Dr. Henderson. Dr. Henderson stated that he would like to see the next set of scans prior to doing radiation.  Patient reports that she is taking Chinese medicines.   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 [facility] CDx (11/27/23) - 3 Muts/Mb, MSI-high not detected, ctDNA tumor fraction low (<1%).  CURRENT THERAPY: Ipi/Nivo (Cycle 1: 08/30/2024; C2:  09/17/2024)  PRIOR THERAPIES: 03/12/2023-4/30/24 Tebentafusp  9/21/23 Y90 radioembolization of liver Immunoembolization (#1, right 03/25/2024; #2, left 04/19/2024) Y90 radioembolization (Right:5/17/24) Radioembolization (Right: 05/17/2024; Left: 06/13/2024)  INTERVAL HISTORY: Mrs. Hoff is a 44 year-old female with a history of primary uveal melanoma, now with newly diagnosed metastatic disease with liver only involvement who presents for continued management of her disease.  She is followed by Dr. Hernan Osborn.  She is HLA  positive. Please see my prior notes for her complex history.  Briefly, she initially presented in January 2018 with vision changes in her right eye, with later loss of temporal and upper visual field vision.  She was evaluated by Dr. Konstantin Barros at the [facility] and was found to have a subretinal mass in her right eye with a greatest basal diameter of 14.43 mm and a maximal height of 7 mm.  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  She was treated with Ru-106 brachytherapy from 01/30/2018 until 02/06/2018.  She was subsequently placed on expectant observation for systemic recurrence.  She did well until, on 01/29/2023, she underwent an abdominal ultrasound which was significant for a new focal liver lesion measuring 3 cm.  A subsequent chest, abdominal and pelvic CT scan, performed on 01/30/2023, demonstrated a segment 7 liver lesion with arterial enhancement and other subcentimeter hepatic lesions measuring up to 4 mm of unclear etiology.  On 02/01/2023, she underwent a liver biopsy, with pathology consistent with melanoma.  Immunohistochemistry was positive for SOX10 and MART1.  An abdominal MRI, performed on 02/16/2023, demonstrated a dominant segment 7 lesion in the liver, with multiple other smaller subcentimeter lesions bilaterally.  She was evaluated at the [facility] on 02/20/2023 by Dr. Miguelito Truong, with recommendations for HLA testing and possible tebentafusp if appropriate or combination checkpoint blockade.  She was found to be HLA A*02:01 positive and initiated therapy with tebentafusp on 03/12/2023, and she has now completed her first three inpatient doses, with her most recent dose administered on 03/26/2023.  She experienced mild rash and periorbital edema, with IV fluids administered for mild hypotension with her first dose, but otherwise tolerated therapy without difficulty.  On 04/17/2023, she underwent a new baseline chest and pelvis CT as well as a liver MRI.  The MRI demonstrated metastatic lesions affecting both lobes of the liver, with some worsening when compared with the prior scans from 02/16/2023 and with the largest lesion now measuring 4.5 x 3.5 cm in segment 7. She had repeat imaging done on 8/18/23 which showed interval increase in size of the hepatic segment 7 metastatic lesion and persistent numerous additional scattered small hemorrhagic liver metastases. 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  She underwent mapping on 9/7/23 and Y90 radioembolization on 9/21/23 with JEROMY Viera.   She continues on weekly tebentafusp.  Although she has typically developed low grade fever around 6 hours after infusion as well as treatment associated fatigue, she has not had these symptoms with her two most recent doses.   She has developed progressive vitiligo.  On 01/15/2024, she underwent a repeat liver MRI.  This study, when compared with a prior dated 11/17/2023, demonstrated decrease in the size of the dominant segment 7 lesion, with no significant change in the other small bilateral lesions.  She was evaluated by Dr. Miguelito Torres at [facility] on 01/22/2023.  The Delcath PHP procedure was discussed at length and the patient has opted to not proceed.  She underwent a chest CT and abdominal MRI on 03/15/88433.  The CT study, when compared with a prior dated 01/25/2024, demonstrated no significant change with no overt evidence for progressive disease.  The MRI, when compared with a prior dated 01/15/2024, demonstrated interval decrease in the size of the hepatic segment 7 lesion. There is an increase in size and number of the small liver lesions.  On 3/25/2024, patient underwent right hepatic intra-arterial administration of leukine and lipiodol with a gelfoam slurry embolization of the right hepatic artery and more recently underwent immuno embolization of the left on 04/19/2024. She received her last dose of tebentafusp on 04/30/2024.  Due to continued disease progression on MRI performed on 05/01/2024 as determined by review at our uveal melanoma tumor board (despite the reported stability on the formal read) as well as her planned travel schedule, her therapy was changed to radioembolization alone. Patient is s/p Y90 (right) on 5/17/2024 andY90 to the left lobe (06/13/2024). She experienced pain and fatigue with her most recent treatment, now resolved.  She underwent a followup MRI Abdomen on 8/09/2024.  Although the formal report states there is overall stable disease, on our review at our uveal melanoma tumor board, there appear be a few lesions on liver that appear slightly larger, with new lesions observed. Patient's case discussed at Uveal Melanoma tumor board; the consensus was for patient to have radiation to her sacral lesion and initiate patient on systemic ipi/nivo. She was also evaluated by the team at [facility] with recommendations for TACE versus PHP.  Patient is here for a follow up visit; last seen via telehealth 910/2024. Patient reports intermittent pruritus and states that she takes allergy medication when this occurs. She also reports watery eyes as well intermittently.  Lab work last week abnormal LFTs, but they since have trended down.  On 9/12/2024, patient underwent ECHO. The only abnormal finding was trace pulmonic regurgitation.  The following day, patient saw Dr. Henderson. Dr. Henderson stated that he would like to see the next set of scans prior to doing radiation.  Patient reports that she is taking Chinese medicines.  PAST MEDICAL HISTORY: None  SOCIAL HISTORY: The patient has moved from [facility] and is now living in [place].  Her partner currently resides in the [place] but is considering relocating, currently here in [place].  She works in the [employer].  FAMILY HISTORY: Her maternal grandfather had mucosal melanoma arising from the stomach.

## 2024-09-19 NOTE — HISTORY OF PRESENT ILLNESS
[Disease: _____________________] : Disease: [unfilled] [M: ___] : M[unfilled] [AJCC Stage: ____] : AJCC Stage: [unfilled] [de-identified] : Patient is here for a follow up visit; last seen via telehealth 910/2024. Patient reports intermittent pruritus and states that she takes allergy medication when this occurs. She also reports watery eyes as well intermittently.  Lab work last week abnormal LFTs, but they since have trended down.  On 9/12/2024, patient underwent ECHO. The only abnormal finding was trace pulmonic regurgitation.  The following day, patient saw Dr. Henderson. Dr. Henderson stated that he would like to see the next set of scans prior to doing radiation.  Patient reports that she is taking Chinese medicines.   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 [facility]One Liquid CDx (11/27/23) - 3 Muts/Mb, MSI-high not detected, ctDNA tumor fraction low (<1%).  CURRENT THERAPY: Ipi/Nivo (Cycle 1: 08/30/2024; C2:  09/17/2024)  PRIOR THERAPIES: 03/12/2023-4/30/24 Tebentafusp  9/21/23 Y90 radioembolization of liver Immunoembolization (#1, right 03/25/2024; #2, left 04/19/2024) Y90 radioembolization (Right:5/17/24) Radioembolization (Right: 05/17/2024; Left: 06/13/2024)  INTERVAL HISTORY: Mrs. Hoff is a 44 year-old female with a history of primary uveal melanoma, now with newly diagnosed metastatic disease with liver only involvement who presents for continued management of her disease.  She is followed by Dr. Hernan Osborn.  She is HLA  positive. Please see my prior notes for her complex history.  Briefly, she initially presented in January 2018 with vision changes in her right eye, with later loss of temporal and upper visual field vision.  She was evaluated by Dr. 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  She was treated with Ru-106 brachytherapy from 01/30/2018 until 02/06/2018.  She was subsequently placed on expectant observation for systemic recurrence.  She did well until, on 01/29/2023, she underwent an abdominal ultrasound which was significant for a new focal liver lesion measuring 3 cm.  A subsequent chest, abdominal and pelvic CT scan, performed on 01/30/2023, demonstrated a segment 7 liver lesion with arterial enhancement and other subcentimeter hepatic lesions measuring up to 4 mm of unclear etiology.  On 02/01/2023, she underwent a liver biopsy, with pathology consistent with melanoma.  Immunohistochemistry was positive for SOX10 and MART1.  An abdominal MRI, performed on 02/16/2023, demonstrated a dominant segment 7 lesion in the liver, with multiple other smaller subcentimeter lesions bilaterally.  She was evaluated at the [facility] on 02/20/2023 by Dr. Miguelito Truong, with recommendations for HLA testing and possible tebentafusp if appropriate or combination checkpoint blockade.  She was found to be HLA A*02:01 positive and initiated therapy with tebentafusp on 03/12/2023, and she has now completed her first three inpatient doses, with her most recent dose administered on 03/26/2023.  She experienced mild rash and periorbital edema, with IV fluids administered for mild hypotension with her first dose, but otherwise tolerated therapy without difficulty.  On 04/17/2023, she underwent a new baseline chest and pelvis CT as well as a liver MRI.  The MRI demonstrated metastatic lesions affecting both lobes of the liver, with some worsening when compared with the prior scans from 02/16/2023 and with the largest lesion now measuring 4.5 x 3.5 cm in segment 7. She had repeat imaging done on 8/18/23 which showed interval increase in size of the hepatic segment 7 metastatic lesion and persistent numerous additional scattered small hemorrhagic liver metastases. 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  She underwent mapping on 9/7/23 and Y90 radioembolization on 9/21/23 with JEROMY Viera.   She continues on weekly tebentafusp.  Although she has typically developed low grade fever around 6 hours after infusion as well as treatment associated fatigue, she has not had these symptoms with her two most recent doses.   She has developed progressive vitiligo.  On 01/15/2024, she underwent a repeat liver MRI.  This study, when compared with a prior dated 11/17/2023, demonstrated decrease in the size of the dominant segment 7 lesion, with no significant change in the other small bilateral lesions.  She was evaluated by Dr. Miguelito Trores at [facility] on 01/22/2023.  The Delcath PHP procedure was discussed at length and the patient has opted to not proceed.  She underwent a chest CT and abdominal MRI on 03/15/72291.  The CT study, when compared with a prior dated 01/25/2024, demonstrated no significant change with no overt evidence for progressive disease.  The MRI, when compared with a prior dated 01/15/2024, demonstrated interval decrease in the size of the hepatic segment 7 lesion. There is an increase in size and number of the small liver lesions.  On 3/25/2024, patient underwent right hepatic intra-arterial administration of leukine and lipiodol with a gelfoam slurry embolization of the right hepatic artery and more recently underwent immuno embolization of the left on 04/19/2024. She received her last dose of tebentafusp on 04/30/2024.  Due to continued disease progression on MRI performed on 05/01/2024 as determined by review at our uveal melanoma tumor board (despite the reported stability on the formal read) as well as her planned travel schedule, her therapy was changed to radioembolization alone. Patient is s/p Y90 (right) on 5/17/2024 andY90 to the left lobe (06/13/2024). She experienced pain and fatigue with her most recent treatment, now resolved.  She underwent a followup MRI Abdomen on 8/09/2024.  Although the formal report states there is overall stable disease, on our review at our uveal melanoma tumor board, there appear be a few lesions on liver that appear slightly larger, with new lesions observed. Patient's case discussed at Uveal Melanoma tumor board; the consensus was for patient to have radiation to her sacral lesion and initiate patient on systemic ipi/nivo. She was also evaluated by the team at [facility] with recommendations for TACE versus PHP.  Patient is here for a follow up visit; last seen via telehealth 910/2024. Patient reports intermittent pruritus and states that she takes allergy medication when this occurs. She also reports watery eyes as well intermittently.  Lab work last week abnormal LFTs, but they since have trended down.  On 9/12/2024, patient underwent ECHO. The only abnormal finding was trace pulmonic regurgitation.  The following day, patient saw Dr. Henderson. Dr. Henderson stated that he would like to see the next set of scans prior to doing radiation.  Patient reports that she is taking Chinese medicines.  PAST MEDICAL HISTORY: None  SOCIAL HISTORY: The patient has moved from [facility] and is now living in [place].  Her partner currently resides in the [place] but is considering relocating, currently here in [place].  She works in the [employer].  FAMILY HISTORY: Her maternal grandfather had mucosal melanoma arising from the stomach.

## 2024-09-19 NOTE — PHYSICAL EXAM
[Fully active, able to carry on all pre-disease performance without restriction] : Status 0 - Fully active, able to carry on all pre-disease performance without restriction [Normal] : grossly intact [de-identified] : No increased work of breathing. [de-identified] : Vitiligo [de-identified] : Alert and Oriented x 3

## 2024-09-19 NOTE — REVIEW OF SYSTEMS
[Diarrhea: Grade 0] : Diarrhea: Grade 0 [Negative] : Allergic/Immunologic [FreeTextEntry2] : See interval history. [de-identified] : Intermittent pruritus

## 2024-09-19 NOTE — ASSESSMENT
[FreeTextEntry1] : This is a 44 year old HLA  positive female now with an M1b, stage IV, uveal melanoma with liver involvement. She initiated therapy with tebentafusp at [facility] but then moved to [place]. Due to radiographic progression of a dominant site of disease, she underwent Y90 mapping on 9/7 and Y90 radioembolization on 09/21/23 with no issue. Based upon her most recent imaging studies, she has achieved a treatment effect in the dominant mass treated with Y90, with some progression elsewhere. She has now received therapy with immunoembolization x 2 administered concurrently with tebentafusp. Her therapy was changed to radioembolization and she is s/p Y90 to the right hepatic lobe on 5/17/24 and Y90 to the left hepatic lobe on 6/13/24, some slight abd pain after, but overall well tolerated. Due to disease progression, her therapy was changed to ipilimumab and nivolumab.  Plan: - Patient began Ipi/Niv on 8/30/2024, next cycle will be today, the following cycle will be 10/8/204 (appointment scheduled).  - Patient's LFTs were elevated but AST/ALT are WNL. ALK Phos trended down to 143.  - Imaging of Chest, Abdomen, Pelvis and Brain to be performed on 10/3/2024 at [facility] location (appointments scheduled).  - Potentially will do PHP at [place] this fall.  - Patient will also potentially undergo radiation to the sacral lesion. - We will recheck bloodwork, including LFTs, on Friday

## 2024-09-19 NOTE — HISTORY OF PRESENT ILLNESS
[Disease: _____________________] : Disease: [unfilled] [M: ___] : M[unfilled] [AJCC Stage: ____] : AJCC Stage: [unfilled] [de-identified] : Patient is here for a follow up visit; last seen via telehealth 910/2024. Patient reports intermittent pruritus and states that she takes allergy medication when this occurs. She also reports watery eyes as well intermittently.  Lab work last week abnormal LFTs, but they since have trended down.  On 9/12/2024, patient underwent ECHO. The only abnormal finding was trace pulmonic regurgitation.  The following day, patient saw Dr. Henderson. Dr. Henderson stated that he would like to see the next set of scans prior to doing radiation.  Patient reports that she is taking Chinese medicines.   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 FoundationOne Liquid CDx (11/27/23) - 3 Muts/Mb, MSI-high not detected, ctDNA tumor fraction low (<1%).  CURRENT THERAPY: Ipi/Nivo (Cycle 1: 08/30/2024; C2:  09/17/2024)  PRIOR THERAPIES: 03/12/2023-4/30/24 Tebentafusp  9/21/23 Y90 radioembolization of liver Immunoembolization (#1, right 03/25/2024; #2, left 04/19/2024) Y90 radioembolization (Right:5/17/24) Radioembolization (Right: 05/17/2024; Left: 06/13/2024)  INTERVAL HISTORY: Mrs. Hoff is a 44 year-old female with a history of primary uveal melanoma, now with newly diagnosed metastatic disease with liver only involvement who presents for continued management of her disease.  She is followed by Dr. Hernan Osborn.  She is HLA  positive. Please see my prior notes for her complex history.  Briefly, she initially presented in January 2018 with vision changes in her right eye, with later loss of temporal and upper visual field vision.  She was evaluated by Dr. 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  She was treated with Ru-106 brachytherapy from 01/30/2018 until 02/06/2018.  She was subsequently placed on expectant observation for systemic recurrence.  She did well until, on 01/29/2023, she underwent an abdominal ultrasound which was significant for a new focal liver lesion measuring 3 cm.  A subsequent chest, abdominal and pelvic CT scan, performed on 01/30/2023, demonstrated a segment 7 liver lesion with arterial enhancement and other subcentimeter hepatic lesions measuring up to 4 mm of unclear etiology.  On 02/01/2023, she underwent a liver biopsy, with pathology consistent with melanoma.  Immunohistochemistry was positive for SOX10 and MART1.  An abdominal MRI, performed on 02/16/2023, demonstrated a dominant segment 7 lesion in the liver, with multiple other smaller subcentimeter lesions bilaterally.  She was evaluated at the [facility] on 02/20/2023 by Dr. Miguelito Truong, with recommendations for HLA testing and possible tebentafusp if appropriate or combination checkpoint blockade.  She was found to be HLA A*02:01 positive and initiated therapy with tebentafusp on 03/12/2023, and she has now completed her first three inpatient doses, with her most recent dose administered on 03/26/2023.  She experienced mild rash and periorbital edema, with IV fluids administered for mild hypotension with her first dose, but otherwise tolerated therapy without difficulty.  On 04/17/2023, she underwent a new baseline chest and pelvis CT as well as a liver MRI.  The MRI demonstrated metastatic lesions affecting both lobes of the liver, with some worsening when compared with the prior scans from 02/16/2023 and with the largest lesion now measuring 4.5 x 3.5 cm in segment 7. She had repeat imaging done on 8/18/23 which showed interval increase in size of the hepatic segment 7 metastatic lesion and persistent numerous additional scattered small hemorrhagic liver metastases. 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  She underwent mapping on 9/7/23 and Y90 radioembolization on 9/21/23 with JEROMY Viera.   She continues on weekly tebentafusp.  Although she has typically developed low grade fever around 6 hours after infusion as well as treatment associated fatigue, she has not had these symptoms with her two most recent doses.   She has developed progressive vitiligo.  On 01/15/2024, she underwent a repeat liver MRI.  This study, when compared with a prior dated 11/17/2023, demonstrated decrease in the size of the dominant segment 7 lesion, with no significant change in the other small bilateral lesions.  She was evaluated by Dr. Miguelito Torres at [facility] on 01/22/2023.  The Delcath PHP procedure was discussed at length and the patient has opted to not proceed.  She underwent a chest CT and abdominal MRI on 03/15/98591.  The CT study, when compared with a prior dated 01/25/2024, demonstrated no significant change with no overt evidence for progressive disease.  The MRI, when compared with a prior dated 01/15/2024, demonstrated interval decrease in the size of the hepatic segment 7 lesion. There is an increase in size and number of the small liver lesions.  On 3/25/2024, patient underwent right hepatic intra-arterial administration of leukine and lipiodol with a gelfoam slurry embolization of the right hepatic artery and more recently underwent immuno embolization of the left on 04/19/2024. She received her last dose of tebentafusp on 04/30/2024.  Due to continued disease progression on MRI performed on 05/01/2024 as determined by review at our uveal melanoma tumor board (despite the reported stability on the formal read) as well as her planned travel schedule, her therapy was changed to radioembolization alone. Patient is s/p Y90 (right) on 5/17/2024 andY90 to the left lobe (06/13/2024). She experienced pain and fatigue with her most recent treatment, now resolved.  She underwent a followup MRI Abdomen on 8/09/2024.  Although the formal report states there is overall stable disease, on our review at our uveal melanoma tumor board, there appear be a few lesions on liver that appear slightly larger, with new lesions observed. Patient's case discussed at Uveal Melanoma tumor board; the consensus was for patient to have radiation to her sacral lesion and initiate patient on systemic ipi/nivo. She was also evaluated by the team at [facility] with recommendations for TACE versus PHP.  Patient is here for a follow up visit; last seen via telehealth 910/2024. Patient reports intermittent pruritus and states that she takes allergy medication when this occurs. She also reports watery eyes as well intermittently.  Lab work last week abnormal LFTs, but they since have trended down.  On 9/12/2024, patient underwent ECHO. The only abnormal finding was trace pulmonic regurgitation.  The following day, patient saw Dr. Henderson. Dr. Henderson stated that he would like to see the next set of scans prior to doing radiation.  Patient reports that she is taking Chinese medicines.  PAST MEDICAL HISTORY: None  SOCIAL HISTORY: The patient has moved from [facility] and is now living in [place].  Her partner currently resides in the [place] but is considering relocating, currently here in [place].  She works in the [employer].  FAMILY HISTORY: Her maternal grandfather had mucosal melanoma arising from the stomach.

## 2024-09-19 NOTE — PHYSICAL EXAM
[Fully active, able to carry on all pre-disease performance without restriction] : Status 0 - Fully active, able to carry on all pre-disease performance without restriction [Normal] : grossly intact [de-identified] : No increased work of breathing. [de-identified] : Vitiligo [de-identified] : Alert and Oriented x 3

## 2024-09-19 NOTE — PHYSICAL EXAM
[Fully active, able to carry on all pre-disease performance without restriction] : Status 0 - Fully active, able to carry on all pre-disease performance without restriction [Normal] : grossly intact [de-identified] : No increased work of breathing. [de-identified] : Vitiligo [de-identified] : Alert and Oriented x 3

## 2024-09-19 NOTE — REVIEW OF SYSTEMS
[Diarrhea: Grade 0] : Diarrhea: Grade 0 [Negative] : Allergic/Immunologic [FreeTextEntry2] : See interval history. [de-identified] : Intermittent pruritus

## 2024-09-19 NOTE — ASSESSMENT
[FreeTextEntry1] : This is a 44 year old HLA  positive female now with an M1b, stage IV, uveal melanoma with liver involvement. She initiated therapy with tebentafusp at [facility] but then moved to [place]. Due to radiographic progression of a dominant site of disease, she underwent Y90 mapping on 9/7 and Y90 radioembolization on 09/21/23 with no issue. Based upon her most recent imaging studies, she has achieved a treatment effect in the dominant mass treated with Y90, with some progression elsewhere. She has now received therapy with immunoembolization x 2 administered concurrently with tebentafusp. Her therapy was changed to radioembolization and she is s/p Y90 to the right hepatic lobe on 5/17/24 and Y90 to the left hepatic lobe on 6/13/24, some slight abd pain after, but overall well tolerated. Due to disease progression, her therapy was changed to ipilimumab and nivolumab.  Plan: - Patient began Ipi/Niv on 8/30/2024, next cycle will be today, the following cycle will be 10/8/204 (appointment scheduled).  - Patient's LFTs were elevated but AST/ALT are WNL. ALK Phos trended down to 143.  - Imaging of Chest, Abdomen, Pelvis and Brain to be performed on 10/3/2024 at [facility] location (appointments scheduled).  - Potentially will do PHP at [place] this fall.  - Patient will also potentially undergo radiation to the sacral lesion. - We will recheck bloodwork, including LFTs, on Friday

## 2024-09-19 NOTE — ASSESSMENT
[FreeTextEntry1] : This is a 44 year old HLA  positive female now with an M1b, stage IV, uveal melanoma with liver involvement. She initiated therapy with tebentafusp at [facility] but then moved to [place]. Due to radiographic progression of a dominant site of disease, she underwent Y90 mapping on 9/7 and Y90 radioembolization on 09/21/23 with no issue. Based upon her most recent imaging studies, she has achieved a treatment effect in the dominant mass treated with Y90, with some progression elsewhere. She has now received therapy with immunoembolization x 2 administered concurrently with tebentafusp. Her therapy was changed to radioembolization and she is s/p Y90 to the right hepatic lobe on 5/17/24 and Y90 to the left hepatic lobe on 6/13/24, some slight abd pain after, but overall well tolerated. Due to disease progression, her therapy was changed to ipilimumab and nivolumab.  Plan: - Patient began Ipi/Niv on 8/30/2024, next cycle will be today, the following cycle will be 10/8/204 (appointment scheduled).  - Patient's LFTs were elevated but AST/ALT are WNL. ALK Phos trended down to 143.  - Imaging of Chest, Abdomen, Pelvis and Brain to be performed on 10/3/2024 at [facility] location (appointments scheduled).  - Potentially will do PHP at [place] this fall.  - Patient will also potentially undergo radiation to the sacral lesion. - We will recheck bloodwork, including LFTs, on Friday

## 2024-09-20 ENCOUNTER — APPOINTMENT (OUTPATIENT)
Dept: HEMATOLOGY ONCOLOGY | Facility: CLINIC | Age: 44
End: 2024-09-20

## 2024-09-23 ENCOUNTER — NON-APPOINTMENT (OUTPATIENT)
Age: 44
End: 2024-09-23

## 2024-09-23 LAB
ALBUMIN SERPL ELPH-MCNC: 3.5 G/DL
ALP BLD-CCNC: 127 U/L
ALT SERPL-CCNC: 35 U/L
ANION GAP SERPL CALC-SCNC: 5 MMOL/L
AST SERPL-CCNC: 40 U/L
BILIRUB SERPL-MCNC: 0.8 MG/DL
BUN SERPL-MCNC: 11 MG/DL
CALCIUM SERPL-MCNC: 9.3 MG/DL
CHLORIDE SERPL-SCNC: 104 MMOL/L
CO2 SERPL-SCNC: 25 MMOL/L
CREAT SERPL-MCNC: 0.8 MG/DL
EGFR: 92 ML/MIN/1.73M2
GLUCOSE SERPL-MCNC: 105 MG/DL
HCT VFR BLD CALC: 41.1 %
HGB BLD-MCNC: 14 G/DL
LDH SERPL-CCNC: 197 U/L
LYMPHOCYTES # BLD AUTO: 1.6 K/UL
LYMPHOCYTES NFR BLD AUTO: 18.5 %
MAN DIFF?: NO
MCHC RBC-ENTMCNC: 30 PG
MCHC RBC-ENTMCNC: 34.1 GM/DL
MCV RBC AUTO: 88.2 FL
NEUTROPHILS # BLD AUTO: 6 K/UL
NEUTROPHILS NFR BLD AUTO: 72.2 %
PLATELET # BLD AUTO: 239 K/UL
POTASSIUM SERPL-SCNC: 4 MMOL/L
PROT SERPL-MCNC: 7.2 G/DL
RBC # BLD: 4.66 M/UL
RBC # FLD: 12.6 %
SODIUM SERPL-SCNC: 134 MMOL/L
TSH SERPL-ACNC: 0.95 UIU/ML
WBC # FLD AUTO: 8.4 K/UL

## 2024-10-07 ENCOUNTER — NON-APPOINTMENT (OUTPATIENT)
Age: 44
End: 2024-10-07

## 2024-10-07 ENCOUNTER — INPATIENT (INPATIENT)
Facility: HOSPITAL | Age: 44
LOS: 0 days | Discharge: HOME | DRG: 872 | End: 2024-10-08
Attending: STUDENT IN AN ORGANIZED HEALTH CARE EDUCATION/TRAINING PROGRAM | Admitting: STUDENT IN AN ORGANIZED HEALTH CARE EDUCATION/TRAINING PROGRAM
Payer: COMMERCIAL

## 2024-10-07 VITALS
TEMPERATURE: 98 F | SYSTOLIC BLOOD PRESSURE: 88 MMHG | DIASTOLIC BLOOD PRESSURE: 63 MMHG | HEIGHT: 67 IN | OXYGEN SATURATION: 99 % | HEART RATE: 117 BPM | RESPIRATION RATE: 17 BRPM | WEIGHT: 149.91 LBS

## 2024-10-07 DIAGNOSIS — Z29.9 ENCOUNTER FOR PROPHYLACTIC MEASURES, UNSPECIFIED: ICD-10-CM

## 2024-10-07 DIAGNOSIS — E03.9 HYPOTHYROIDISM, UNSPECIFIED: ICD-10-CM

## 2024-10-07 DIAGNOSIS — A41.9 SEPSIS, UNSPECIFIED ORGANISM: ICD-10-CM

## 2024-10-07 DIAGNOSIS — Z98.49 CATARACT EXTRACTION STATUS, UNSPECIFIED EYE: Chronic | ICD-10-CM

## 2024-10-07 DIAGNOSIS — C43.9 MALIGNANT MELANOMA OF SKIN, UNSPECIFIED: ICD-10-CM

## 2024-10-07 LAB
ALBUMIN SERPL ELPH-MCNC: 3.4 G/DL — SIGNIFICANT CHANGE UP (ref 3.3–5)
ALP SERPL-CCNC: 281 U/L — HIGH (ref 40–120)
ALT FLD-CCNC: 77 U/L — HIGH (ref 10–45)
ANION GAP SERPL CALC-SCNC: 13 MMOL/L — SIGNIFICANT CHANGE UP (ref 5–17)
APPEARANCE UR: CLEAR — SIGNIFICANT CHANGE UP
APTT BLD: 28 SEC — SIGNIFICANT CHANGE UP (ref 24.5–35.6)
AST SERPL-CCNC: SIGNIFICANT CHANGE UP (ref 10–40)
BACTERIA # UR AUTO: ABNORMAL /HPF
BASE EXCESS BLDV CALC-SCNC: -2.3 MMOL/L — LOW (ref -2–3)
BASOPHILS # BLD AUTO: 0.05 K/UL — SIGNIFICANT CHANGE UP (ref 0–0.2)
BASOPHILS NFR BLD AUTO: 0.3 % — SIGNIFICANT CHANGE UP (ref 0–2)
BILIRUB SERPL-MCNC: 0.5 MG/DL — SIGNIFICANT CHANGE UP (ref 0.2–1.2)
BILIRUB UR-MCNC: NEGATIVE — SIGNIFICANT CHANGE UP
BUN SERPL-MCNC: 12 MG/DL — SIGNIFICANT CHANGE UP (ref 7–23)
C DIFF GDH STL QL: NEGATIVE — SIGNIFICANT CHANGE UP
C DIFF GDH STL QL: SIGNIFICANT CHANGE UP
CA-I SERPL-SCNC: 1.19 MMOL/L — SIGNIFICANT CHANGE UP (ref 1.15–1.33)
CALCIUM SERPL-MCNC: 8.6 MG/DL — SIGNIFICANT CHANGE UP (ref 8.4–10.5)
CAST: 5 /LPF — HIGH (ref 0–4)
CHLORIDE SERPL-SCNC: 104 MMOL/L — SIGNIFICANT CHANGE UP (ref 96–108)
CO2 BLDV-SCNC: 24 MMOL/L — SIGNIFICANT CHANGE UP (ref 22–26)
CO2 SERPL-SCNC: 20 MMOL/L — LOW (ref 22–31)
COLOR SPEC: YELLOW — SIGNIFICANT CHANGE UP
CREAT SERPL-MCNC: 0.84 MG/DL — SIGNIFICANT CHANGE UP (ref 0.5–1.3)
DIFF PNL FLD: NEGATIVE — SIGNIFICANT CHANGE UP
EGFR: 87 ML/MIN/1.73M2 — SIGNIFICANT CHANGE UP
EOSINOPHIL # BLD AUTO: 0.03 K/UL — SIGNIFICANT CHANGE UP (ref 0–0.5)
EOSINOPHIL NFR BLD AUTO: 0.2 % — SIGNIFICANT CHANGE UP (ref 0–6)
EPEC DNA STL QL NAA+PROBE: DETECTED
FLUAV AG NPH QL: SIGNIFICANT CHANGE UP
FLUBV AG NPH QL: SIGNIFICANT CHANGE UP
GAS PNL BLDV: 134 MMOL/L — LOW (ref 136–145)
GAS PNL BLDV: SIGNIFICANT CHANGE UP
GI PCR PANEL: DETECTED
GLUCOSE SERPL-MCNC: 109 MG/DL — HIGH (ref 70–99)
GLUCOSE UR QL: NEGATIVE MG/DL — SIGNIFICANT CHANGE UP
HCG SERPL-ACNC: <1 MIU/ML — SIGNIFICANT CHANGE UP
HCO3 BLDV-SCNC: 22 MMOL/L — SIGNIFICANT CHANGE UP (ref 22–29)
HCT VFR BLD CALC: 41.1 % — SIGNIFICANT CHANGE UP (ref 34.5–45)
HGB BLD-MCNC: 13.9 G/DL — SIGNIFICANT CHANGE UP (ref 11.5–15.5)
IMM GRANULOCYTES NFR BLD AUTO: 1 % — HIGH (ref 0–0.9)
INR BLD: 1.17 — HIGH (ref 0.85–1.16)
KETONES UR-MCNC: NEGATIVE MG/DL — SIGNIFICANT CHANGE UP
LACTATE SERPL-SCNC: 1.5 MMOL/L — SIGNIFICANT CHANGE UP (ref 0.5–2)
LACTATE SERPL-SCNC: 3.1 MMOL/L — HIGH (ref 0.5–2)
LEUKOCYTE ESTERASE UR-ACNC: ABNORMAL
LYMPHOCYTES # BLD AUTO: 0.89 K/UL — LOW (ref 1–3.3)
LYMPHOCYTES # BLD AUTO: 6.1 % — LOW (ref 13–44)
MCHC RBC-ENTMCNC: 29.2 PG — SIGNIFICANT CHANGE UP (ref 27–34)
MCHC RBC-ENTMCNC: 33.8 GM/DL — SIGNIFICANT CHANGE UP (ref 32–36)
MCV RBC AUTO: 86.3 FL — SIGNIFICANT CHANGE UP (ref 80–100)
MONOCYTES # BLD AUTO: 0.8 K/UL — SIGNIFICANT CHANGE UP (ref 0–0.9)
MONOCYTES NFR BLD AUTO: 5.4 % — SIGNIFICANT CHANGE UP (ref 2–14)
MUCOUS THREADS # UR AUTO: PRESENT
NEUTROPHILS # BLD AUTO: 12.76 K/UL — HIGH (ref 1.8–7.4)
NEUTROPHILS NFR BLD AUTO: 87 % — HIGH (ref 43–77)
NITRITE UR-MCNC: NEGATIVE — SIGNIFICANT CHANGE UP
NRBC # BLD: 0 /100 WBCS — SIGNIFICANT CHANGE UP (ref 0–0)
PCO2 BLDV: 38 MMHG — LOW (ref 39–42)
PH BLDV: 7.38 — SIGNIFICANT CHANGE UP (ref 7.32–7.43)
PH UR: 6 — SIGNIFICANT CHANGE UP (ref 5–8)
PLATELET # BLD AUTO: 216 K/UL — SIGNIFICANT CHANGE UP (ref 150–400)
PO2 BLDV: 38 MMHG — SIGNIFICANT CHANGE UP (ref 25–45)
POTASSIUM BLDV-SCNC: 4.1 MMOL/L — SIGNIFICANT CHANGE UP (ref 3.5–5.1)
POTASSIUM SERPL-MCNC: 4.1 MMOL/L — SIGNIFICANT CHANGE UP (ref 3.5–5.3)
POTASSIUM SERPL-SCNC: 4.1 MMOL/L — SIGNIFICANT CHANGE UP (ref 3.5–5.3)
PROT SERPL-MCNC: 7.3 G/DL — SIGNIFICANT CHANGE UP (ref 6–8.3)
PROT UR-MCNC: SIGNIFICANT CHANGE UP MG/DL
PROTHROM AB SERPL-ACNC: 13.4 SEC — SIGNIFICANT CHANGE UP (ref 9.9–13.4)
RBC # BLD: 4.76 M/UL — SIGNIFICANT CHANGE UP (ref 3.8–5.2)
RBC # FLD: 12.1 % — SIGNIFICANT CHANGE UP (ref 10.3–14.5)
RBC CASTS # UR COMP ASSIST: 3 /HPF — SIGNIFICANT CHANGE UP (ref 0–4)
RSV RNA NPH QL NAA+NON-PROBE: SIGNIFICANT CHANGE UP
SAO2 % BLDV: 59.5 % — LOW (ref 67–88)
SARS-COV-2 RNA SPEC QL NAA+PROBE: SIGNIFICANT CHANGE UP
SODIUM SERPL-SCNC: 137 MMOL/L — SIGNIFICANT CHANGE UP (ref 135–145)
SP GR SPEC: 1.01 — SIGNIFICANT CHANGE UP (ref 1–1.03)
SQUAMOUS # UR AUTO: 10 /HPF — HIGH (ref 0–5)
TRANS CELLS #/AREA URNS HPF: PRESENT
TSH SERPL-MCNC: <0.118 UIU/ML — LOW (ref 0.27–4.2)
UROBILINOGEN FLD QL: 0.2 MG/DL — SIGNIFICANT CHANGE UP (ref 0.2–1)
WBC # BLD: 14.68 K/UL — HIGH (ref 3.8–10.5)
WBC # FLD AUTO: 14.68 K/UL — HIGH (ref 3.8–10.5)
WBC UR QL: 8 /HPF — HIGH (ref 0–5)

## 2024-10-07 PROCEDURE — 71260 CT THORAX DX C+: CPT | Mod: 26,MC

## 2024-10-07 PROCEDURE — 74177 CT ABD & PELVIS W/CONTRAST: CPT | Mod: 26,MC

## 2024-10-07 PROCEDURE — 99291 CRITICAL CARE FIRST HOUR: CPT

## 2024-10-07 PROCEDURE — 71045 X-RAY EXAM CHEST 1 VIEW: CPT | Mod: 26

## 2024-10-07 PROCEDURE — 93010 ELECTROCARDIOGRAM REPORT: CPT

## 2024-10-07 PROCEDURE — 99223 1ST HOSP IP/OBS HIGH 75: CPT | Mod: GC

## 2024-10-07 RX ORDER — PIPERACILLIN SODIUM AND TAZOBACTAM SODIUM 12; 1.5 G/60ML; G/60ML
3.38 INJECTION, POWDER, LYOPHILIZED, FOR SOLUTION INTRAVENOUS EVERY 8 HOURS
Refills: 0 | Status: DISCONTINUED | OUTPATIENT
Start: 2024-10-07 | End: 2024-10-07

## 2024-10-07 RX ORDER — ONDANSETRON HCL/PF 4 MG/2 ML
4 VIAL (ML) INJECTION ONCE
Refills: 0 | Status: COMPLETED | OUTPATIENT
Start: 2024-10-07 | End: 2024-10-07

## 2024-10-07 RX ORDER — ENOXAPARIN SODIUM 150 MG/ML
40 INJECTION SUBCUTANEOUS EVERY 24 HOURS
Refills: 0 | Status: DISCONTINUED | OUTPATIENT
Start: 2024-10-07 | End: 2024-10-08

## 2024-10-07 RX ORDER — IOHEXOL 350 MG/ML
30 INJECTION, SOLUTION INTRAVENOUS ONCE
Refills: 0 | Status: COMPLETED | OUTPATIENT
Start: 2024-10-07 | End: 2024-10-07

## 2024-10-07 RX ORDER — METHYLPREDNISOLONE ACETATE 80 MG/ML
125 INJECTION, SUSPENSION INTRA-ARTICULAR; INTRALESIONAL; INTRAMUSCULAR; SOFT TISSUE ONCE
Refills: 0 | Status: COMPLETED | OUTPATIENT
Start: 2024-10-07 | End: 2024-10-07

## 2024-10-07 RX ORDER — SODIUM CHLORIDE IRRIG SOLUTION 0.9 %
1000 SOLUTION, IRRIGATION IRRIGATION
Refills: 0 | Status: DISCONTINUED | OUTPATIENT
Start: 2024-10-07 | End: 2024-10-08

## 2024-10-07 RX ORDER — PIPERACILLIN SODIUM AND TAZOBACTAM SODIUM 12; 1.5 G/60ML; G/60ML
3.38 INJECTION, POWDER, LYOPHILIZED, FOR SOLUTION INTRAVENOUS ONCE
Refills: 0 | Status: COMPLETED | OUTPATIENT
Start: 2024-10-07 | End: 2024-10-07

## 2024-10-07 RX ORDER — VANCOMYCIN HCL-SODIUM CHLORIDE IV SOLN 1.5 GM/250ML-0.9% 1.5-0.9/25 GM/ML-%
1000 SOLUTION INTRAVENOUS ONCE
Refills: 0 | Status: COMPLETED | OUTPATIENT
Start: 2024-10-07 | End: 2024-10-07

## 2024-10-07 RX ORDER — SODIUM CHLORIDE 0.9 % (FLUSH) 0.9 %
2100 SYRINGE (ML) INJECTION ONCE
Refills: 0 | Status: COMPLETED | OUTPATIENT
Start: 2024-10-07 | End: 2024-10-07

## 2024-10-07 RX ORDER — METHYLPREDNISOLONE ACETATE 80 MG/ML
125 INJECTION, SUSPENSION INTRA-ARTICULAR; INTRALESIONAL; INTRAMUSCULAR; SOFT TISSUE EVERY 24 HOURS
Refills: 0 | Status: DISCONTINUED | OUTPATIENT
Start: 2024-10-08 | End: 2024-10-08

## 2024-10-07 RX ORDER — ACETAMINOPHEN 325 MG
650 TABLET ORAL EVERY 6 HOURS
Refills: 0 | Status: DISCONTINUED | OUTPATIENT
Start: 2024-10-07 | End: 2024-10-08

## 2024-10-07 RX ORDER — ACETAMINOPHEN 325 MG
650 TABLET ORAL ONCE
Refills: 0 | Status: COMPLETED | OUTPATIENT
Start: 2024-10-07 | End: 2024-10-07

## 2024-10-07 RX ADMIN — IOHEXOL 30 MILLILITER(S): 350 INJECTION, SOLUTION INTRAVENOUS at 16:30

## 2024-10-07 RX ADMIN — METHYLPREDNISOLONE ACETATE 125 MILLIGRAM(S): 80 INJECTION, SUSPENSION INTRA-ARTICULAR; INTRALESIONAL; INTRAMUSCULAR; SOFT TISSUE at 17:23

## 2024-10-07 RX ADMIN — Medication 650 MILLIGRAM(S): at 16:30

## 2024-10-07 RX ADMIN — PIPERACILLIN SODIUM AND TAZOBACTAM SODIUM 200 GRAM(S): 12; 1.5 INJECTION, POWDER, LYOPHILIZED, FOR SOLUTION INTRAVENOUS at 16:34

## 2024-10-07 RX ADMIN — VANCOMYCIN HCL-SODIUM CHLORIDE IV SOLN 1.5 GM/250ML-0.9% 250 MILLIGRAM(S): 1.5-0.9/25 SOLUTION at 16:53

## 2024-10-07 RX ADMIN — Medication 2100 MILLILITER(S): at 16:35

## 2024-10-07 NOTE — H&P ADULT - PROBLEM SELECTOR PLAN 3
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 Oncologist is Dr. Ruby  - consult Heme/onc in the AM Stage IV (M1) uveal melanoma metastatic to the liver s/p Y 90 radioembolization and tebentafusp, most recently on ipilubinab and nivolubinab (1st dose 9/17/24), and sacral spine bony mets (has not received RT yet). Oncologist is Dr. Ruby  - consult Heme/onc in the AM  - get clarification on why pt needs steroids (s/p 125mg solumedrol in the ED)

## 2024-10-07 NOTE — H&P ADULT - ASSESSMENT
Pt is a 43 y/o w/ pmhx metastatic melanoma on immunotherapy c/o fever/chills, weakness, abd pain and diarrhea admitted for colitis vs typhilitis Pt is a 45 y/o w/ pmhx metastatic melanoma on immunotherapy c/o fever/chills, weakness, abd pain and diarrhea admitted for sepsis 2/2 colitis

## 2024-10-07 NOTE — H&P ADULT - ATTENDING COMMENTS
43 y/o w/ pmhx metastatic melanoma on immunotherapy c/o fever/chills, weakness, abd pain and diarrhea admitted for sepsis 2/2 colitis. GI PCR+EPEC.   Pt seen at bedside, in NAD, resting comfortably. Denies any abdominal pain, only intermittent generalized discomfort. No chills, cough, headache/ vision changes, urinary symptoms, NV. On exam: AO x3, no FND. MMM. no murmur, CTA b/l lung fields, abd soft, NT/ND. no LE edema.     #Sepsis   #Colitis   #EPEC   #metastatic melanoma     Plan   -cw maintenance fluids   -cdiff neg. GI PCR +EPEC  -c/w azithromycin   -currently afebrile, HDS. consider broadening abx if worsening sepsis.   -f/up blood cx

## 2024-10-07 NOTE — H&P ADULT - HISTORY OF PRESENT ILLNESS
Pt is a 45 y/o w/ pmhx metastatic melanoma on immunotherapy c/o fever/chills, weakness, abd pain and diarrhea.  Pt reports that symptoms started last week when she was in [facility]. During her flight back from [facility], she reports have sharp abdominal pain and two loose stools and took an imodium during the flight. On Thursday, she felt feverish and temp was 99.5. She took some tylenol and felt better, However on Friday she had another episode of cramps and 2-3 purely watery stool. Temp was 103. She also developed a headache behind her head and took some ibuprofen with relief. Pt woke up this morning with intermittent abdominal pain and two episodes of diarrhea with resolution of abdominal pain. Temp this time was 104. She spoke to Dr. Ruby's office and was advised to get evaluated in the ED. Pt denies eating anything unusual and her partner (who she traveled with) denies having any similar symptoms.  Pt has recieved two rounds of chemotherapy (Ipilibinab and nivolubinab), once in August and only adverse reaction was a skin rash. Her latest Chemo was 09/17 and she tolerated it well. Pt denies any head stiffness, neck pain, CP, SOB, but endorses some abdominal pain with palpation, no more diarrhea, no  symptoms.    In the ED:  Vitals: T 97.8  BP 88/63-->111/77 RR 17 SpO2 99%  Labs: WBC 14.68 lactate 3.1 TSH <0.118 ALk phosph 281, AST/ALT ?/77  RVP: neg  CT AP w/oral and IV cont:  1. Wall thickening of the ascending colon and cecum consistent with colitis/typhlitis. No free air.  2. Segment 7 lesion consistent with treated metastasis. Multiple additional smaller liver metastases are better assessed on MRI.  3. Slightly larger left lower lobe pulmonary nodule and stable right  upper lobe nodule.  CXR: mild airspace opacifications that appears compatible with pulmonayr edema. heart size normal in size  Interventions: tylenol 650mg x1, solumedrol 125mg, zofran 4mg IV x1, Zosyn 3.375g , Vanc 1g, NS 2.1L

## 2024-10-07 NOTE — ED PROVIDER NOTE - ENMT, MLM
Airway patent, Nasal mucosa clear. Mouth with normal mucosa. Throat has no vesicles, no oropharyngeal exudates and uvula is midline. No meningismus.

## 2024-10-07 NOTE — ED PROVIDER NOTE - OBJECTIVE STATEMENT
45 yo F with PMH of metastatic melanoma on immunotherapy c/o fever/chills, weakness, abd pain and diarrhea. Reports that symptoms started last week when she was in [facility]. During her flight back from [facility], she reports have sharp abdominal pain and two loose stools and took an imodium during the flight. On Thursday, she felt feverish and temp was 99.5. She took some Tylenol and felt better, However on Friday she had another episode of cramps and 2-3 purely watery stool with fevers of 103. She also developed a headache behind her head and took some ibuprofen with relief. Pt woke up this morning with intermittent abdominal pain and two episodes of diarrhea with resolution of abdominal pain. Temp this time was 104. She spoke to Dr. Ruby's office and was advised to get evaluated in the ED. Pt denies eating anything unusual and her partner (who she traveled with) denies having any similar symptoms.  Pt has received two rounds of chemotherapy (Ipilibinab and nivolubinab), once in August and only adverse reaction was a skin rash. Her latest Chemo was 09/17 and she tolerated it well. Pt denies any head stiffness, neck pain, CP, SOB, urinary sxs.

## 2024-10-07 NOTE — H&P ADULT - PROBLEM SELECTOR PLAN 4
Fluids: maintenance fluids  Electrolytes: Mg >2, K >4  Nutrition: NPO for bowel rest  Prophylaxis: lovenox  Activity: as tolerated  GI: none  C: [place]  Dispo: [facility] TSH <0.118 iso infection and recent methylpred in the ED. Pt likely does not have underlying hypothyroid but would re-check when not septic.  - f/u TSH before discharge  - f/u outpatient TSH <0.118 iso infection and recent methylpred in the ED. Pt likely does not have underlying hypothyroid but would re-check when not septic.  - f/u TSH before discharge  - f/u outpatient  - f/u Free T4

## 2024-10-07 NOTE — H&P ADULT - PROBLEM SELECTOR PLAN 5
Fluids: maintenance fluids  Electrolytes: Mg >2, K >4  Nutrition: NPO for bowel rest  Prophylaxis: lovenox  Activity: as tolerated  GI: none  C: [place]  Dispo: [facility]

## 2024-10-07 NOTE — ED ADULT NURSE NOTE - OBJECTIVE STATEMENT
Pt presents to ER AOx4 speaking in full clear sentences w/ pmhx metastatic melanoma on immunotherapy. pt c/o fever/chills, weakness, abd pain and diarrhea. reports 104F fever this morning, took 600mg ibuprofen at home. pt hypotensive. MD Gonzalez at bedside. connected to continuous pulse ox/cardiac/bp monitoring. iv placed.

## 2024-10-07 NOTE — ED PROVIDER NOTE - CLINICAL SUMMARY MEDICAL DECISION MAKING FREE TEXT BOX
43 yo F with PMH of metastatic melanoma on immunotherapy c/o fever/chills, weakness, abd pain and diarrhea. Reports that symptoms started last week when she was in [facility]. During her flight back from [facility], she reports have sharp abdominal pain and two loose stools and took an imodium during the flight. On Thursday, she felt feverish and temp was 99.5. She took some Tylenol and felt better, However on Friday she had another episode of cramps and 2-3 purely watery stool with fevers of 103. She also developed a headache behind her head and took some ibuprofen with relief. Pt woke up this morning with intermittent abdominal pain and two episodes of diarrhea with resolution of abdominal pain. Temp this time was 104. She spoke to Dr. Ruby's office and was advised to get evaluated in the ED. Pt denies eating anything unusual and her partner (who she traveled with) denies having any similar symptoms.  Pt has received two rounds of chemotherapy (Ipilibinab and nivolubinab), once in August and only adverse reaction was a skin rash. Her latest Chemo was 09/17 and she tolerated it well. Pt denies any head stiffness, neck pain, CP, SOB, urinary sxs.      In the ED:  Vitals: T 97.8  BP 88/63-->111/77 RR 17 SpO2 99%  Labs: WBC 14.68 lactate 3.1 TSH <0.118 ALk phosph 281, AST/ALT ?/77  RVP: neg  CT AP w/oral and IV cont:  1. Wall thickening of the ascending colon and cecum consistent with colitis/typhlitis. No free air.  2. Segment 7 lesion consistent with treated metastasis. Multiple additional smaller liver metastases are better assessed on MRI.  3. Slightly larger left lower lobe pulmonary nodule and stable right  upper lobe nodule.  CXR: mild airspace opacifications that appears compatible with pulmonayr edema. heart size normal in size  Interventions: tylenol 650mg x1, solumedrol 125mg, zofran 4mg IV x1, Zosyn 3.375g , Vanc 1g, NS 2.1L 45 yo F with PMH of metastatic melanoma on immunotherapy c/o fever/chills, weakness, abd pain and diarrhea. Reports that symptoms started last week when she was in [facility]. During her flight back from [facility], she reports have sharp abdominal pain and two loose stools and took an imodium during the flight. On Thursday, she felt feverish and temp was 99.5. She took some Tylenol and felt better, However on Friday she had another episode of cramps and 2-3 purely watery stool with fevers of 103. She also developed a headache behind her head and took some ibuprofen with relief. Pt woke up this morning with intermittent abdominal pain and two episodes of diarrhea with resolution of abdominal pain. Temp this time was 104. She spoke to Dr. Ruby's office and was advised to get evaluated in the ED. Pt denies eating anything unusual and her partner (who she traveled with) denies having any similar symptoms.  Pt has received two rounds of chemotherapy (Ipilibinab and nivolubinab), once in August and only adverse reaction was a skin rash. Her latest Chemo was 09/17 and she tolerated it well. Pt denies any head stiffness, neck pain, CP, SOB, urinary sxs.      In the ED: Sepsis w/up initiated.   Vitals: T 97.8  BP 88/63-->111/77 RR 17 SpO2 99%  Labs: WBC 14.68 lactate 3.1 TSH <0.118 ALk phosph 281, ALT 77  Lactate improved to 1.5 post IVF.   RVP: neg  CT AP w/oral and IV cont:  1. Wall thickening of the ascending colon and cecum consistent with colitis/typhlitis. No free air.  2. Segment 7 lesion consistent with treated metastasis. Multiple additional smaller liver metastases are better assessed on MRI.  3. Slightly larger left lower lobe pulmonary nodule and stable right  upper lobe nodule.    CXR: mild airspace opacifications that appears compatible with pulmonayr edema. heart size normal in size    Interventions: tylenol 650mg x1, solumedrol 125mg, zofran 4mg IV x1, Zosyn 3.375g , Vanc 1g, NS 2.1L  Case discussed with pt's oncologist who requested Solumedrol IV which was ordered.   Findings discussed with patient. Patient admitted to Medicine.

## 2024-10-07 NOTE — H&P ADULT - PROBLEM SELECTOR PLAN 1
Pt presenting with fevers at home, diarrhea and abdominal cramping pain for a week iso recent travel to [facility] and chemotherapy (09/14)  meeting 2/4 SIRS with tachycardia, leucocytosis and suspected colitis vs typhilitis. CTAP showing 1. Wall thickening of the ascending colon and cecum consistent with colitis/typhlitis. No free air. In Typhylitis, symptoms usually appear within 1-2 weeks after finishing chemotherapy in cancer patients (last chemo 09/14).  lactate 3.5 -->1.5. S/p tylenol 650mg x1, solumedrol 125mg, zofran 4mg IV x1, Zosyn 3.375g , NS 2.1L  - c/w Zosyn 3.375 q6h  - c/w maintenance fluids  - bowel rest- NPO  - f/u GI PCR  - f/u CRP, ESR Pt presenting with fevers at home, diarrhea and abdominal cramping pain for a week iso recent travel to [facility] and chemotherapy (09/14)  meeting 2/4 SIRS with tachycardia, leucocytosis and suspected colitis vs typhilitis. CTAP showing 1. Wall thickening of the ascending colon and cecum consistent with colitis/typhlitis. No free air. In Typhylitis, symptoms usually appear within 1-2 weeks after finishing chemotherapy in cancer patients (last chemo 09/14).  lactate 3.5 -->1.5. S/p tylenol 650mg x1, solumedrol 125mg, zofran 4mg IV x1, Zosyn 3.375g , NS 2.1L  - c/w Zosyn 3.375 q6h  - c/w maintenance fluids  - bowel rest- NPO  - f/u Bcx  - f/u GI PCR  - f/u CRP, ESR Pt presenting with fevers at home, diarrhea and abdominal cramping pain for a week iso recent travel to [facility] and chemotherapy (09/14)  meeting 2/4 SIRS with tachycardia, leucocytosis and suspected colitis vs typhilitis. CTAP showing 1. Wall thickening of the ascending colon and cecum consistent with colitis/typhlitis. No free air. In Typhylitis, symptoms usually appear within 1-2 weeks after finishing chemotherapy in cancer patients (last chemo 09/14).  lactate 3.5 -->1.5. S/p tylenol 650mg x1, solumedrol 125mg, zofran 4mg IV x1, Zosyn 3.375g , NS 2.1L  - c/w Zosyn 3.375 q8h  - c/w maintenance fluids at 70cc/h for 12h  - Bowerl rest/NPO  - f/u Bcx  - f/u GI PCR  - f/u CRP, ESR Pt presenting with fevers at home, diarrhea and abdominal cramping pain for a week iso recent travel to [facility] and chemotherapy (09/14)  meeting 2/4 SIRS with tachycardia, leucocytosis and suspected colitis vs typhilitis. CTAP showing 1. Wall thickening of the ascending colon and cecum consistent with colitis/typhlitis. No free air. In Typhylitis, symptoms usually appear within 1-2 weeks after finishing chemotherapy in cancer patients (last chemo 09/14).  lactate 3.5 -->1.5. GI-PCR positive for EPEC. S/p tylenol 650mg x1, solumedrol 125mg, zofran 4mg IV x1, Zosyn 3.375g , NS 2.1L  - c/w Azithromycin (single dose 1g vs 500mg daily x3 days)  - has shown promise in treating EPEC diarrhea in cancer pts vs bactrim  - c/w maintenance fluids at 70cc/h for 12h  - Bowerl rest/NPO  - f/u Bcx  - f/u GI PCR  - f/u CRP, ESR Pt presenting with fevers at home, diarrhea and abdominal cramping pain for a week iso recent travel to [facility] and chemotherapy (09/14)  meeting 2/4 SIRS with tachycardia, leucocytosis and suspected colitis vs typhilitis. CTAP showing 1. Wall thickening of the ascending colon and cecum consistent with colitis/typhlitis. No free air. In Typhylitis, symptoms usually appear within 1-2 weeks after finishing chemotherapy in cancer patients (last chemo 09/14).  lactate 3.5 -->1.5. GI-PCR positive for EPEC. S/p tylenol 650mg x1, solumedrol 125mg, zofran 4mg IV x1, Zosyn 3.375g , NS 2.1L  - c/w Azithromycin (500mg daily x3 days)  - has shown promise in treating EPEC diarrhea in cancer pts  - c/w maintenance fluids at 70cc/h for 12h  - f/u Bcx  - f/u GI PCR  - f/u CRP, ESR

## 2024-10-07 NOTE — H&P ADULT - NSHPPHYSICALEXAM_GEN_ALL_CORE
GENERAL: NAD, lying in bed comfortably  HEAD:  Atraumatic, normocephalic  EYES: EOMI, PERRLA, conjunctiva and sclera clear  NECK: Supple, trachea midline, no JVD  HEART: Regular rate and rhythm, no murmurs, rubs, or gallops  LUNGS: Unlabored respirations.  Clear to auscultation bilaterally, no crackles, wheezing, or rhonchi  ABDOMEN: slightly tender to palpation on LUQ, Soft, nondistended, +BS  EXTREMITIES: 2+ peripheral pulses bilaterally. No clubbing, cyanosis, or edema  NERVOUS SYSTEM:  A&Ox3, conversing normally  SKIN: No rashes or lesions

## 2024-10-07 NOTE — H&P ADULT - NSHPLABSRESULTS_GEN_ALL_CORE
LABS:                         13.9   14.68 )-----------( 216      ( 07 Oct 2024 16:21 )             41.1     10-07    137  |  104  |  12  ----------------------------<  109[H]  4.1   |  20[L]  |  0.84    Ca    8.6      07 Oct 2024 16:21    TPro  7.3  /  Alb  3.4  /  TBili  0.5  /  DBili  x   /  AST  See Note  /  ALT  77[H]  /  AlkPhos  281[H]  10-07    PT/INR - ( 07 Oct 2024 16:21 )   PT: 13.4 sec;   INR: 1.17          PTT - ( 07 Oct 2024 16:21 )  PTT:28.0 sec  Urinalysis Basic - ( 07 Oct 2024 17:40 )    Color: Yellow / Appearance: Clear / S.009 / pH: x  Gluc: x / Ketone: Negative mg/dL  / Bili: Negative / Urobili: 0.2 mg/dL   Blood: x / Protein: Trace mg/dL / Nitrite: Negative   Leuk Esterase: Trace / RBC: 3 /HPF / WBC 8 /HPF   Sq Epi: x / Non Sq Epi: 10 /HPF / Bacteria: Few /HPF            Lactate, Blood: 1.5 mmol/L (10-07 @ 17:33)  Lactate, Blood: 3.1 mmol/L (10-07 @ 16:21)      RADIOLOGY, EKG & ADDITIONAL TESTS: Reviewed.

## 2024-10-07 NOTE — H&P ADULT - PROBLEM SELECTOR PLAN 2
p/w several days of crampy abdominal pain and associated diarrhea and fevers at home. C-diff negative. CTAP concerning for colitis vs typhilitis. Pt received chemotherapy - Ipilubinab and nivolubinab in 08/2024 and 09/17/2024. Did not have any immediate adverse effects but possible her presenting symptoms are chemo side effects ( can present with diarrhea, fevers) s/p solumedrol 125mg  - f/u GI-PCR  - NPO for bowel rest  - c/w solumedrol 125mg for now  - heme/onc consult for steroid management p/w several days of crampy abdominal pain and associated diarrhea and fevers at home. C-diff negative. CTAP concerning for colitis vs typhilitis. Pt received chemotherapy - Ipilubinab and nivolubinab in 08/2024 and 09/17/2024. Did not have any immediate adverse effects but possible her presenting symptoms are chemo side effects ( can present with diarrhea, fevers) s/p solumedrol 125mg. EPEC positive of GI-PCR  - NPO for bowel rest  - c/w solumedrol 125mg for now  - heme/onc consult for steroid management p/w several days of crampy abdominal pain and associated diarrhea and fevers at home. C-diff negative. CTAP concerning for colitis vs typhilitis. Pt received chemotherapy - Ipilubinab and nivolubinab in 08/2024 and 09/17/2024. Did not have any immediate adverse effects but possible her presenting symptoms are chemo side effects ( can present with diarrhea, fevers) s/p solumedrol 125mg. EPEC positive of GI-PCR  - abx as above

## 2024-10-08 ENCOUNTER — TRANSCRIPTION ENCOUNTER (OUTPATIENT)
Age: 44
End: 2024-10-08

## 2024-10-08 ENCOUNTER — APPOINTMENT (OUTPATIENT)
Dept: INFUSION THERAPY | Facility: CLINIC | Age: 44
End: 2024-10-08

## 2024-10-08 ENCOUNTER — APPOINTMENT (OUTPATIENT)
Dept: HEMATOLOGY ONCOLOGY | Facility: CLINIC | Age: 44
End: 2024-10-08

## 2024-10-08 VITALS
HEART RATE: 83 BPM | OXYGEN SATURATION: 95 % | RESPIRATION RATE: 18 BRPM | SYSTOLIC BLOOD PRESSURE: 117 MMHG | DIASTOLIC BLOOD PRESSURE: 78 MMHG | TEMPERATURE: 98 F

## 2024-10-08 DIAGNOSIS — A41.9 SEPSIS, UNSPECIFIED ORGANISM: ICD-10-CM

## 2024-10-08 LAB
ALBUMIN SERPL ELPH-MCNC: 3.1 G/DL — LOW (ref 3.3–5)
ALP SERPL-CCNC: 253 U/L — HIGH (ref 40–120)
ALT FLD-CCNC: 68 U/L — HIGH (ref 10–45)
ANION GAP SERPL CALC-SCNC: 8 MMOL/L — SIGNIFICANT CHANGE UP (ref 5–17)
AST SERPL-CCNC: 51 U/L — HIGH (ref 10–40)
BASOPHILS # BLD AUTO: 0.04 K/UL — SIGNIFICANT CHANGE UP (ref 0–0.2)
BASOPHILS NFR BLD AUTO: 0.4 % — SIGNIFICANT CHANGE UP (ref 0–2)
BILIRUB SERPL-MCNC: 0.4 MG/DL — SIGNIFICANT CHANGE UP (ref 0.2–1.2)
BUN SERPL-MCNC: 9 MG/DL — SIGNIFICANT CHANGE UP (ref 7–23)
CALCIUM SERPL-MCNC: 9 MG/DL — SIGNIFICANT CHANGE UP (ref 8.4–10.5)
CHLORIDE SERPL-SCNC: 108 MMOL/L — SIGNIFICANT CHANGE UP (ref 96–108)
CO2 SERPL-SCNC: 23 MMOL/L — SIGNIFICANT CHANGE UP (ref 22–31)
CREAT SERPL-MCNC: 0.6 MG/DL — SIGNIFICANT CHANGE UP (ref 0.5–1.3)
CRP SERPL-MCNC: 156.5 MG/L — HIGH (ref 0–4)
EGFR: 112 ML/MIN/1.73M2 — SIGNIFICANT CHANGE UP
EOSINOPHIL # BLD AUTO: 0.01 K/UL — SIGNIFICANT CHANGE UP (ref 0–0.5)
EOSINOPHIL NFR BLD AUTO: 0.1 % — SIGNIFICANT CHANGE UP (ref 0–6)
ERYTHROCYTE [SEDIMENTATION RATE] IN BLOOD: 44 MM/HR — HIGH
GLUCOSE SERPL-MCNC: 146 MG/DL — HIGH (ref 70–99)
HCT VFR BLD CALC: 38.6 % — SIGNIFICANT CHANGE UP (ref 34.5–45)
HGB BLD-MCNC: 12.8 G/DL — SIGNIFICANT CHANGE UP (ref 11.5–15.5)
IMM GRANULOCYTES NFR BLD AUTO: 1.5 % — HIGH (ref 0–0.9)
LYMPHOCYTES # BLD AUTO: 1.27 K/UL — SIGNIFICANT CHANGE UP (ref 1–3.3)
LYMPHOCYTES # BLD AUTO: 13.4 % — SIGNIFICANT CHANGE UP (ref 13–44)
MAGNESIUM SERPL-MCNC: 2.1 MG/DL — SIGNIFICANT CHANGE UP (ref 1.6–2.6)
MCHC RBC-ENTMCNC: 28.5 PG — SIGNIFICANT CHANGE UP (ref 27–34)
MCHC RBC-ENTMCNC: 33.2 GM/DL — SIGNIFICANT CHANGE UP (ref 32–36)
MCV RBC AUTO: 86 FL — SIGNIFICANT CHANGE UP (ref 80–100)
MONOCYTES # BLD AUTO: 0.12 K/UL — SIGNIFICANT CHANGE UP (ref 0–0.9)
MONOCYTES NFR BLD AUTO: 1.3 % — LOW (ref 2–14)
NEUTROPHILS # BLD AUTO: 7.92 K/UL — HIGH (ref 1.8–7.4)
NEUTROPHILS NFR BLD AUTO: 83.3 % — HIGH (ref 43–77)
NRBC # BLD: 0 /100 WBCS — SIGNIFICANT CHANGE UP (ref 0–0)
PHOSPHATE SERPL-MCNC: 2.5 MG/DL — SIGNIFICANT CHANGE UP (ref 2.5–4.5)
PLATELET # BLD AUTO: 229 K/UL — SIGNIFICANT CHANGE UP (ref 150–400)
POTASSIUM SERPL-MCNC: 4.2 MMOL/L — SIGNIFICANT CHANGE UP (ref 3.5–5.3)
POTASSIUM SERPL-SCNC: 4.2 MMOL/L — SIGNIFICANT CHANGE UP (ref 3.5–5.3)
PROT SERPL-MCNC: 6.9 G/DL — SIGNIFICANT CHANGE UP (ref 6–8.3)
RBC # BLD: 4.49 M/UL — SIGNIFICANT CHANGE UP (ref 3.8–5.2)
RBC # FLD: 12.2 % — SIGNIFICANT CHANGE UP (ref 10.3–14.5)
SODIUM SERPL-SCNC: 139 MMOL/L — SIGNIFICANT CHANGE UP (ref 135–145)
T4 FREE SERPL-MCNC: 2.42 NG/DL — HIGH (ref 0.93–1.7)
WBC # BLD: 9.5 K/UL — SIGNIFICANT CHANGE UP (ref 3.8–10.5)
WBC # FLD AUTO: 9.5 K/UL — SIGNIFICANT CHANGE UP (ref 3.8–10.5)

## 2024-10-08 PROCEDURE — 93005 ELECTROCARDIOGRAM TRACING: CPT

## 2024-10-08 PROCEDURE — 85730 THROMBOPLASTIN TIME PARTIAL: CPT

## 2024-10-08 PROCEDURE — 74177 CT ABD & PELVIS W/CONTRAST: CPT | Mod: MC

## 2024-10-08 PROCEDURE — 87637 SARSCOV2&INF A&B&RSV AMP PRB: CPT

## 2024-10-08 PROCEDURE — 84439 ASSAY OF FREE THYROXINE: CPT

## 2024-10-08 PROCEDURE — 87507 IADNA-DNA/RNA PROBE TQ 12-25: CPT

## 2024-10-08 PROCEDURE — 84295 ASSAY OF SERUM SODIUM: CPT

## 2024-10-08 PROCEDURE — 84132 ASSAY OF SERUM POTASSIUM: CPT

## 2024-10-08 PROCEDURE — 85025 COMPLETE CBC W/AUTO DIFF WBC: CPT

## 2024-10-08 PROCEDURE — 85610 PROTHROMBIN TIME: CPT

## 2024-10-08 PROCEDURE — 96375 TX/PRO/DX INJ NEW DRUG ADDON: CPT

## 2024-10-08 PROCEDURE — 81001 URINALYSIS AUTO W/SCOPE: CPT

## 2024-10-08 PROCEDURE — 82803 BLOOD GASES ANY COMBINATION: CPT

## 2024-10-08 PROCEDURE — 84702 CHORIONIC GONADOTROPIN TEST: CPT

## 2024-10-08 PROCEDURE — 87040 BLOOD CULTURE FOR BACTERIA: CPT

## 2024-10-08 PROCEDURE — 83605 ASSAY OF LACTIC ACID: CPT

## 2024-10-08 PROCEDURE — 85652 RBC SED RATE AUTOMATED: CPT

## 2024-10-08 PROCEDURE — 84100 ASSAY OF PHOSPHORUS: CPT

## 2024-10-08 PROCEDURE — 84449 ASSAY OF TRANSCORTIN: CPT

## 2024-10-08 PROCEDURE — 36415 COLL VENOUS BLD VENIPUNCTURE: CPT

## 2024-10-08 PROCEDURE — 84443 ASSAY THYROID STIM HORMONE: CPT

## 2024-10-08 PROCEDURE — 82330 ASSAY OF CALCIUM: CPT

## 2024-10-08 PROCEDURE — 86140 C-REACTIVE PROTEIN: CPT

## 2024-10-08 PROCEDURE — 83735 ASSAY OF MAGNESIUM: CPT

## 2024-10-08 PROCEDURE — 99239 HOSP IP/OBS DSCHRG MGMT >30: CPT | Mod: GC

## 2024-10-08 PROCEDURE — 99285 EMERGENCY DEPT VISIT HI MDM: CPT

## 2024-10-08 PROCEDURE — 71260 CT THORAX DX C+: CPT | Mod: MC

## 2024-10-08 PROCEDURE — 80053 COMPREHEN METABOLIC PANEL: CPT

## 2024-10-08 PROCEDURE — 87449 NOS EACH ORGANISM AG IA: CPT

## 2024-10-08 PROCEDURE — 82533 TOTAL CORTISOL: CPT

## 2024-10-08 PROCEDURE — 87324 CLOSTRIDIUM AG IA: CPT

## 2024-10-08 PROCEDURE — 71045 X-RAY EXAM CHEST 1 VIEW: CPT

## 2024-10-08 PROCEDURE — 96374 THER/PROPH/DIAG INJ IV PUSH: CPT

## 2024-10-08 RX ORDER — AZITHROMYCIN 250 MG/1
1 TABLET, FILM COATED ORAL
Qty: 1 | Refills: 0
Start: 2024-10-08 | End: 2024-10-08

## 2024-10-08 RX ORDER — AZITHROMYCIN 250 MG/1
1 TABLET, FILM COATED ORAL
Qty: 2 | Refills: 0
Start: 2024-10-08 | End: 2024-10-09

## 2024-10-08 RX ORDER — AZITHROMYCIN 250 MG/1
500 TABLET, FILM COATED ORAL EVERY 24 HOURS
Refills: 0 | Status: DISCONTINUED | OUTPATIENT
Start: 2024-10-08 | End: 2024-10-08

## 2024-10-08 RX ORDER — INFLUENZA VIRUS VACCINE 15; 15; 15; 15 UG/.5ML; UG/.5ML; UG/.5ML; UG/.5ML
0.5 SUSPENSION INTRAMUSCULAR ONCE
Refills: 0 | Status: DISCONTINUED | OUTPATIENT
Start: 2024-10-08 | End: 2024-10-08

## 2024-10-08 RX ADMIN — AZITHROMYCIN 500 MILLIGRAM(S): 250 TABLET, FILM COATED ORAL at 09:37

## 2024-10-08 NOTE — DISCHARGE NOTE PROVIDER - NSDCMRMEDTOKEN_GEN_ALL_CORE_FT
oxyCODONE 5 mg oral tablet: 1 tab(s) orally every 6 hours as needed for  severe pain MDD: 20mg  oxyCODONE 5 mg oral tablet: 1 tab(s) orally every 6 hours as needed for -for moderate-severe pain take 1-2 tabs as needed for moderate-severe pain MDD: 4   azithromycin 500 mg oral tablet: 1 tab(s) orally once a day   azithromycin 500 mg oral tablet: 1 tab(s) orally once a day  azithromycin 500 mg oral tablet: 1 tab(s) orally once a day

## 2024-10-08 NOTE — PROGRESS NOTE ADULT - PROBLEM SELECTOR PLAN 2
p/w several days of crampy abdominal pain and associated diarrhea and fevers at home. C-diff negative. CTAP concerning for colitis vs typhilitis. Pt received chemotherapy - Ipilubinab and nivolubinab in 08/2024 and 09/17/2024. Did not have any immediate adverse effects but possible her presenting symptoms are chemo side effects ( can present with diarrhea, fevers) s/p solumedrol 125mg. EPEC positive of GI-PCR  - abx as above

## 2024-10-08 NOTE — PROGRESS NOTE ADULT - PROBLEM SELECTOR PLAN 3
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 Oncologist is Dr. Ruby  - consult Heme/onc in the AM  - get clarification on why pt needs steroids (s/p 125mg solumedrol in the ED)

## 2024-10-08 NOTE — PROGRESS NOTE ADULT - PROBLEM SELECTOR PLAN 4
TSH <0.118 iso infection and recent methylpred in the ED. Pt likely does not have underlying hypothyroid but would re-check when not septic.  - f/u TSH before discharge  - f/u outpatient  - f/u Free T4

## 2024-10-08 NOTE — DISCHARGE NOTE NURSING/CASE MANAGEMENT/SOCIAL WORK - NSDCFUADDAPPT_GEN_ALL_CORE_FT
[facility] with Dr. Flori DAWN [address]  Phone #: [phone]  Scheduled Appointment: 10/22/2024, 1:20PM in person

## 2024-10-08 NOTE — DISCHARGE NOTE PROVIDER - NSDCFUADDAPPT_GEN_ALL_CORE_FT
[facility] [address]  Phone #: [phone]  Scheduled Appointment: 10/22/2024, 1:20PM in person  [facility] with Dr. Flori DAWN [address]  Phone #: [phone]  Scheduled Appointment: 10/22/2024, 1:20PM in person

## 2024-10-08 NOTE — DISCHARGE NOTE PROVIDER - NSDCFUSCHEDAPPT_GEN_ALL_CORE_FT
[facility]  HEMONC [address]  Scheduled Appointment: 10/08/2024    [facility]  AMBCHEMO  [address]  Scheduled Appointment: 10/08/2024    [facility]  CATSCAN [address]  Scheduled Appointment: 10/11/2024    [facility]   [address]  Scheduled Appointment: 10/11/2024    [facility]   [address]  Scheduled Appointment: 10/11/2024    D'Amico, Randy  [facility]  NEUROSURG [address]  Scheduled Appointment: 10/16/2024    Joselin Resendiz  [facility]  OBGYNGEN [address]  Scheduled Appointment: 10/22/2024     [facility]  AMBCHEMO  [address]  Scheduled Appointment: 10/08/2024    [facility]  CATSCAN [address]  Scheduled Appointment: 10/11/2024    [facility]   [address]  Scheduled Appointment: 10/11/2024    [facility]   [address]  Scheduled Appointment: 10/11/2024    D'Amico, Randy  [facility]  NEUROSURG [address]  Scheduled Appointment: 10/16/2024    Joselin Resendiz  [facility]  OBGYNGEN [address]  Scheduled Appointment: 10/22/2024

## 2024-10-08 NOTE — DISCHARGE NOTE NURSING/CASE MANAGEMENT/SOCIAL WORK - NSDCPEFALRISK_GEN_ALL_CORE
For information on Fall & Injury Prevention, visit: [email or web address] OR  [email or web address] OR  https://www.cdc.gov/steadi/patient.html

## 2024-10-08 NOTE — DISCHARGE NOTE PROVIDER - HOSPITAL COURSE
Hospital course:  Pt is a 43 y/o w/ pmhx metastatic melanoma on immunotherapy c/o fever/chills, weakness, abd pain and diarrhea admitted for sepsis 2/2 EPEC     Problem/Plan - 1:  ·  Problem: Sepsis.   ·  Plan: Pt presenting with fevers at home, diarrhea and abdominal cramping pain for a week iso recent travel to [facility] and chemotherapy (09/14)  meeting 2/4 SIRS with tachycardia, leucocytosis and suspected colitis vs typhilitis. CTAP showing 1. Wall thickening of the ascending colon and cecum consistent with colitis/typhlitis. No free air. In Typhylitis, symptoms usually appear within 1-2 weeks after finishing chemotherapy in cancer patients (last chemo 09/14).  lactate 3.5 -->1.5. GI-PCR positive for EPEC. S/p tylenol 650mg x1, solumedrol 125mg, zofran 4mg IV x1, Zosyn 3.375g , NS 2.1L  - c/w Azithromycin (500mg daily x3 days)  - has shown promise in treating EPEC diarrhea in cancer pts  - c/w maintenance fluids at 70cc/h for 12h  - f/u Bcx  - f/u GI PCR  - f/u CRP, ESR.     Problem/Plan - 2:  ·  Problem: Abdominal pain.   ·  Plan: p/w several days of crampy abdominal pain and associated diarrhea and fevers at home. C-diff negative. CTAP concerning for colitis vs typhilitis. Pt received chemotherapy - Ipilubinab and nivolubinab in 08/2024 and 09/17/2024. Did not have any immediate adverse effects but possible her presenting symptoms are chemo side effects ( can present with diarrhea, fevers) s/p solumedrol 125mg. EPEC positive of GI-PCR  - abx as above.     Problem/Plan - 3:  ·  Problem: Metastatic melanoma.   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 Oncologist is Dr. Ruby  - consult Heme/onc in the AM  - get clarification on why pt needs steroids (s/p 125mg solumedrol in the ED).     Problem/Plan - 4:  ·  Problem: Hypothyroid.   ·  Plan: TSH <0.118 iso infection and recent methylpred in the ED. Pt likely does not have underlying hypothyroid but would re-check when not septic.  - f/u TSH before discharge  - f/u outpatient  - f/u Free T4.      Physical exam at discharge:    New medications on discharge:  Labs to be followed up:  Imaging to be done as outpatient:  Further outpatient workup:   Hospital course:  Pt is a 43 y/o w/ pmhx metastatic melanoma on immunotherapy c/o fever/chills, weakness, abd pain and diarrhea admitted for sepsis 2/2 EPEC     Problem/Plan - 1:  ·  Problem: Severe Sepsis   ·  Plan: Pt presenting with fevers at home, diarrhea and abdominal cramping pain for a week iso recent travel to [facility] and chemotherapy (09/14)  meeting 2/4 SIRS with tachycardia, leucocytosis and suspected colitis vs typhilitis with elevated lactate. CTAP showing 1. Wall thickening of the ascending colon and cecum consistent with colitis/typhlitis. No free air. In Typhylitis, symptoms usually appear within 1-2 weeks after finishing chemotherapy in cancer patients (last chemo 09/14).  lactate 3.5 -->1.5. GI-PCR positive for EPEC. S/p tylenol 650mg x1, solumedrol 125mg, zofran 4mg IV x1, Zosyn 3.375g , NS 2.1L  - c/w Azithromycin (500mg daily x3 days)  - has shown promise in treating EPEC diarrhea in cancer pts       Problem/Plan - 2:  ·  Problem: Abdominal pain.   ·  Plan: p/w several days of crampy abdominal pain and associated diarrhea and fevers at home. C-diff negative. CTAP concerning for colitis vs typhilitis. Pt received chemotherapy - Ipilubinab and nivolubinab in 08/2024 and 09/17/2024. Did not have any immediate adverse effects but possible her presenting symptoms are chemo side effects ( can present with diarrhea, fevers) s/p solumedrol 125mg. EPEC positive of GI-PCR  - abx as above.     Problem/Plan - 3:  ·  Problem: Metastatic melanoma.   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 Oncologist is Dr. Ruby  - follow up with Dr. Ruby- appointment scheduled 10/22/2024     Problem/Plan - 4:  ·  Problem: Hyperthyroid.   ·  Plan: TSH <0.118 and Free T4 2.42, Pt reported that Dr. Ruby has been monitoring thyroid function due to current immunotherapy  - follow up with Dr. Ruby to re-check TFTs- appointment scheduled 10/22/2024      Physical exam at discharge:    -cardiac: normal rate, regular rhythm, S1/S2 normal, no murmur/rub/gallop  -pulmonary: lungs clear to auscultation bilaterally, no crackles/wheezes/rhonchi  -abdomen: mild tenderness to palpation in right and left upper quadrant, normal bowel sounds, non distended    New medications on discharge: azithromycin 500mg x3 days  Labs to be followed up: thyroid function  Imaging to be done as outpatient: none  Further outpatient workup: thyroid function    Hospital course:  Pt is a 43 y/o w/ pmhx metastatic melanoma on immunotherapy c/o fever/chills, weakness, abd pain and diarrhea admitted for severe sepsis 2/2 EPEC     Problem/Plan - 1:  ·  Problem: Severe Sepsis   ·  Plan: Pt presenting with fevers at home, diarrhea and abdominal cramping pain for a week iso recent travel to [facility] and chemotherapy (09/14)  meeting 2/4 SIRS with tachycardia, leucocytosis and suspected colitis vs typhilitis with elevated lactate. CTAP showing 1. Wall thickening of the ascending colon and cecum consistent with colitis/typhlitis. No free air. In Typhylitis, symptoms usually appear within 1-2 weeks after finishing chemotherapy in cancer patients (last chemo 09/14).  lactate 3.5 -->1.5. GI-PCR positive for EPEC. S/p tylenol 650mg x1, solumedrol 125mg, zofran 4mg IV x1, Zosyn 3.375g , NS 2.1L  - c/w Azithromycin 500mg daily (10/7-10/9)       Problem/Plan - 2:  ·  Problem: Abdominal pain.   ·  Plan: p/w several days of crampy abdominal pain and associated diarrhea and fevers at home. C-diff negative. CTAP concerning for colitis vs typhilitis. Pt received chemotherapy - Ipilubinab and nivolubinab in 08/2024 and 09/17/2024. Did not have any immediate adverse effects but possible her presenting symptoms are chemo side effects ( can present with diarrhea, fevers) s/p solumedrol 125mg. EPEC positive of GI-PCR  - abx as above.     Problem/Plan - 3:  ·  Problem: Metastatic melanoma.   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 Oncologist is Dr. Ruby  - follow up with Dr. Ruby- appointment scheduled 10/22/2024     Problem/Plan - 4:  ·  Problem: Hyperthyroid.   ·  Plan: TSH <0.118 and Free T4 2.42, Pt reported that Dr. Ruby has been monitoring thyroid function due to current immunotherapy  - follow up with Dr. Ruby to re-check TFTs- appointment scheduled 10/22/2024      Physical exam at discharge:    -cardiac: normal rate, regular rhythm, S1/S2 normal, no murmur/rub/gallop  -pulmonary: lungs clear to auscultation bilaterally, no crackles/wheezes/rhonchi  -abdomen: mild tenderness to palpation in right and left upper quadrant, normal bowel sounds, non distended    New medications on discharge: azithromycin 500mg x3 days  Labs to be followed up: thyroid function  Imaging to be done as outpatient: none  Further outpatient workup: thyroid function    Hospital course:  Pt is a 43 y/o w/ pmhx metastatic melanoma on immunotherapy c/o fever/chills, weakness, abd pain and diarrhea admitted for severe sepsis 2/2 EPEC     Problem/Plan - 1:  ·  Problem: Severe Sepsis   ·  Plan: Pt presenting with fevers at home, diarrhea and abdominal cramping pain for a week iso recent travel to [facility] and chemotherapy (09/14)  meeting 2/4 SIRS with tachycardia, leucocytosis and suspected colitis vs typhilitis with elevated lactate. CTAP showing 1. Wall thickening of the ascending colon and cecum consistent with colitis/typhlitis. No free air. In Typhylitis, symptoms usually appear within 1-2 weeks after finishing chemotherapy in cancer patients (last chemo 09/14).  lactate 3.5 -->1.5. GI-PCR positive for EPEC. S/p tylenol 650mg x1, solumedrol 125mg, zofran 4mg IV x1, Zosyn 3.375g , NS 2.1L  - c/w Azithromycin 500mg daily (10/8-10/10)       Problem/Plan - 2:  ·  Problem: Abdominal pain.   ·  Plan: p/w several days of crampy abdominal pain and associated diarrhea and fevers at home. C-diff negative. CTAP concerning for colitis vs typhilitis. Pt received chemotherapy - Ipilubinab and nivolubinab in 08/2024 and 09/17/2024. Did not have any immediate adverse effects but possible her presenting symptoms are chemo side effects ( can present with diarrhea, fevers) s/p solumedrol 125mg. EPEC positive of GI-PCR  - abx as above.     Problem/Plan - 3:  ·  Problem: Metastatic melanoma.   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 Oncologist is Dr. Ruby  - follow up with Dr. Ruby- appointment scheduled 10/22/2024     Problem/Plan - 4:  ·  Problem: Hyperthyroid.   ·  Plan: TSH <0.118 and Free T4 2.42, Pt reported that Dr. Ruby has been monitoring thyroid function due to current immunotherapy  - follow up with Dr. Ruby to re-check TFTs- appointment scheduled 10/22/2024      Physical exam at discharge:    -cardiac: normal rate, regular rhythm, S1/S2 normal, no murmur/rub/gallop  -pulmonary: lungs clear to auscultation bilaterally, no crackles/wheezes/rhonchi  -abdomen: mild tenderness to palpation in right and left upper quadrant, normal bowel sounds, non distended    New medications on discharge: azithromycin 500mg x3 days  Labs to be followed up: thyroid function  Imaging to be done as outpatient: none  Further outpatient workup: thyroid function

## 2024-10-08 NOTE — DISCHARGE NOTE PROVIDER - PROVIDER TOKENS
PROVIDER:[TOKEN:[[id number]],FOLLOWUP:[1 week],ESTABLISHEDPATIENT:[T]] PROVIDER:[TOKEN:[[id number]],SCHEDULEDAPPT:[10/22/2024],ESTABLISHEDPATIENT:[T]] PROVIDER:[TOKEN:[[id number]],SCHEDULEDAPPT:[10/22/2024],SCHEDULEDAPPTTIME:[01:20 PM],ESTABLISHEDPATIENT:[T]]

## 2024-10-08 NOTE — PROGRESS NOTE ADULT - PROBLEM SELECTOR PLAN 1
Pt presenting with fevers at home, diarrhea and abdominal cramping pain for a week iso recent travel to [facility] and chemotherapy (09/14)  meeting 2/4 SIRS with tachycardia, leucocytosis and suspected colitis vs typhilitis. CTAP showing 1. Wall thickening of the ascending colon and cecum consistent with colitis/typhlitis. No free air. In Typhylitis, symptoms usually appear within 1-2 weeks after finishing chemotherapy in cancer patients (last chemo 09/14).  lactate 3.5 -->1.5. GI-PCR positive for EPEC. S/p tylenol 650mg x1, solumedrol 125mg, zofran 4mg IV x1, Zosyn 3.375g , NS 2.1L  .5  - c/w Azithromycin (500mg daily x3 days)  - has shown promise in treating EPEC diarrhea in cancer pts  - c/w maintenance fluids at 70cc/h for 12h  - f/u Bcx  - f/u GI PCR  - f/u ESR

## 2024-10-08 NOTE — PROGRESS NOTE ADULT - ATTENDING COMMENTS
44-year-old female with a PMHx of metastatic melanoma (on Ipi/Nivo, last 9/17) who presented with severe sepsis 2/2 colitis 2/2 EPEC.  Patient no longer with diarrhea or abdominal pain and is tolerating a regular diet.     Plan:    -continue with Azithromycin x 3-days given immunosuppression    -repeat TFTs in 4 weeks as an outpatient   -outpatient oncology follow up (oncologist aware of admission)    Rest of plan as per resident note.

## 2024-10-08 NOTE — PATIENT PROFILE ADULT - ARRIVAL FROM
[FreeTextEntry1] : 46yoF with cervical adenocarcinoma presents for follow-up palliative care visit, referred by Oncology.  \horacio alves Patient initially diagnosed with cervical cancer 5/2021. Underwent weekly cisplatin and pelvic RT 4500cgy 25 fx plus parametrial boost 540cgy 3 fx and 4 brachytherapy procedures (hybrid therapy) 700cgy x 4.\horacio alves Patient was recently hospitalized at [facility] (1/26 - 2/1/22) after presenting with 3 weeks of worsening b/l hip/lower back pain and feet numbness. Pain is sharp in nature, radiating down the lateral thighs, with mild numbness on dorsal sides of feet. Symptoms initially improved with Aleve, however noted blood tinged vaginal discharge and blood clots with urination not related to menstrual periods so stopped taking Aleve. Hip pain and feet numbness progressively worsened, to the point that she cannot walk or lie supine. Pain is mildly alleviated when lying prone, and better with activity. Pt presented to ER in [place] 2 weeks prior to admission due to these symptoms, CT spine non-con showed multiple non-obstructing nephrolithiasis in L kidney but no fractures, deformities, subluxation were noted. She was discharged with gabapentin 300mg BID, Methocarbomol 500mg TID, and Meloxicam 15mg. She is referred today for continuation of pain management. \horacio alves Patient states that she developed pain in her L buttock approximately 6 weeks ago. It started as a pinching pain in her L buttock. It worsened to the point that the pain felt as if someone where punching her in the area.  Standing up and walking helped to relieve the pain a bit.  She found that she was pacing a lot in her home to help the pain. \horacio alves She was advised to use Acetaminophen 1000mg, was using it about twice daily.   This initially helped a bit. The pain progressed, eventually affecting both left and right. Associated with tingling in her toes. Was advised to use Aleve BID. Was advised to increase to 2 pills BID which caused her vaginal bleeding, she stopped. long alves Was in [place] at the time and was sent to the hospital where she states she received a steroid shot to her back. This helped her very much for a while. She was also prescribed a lidocaine 5% patch there but this was not approved by her insurance. Pain came back and was intense.  She returned to [place] on 1/24 and presented to [facility] on 1/26 at which time she was admitted to [facility]. \par \par Pt states that numbness is worse on L side, from dorsal foot to lateral mid-calf. Feels like it is "freezing" and must keep socks on. Also notes cramping intermittently in the toes. Pt also noticed that not all of her urine will come out, and must put pressure to completely relieve herself\par \par When she walks her low back pain gets better but her legs/feet feel tight and crampy. \par She feels weakness in her legs, lifting her legs is difficult, moving her toes is difficult.  She describes a sensation of coldness of her toes. \par She started home PT yesterday, has [facility] care in place. Is using a rolling walker for assistance with ambulation. 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 \par She is reporting intermittent swelling/pitting edema of the RLE, was particularly bad one day but did resolve on its own. She tries to elevate her legs whenever possible, \par Has been having foot cramping, R>L,  and for this pain she has to use the oxycodone IR 5mg, this helps. \par Getting in a hot shower helps to alleviate some of the acute pain and cramping. \par Has been going for PT twice weekly. \par \par Current pain regimen:\par MS ER 30mg BID\par Oxycodone IR 5-10mg PRN  (2-3 times daily)\par Gabapentin 600mg BID\par Methocarbamol 500mg TID\par Tylenol 1000mg PRN (takes before PT)\par \par medical cannabis not helping\par \par ROS:\par +tearful, anxious about her pain - she has lorazepam which she uses PRN when she gets very worked up. She's upset about her current state, she is dependent on others which is hard to get used to. Has established care with psychiatrist Dr. Lang.\par +constipation - uses senna daily, PRN Miralax\par +some nausea, no vomiting\par +appetite has been OK. \par Denies urinary issues.\par \par Patient is single, lives with her sister, parents, daughter. \par She has a 28yo son and a 26yo daughter.\par She is unemployed. \par \par PMD: Dr. Valentino Comer ([phone])\par \par [employer] Ref#: [id number] Home

## 2024-10-08 NOTE — PROGRESS NOTE ADULT - ASSESSMENT
Pt is a 45 y/o w/ pmhx metastatic melanoma on immunotherapy c/o fever/chills, weakness, abd pain and diarrhea admitted for sepsis 2/2 colitis

## 2024-10-08 NOTE — DISCHARGE NOTE NURSING/CASE MANAGEMENT/SOCIAL WORK - PATIENT PORTAL LINK FT
You can access the FollowMyHealth Patient Portal offered by [facility] by registering at the following website: [email or web address]. By joining [email or web address]’s FollowMyHealth portal, you will also be able to view your health information using other applications (apps) compatible with our system.

## 2024-10-08 NOTE — PROGRESS NOTE ADULT - SUBJECTIVE AND OBJECTIVE BOX
O/N Events:  Subjective/ROS: Denies HA, CP, SOB, n/v, changes in bowel/urinary habits.  12pt ROS otherwise negative.    VITALS  Vital Signs Last 24 Hrs  T(C): 36.4 (08 Oct 2024 05:47), Max: 36.8 (07 Oct 2024 19:36)  T(F): 97.6 (08 Oct 2024 05:47), Max: 98.3 (07 Oct 2024 19:36)  HR: 95 (08 Oct 2024 05:47) (85 - 117)  BP: 110/73 (08 Oct 2024 05:47) (88/63 - 120/77)  BP(mean): --  RR: 18 (08 Oct 2024 05:47) (17 - 18)  SpO2: 96% (08 Oct 2024 05:47) (94% - 99%)    Parameters below as of 08 Oct 2024 05:47  Patient On (Oxygen Delivery Method): room air        CAPILLARY BLOOD GLUCOSE          PHYSICAL EXAM  General: A&Ox3; NAD  Head: NC/AT; MMM; PERRL; EOMI;  Neck: Supple; no JVD  Respiratory: CTA B/L; no wheezes/crackles   Cardiovascular: Regular rhythm/rate; S1/S2   Gastrointestinal: Soft; NTND; normoactive BS  Extremities: WWP; no edema/cyanosis  Neurological:  CNII-XII grossly intact; no obvious focal deficits    MEDICATIONS  (STANDING):  azithromycin   Tablet 500 milliGRAM(s) Oral every 24 hours  enoxaparin Injectable 40 milliGRAM(s) SubCutaneous every 24 hours  influenza   Vaccine 0.5 milliLiter(s) IntraMuscular once  lactated ringers. 1000 milliLiter(s) (70 mL/Hr) IV Continuous <Continuous>    MEDICATIONS  (PRN):  acetaminophen     Tablet .. 650 milliGRAM(s) Oral every 6 hours PRN Temp greater or equal to 38C (100.4F), Mild Pain (1 - 3)      No Known Allergies      LABS                        12.8   9.50  )-----------( 229      ( 08 Oct 2024 05:30 )             38.6     10-08    139  |  108  |  9   ----------------------------<  146[H]  4.2   |  23  |  0.60    Ca    9.0      08 Oct 2024 05:30  Phos  2.5     10-08  Mg     2.1     10-08    TPro  6.9  /  Alb  3.1[L]  /  TBili  0.4  /  DBili  x   /  AST  51[H]  /  ALT  68[H]  /  AlkPhos  253[H]  10-08    PT/INR - ( 07 Oct 2024 16:21 )   PT: 13.4 sec;   INR: 1.17          PTT - ( 07 Oct 2024 16:21 )  PTT:28.0 sec  Urinalysis Basic - ( 08 Oct 2024 05:30 )    Color: x / Appearance: x / SG: x / pH: x  Gluc: 146 mg/dL / Ketone: x  / Bili: x / Urobili: x   Blood: x / Protein: x / Nitrite: x   Leuk Esterase: x / RBC: x / WBC x   Sq Epi: x / Non Sq Epi: x / Bacteria: x            IMAGING/EKG/ETC: Reviewed

## 2024-10-08 NOTE — DISCHARGE NOTE PROVIDER - ATTENDING DISCHARGE PHYSICAL EXAMINATION:
Gen: NAD, resting in bed comfortably   HEENT: EOMI, PERRL, no scleral icterus  CV: normal S1 and S2  Lungs: CTABL, normal respiratory effort on RA  Ab: soft, NT, ND, normal BS  Ext: no LE edema   Neuro: A&O x 3, no focal deficits

## 2024-10-08 NOTE — DISCHARGE NOTE PROVIDER - NSDCCPCAREPLAN_GEN_ALL_CORE_FT
PRINCIPAL DISCHARGE DIAGNOSIS  Diagnosis: Diarrhea  Assessment and Plan of Treatment:      PRINCIPAL DISCHARGE DIAGNOSIS  Diagnosis: Diarrhea  Assessment and Plan of Treatment: You came to the hospital for abdominal pain and diarrhea after returning home from a trip abroad. You were found to have bacteria in the stool (EPEC) and due to your immunocrompromised state, we treated you with IV antibiotics. You clinically improved while in the hosptial and your diarrhea improved. We will discharge you on oral antibiotics to complete a three day course. Please follow up with your oncologist in 2 weeks to recheck labs.   Plan:   - c/w Azithromycin 500mg daily (10/7-10/9)    - follow up with Dr. Ruby on 10/22     PRINCIPAL DISCHARGE DIAGNOSIS  Diagnosis: Severe sepsis  Assessment and Plan of Treatment: You came to the hospital for abdominal pain and diarrhea after returning home from a trip abroad. You were found to have bacteria in the stool (EPEC) and due to your immunocrompromised state, we treated you with IV antibiotics. You clinically improved while in the hosptial and your diarrhea improved. We will discharge you on oral antibiotics to complete a three day course. Please follow up with your oncologist in 2 weeks to recheck labs.   Plan:   - c/w Azithromycin 500mg daily (10/7-10/9)    - follow up with Dr. Ruby on 10/22     PRINCIPAL DISCHARGE DIAGNOSIS  Diagnosis: Severe sepsis  Assessment and Plan of Treatment: You came to the hospital for abdominal pain and diarrhea after returning home from a trip abroad. You were found to have bacteria in the stool (EPEC) and due to your immunocrompromised state, we treated you with IV antibiotics. You clinically improved while in the hosptial and your diarrhea improved. We will discharge you on oral antibiotics to complete a three day course. Please follow up with your oncologist in 2 weeks to recheck labs.   Plan:   - c/w Azithromycin 500mg daily (10/8-10/10)    - follow up with Dr. Ruby on 10/22

## 2024-10-08 NOTE — DISCHARGE NOTE PROVIDER - CARE PROVIDER_API CALL
Steve Ruby.  Medical Oncology  [address] [place] [id number]  Phone: [phone]  Fax: [phone]  Established Patient  Follow Up Time: 1 week   Steve Ruby.  Medical Oncology  [address] [place] [id number]  Phone: [phone]  Fax: [phone]  Established Patient  Scheduled Appointment: 10/22/2024   Steve Ruby.  Medical Oncology  [address] [place] [id number]  Phone: [phone]  Fax: [phone]  Established Patient  Scheduled Appointment: 10/22/2024 01:20 PM

## 2024-10-09 PROBLEM — M53.3 SACRAL LESION: Status: ACTIVE | Noted: 2024-10-09

## 2024-10-10 ENCOUNTER — NON-APPOINTMENT (OUTPATIENT)
Age: 44
End: 2024-10-10

## 2024-10-11 ENCOUNTER — APPOINTMENT (OUTPATIENT)
Dept: MRI IMAGING | Facility: CLINIC | Age: 44
End: 2024-10-11

## 2024-10-11 ENCOUNTER — LABORATORY RESULT (OUTPATIENT)
Age: 44
End: 2024-10-11

## 2024-10-11 ENCOUNTER — APPOINTMENT (OUTPATIENT)
Dept: HEMATOLOGY ONCOLOGY | Facility: CLINIC | Age: 44
End: 2024-10-11

## 2024-10-11 ENCOUNTER — APPOINTMENT (OUTPATIENT)
Dept: CT IMAGING | Facility: CLINIC | Age: 44
End: 2024-10-11

## 2024-10-11 ENCOUNTER — OUTPATIENT (OUTPATIENT)
Dept: OUTPATIENT SERVICES | Facility: HOSPITAL | Age: 44
LOS: 1 days | End: 2024-10-11

## 2024-10-11 DIAGNOSIS — Z98.49 CATARACT EXTRACTION STATUS, UNSPECIFIED EYE: Chronic | ICD-10-CM

## 2024-10-11 PROBLEM — C43.9 MALIGNANT MELANOMA OF SKIN, UNSPECIFIED: Chronic | Status: ACTIVE | Noted: 2024-10-07

## 2024-10-11 LAB
ALBUMIN SERPL ELPH-MCNC: 3 G/DL
ALP BLD-CCNC: 205 U/L
ALT SERPL-CCNC: 245 U/L
ANION GAP SERPL CALC-SCNC: 7 MMOL/L
AST SERPL-CCNC: 124 U/L
BILIRUB SERPL-MCNC: 0.8 MG/DL
BUN SERPL-MCNC: 14 MG/DL
CALCIUM SERPL-MCNC: 9.3 MG/DL
CHLORIDE SERPL-SCNC: 105 MMOL/L
CO2 SERPL-SCNC: 25 MMOL/L
CREAT SERPL-MCNC: 0.6 MG/DL
EGFR: 112 ML/MIN/1.73M2
GLUCOSE SERPL-MCNC: 132 MG/DL
HCT VFR BLD CALC: 38.8 %
HGB BLD-MCNC: 13.4 G/DL
LYMPHOCYTES # BLD AUTO: 2.3 K/UL
LYMPHOCYTES NFR BLD AUTO: 13.8 %
MAN DIFF?: NO
MCHC RBC-ENTMCNC: 30 PG
MCHC RBC-ENTMCNC: 34.5 GM/DL
MCV RBC AUTO: 86.8 FL
NEUTROPHILS # BLD AUTO: 13.3 K/UL
NEUTROPHILS NFR BLD AUTO: 79.5 %
PLATELET # BLD AUTO: 272 K/UL
POTASSIUM SERPL-SCNC: 4.4 MMOL/L
PROT SERPL-MCNC: 7.1 G/DL
RBC # BLD: 4.47 M/UL
RBC # FLD: 13 %
SODIUM SERPL-SCNC: 137 MMOL/L
TSH SERPL-ACNC: 0.01 UIU/ML
WBC # FLD AUTO: 16.7 K/UL

## 2024-10-11 PROCEDURE — 70553 MRI BRAIN STEM W/O & W/DYE: CPT | Mod: 26

## 2024-10-12 LAB
CULTURE RESULTS: SIGNIFICANT CHANGE UP
CULTURE RESULTS: SIGNIFICANT CHANGE UP
SPECIMEN SOURCE: SIGNIFICANT CHANGE UP
SPECIMEN SOURCE: SIGNIFICANT CHANGE UP

## 2024-10-14 ENCOUNTER — APPOINTMENT (OUTPATIENT)
Dept: MRI IMAGING | Facility: CLINIC | Age: 44
End: 2024-10-14
Payer: COMMERCIAL

## 2024-10-14 ENCOUNTER — OUTPATIENT (OUTPATIENT)
Dept: OUTPATIENT SERVICES | Facility: HOSPITAL | Age: 44
LOS: 1 days | End: 2024-10-14

## 2024-10-14 DIAGNOSIS — Z98.49 CATARACT EXTRACTION STATUS, UNSPECIFIED EYE: Chronic | ICD-10-CM

## 2024-10-14 PROCEDURE — 74183 MRI ABD W/O CNTR FLWD CNTR: CPT | Mod: 26

## 2024-10-15 ENCOUNTER — APPOINTMENT (OUTPATIENT)
Dept: HEMATOLOGY ONCOLOGY | Facility: CLINIC | Age: 44
End: 2024-10-15
Payer: COMMERCIAL

## 2024-10-15 ENCOUNTER — LABORATORY RESULT (OUTPATIENT)
Age: 44
End: 2024-10-15

## 2024-10-15 VITALS
HEART RATE: 90 BPM | DIASTOLIC BLOOD PRESSURE: 77 MMHG | OXYGEN SATURATION: 97 % | TEMPERATURE: 97.6 F | RESPIRATION RATE: 18 BRPM | WEIGHT: 143 LBS | HEIGHT: 68 IN | BODY MASS INDEX: 21.67 KG/M2 | SYSTOLIC BLOOD PRESSURE: 114 MMHG

## 2024-10-15 LAB
HCT VFR BLD CALC: 41.7 %
HGB BLD-MCNC: 14.4 G/DL
LDH SERPL-CCNC: 272 U/L
LYMPHOCYTES # BLD AUTO: 1.7 K/UL
LYMPHOCYTES NFR BLD AUTO: 10.6 %
MAN DIFF?: NO
MCHC RBC-ENTMCNC: 30.5 PG
MCHC RBC-ENTMCNC: 34.5 GM/DL
MCV RBC AUTO: 88.3 FL
NEUTROPHILS # BLD AUTO: 13.9 K/UL
NEUTROPHILS NFR BLD AUTO: 87.2 %
PLATELET # BLD AUTO: 396 K/UL
RBC # BLD: 4.72 M/UL
RBC # FLD: 12.9 %
WBC # FLD AUTO: 15.9 K/UL

## 2024-10-15 PROCEDURE — G2211 COMPLEX E/M VISIT ADD ON: CPT | Mod: NC

## 2024-10-15 PROCEDURE — 99214 OFFICE O/P EST MOD 30 MIN: CPT

## 2024-10-16 ENCOUNTER — NON-APPOINTMENT (OUTPATIENT)
Age: 44
End: 2024-10-16

## 2024-10-16 ENCOUNTER — APPOINTMENT (OUTPATIENT)
Dept: NEUROSURGERY | Facility: CLINIC | Age: 44
End: 2024-10-16
Payer: COMMERCIAL

## 2024-10-16 VITALS
DIASTOLIC BLOOD PRESSURE: 76 MMHG | OXYGEN SATURATION: 98 % | HEIGHT: 68 IN | RESPIRATION RATE: 18 BRPM | TEMPERATURE: 97.5 F | HEART RATE: 83 BPM | SYSTOLIC BLOOD PRESSURE: 117 MMHG | BODY MASS INDEX: 22.43 KG/M2 | WEIGHT: 148 LBS

## 2024-10-16 DIAGNOSIS — R65.20 SEVERE SEPSIS WITHOUT SEPTIC SHOCK: ICD-10-CM

## 2024-10-16 DIAGNOSIS — Z11.52 ENCOUNTER FOR SCREENING FOR COVID-19: ICD-10-CM

## 2024-10-16 DIAGNOSIS — D84.9 IMMUNODEFICIENCY, UNSPECIFIED: ICD-10-CM

## 2024-10-16 DIAGNOSIS — C69.90 MALIGNANT NEOPLASM OF UNSPECIFIED SITE OF UNSPECIFIED EYE: ICD-10-CM

## 2024-10-16 DIAGNOSIS — M89.9 DISORDER OF BONE, UNSPECIFIED: ICD-10-CM

## 2024-10-16 DIAGNOSIS — R19.7 DIARRHEA, UNSPECIFIED: ICD-10-CM

## 2024-10-16 DIAGNOSIS — C79.51 SECONDARY MALIGNANT NEOPLASM OF BONE: ICD-10-CM

## 2024-10-16 DIAGNOSIS — A04.0 ENTEROPATHOGENIC ESCHERICHIA COLI INFECTION: ICD-10-CM

## 2024-10-16 DIAGNOSIS — A41.51 SEPSIS DUE TO ESCHERICHIA COLI [E. COLI]: ICD-10-CM

## 2024-10-16 DIAGNOSIS — C78.7 SECONDARY MALIGNANT NEOPLASM OF LIVER AND INTRAHEPATIC BILE DUCT: ICD-10-CM

## 2024-10-16 DIAGNOSIS — E05.90 THYROTOXICOSIS, UNSPECIFIED WITHOUT THYROTOXIC CRISIS OR STORM: ICD-10-CM

## 2024-10-16 DIAGNOSIS — M53.3 SACROCOCCYGEAL DISORDERS, NOT ELSEWHERE CLASSIFIED: ICD-10-CM

## 2024-10-16 PROCEDURE — 99204 OFFICE O/P NEW MOD 45 MIN: CPT

## 2024-10-17 LAB
ALBUMIN SERPL ELPH-MCNC: 3.2 G/DL
ALP BLD-CCNC: 161 U/L
ALT SERPL-CCNC: 100 U/L
ANION GAP SERPL CALC-SCNC: 4 MMOL/L
AST SERPL-CCNC: 49 U/L
BILIRUB SERPL-MCNC: 1 MG/DL
BUN SERPL-MCNC: 18 MG/DL
CALCIUM SERPL-MCNC: 9.1 MG/DL
CHLORIDE SERPL-SCNC: 104 MMOL/L
CO2 SERPL-SCNC: 27 MMOL/L
CREAT SERPL-MCNC: 0.6 MG/DL
EGFR: 112 ML/MIN/1.73M2
GLUCOSE SERPL-MCNC: 149 MG/DL
POTASSIUM SERPL-SCNC: 4.5 MMOL/L
PROT SERPL-MCNC: 6.9 G/DL
SODIUM SERPL-SCNC: 135 MMOL/L
TSH SERPL-ACNC: 0.01 UIU/ML

## 2024-10-18 ENCOUNTER — NON-APPOINTMENT (OUTPATIENT)
Age: 44
End: 2024-10-18

## 2024-10-18 LAB
CORTICOSTEROID BINDING GLOBULIN RESULT: 2 MG/DL — SIGNIFICANT CHANGE UP
CORTIS F/TOTAL MFR SERPL: 11 % — SIGNIFICANT CHANGE UP
CORTIS SERPL-MCNC: 12 UG/DL — SIGNIFICANT CHANGE UP
CORTISOL, FREE RESULT: 1.3 UG/DL — SIGNIFICANT CHANGE UP

## 2024-10-21 ENCOUNTER — APPOINTMENT (OUTPATIENT)
Dept: NEUROSURGERY | Facility: CLINIC | Age: 44
End: 2024-10-21
Payer: COMMERCIAL

## 2024-10-21 ENCOUNTER — APPOINTMENT (OUTPATIENT)
Dept: HEMATOLOGY ONCOLOGY | Facility: CLINIC | Age: 44
End: 2024-10-21

## 2024-10-21 ENCOUNTER — LABORATORY RESULT (OUTPATIENT)
Age: 44
End: 2024-10-21

## 2024-10-21 PROCEDURE — 63620 SRS SPINAL LESION: CPT

## 2024-10-22 ENCOUNTER — RESULT REVIEW (OUTPATIENT)
Age: 44
End: 2024-10-22

## 2024-10-22 ENCOUNTER — APPOINTMENT (OUTPATIENT)
Dept: HEMATOLOGY ONCOLOGY | Facility: CLINIC | Age: 44
End: 2024-10-22
Payer: COMMERCIAL

## 2024-10-22 DIAGNOSIS — N63.0 UNSPECIFIED LUMP IN UNSPECIFIED BREAST: ICD-10-CM

## 2024-10-22 LAB
ALBUMIN SERPL ELPH-MCNC: 3.1 G/DL
ALP BLD-CCNC: 120 U/L
ALT SERPL-CCNC: 104 U/L
ANION GAP SERPL CALC-SCNC: 5 MMOL/L
AST SERPL-CCNC: 49 U/L
BILIRUB SERPL-MCNC: 1.2 MG/DL
BUN SERPL-MCNC: 11 MG/DL
CALCIUM SERPL-MCNC: 8.6 MG/DL
CHLORIDE SERPL-SCNC: 104 MMOL/L
CO2 SERPL-SCNC: 26 MMOL/L
CREAT SERPL-MCNC: 0.8 MG/DL
EGFR: 92 ML/MIN/1.73M2
GLUCOSE SERPL-MCNC: 91 MG/DL
HCT VFR BLD CALC: 41.5 %
HGB BLD-MCNC: 13.9 G/DL
LDH SERPL-CCNC: 250 U/L
LYMPHOCYTES # BLD AUTO: 1.3 K/UL
LYMPHOCYTES NFR BLD AUTO: 8.1 %
MAN DIFF?: NO
MCHC RBC-ENTMCNC: 30 PG
MCHC RBC-ENTMCNC: 33.5 GM/DL
MCV RBC AUTO: 89.6 FL
NEUTROPHILS # BLD AUTO: 13.6 K/UL
NEUTROPHILS NFR BLD AUTO: 83.8 %
PLATELET # BLD AUTO: 391 K/UL
POTASSIUM SERPL-SCNC: 4 MMOL/L
PROT SERPL-MCNC: 6.9 G/DL
RBC # BLD: 4.63 M/UL
RBC # FLD: 13.2 %
SODIUM SERPL-SCNC: 135 MMOL/L
TSH SERPL-ACNC: 0.01 UIU/ML
WBC # FLD AUTO: 16.2 K/UL

## 2024-10-22 PROCEDURE — 99214 OFFICE O/P EST MOD 30 MIN: CPT

## 2024-10-22 PROCEDURE — G2211 COMPLEX E/M VISIT ADD ON: CPT | Mod: NC

## 2024-10-25 ENCOUNTER — LABORATORY RESULT (OUTPATIENT)
Age: 44
End: 2024-10-25

## 2024-10-25 ENCOUNTER — APPOINTMENT (OUTPATIENT)
Dept: MAMMOGRAPHY | Facility: HOSPITAL | Age: 44
End: 2024-10-25

## 2024-10-25 ENCOUNTER — APPOINTMENT (OUTPATIENT)
Dept: ULTRASOUND IMAGING | Facility: HOSPITAL | Age: 44
End: 2024-10-25

## 2024-10-28 ENCOUNTER — LABORATORY RESULT (OUTPATIENT)
Age: 44
End: 2024-10-28

## 2024-10-28 ENCOUNTER — APPOINTMENT (OUTPATIENT)
Dept: HEMATOLOGY ONCOLOGY | Facility: CLINIC | Age: 44
End: 2024-10-28

## 2024-10-28 ENCOUNTER — NON-APPOINTMENT (OUTPATIENT)
Age: 44
End: 2024-10-28

## 2024-10-28 LAB
HCT VFR BLD CALC: 40.7 %
HGB BLD-MCNC: 13.5 G/DL
LYMPHOCYTES # BLD AUTO: 0.7 K/UL
LYMPHOCYTES NFR BLD AUTO: 5.6 %
MAN DIFF?: NO
MCHC RBC-ENTMCNC: 29.9 PG
MCHC RBC-ENTMCNC: 33.2 GM/DL
MCV RBC AUTO: 90.2 FL
NEUTROPHILS # BLD AUTO: 10.7 K/UL
NEUTROPHILS NFR BLD AUTO: 90.8 %
PLATELET # BLD AUTO: 281 K/UL
RBC # BLD: 4.51 M/UL
RBC # FLD: 13.5 %
WBC # FLD AUTO: 11.8 K/UL

## 2024-10-29 ENCOUNTER — APPOINTMENT (OUTPATIENT)
Dept: HEMATOLOGY ONCOLOGY | Facility: CLINIC | Age: 44
End: 2024-10-29
Payer: COMMERCIAL

## 2024-10-29 ENCOUNTER — NON-APPOINTMENT (OUTPATIENT)
Age: 44
End: 2024-10-29

## 2024-10-29 LAB
ALBUMIN SERPL ELPH-MCNC: 3.1 G/DL
ALBUMIN SERPL ELPH-MCNC: 3.3 G/DL
ALP BLD-CCNC: 118 U/L
ALP BLD-CCNC: 95 U/L
ALT SERPL-CCNC: 51 U/L
ALT SERPL-CCNC: 83 U/L
ANION GAP SERPL CALC-SCNC: 1 MMOL/L
ANION GAP SERPL CALC-SCNC: 1 MMOL/L
AST SERPL-CCNC: 31 U/L
AST SERPL-CCNC: 47 U/L
BILIRUB SERPL-MCNC: 1 MG/DL
BILIRUB SERPL-MCNC: 1.1 MG/DL
BUN SERPL-MCNC: 11 MG/DL
BUN SERPL-MCNC: 14 MG/DL
CALCIUM SERPL-MCNC: 9 MG/DL
CALCIUM SERPL-MCNC: 9.2 MG/DL
CHLORIDE SERPL-SCNC: 107 MMOL/L
CHLORIDE SERPL-SCNC: 107 MMOL/L
CO2 SERPL-SCNC: 27 MMOL/L
CO2 SERPL-SCNC: 29 MMOL/L
CREAT SERPL-MCNC: 0.6 MG/DL
CREAT SERPL-MCNC: 0.7 MG/DL
EGFR: 108 ML/MIN/1.73M2
EGFR: 112 ML/MIN/1.73M2
GLUCOSE SERPL-MCNC: 113 MG/DL
GLUCOSE SERPL-MCNC: 96 MG/DL
HCT VFR BLD CALC: 39.9 %
HGB BLD-MCNC: 13.2 G/DL
LDH SERPL-CCNC: 245 U/L
LDH SERPL-CCNC: 259 U/L
LYMPHOCYTES # BLD AUTO: 1.79 K/UL
LYMPHOCYTES NFR BLD AUTO: 17 %
MAN DIFF?: YES
MCHC RBC-ENTMCNC: 30 PG
MCHC RBC-ENTMCNC: 33.1 GM/DL
MCV RBC AUTO: 90.7 FL
MONOCYTES # BLD AUTO: 0.5 K/UL
MONOCYTES NFR BLD AUTO: 5 %
NEUTROPHILS # BLD AUTO: 8.19 K/UL
NEUTROPHILS NFR BLD AUTO: 78 %
PLATELET # BLD AUTO: 188 K/UL
POTASSIUM SERPL-SCNC: 4.4 MMOL/L
POTASSIUM SERPL-SCNC: 4.7 MMOL/L
PROT SERPL-MCNC: 6.3 G/DL
PROT SERPL-MCNC: 6.5 G/DL
RBC # BLD: 4.4 M/UL
RBC # FLD: 13.7 %
SODIUM SERPL-SCNC: 135 MMOL/L
SODIUM SERPL-SCNC: 137 MMOL/L
WBC # FLD AUTO: 10.5 K/UL

## 2024-10-29 PROCEDURE — 99214 OFFICE O/P EST MOD 30 MIN: CPT

## 2024-10-29 PROCEDURE — G2211 COMPLEX E/M VISIT ADD ON: CPT | Mod: NC

## 2024-10-30 ENCOUNTER — RESULT REVIEW (OUTPATIENT)
Age: 44
End: 2024-10-30

## 2024-10-30 ENCOUNTER — NON-APPOINTMENT (OUTPATIENT)
Age: 44
End: 2024-10-30

## 2024-10-30 ENCOUNTER — APPOINTMENT (OUTPATIENT)
Dept: ULTRASOUND IMAGING | Facility: CLINIC | Age: 44
End: 2024-10-30
Payer: COMMERCIAL

## 2024-10-30 ENCOUNTER — OUTPATIENT (OUTPATIENT)
Dept: OUTPATIENT SERVICES | Facility: HOSPITAL | Age: 44
LOS: 1 days | End: 2024-10-30

## 2024-10-30 ENCOUNTER — APPOINTMENT (OUTPATIENT)
Dept: MAMMOGRAPHY | Facility: CLINIC | Age: 44
End: 2024-10-30
Payer: COMMERCIAL

## 2024-10-30 DIAGNOSIS — Z98.49 CATARACT EXTRACTION STATUS, UNSPECIFIED EYE: Chronic | ICD-10-CM

## 2024-10-30 PROCEDURE — 76642 ULTRASOUND BREAST LIMITED: CPT | Mod: 26,RT

## 2024-10-30 PROCEDURE — 77066 DX MAMMO INCL CAD BI: CPT | Mod: 26

## 2024-10-30 PROCEDURE — G0279: CPT | Mod: 26

## 2024-10-31 ENCOUNTER — NON-APPOINTMENT (OUTPATIENT)
Age: 44
End: 2024-10-31

## 2024-10-31 DIAGNOSIS — C69.40 MALIGNANT NEOPLASM OF UNSPECIFIED CILIARY BODY: ICD-10-CM

## 2024-10-31 DIAGNOSIS — C78.7 MALIGNANT NEOPLASM OF UNSPECIFIED CILIARY BODY: ICD-10-CM

## 2024-10-31 DIAGNOSIS — C69.90 MALIGNANT NEOPLASM OF UNSPECIFIED SITE OF UNSPECIFIED EYE: ICD-10-CM

## 2024-10-31 LAB
TSH SERPL-ACNC: 0.01 UIU/ML
TSH SERPL-ACNC: 0.01 UIU/ML

## 2024-11-01 ENCOUNTER — NON-APPOINTMENT (OUTPATIENT)
Age: 44
End: 2024-11-01

## 2024-11-04 ENCOUNTER — APPOINTMENT (OUTPATIENT)
Dept: HEMATOLOGY ONCOLOGY | Facility: CLINIC | Age: 44
End: 2024-11-04

## 2024-11-04 ENCOUNTER — LABORATORY RESULT (OUTPATIENT)
Age: 44
End: 2024-11-04

## 2024-11-04 LAB
ALBUMIN SERPL ELPH-MCNC: 3.5 G/DL
ALP BLD-CCNC: 106 U/L
ALT SERPL-CCNC: 94 U/L
ANION GAP SERPL CALC-SCNC: 3 MMOL/L
AST SERPL-CCNC: 61 U/L
BILIRUB SERPL-MCNC: 1 MG/DL
BUN SERPL-MCNC: 7 MG/DL
CALCIUM SERPL-MCNC: 9.5 MG/DL
CHLORIDE SERPL-SCNC: 105 MMOL/L
CO2 SERPL-SCNC: 28 MMOL/L
CREAT SERPL-MCNC: 0.7 MG/DL
EGFR: 108 ML/MIN/1.73M2
GLUCOSE SERPL-MCNC: 93 MG/DL
HCT VFR BLD CALC: 39.8 %
HGB BLD-MCNC: 13.3 G/DL
LDH SERPL-CCNC: 302 U/L
LYMPHOCYTES # BLD AUTO: 1.1 K/UL
LYMPHOCYTES NFR BLD AUTO: 21.2 %
MAN DIFF?: NO
MCHC RBC-ENTMCNC: 29.9 PG
MCHC RBC-ENTMCNC: 33.4 G/DL
MCV RBC AUTO: 89.4 FL
NEUTROPHILS # BLD AUTO: 3.5 K/UL
NEUTROPHILS NFR BLD AUTO: 71.2 %
PLATELET # BLD AUTO: 139 K/UL
POTASSIUM SERPL-SCNC: 4.6 MMOL/L
PROT SERPL-MCNC: 6.8 G/DL
RBC # BLD: 4.45 M/UL
RBC # FLD: 13.9 %
SODIUM SERPL-SCNC: 136 MMOL/L
WBC # FLD AUTO: 5 K/UL

## 2024-11-05 ENCOUNTER — APPOINTMENT (OUTPATIENT)
Dept: HEMATOLOGY ONCOLOGY | Facility: CLINIC | Age: 44
End: 2024-11-05
Payer: COMMERCIAL

## 2024-11-05 LAB — TSH SERPL-ACNC: 0.11 UIU/ML

## 2024-11-05 PROCEDURE — 99214 OFFICE O/P EST MOD 30 MIN: CPT

## 2024-11-05 PROCEDURE — G2211 COMPLEX E/M VISIT ADD ON: CPT | Mod: NC

## 2024-11-07 ENCOUNTER — APPOINTMENT (OUTPATIENT)
Dept: HEMATOLOGY ONCOLOGY | Facility: CLINIC | Age: 44
End: 2024-11-07

## 2024-11-07 LAB
ALBUMIN SERPL ELPH-MCNC: 3.3 G/DL
ALP BLD-CCNC: 116 U/L
ALT SERPL-CCNC: 84 U/L
ANION GAP SERPL CALC-SCNC: 2 MMOL/L
AST SERPL-CCNC: 63 U/L
BILIRUB SERPL-MCNC: 0.8 MG/DL
BUN SERPL-MCNC: 7 MG/DL
CALCIUM SERPL-MCNC: 8.7 MG/DL
CHLORIDE SERPL-SCNC: 108 MMOL/L
CO2 SERPL-SCNC: 26 MMOL/L
CREAT SERPL-MCNC: 0.7 MG/DL
EGFR: 108 ML/MIN/1.73M2
GLUCOSE SERPL-MCNC: 88 MG/DL
HCT VFR BLD CALC: 39 %
HGB BLD-MCNC: 13.1 G/DL
LDH SERPL-CCNC: 297 U/L
MCHC RBC-ENTMCNC: 30.2 PG
MCHC RBC-ENTMCNC: 33.6 G/DL
MCV RBC AUTO: 89.9 FL
PLATELET # BLD AUTO: 165 K/UL
POTASSIUM SERPL-SCNC: 4.4 MMOL/L
PROT SERPL-MCNC: 6.5 G/DL
RBC # BLD: 4.34 M/UL
RBC # FLD: 13.8 %
SODIUM SERPL-SCNC: 136 MMOL/L
TSH SERPL-ACNC: <0.118 UIU/ML
WBC # FLD AUTO: 3.9 K/UL

## 2024-11-14 ENCOUNTER — APPOINTMENT (OUTPATIENT)
Dept: HEMATOLOGY ONCOLOGY | Facility: CLINIC | Age: 44
End: 2024-11-14

## 2024-11-14 ENCOUNTER — NON-APPOINTMENT (OUTPATIENT)
Age: 44
End: 2024-11-14

## 2024-11-14 LAB
ALBUMIN SERPL ELPH-MCNC: 3.1 G/DL
ALP BLD-CCNC: 106 U/L
ALT SERPL-CCNC: 46 U/L
ANION GAP SERPL CALC-SCNC: 0 MMOL/L
AST SERPL-CCNC: 38 U/L
BILIRUB SERPL-MCNC: 1 MG/DL
BUN SERPL-MCNC: 13 MG/DL
CALCIUM SERPL-MCNC: 9.2 MG/DL
CHLORIDE SERPL-SCNC: 109 MMOL/L
CO2 SERPL-SCNC: 27 MMOL/L
CREAT SERPL-MCNC: 0.7 MG/DL
EGFR: 108 ML/MIN/1.73M2
GLUCOSE SERPL-MCNC: 103 MG/DL
POTASSIUM SERPL-SCNC: 4.4 MMOL/L
PROT SERPL-MCNC: 6.6 G/DL
SODIUM SERPL-SCNC: 136 MMOL/L

## 2024-11-19 ENCOUNTER — APPOINTMENT (OUTPATIENT)
Dept: HEMATOLOGY ONCOLOGY | Facility: CLINIC | Age: 44
End: 2024-11-19
Payer: COMMERCIAL

## 2024-11-19 ENCOUNTER — APPOINTMENT (OUTPATIENT)
Dept: NEUROSURGERY | Facility: CLINIC | Age: 44
End: 2024-11-19

## 2024-11-19 PROCEDURE — G2211 COMPLEX E/M VISIT ADD ON: CPT | Mod: NC

## 2024-11-19 PROCEDURE — 99213 OFFICE O/P EST LOW 20 MIN: CPT

## 2024-11-21 ENCOUNTER — APPOINTMENT (OUTPATIENT)
Dept: ENDOCRINOLOGY | Facility: CLINIC | Age: 44
End: 2024-11-21
Payer: COMMERCIAL

## 2024-11-21 VITALS
DIASTOLIC BLOOD PRESSURE: 82 MMHG | HEART RATE: 69 BPM | OXYGEN SATURATION: 99 % | TEMPERATURE: 97.6 F | SYSTOLIC BLOOD PRESSURE: 120 MMHG

## 2024-11-21 DIAGNOSIS — E06.4 DRUG-INDUCED THYROIDITIS: ICD-10-CM

## 2024-11-21 DIAGNOSIS — Z92.89 PERSONAL HISTORY OF OTHER MEDICAL TREATMENT: ICD-10-CM

## 2024-11-21 PROBLEM — E03.9 HYPOTHYROIDISM: Status: ACTIVE | Noted: 2024-11-21

## 2024-11-21 PROCEDURE — 99204 OFFICE O/P NEW MOD 45 MIN: CPT

## 2024-11-21 RX ORDER — LEVOTHYROXINE SODIUM 0.09 MG/1
88 TABLET ORAL
Qty: 1 | Refills: 3 | Status: ACTIVE | COMMUNITY
Start: 2024-11-21 | End: 1900-01-01

## 2024-11-22 ENCOUNTER — LABORATORY RESULT (OUTPATIENT)
Age: 44
End: 2024-11-22

## 2024-11-22 ENCOUNTER — APPOINTMENT (OUTPATIENT)
Dept: HEMATOLOGY ONCOLOGY | Facility: CLINIC | Age: 44
End: 2024-11-22

## 2024-11-22 LAB
ALBUMIN SERPL ELPH-MCNC: 4 G/DL
ALP BLD-CCNC: 120 U/L
ALT SERPL-CCNC: 32 U/L
ANION GAP SERPL CALC-SCNC: 12 MMOL/L
AST SERPL-CCNC: 29 U/L
BASOPHILS # BLD AUTO: 0 K/UL
BASOPHILS NFR BLD AUTO: 0 %
BILIRUB SERPL-MCNC: 0.6 MG/DL
BUN SERPL-MCNC: 11 MG/DL
CALCIUM SERPL-MCNC: 9.1 MG/DL
CHLORIDE SERPL-SCNC: 102 MMOL/L
CO2 SERPL-SCNC: 22 MMOL/L
CREAT SERPL-MCNC: 0.72 MG/DL
EGFR: 104 ML/MIN/1.73M2
EOSINOPHIL # BLD AUTO: 0 K/UL
EOSINOPHIL NFR BLD AUTO: 0 %
GLUCOSE SERPL-MCNC: 87 MG/DL
HCT VFR BLD CALC: 40.7 %
HGB BLD-MCNC: 13.4 G/DL
LDH SERPL-CCNC: 316 U/L
LYMPHOCYTES # BLD AUTO: 0.63 K/UL
LYMPHOCYTES NFR BLD AUTO: 4.4 %
MAN DIFF?: NORMAL
MCHC RBC-ENTMCNC: 29.7 PG
MCHC RBC-ENTMCNC: 32.9 G/DL
MCV RBC AUTO: 90.2 FL
MONOCYTES # BLD AUTO: 0.63 K/UL
MONOCYTES NFR BLD AUTO: 4.4 %
NEUTROPHILS # BLD AUTO: 12.61 K/UL
NEUTROPHILS NFR BLD AUTO: 88.5 %
PLATELET # BLD AUTO: 298 K/UL
POTASSIUM SERPL-SCNC: 4 MMOL/L
PROT SERPL-MCNC: 7.1 G/DL
RBC # BLD: 4.51 M/UL
RBC # FLD: 13.9 %
SODIUM SERPL-SCNC: 136 MMOL/L
T4 FREE SERPL-MCNC: 1.4 NG/DL
TSH RECEPTOR AB: <1.1 IU/L
TSH SERPL-ACNC: 0.03 UIU/ML
WBC # FLD AUTO: 14.25 K/UL

## 2024-11-24 LAB — TSI ACT/NOR SER: <0.1 IU/L

## 2024-11-25 DIAGNOSIS — E03.9 HYPOTHYROIDISM, UNSPECIFIED: ICD-10-CM

## 2024-11-26 ENCOUNTER — RESULT REVIEW (OUTPATIENT)
Age: 44
End: 2024-11-26

## 2024-11-27 ENCOUNTER — APPOINTMENT (OUTPATIENT)
Dept: HEMATOLOGY ONCOLOGY | Facility: CLINIC | Age: 44
End: 2024-11-27

## 2024-11-27 ENCOUNTER — LABORATORY RESULT (OUTPATIENT)
Age: 44
End: 2024-11-27

## 2024-11-29 LAB
ALBUMIN SERPL ELPH-MCNC: 3.3 G/DL
ALP BLD-CCNC: 79 U/L
ALT SERPL-CCNC: 35 U/L
ANION GAP SERPL CALC-SCNC: 3 MMOL/L
AST SERPL-CCNC: 33 U/L
BILIRUB SERPL-MCNC: 0.9 MG/DL
BUN SERPL-MCNC: 13 MG/DL
CALCIUM SERPL-MCNC: 9.2 MG/DL
CHLORIDE SERPL-SCNC: 107 MMOL/L
CO2 SERPL-SCNC: 27 MMOL/L
CREAT SERPL-MCNC: 0.7 MG/DL
EGFR: 108 ML/MIN/1.73M2
GLUCOSE SERPL-MCNC: 101 MG/DL
POTASSIUM SERPL-SCNC: 4.2 MMOL/L
PROT SERPL-MCNC: 6.6 G/DL
SODIUM SERPL-SCNC: 137 MMOL/L
TSH SERPL-ACNC: 0.01 UIU/ML

## 2024-12-02 ENCOUNTER — APPOINTMENT (OUTPATIENT)
Dept: CT IMAGING | Facility: CLINIC | Age: 44
End: 2024-12-02
Payer: COMMERCIAL

## 2024-12-02 ENCOUNTER — APPOINTMENT (OUTPATIENT)
Dept: MRI IMAGING | Facility: CLINIC | Age: 44
End: 2024-12-02
Payer: COMMERCIAL

## 2024-12-02 ENCOUNTER — OUTPATIENT (OUTPATIENT)
Dept: OUTPATIENT SERVICES | Facility: HOSPITAL | Age: 44
LOS: 1 days | End: 2024-12-02

## 2024-12-02 DIAGNOSIS — Z98.49 CATARACT EXTRACTION STATUS, UNSPECIFIED EYE: Chronic | ICD-10-CM

## 2024-12-02 LAB
HCT VFR BLD CALC: 39.8 %
HGB BLD-MCNC: 13.1 G/DL
LYMPHOCYTES # BLD AUTO: 0.7 K/UL
LYMPHOCYTES NFR BLD AUTO: 7.6 %
MAN DIFF?: NO
MCHC RBC-ENTMCNC: 30.3 PG
MCHC RBC-ENTMCNC: 32.9 G/DL
MCV RBC AUTO: 92.1 FL
NEUTROPHILS # BLD AUTO: 8.2 K/UL
NEUTROPHILS NFR BLD AUTO: 83.3 %
PLATELET # BLD AUTO: 252 K/UL
RBC # BLD: 4.32 M/UL
RBC # FLD: 14.7 %
WBC # FLD AUTO: 9.8 K/UL

## 2024-12-02 PROCEDURE — 74183 MRI ABD W/O CNTR FLWD CNTR: CPT | Mod: 26

## 2024-12-02 PROCEDURE — 71260 CT THORAX DX C+: CPT | Mod: 26

## 2024-12-02 PROCEDURE — 72197 MRI PELVIS W/O & W/DYE: CPT | Mod: 26

## 2024-12-03 ENCOUNTER — APPOINTMENT (OUTPATIENT)
Dept: HEMATOLOGY ONCOLOGY | Facility: CLINIC | Age: 44
End: 2024-12-03
Payer: COMMERCIAL

## 2024-12-03 PROCEDURE — 99214 OFFICE O/P EST MOD 30 MIN: CPT

## 2024-12-03 PROCEDURE — G2211 COMPLEX E/M VISIT ADD ON: CPT | Mod: NC

## 2024-12-04 ENCOUNTER — NON-APPOINTMENT (OUTPATIENT)
Age: 44
End: 2024-12-04

## 2024-12-05 ENCOUNTER — TRANSCRIPTION ENCOUNTER (OUTPATIENT)
Age: 44
End: 2024-12-05

## 2024-12-09 ENCOUNTER — APPOINTMENT (OUTPATIENT)
Dept: HEMATOLOGY ONCOLOGY | Facility: CLINIC | Age: 44
End: 2024-12-09

## 2024-12-09 ENCOUNTER — NON-APPOINTMENT (OUTPATIENT)
Age: 44
End: 2024-12-09

## 2024-12-09 ENCOUNTER — APPOINTMENT (OUTPATIENT)
Dept: OBGYN | Facility: CLINIC | Age: 44
End: 2024-12-09
Payer: COMMERCIAL

## 2024-12-09 VITALS
BODY MASS INDEX: 23.19 KG/M2 | WEIGHT: 153 LBS | DIASTOLIC BLOOD PRESSURE: 81 MMHG | HEIGHT: 68 IN | SYSTOLIC BLOOD PRESSURE: 130 MMHG

## 2024-12-09 DIAGNOSIS — Z11.51 ENCOUNTER FOR SCREENING FOR HUMAN PAPILLOMAVIRUS (HPV): ICD-10-CM

## 2024-12-09 DIAGNOSIS — N97.9 FEMALE INFERTILITY, UNSPECIFIED: ICD-10-CM

## 2024-12-09 DIAGNOSIS — Z87.42 PERSONAL HISTORY OF OTHER DISEASES OF THE FEMALE GENITAL TRACT: ICD-10-CM

## 2024-12-09 DIAGNOSIS — Z01.419 ENCOUNTER FOR GYNECOLOGICAL EXAMINATION (GENERAL) (ROUTINE) W/OUT ABNORMAL FINDINGS: ICD-10-CM

## 2024-12-09 LAB
ALBUMIN SERPL ELPH-MCNC: 3.6 G/DL
ALP BLD-CCNC: 95 U/L
ALT SERPL-CCNC: 51 U/L
ANION GAP SERPL CALC-SCNC: 2 MMOL/L
AST SERPL-CCNC: 44 U/L
BILIRUB SERPL-MCNC: 0.8 MG/DL
BUN SERPL-MCNC: 12 MG/DL
CALCIUM SERPL-MCNC: 9.7 MG/DL
CHLORIDE SERPL-SCNC: 108 MMOL/L
CO2 SERPL-SCNC: 28 MMOL/L
CREAT SERPL-MCNC: 0.7 MG/DL
EGFR: 108 ML/MIN/1.73M2
GLUCOSE SERPL-MCNC: 95 MG/DL
HCT VFR BLD CALC: 40.7 %
HGB BLD-MCNC: 13.7 G/DL
LYMPHOCYTES # BLD AUTO: 0.7 K/UL
LYMPHOCYTES NFR BLD AUTO: 8 %
MAN DIFF?: NO
MCHC RBC-ENTMCNC: 31.1 PG
MCHC RBC-ENTMCNC: 33.7 G/DL
MCV RBC AUTO: 92.3 FL
NEUTROPHILS # BLD AUTO: 7.4 K/UL
NEUTROPHILS NFR BLD AUTO: 82.4 %
PLATELET # BLD AUTO: 272 K/UL
POTASSIUM SERPL-SCNC: 4.4 MMOL/L
PROT SERPL-MCNC: 7.1 G/DL
RBC # BLD: 4.41 M/UL
RBC # FLD: 14.8 %
SODIUM SERPL-SCNC: 138 MMOL/L
WBC # FLD AUTO: 9 K/UL

## 2024-12-09 PROCEDURE — 99386 PREV VISIT NEW AGE 40-64: CPT

## 2024-12-10 ENCOUNTER — TRANSCRIPTION ENCOUNTER (OUTPATIENT)
Age: 44
End: 2024-12-10

## 2024-12-10 ENCOUNTER — APPOINTMENT (OUTPATIENT)
Dept: HEMATOLOGY ONCOLOGY | Facility: CLINIC | Age: 44
End: 2024-12-10
Payer: COMMERCIAL

## 2024-12-10 DIAGNOSIS — C69.90 MALIGNANT NEOPLASM OF UNSPECIFIED SITE OF UNSPECIFIED EYE: ICD-10-CM

## 2024-12-10 LAB — ANTI-MUELLERIAN HORMONE: 0.03 NG/ML

## 2024-12-10 PROCEDURE — G2211 COMPLEX E/M VISIT ADD ON: CPT | Mod: NC

## 2024-12-10 PROCEDURE — 99214 OFFICE O/P EST MOD 30 MIN: CPT

## 2024-12-11 LAB — HPV HIGH+LOW RISK DNA PNL CVX: NOT DETECTED

## 2024-12-13 ENCOUNTER — TRANSCRIPTION ENCOUNTER (OUTPATIENT)
Age: 44
End: 2024-12-13

## 2024-12-13 LAB — CYTOLOGY CVX/VAG DOC THIN PREP: NORMAL

## 2024-12-14 ENCOUNTER — EMERGENCY (EMERGENCY)
Facility: HOSPITAL | Age: 44
LOS: 1 days | Discharge: HOME | End: 2024-12-14
Attending: EMERGENCY MEDICINE | Admitting: EMERGENCY MEDICINE
Payer: COMMERCIAL

## 2024-12-14 ENCOUNTER — NON-APPOINTMENT (OUTPATIENT)
Age: 44
End: 2024-12-14

## 2024-12-14 VITALS
OXYGEN SATURATION: 98 % | SYSTOLIC BLOOD PRESSURE: 113 MMHG | TEMPERATURE: 98 F | DIASTOLIC BLOOD PRESSURE: 78 MMHG | HEART RATE: 89 BPM | RESPIRATION RATE: 17 BRPM

## 2024-12-14 VITALS
DIASTOLIC BLOOD PRESSURE: 92 MMHG | TEMPERATURE: 98 F | SYSTOLIC BLOOD PRESSURE: 139 MMHG | HEART RATE: 125 BPM | RESPIRATION RATE: 16 BRPM | WEIGHT: 149.91 LBS | OXYGEN SATURATION: 97 %

## 2024-12-14 DIAGNOSIS — R10.11 RIGHT UPPER QUADRANT PAIN: ICD-10-CM

## 2024-12-14 DIAGNOSIS — R00.0 TACHYCARDIA, UNSPECIFIED: ICD-10-CM

## 2024-12-14 DIAGNOSIS — C43.9 MALIGNANT MELANOMA OF SKIN, UNSPECIFIED: ICD-10-CM

## 2024-12-14 DIAGNOSIS — Z98.49 CATARACT EXTRACTION STATUS, UNSPECIFIED EYE: Chronic | ICD-10-CM

## 2024-12-14 LAB
ADD ON TEST-SPECIMEN IN LAB: SIGNIFICANT CHANGE UP
ANION GAP SERPL CALC-SCNC: 7 MMOL/L — SIGNIFICANT CHANGE UP (ref 5–17)
APPEARANCE UR: CLEAR — SIGNIFICANT CHANGE UP
BASOPHILS # BLD AUTO: 0.03 K/UL — SIGNIFICANT CHANGE UP (ref 0–0.2)
BASOPHILS NFR BLD AUTO: 0.4 % — SIGNIFICANT CHANGE UP (ref 0–2)
BILIRUB UR-MCNC: NEGATIVE — SIGNIFICANT CHANGE UP
BUN SERPL-MCNC: 8 MG/DL — SIGNIFICANT CHANGE UP (ref 7–23)
CALCIUM SERPL-MCNC: 9.2 MG/DL — SIGNIFICANT CHANGE UP (ref 8.4–10.5)
CHLORIDE SERPL-SCNC: 102 MMOL/L — SIGNIFICANT CHANGE UP (ref 96–108)
CO2 SERPL-SCNC: 25 MMOL/L — SIGNIFICANT CHANGE UP (ref 22–31)
COLOR SPEC: YELLOW — SIGNIFICANT CHANGE UP
CREAT SERPL-MCNC: 0.68 MG/DL — SIGNIFICANT CHANGE UP (ref 0.5–1.3)
DIFF PNL FLD: NEGATIVE — SIGNIFICANT CHANGE UP
EGFR: 109 ML/MIN/1.73M2 — SIGNIFICANT CHANGE UP
EOSINOPHIL # BLD AUTO: 0.2 K/UL — SIGNIFICANT CHANGE UP (ref 0–0.5)
EOSINOPHIL NFR BLD AUTO: 2.6 % — SIGNIFICANT CHANGE UP (ref 0–6)
GLUCOSE SERPL-MCNC: 91 MG/DL — SIGNIFICANT CHANGE UP (ref 70–99)
GLUCOSE UR QL: NEGATIVE MG/DL — SIGNIFICANT CHANGE UP
HCG SERPL-ACNC: <1 MIU/ML — SIGNIFICANT CHANGE UP
HCT VFR BLD CALC: 41.8 % — SIGNIFICANT CHANGE UP (ref 34.5–45)
HGB BLD-MCNC: 13.8 G/DL — SIGNIFICANT CHANGE UP (ref 11.5–15.5)
IMM GRANULOCYTES NFR BLD AUTO: 1 % — HIGH (ref 0–0.9)
KETONES UR-MCNC: NEGATIVE MG/DL — SIGNIFICANT CHANGE UP
LEUKOCYTE ESTERASE UR-ACNC: NEGATIVE — SIGNIFICANT CHANGE UP
LYMPHOCYTES # BLD AUTO: 0.88 K/UL — LOW (ref 1–3.3)
LYMPHOCYTES # BLD AUTO: 11.4 % — LOW (ref 13–44)
MCHC RBC-ENTMCNC: 30.1 PG — SIGNIFICANT CHANGE UP (ref 27–34)
MCHC RBC-ENTMCNC: 33 G/DL — SIGNIFICANT CHANGE UP (ref 32–36)
MCV RBC AUTO: 91.1 FL — SIGNIFICANT CHANGE UP (ref 80–100)
MONOCYTES # BLD AUTO: 0.89 K/UL — SIGNIFICANT CHANGE UP (ref 0–0.9)
MONOCYTES NFR BLD AUTO: 11.5 % — SIGNIFICANT CHANGE UP (ref 2–14)
NEUTROPHILS # BLD AUTO: 5.67 K/UL — SIGNIFICANT CHANGE UP (ref 1.8–7.4)
NEUTROPHILS NFR BLD AUTO: 73.1 % — SIGNIFICANT CHANGE UP (ref 43–77)
NITRITE UR-MCNC: NEGATIVE — SIGNIFICANT CHANGE UP
NRBC # BLD: 0 /100 WBCS — SIGNIFICANT CHANGE UP (ref 0–0)
NT-PROBNP SERPL-SCNC: <36 PG/ML — SIGNIFICANT CHANGE UP (ref 0–300)
PH UR: 6.5 — SIGNIFICANT CHANGE UP (ref 5–8)
PLATELET # BLD AUTO: 303 K/UL — SIGNIFICANT CHANGE UP (ref 150–400)
POTASSIUM SERPL-MCNC: 3.7 MMOL/L — SIGNIFICANT CHANGE UP (ref 3.5–5.3)
POTASSIUM SERPL-SCNC: 3.7 MMOL/L — SIGNIFICANT CHANGE UP (ref 3.5–5.3)
PROT UR-MCNC: NEGATIVE MG/DL — SIGNIFICANT CHANGE UP
RBC # BLD: 4.59 M/UL — SIGNIFICANT CHANGE UP (ref 3.8–5.2)
RBC # FLD: 13.6 % — SIGNIFICANT CHANGE UP (ref 10.3–14.5)
SODIUM SERPL-SCNC: 134 MMOL/L — LOW (ref 135–145)
SP GR SPEC: 1.01 — SIGNIFICANT CHANGE UP (ref 1–1.03)
TROPONIN T, HIGH SENSITIVITY RESULT: <6 NG/L — SIGNIFICANT CHANGE UP (ref 0–51)
UROBILINOGEN FLD QL: 0.2 MG/DL — SIGNIFICANT CHANGE UP (ref 0.2–1)
WBC # BLD: 7.75 K/UL — SIGNIFICANT CHANGE UP (ref 3.8–10.5)
WBC # FLD AUTO: 7.75 K/UL — SIGNIFICANT CHANGE UP (ref 3.8–10.5)

## 2024-12-14 PROCEDURE — 85025 COMPLETE CBC W/AUTO DIFF WBC: CPT

## 2024-12-14 PROCEDURE — 80048 BASIC METABOLIC PNL TOTAL CA: CPT

## 2024-12-14 PROCEDURE — 84484 ASSAY OF TROPONIN QUANT: CPT

## 2024-12-14 PROCEDURE — 99285 EMERGENCY DEPT VISIT HI MDM: CPT

## 2024-12-14 PROCEDURE — 76705 ECHO EXAM OF ABDOMEN: CPT

## 2024-12-14 PROCEDURE — 36415 COLL VENOUS BLD VENIPUNCTURE: CPT

## 2024-12-14 PROCEDURE — 81003 URINALYSIS AUTO W/O SCOPE: CPT

## 2024-12-14 PROCEDURE — 99284 EMERGENCY DEPT VISIT MOD MDM: CPT | Mod: 25

## 2024-12-14 PROCEDURE — 71275 CT ANGIOGRAPHY CHEST: CPT | Mod: 26,MC

## 2024-12-14 PROCEDURE — 83880 ASSAY OF NATRIURETIC PEPTIDE: CPT

## 2024-12-14 PROCEDURE — 96374 THER/PROPH/DIAG INJ IV PUSH: CPT | Mod: XU

## 2024-12-14 PROCEDURE — 76705 ECHO EXAM OF ABDOMEN: CPT | Mod: 26

## 2024-12-14 PROCEDURE — 71275 CT ANGIOGRAPHY CHEST: CPT | Mod: MC

## 2024-12-14 PROCEDURE — 80076 HEPATIC FUNCTION PANEL: CPT

## 2024-12-14 PROCEDURE — 84702 CHORIONIC GONADOTROPIN TEST: CPT

## 2024-12-14 PROCEDURE — 83690 ASSAY OF LIPASE: CPT

## 2024-12-14 RX ORDER — IBUPROFEN 200 MG
1 TABLET ORAL
Qty: 30 | Refills: 0
Start: 2024-12-14 | End: 2024-12-23

## 2024-12-14 RX ORDER — KETOROLAC TROMETHAMINE 30 MG/ML
15 INJECTION INTRAMUSCULAR; INTRAVENOUS ONCE
Refills: 0 | Status: DISCONTINUED | OUTPATIENT
Start: 2024-12-14 | End: 2024-12-14

## 2024-12-14 RX ORDER — LIDOCAINE 40 MG/G
1 CREAM TOPICAL
Qty: 2 | Refills: 0
Start: 2024-12-14 | End: 2024-12-23

## 2024-12-14 RX ORDER — SODIUM CHLORIDE 9 MG/ML
1000 INJECTION, SOLUTION INTRAMUSCULAR; INTRAVENOUS; SUBCUTANEOUS ONCE
Refills: 0 | Status: COMPLETED | OUTPATIENT
Start: 2024-12-14 | End: 2024-12-14

## 2024-12-14 RX ADMIN — SODIUM CHLORIDE 1000 MILLILITER(S): 9 INJECTION, SOLUTION INTRAMUSCULAR; INTRAVENOUS; SUBCUTANEOUS at 10:56

## 2024-12-14 RX ADMIN — KETOROLAC TROMETHAMINE 15 MILLIGRAM(S): 30 INJECTION INTRAMUSCULAR; INTRAVENOUS at 11:04

## 2024-12-14 RX ADMIN — KETOROLAC TROMETHAMINE 15 MILLIGRAM(S): 30 INJECTION INTRAMUSCULAR; INTRAVENOUS at 11:34

## 2024-12-14 NOTE — ED ADULT TRIAGE NOTE - AS TEMP SITE
CC:  Rod Berkowitz is here today for:   Chief Complaint   Patient presents with    Office Visit     Left hip pain, saw a PA 5/14/24, may be a possible labral tear        Referring MD: Lamonte Silva MD  PCP: Brissa Monzon, DO   Medications: medications verified and updated  Refills needed today?  NO  denies known Latex allergy or symptoms of Latex sensitivity.  Patient would like communication of their results via:  [employer]  Tobacco history: verified  BMI calculated           [facility] INFECTIOUS DISEASE CONSULT NOTE      Reason for Consult:  Recurrent fever in a 18-year-old gentleman who reports a diagnosis of Q fever in September 2020.    HISTORY OF PRESENT ILLNESS:  18-year-old gentleman without past medical history who reports a diagnosis of Q fever upon returning from a trip to [place] in August 2020.  Diagnosis made after hospitalization in September 2020 at [facility].  Records not yet available and it is unclear what treatment he received although I suspect a 14-day course of oral doxycycline.  Admitted today through the emergency room after presenting with complaint of new onset fever, myalgias and mild cough.  Initial SARS-CoV-2 test has been negative.  In addition, chest x-ray reported as clear.  He does have mild transaminitis with an SGOT of 73, SGPT of 86, and normal bilirubin.  His white blood cell count is mildly elevated at 11.6 but there is no thrombocytopenia.  Both CRP and ESR are elevated as is procalcitonin at 0.13.  He has not traveled outside of [place] since August.  No specific animal exposure at this time.  No known antibiotic allergies.  He has not been placed on any antibiotic treatment.    Patient Past History  No past medical history on file.    No past surgical history on file.    Social History     Socioeconomic History   • Marital status: Single     Spouse name: Not on file   • Number of children: Not on file   • Years of education: Not on file   • Highest education level: Not on file   Occupational History   • Not on file   Social Needs   • Financial resource strain: Not on file   • Food insecurity     Worry: Not on file     Inability: Not on file   • Transportation needs     Medical: Not on file     Non-medical: Not on file   Tobacco Use   • Smoking status: Never Smoker   • Smokeless tobacco: Never Used   Substance and Sexual Activity   • Alcohol use: Not Currently     Comment: Social   • Drug use: Not Currently     Comment: social   •  Sexual activity: Not on file   Lifestyle   • Physical activity     Days per week: Not on file     Minutes per session: Not on file   • Stress: Not on file   Relationships   • Social connections     Talks on phone: Not on file     Gets together: Not on file     Attends Jain service: Not on file     Active member of club or organization: Not on file     Attends meetings of clubs or organizations: Not on file     Relationship status: Not on file   • Intimate partner violence     Fear of current or ex partner: Not on file     Emotionally abused: Not on file     Physically abused: Not on file     Forced sexual activity: Not on file   Other Topics Concern   • Not on file   Social History Narrative   • Not on file       No family history on file.     ALLERGIES:  No Known Allergies     Medications:  Current Facility-Administered Medications   Medication Dose Route Frequency Provider Last Rate Last Admin   • sodium chloride 0.9 % flush bag 25 mL  25 mL Intravenous PRN Lisy Blanton MD       • sodium chloride (PF) 0.9 % injection 2 mL  2 mL Intracatheter 2 times per day Lisy Blanton MD       • enoxaparin (LOVENOX) injection 40 mg  40 mg Subcutaneous Daily Lisy Blanton MD         Prior to Admission medications    Not on File         Review of Systems  Review of Systems   All other systems reviewed and are negative.         PHYSICAL EXAM :  Vitals with min/max:      Vital Last Value 24 Hour Range   Temperature 99.1 °F (37.3 °C) (01/30/21 1503) Temp  Min: 98.4 °F (36.9 °C)  Max: 99.1 °F (37.3 °C)   Pulse 100 (01/30/21 1503) Pulse  Min: 90  Max: 105   Respiratory 16 (01/30/21 1503) Resp  Min: 15  Max: 22   Non-Invasive  Blood Pressure 113/63 (01/30/21 1503) BP  Min: 101/60  Max: 133/74   Pulse Oximetry 98 % (01/30/21 1503) SpO2  Min: 96 %  Max: 100 %   Arterial   Blood Pressure   No data recorded        Physical Exam  Vitals signs reviewed.   Constitutional:       Comments: Exam deferred due to PUI  oral status pending follow-up SARS-CoV-2 analysis.          Labs  Admission on 01/30/2021   Component Date Value Ref Range Status   • Culture, Blood or Bone Marrow 01/30/2021 No Growth <24 hours   Preliminary   • Ventricular Rate EKG/Min (BPM) 01/30/2021 92   Preliminary   • Atrial Rate (BPM) 01/30/2021 92   Preliminary   • RI-Interval (MSEC) 01/30/2021 174   Preliminary   • QRS-Interval (MSEC) 01/30/2021 82   Preliminary   • QT-Interval (MSEC) 01/30/2021 338   Preliminary   • QTc 01/30/2021 418   Preliminary   • P Axis (Degrees) 01/30/2021 65   Preliminary   • R Axis (Degrees) 01/30/2021 84   Preliminary   • T Axis (Degrees) 01/30/2021 42   Preliminary   • REPORT TEXT 01/30/2021    Preliminary                    Value:Normal sinus rhythm  Normal ECG  No previous ECGs available     • Sodium 01/30/2021 137  135 - 145 mmol/L Final   • Potassium 01/30/2021 3.6  3.4 - 5.1 mmol/L Final   • Chloride 01/30/2021 100  98 - 107 mmol/L Final   • Carbon Dioxide 01/30/2021 27  21 - 32 mmol/L Final   • Anion Gap 01/30/2021 14  10 - 20 mmol/L Final   • Glucose 01/30/2021 83  65 - 99 mg/dL Final   • BUN 01/30/2021 13  6 - 20 mg/dL Final   • Creatinine 01/30/2021 1.03  0.67 - 1.17 mg/dL Final   • Glomerular Filtration Rate 01/30/2021 >90  >90 mL/min/1.73m2 Final    eGFR results = or >90 mL/min/1.73m2 = Normal kidney function.   • BUN/ Creatinine Ratio 01/30/2021 13  7 - 25 Final   • Calcium 01/30/2021 8.9  8.4 - 10.2 mg/dL Final   • Bilirubin, Total 01/30/2021 0.4  0.2 - 1.0 mg/dL Final   • GOT/AST 01/30/2021 73* <=37 Units/L Final   • GPT/ALT 01/30/2021 86* <64 Units/L Final   • Alkaline Phosphatase 01/30/2021 90  45 - 117 Units/L Final   • Albumin 01/30/2021 4.1  3.6 - 5.1 g/dL Final   • Protein, Total 01/30/2021 9.0* 6.4 - 8.2 g/dL Final   • Globulin 01/30/2021 4.9* 2.0 - 4.0 g/dL Final   • A/G Ratio 01/30/2021 0.8* 1.0 - 2.4 Final   • Culture, Blood or Bone Marrow 01/30/2021 No Growth <24 hours   Preliminary   • Prothrombin Time  01/30/2021 9.7  9.7 - 11.8 sec Final   • INR 01/30/2021 0.9  <=5.0 Final    INR Therapeutic Range: 2.0 to 3.0 (2.5 to 3.5 recommended for recurrent thrombotic episodes and mechanical prosthetic heart valves.)   • PTT 01/30/2021 29  22 - 30 sec Final   • Albumin 01/30/2021 4.0  3.6 - 5.1 g/dL Final   • Bilirubin, Total 01/30/2021 0.4  0.2 - 1.0 mg/dL Final   • Bilirubin, Direct 01/30/2021 0.1  0.0 - 0.2 mg/dL Final   • Alkaline Phosphatase 01/30/2021 90  45 - 117 Units/L Final   • GPT/ALT 01/30/2021 85* <64 Units/L Final   • GOT/AST 01/30/2021 72* <=37 Units/L Final   • Protein, Total 01/30/2021 9.0* 6.4 - 8.2 g/dL Final   • Lactate, Venous 01/30/2021 0.6  0.0 - 2.0 mmol/L Final   • Lipase 01/30/2021 96  73 - 393 Units/L Final   • RBC Sedimentation Rate 01/30/2021 41* 0 - 20 mm/hr Final   • Procalcitonin 01/30/2021 0.13* <=0.09 ng/mL Final      Bacterial Sepsis:  <0.5 ng/mL:    Sepsis not likely. Localized bacterial infection possible.  0.5 - 2 ng/mL: Sepsis or other conditions possible  >2.0 ng/mL:    High risk of sepsis/septic shock    NOTE: If bacterial sepsis is of high clinical suspicion but PCT<2 ng/mL,  consider recheck PCT 6-24 hours after initial test.     Lower Respiratory Tract Infection (LRTI):  <0.1 ng/mL:       Bacterial infection highly unlikely  0.1 - 0.25 ng/mL: Bacterial infection unlikely  0.26 - 0.5 ng/mL: Bacterial infection likely  > 0.5 ng/mL:      Bacterial infection highly likely    NOTE: Patients with advanced CKD or medical/surgical trauma may have  elevated baseline PCT (>0.5 ng/mL) in the absence of bacterial infection. If  bacterial infection is suspected, consider repeat PCT in 2 to 4 days to guide de-escalation or discontinuation of antibiotic therapy.  Higher reference range cutoffs may be appropriate for patients <3 days old.     • C-Reactive Protein 01/30/2021 10.6* <=1.0 mg/dL Final   • COLOR, URINALYSIS 01/30/2021 Yellow   Final   • APPEARANCE, URINALYSIS 01/30/2021 Clear   Final    • GLUCOSE, URINALYSIS 01/30/2021 Negative  Negative mg/dL Final   • BILIRUBIN, URINALYSIS 01/30/2021 Negative  Negative Final   • KETONES, URINALYSIS 01/30/2021 Negative  Negative mg/dL Final   • SPECIFIC GRAVITY, URINALYSIS 01/30/2021 1.026  1.005 - 1.030 Final   • OCCULT BLOOD, URINALYSIS 01/30/2021 Negative  Negative Final   • PH, URINALYSIS 01/30/2021 5.0  5.0 - 7.0 Final   • PROTEIN, URINALYSIS 01/30/2021 Negative  Negative mg/dL Final   • UROBILINOGEN, URINALYSIS 01/30/2021 0.2  0.2, 1.0 mg/dL Final   • NITRITE, URINALYSIS 01/30/2021 Negative  Negative Final   • LEUKOCYTE ESTERASE, URINALYSIS 01/30/2021 Negative  Negative Final   • Rapid SARS-COV-2 by PCR 01/30/2021 Not Detected  Not Detected / Detected / Presumptive Positive / Inhibitors present Final   • Isolation Guidelines 01/30/2021    Final    Do not use this test result as the sole decision-maker for discontinuation of isolation.   Clinical evaluation should be considered for other respiratory illness requiring transmission-based isolation.    •       No fever (<99.0 F/37.2 C) for at least 24 hours without the use of fever-reducing medications    AND  •       Respiratory symptoms have improved or resolved (e.g. cough, shortness of breath)     AND  •       COVID-19 negative test    See COVID-19 Deisolation Resource Guide   • Procedural Comment 01/30/2021    Final    SARS-CoV-2 nucleic acid has not been detected indicating the absence of COVID-19.       Testing was performed using the [employer] Xpert Xpress SARS-CoV-2 RT-PCR assay that has been given Emergency Use Authorization (EUA) by the [place] Food and Drug Administration (FDA).  These results are considered definitive and do not need to be confirmed by another method.   • WBC 01/30/2021 11.6* 4.2 - 11.0 K/mcL Final   • RBC 01/30/2021 4.78  4.50 - 5.90 mil/mcL Final   • HGB 01/30/2021 13.9  13.0 - 17.0 g/dL Final   • HCT 01/30/2021 40.9  39.0 - 51.0 % Final   • MCV 01/30/2021 85.6  78.0 - 100.0  fl Final   • MCH 01/30/2021 29.1  26.0 - 34.0 pg Final   • MCHC 01/30/2021 34.0  32.0 - 36.5 g/dL Final   • RDW-CV 01/30/2021 13.0  11.0 - 15.0 % Final   • RDW-SD 01/30/2021 40.4  39.0 - 50.0 fL Final   • PLT 01/30/2021 241  140 - 450 K/mcL Final   • NRBC 01/30/2021 0  <=0 /100 WBC Final   • Neutrophil, Percent 01/30/2021 75  % Final   • Lymphocytes, Percent 01/30/2021 13  % Final   • Mono, Percent 01/30/2021 11  % Final   • Eosinophils, Percent 01/30/2021 1  % Final   • Basophils, Percent 01/30/2021 0  % Final   • Immature Granulocytes 01/30/2021 0  % Final   • Absolute Neutrophils 01/30/2021 8.7* 1.8 - 8.0 K/mcL Final   • Absolute Lymphocytes 01/30/2021 1.4  1.2 - 5.2 K/mcL Final   • Absolute Monocytes 01/30/2021 1.3* 0.3 - 0.9 K/mcL Final   • Absolute Eosinophils  01/30/2021 0.1  0.0 - 0.5 K/mcL Final   • Absolute Basophils 01/30/2021 0.0  0.0 - 0.3 K/mcL Final   • Absolute Immmature Granulocytes 01/30/2021 0.0  0.0 - 0.2 K/mcL Final         Microbiology Results     None             Imaging:  XR CHEST PA OR AP 1 VIEW   Final Result   FINDINGS AND IMPRESSION:      Normal appearance of the cardiomediastinal silhouette.  No airspace   consolidation, pleural effusions, or pneumothorax.      IZEKE D.O., have reviewed the images and report and concur with   these findings interpreted by WEI GOVEA MD.      Electronically Signed by: ZEKE ESPINAL D.O.    Signed on: 1/30/2021 8:47 AM                Impression:  1.  Admission with fever.  So far, since admission T max 99.1.  We will need to continue to follow.  See #2 below.    2.  Reported recent diagnosis of Q fever, September 2020.  Rule out recurrence after suboptimal treatment or secondary to chronic, localized infection.  Although fever yet to be documented he does have mild transaminitis, mild leukocytosis, and increased acute phase reactants as may be seen with active Q fever.  Original diagnosis made after hospitalization at [facility]  in September 2020.  Need to determine how the diagnosis was made and what treatment was provided.  I suspect at least a 14-day course of doxycycline.  But it is unclear what additional work-up was performed to differentiate between acute infection or more chronic infection with localized disease.  Records from [facility] are indicated so as not to repeat, or perform unnecessary, studies.    3.  In this current environment agree with further studies to determine if he is truly SARS-CoV-2 negative.  But if febrile secondary to active COVID-19 his chest x-ray is not consistent with pneumonia and he is otherwise asymptomatic and without hypoxemia.      Plan:  --Obtain records from [facility].  --Coxiella antibodies have already been ordered.  --Follow-up on blood cultures.  --Would obtain antiphospholipid antibodies.  --Follow-up on transthoracic echocardiogram which is ordered.  --Await follow-up SARS-CoV-2 analysis.   --Empiric antimicrobials are not indicated at this time.  --Further recommendations to follow.  Thank you for the consult.      Efrain Hong MD  1/30/2021 5:47 PM

## 2024-12-14 NOTE — ED ADULT TRIAGE NOTE - CHIEF COMPLAINT QUOTE
Sent in from PMD for r/o PE.  C/o of RUQ pain since wednesday.  Hx of liver ca, not on treatment right now.  C/o of mild dyspnea on exertion.

## 2024-12-14 NOTE — ED PROVIDER NOTE - PATIENT PORTAL LINK FT
You can access the FollowMyHealth Patient Portal offered by [facility] by registering at the following website: [email or web address]. By joining [employer] [facility]’s FollowMyHealth portal, you will also be able to view your health information using other applications (apps) compatible with our system.

## 2024-12-14 NOTE — ED ADULT NURSE NOTE - OBJECTIVE STATEMENT
44 y.o female c/o RUQ pain, pain with inspiration x 2 days. Pt with history of liver CA, last immunotherapy in September. Denies cp, n/v/d.

## 2024-12-14 NOTE — ED PROVIDER NOTE - ATTENDING APP SHARED VISIT CONTRIBUTION OF CARE
43 y/o f hx melanoma on immunotherapy presents c/o right upper abd pain over the past few days.  Pt afebrile in ED, tachy to 125 at triage, EKG sinus tachycardia at 102.  No resp distress on exam, clear lungs.  Labs with no significant abnormalities, normal trop and bnp, CTA chest neg for PE.  Pt sent for RUQ u/s given the area of her pain, will f/u results and reassess.    Addendum to attending statement: I have reviewed the ACP note and agree with the history, exam, and plan of care. I  was available to PA   as a supervising provider if needed. PA given opportunity to ask questions and request further evaluation / care.    Pt well appearing  Some reproducible tenderness right flank right lateral thoracic area  Labs, CTA r/o PE, RUQ US  Workup shows known hx of liver metastatsis and lung nodules - hx of pulm metastasis, no PE  Pt aware of findings and aware symptoms could be c/w msk pain/liver mets/osseous mets from prior scans.  Pt to follow up with heme/onc for continued treatment.  Understands pain control plan and return precautions.

## 2024-12-14 NOTE — ED ADULT TRIAGE NOTE - TEMP AT ED ARRIVAL (C)
36.6
13.4   14.72 )-----------( 381      ( 09 Sep 2021 00:10 )             39.8         136  |  103  |  9   ----------------------------<  83  4.1   |  19<L>  |  0.88    Ca    9.2      08 Sep 2021 22:34    TPro  7.1  /  Alb  3.7  /  TBili  <0.2  /  DBili  x   /  AST  18  /  ALT  34<H>  /  AlkPhos  139<H>      LIVER FUNCTIONS - ( 08 Sep 2021 22:34 )  Alb: 3.7 g/dL / Pro: 7.1 g/dL / ALK PHOS: 139 U/L / ALT: 34 U/L / AST: 18 U/L / GGT: x           PT/INR - ( 08 Sep 2021 22:40 )   PT: 11.4 sec;   INR: 1.00 ratio         PTT - ( 08 Sep 2021 22:40 )  PTT:25.3 sec  Urinalysis Basic - ( 08 Sep 2021 23:03 )    Color: Light Yellow / Appearance: Clear / S.010 / pH: x  Gluc: x / Ketone: Negative  / Bili: Negative / Urobili: <2 mg/dL   Blood: x / Protein: Negative / Nitrite: Negative   Leuk Esterase: Negative / RBC: x / WBC x   Sq Epi: x / Non Sq Epi: x / Bacteria: x

## 2024-12-14 NOTE — ED PROVIDER NOTE - CLINICAL SUMMARY MEDICAL DECISION MAKING FREE TEXT BOX
45 y/o f hx melanoma on immunotherapy presents c/o right upper abd pain over the past few days.  Pt afebrile in ED, tachy to 125 at triage, EKG sinus tachycardia at 102.  No resp distress on exam, clear lungs.  Labs with no significant abnormalities, normal trop and bnp, CTA chest neg for PE.  Pt sent for RUQ u/s given the area of her pain, will f/u results and reassess.

## 2024-12-14 NOTE — ED PROVIDER NOTE - OBJECTIVE STATEMENT
43 y/o f hx melanoma on immunotherapy presents c/o pain to right flank, upper abdomen for the past few days.  Pt stating pain is worse with certain movements, feeling like it is hard to get a deep breath when she is in pain.  Pt was seen in urgent care today and sent to ED for further results.  Pt hasn't been taking anything for pain.  Denies n/v/d, dysuria, fever, chills, all other ROS negative.

## 2024-12-14 NOTE — ED PROVIDER NOTE - PROGRESS NOTE DETAILS
u/s shows no acute findings, hepatic mets which were seen on prior imaging.  Pt stable for d/c, recommend NSAIDs, lidocaine patches for pain, f/u pmd

## 2024-12-16 ENCOUNTER — APPOINTMENT (OUTPATIENT)
Dept: HEMATOLOGY ONCOLOGY | Facility: CLINIC | Age: 44
End: 2024-12-16

## 2024-12-16 ENCOUNTER — LABORATORY RESULT (OUTPATIENT)
Age: 44
End: 2024-12-16

## 2024-12-17 ENCOUNTER — APPOINTMENT (OUTPATIENT)
Dept: HEMATOLOGY ONCOLOGY | Facility: CLINIC | Age: 44
End: 2024-12-17
Payer: COMMERCIAL

## 2024-12-17 ENCOUNTER — NON-APPOINTMENT (OUTPATIENT)
Age: 44
End: 2024-12-17

## 2024-12-17 DIAGNOSIS — C69.40 MALIGNANT NEOPLASM OF UNSPECIFIED CILIARY BODY: ICD-10-CM

## 2024-12-17 DIAGNOSIS — C78.7 MALIGNANT NEOPLASM OF UNSPECIFIED CILIARY BODY: ICD-10-CM

## 2024-12-17 LAB
ALBUMIN SERPL ELPH-MCNC: 3.4 G/DL
ALP BLD-CCNC: 146 U/L
ALT SERPL-CCNC: 116 U/L
ANION GAP SERPL CALC-SCNC: -3 MMOL/L
AST SERPL-CCNC: 116 U/L
BASOPHILS # BLD AUTO: 0.04 K/UL
BASOPHILS NFR BLD AUTO: 0.7 %
BILIRUB SERPL-MCNC: 0.8 MG/DL
BUN SERPL-MCNC: 8 MG/DL
CALCIUM SERPL-MCNC: 9 MG/DL
CHLORIDE SERPL-SCNC: 108 MMOL/L
CO2 SERPL-SCNC: 33 MMOL/L
CREAT SERPL-MCNC: 0.7 MG/DL
EGFR: 108 ML/MIN/1.73M2
EOSINOPHIL # BLD AUTO: 0.15 K/UL
EOSINOPHIL NFR BLD AUTO: 2.7 %
GLUCOSE SERPL-MCNC: 87 MG/DL
HCT VFR BLD CALC: 39.3 %
HGB BLD-MCNC: 13.3 G/DL
IMM GRANULOCYTES NFR BLD AUTO: 1.1 %
LYMPHOCYTES # BLD AUTO: 0.98 K/UL
LYMPHOCYTES NFR BLD AUTO: 17.7 %
MAN DIFF?: NORMAL
MCHC RBC-ENTMCNC: 31.1 PG
MCHC RBC-ENTMCNC: 33.8 G/DL
MCV RBC AUTO: 91.8 FL
MONOCYTES # BLD AUTO: 0.64 K/UL
MONOCYTES NFR BLD AUTO: 11.5 %
NEUTROPHILS # BLD AUTO: 3.68 K/UL
NEUTROPHILS NFR BLD AUTO: 66.3 %
PLATELET # BLD AUTO: 286 K/UL
PMV BLD AUTO: 0 /100 WBCS
POTASSIUM SERPL-SCNC: 4.4 MMOL/L
PROT SERPL-MCNC: 7.1 G/DL
RBC # BLD: 4.28 M/UL
RBC # FLD: 13.6 %
SODIUM SERPL-SCNC: 138 MMOL/L
TSH SERPL-ACNC: 0.01 UIU/ML
WBC # FLD AUTO: 5.55 K/UL

## 2024-12-17 PROCEDURE — G2211 COMPLEX E/M VISIT ADD ON: CPT | Mod: NC

## 2024-12-17 PROCEDURE — 99215 OFFICE O/P EST HI 40 MIN: CPT

## 2024-12-17 RX ADMIN — SULFAMETHOXAZOLE AND TRIMETHOPRIM 0 MG: 800; 160 TABLET ORAL at 00:00

## 2024-12-18 DIAGNOSIS — E06.4 DRUG-INDUCED THYROIDITIS: ICD-10-CM

## 2024-12-18 RX ORDER — SULFAMETHOXAZOLE AND TRIMETHOPRIM 800; 160 MG/1; MG/1
800-160 TABLET ORAL DAILY
Qty: 30 | Refills: 0 | Status: ACTIVE | COMMUNITY
Start: 2024-12-17 | End: 1900-01-01

## 2024-12-19 ENCOUNTER — NON-APPOINTMENT (OUTPATIENT)
Age: 44
End: 2024-12-19

## 2024-12-20 ENCOUNTER — TRANSCRIPTION ENCOUNTER (OUTPATIENT)
Age: 44
End: 2024-12-20

## 2025-01-01 ENCOUNTER — NON-APPOINTMENT (OUTPATIENT)
Age: 45
End: 2025-01-01

## 2025-01-01 ENCOUNTER — INPATIENT (INPATIENT)
Facility: HOSPITAL | Age: 45
LOS: 7 days | Discharge: HOSPICE | DRG: 435 | End: 2025-04-18
Attending: HOSPITALIST | Admitting: INTERNAL MEDICINE
Payer: COMMERCIAL

## 2025-01-01 ENCOUNTER — TRANSCRIPTION ENCOUNTER (OUTPATIENT)
Age: 45
End: 2025-01-01

## 2025-01-01 ENCOUNTER — OUTPATIENT (OUTPATIENT)
Dept: OUTPATIENT SERVICES | Facility: HOSPITAL | Age: 45
LOS: 1 days | End: 2025-01-01
Payer: COMMERCIAL

## 2025-01-01 ENCOUNTER — RESULT REVIEW (OUTPATIENT)
Age: 45
End: 2025-01-01

## 2025-01-01 ENCOUNTER — INPATIENT (INPATIENT)
Facility: HOSPITAL | Age: 45
LOS: 0 days | DRG: 951 | End: 2025-04-19
Attending: INTERNAL MEDICINE | Admitting: INTERNAL MEDICINE
Payer: OTHER MISCELLANEOUS

## 2025-01-01 ENCOUNTER — OUTPATIENT (OUTPATIENT)
Dept: OUTPATIENT SERVICES | Facility: HOSPITAL | Age: 45
LOS: 1 days | Discharge: HOME | End: 2025-01-01

## 2025-01-01 ENCOUNTER — APPOINTMENT (OUTPATIENT)
Dept: HEMATOLOGY ONCOLOGY | Facility: CLINIC | Age: 45
End: 2025-01-01
Payer: COMMERCIAL

## 2025-01-01 ENCOUNTER — APPOINTMENT (OUTPATIENT)
Dept: INTERVENTIONAL RADIOLOGY/VASCULAR | Facility: HOSPITAL | Age: 45
End: 2025-01-01
Payer: COMMERCIAL

## 2025-01-01 VITALS
WEIGHT: 168 LBS | DIASTOLIC BLOOD PRESSURE: 85 MMHG | TEMPERATURE: 98 F | OXYGEN SATURATION: 95 % | SYSTOLIC BLOOD PRESSURE: 127 MMHG | RESPIRATION RATE: 18 BRPM | HEIGHT: 68 IN | BODY MASS INDEX: 25.46 KG/M2 | HEART RATE: 112 BPM

## 2025-01-01 VITALS
HEIGHT: 68 IN | OXYGEN SATURATION: 93 % | WEIGHT: 160.06 LBS | SYSTOLIC BLOOD PRESSURE: 115 MMHG | RESPIRATION RATE: 18 BRPM | TEMPERATURE: 98 F | HEART RATE: 98 BPM | DIASTOLIC BLOOD PRESSURE: 83 MMHG

## 2025-01-01 VITALS — HEIGHT: 67.99 IN | WEIGHT: 160.06 LBS

## 2025-01-01 VITALS — HEART RATE: 99 BPM | RESPIRATION RATE: 28 BRPM | OXYGEN SATURATION: 97 %

## 2025-01-01 DIAGNOSIS — Z51.5 ENCOUNTER FOR PALLIATIVE CARE: ICD-10-CM

## 2025-01-01 DIAGNOSIS — Z98.49 CATARACT EXTRACTION STATUS, UNSPECIFIED EYE: Chronic | ICD-10-CM

## 2025-01-01 DIAGNOSIS — J90 PLEURAL EFFUSION, NOT ELSEWHERE CLASSIFIED: ICD-10-CM

## 2025-01-01 DIAGNOSIS — R65.10 SYSTEMIC INFLAMMATORY RESPONSE SYNDROME (SIRS) OF NON-INFECTIOUS ORIGIN WITHOUT ACUTE ORGAN DYSFUNCTION: ICD-10-CM

## 2025-01-01 DIAGNOSIS — Z71.89 OTHER SPECIFIED COUNSELING: ICD-10-CM

## 2025-01-01 DIAGNOSIS — C43.9 MALIGNANT MELANOMA OF SKIN, UNSPECIFIED: ICD-10-CM

## 2025-01-01 DIAGNOSIS — I81 PORTAL VEIN THROMBOSIS: ICD-10-CM

## 2025-01-01 DIAGNOSIS — D64.9 ANEMIA, UNSPECIFIED: ICD-10-CM

## 2025-01-01 DIAGNOSIS — R18.0 MALIGNANT ASCITES: ICD-10-CM

## 2025-01-01 DIAGNOSIS — G89.3 NEOPLASM RELATED PAIN (ACUTE) (CHRONIC): ICD-10-CM

## 2025-01-01 DIAGNOSIS — C69.90 MALIGNANT NEOPLASM OF UNSPECIFIED SITE OF UNSPECIFIED EYE: ICD-10-CM

## 2025-01-01 DIAGNOSIS — Z86.39 PERSONAL HISTORY OF OTHER ENDOCRINE, NUTRITIONAL AND METABOLIC DISEASE: ICD-10-CM

## 2025-01-01 DIAGNOSIS — E87.1 HYPO-OSMOLALITY AND HYPONATREMIA: ICD-10-CM

## 2025-01-01 DIAGNOSIS — C69.40 MALIGNANT NEOPLASM OF UNSPECIFIED CILIARY BODY: ICD-10-CM

## 2025-01-01 DIAGNOSIS — R18.8 OTHER ASCITES: ICD-10-CM

## 2025-01-01 DIAGNOSIS — C78.7 SECONDARY MALIGNANT NEOPLASM OF LIVER AND INTRAHEPATIC BILE DUCT: ICD-10-CM

## 2025-01-01 DIAGNOSIS — E80.6 OTHER DISORDERS OF BILIRUBIN METABOLISM: ICD-10-CM

## 2025-01-01 DIAGNOSIS — Z29.9 ENCOUNTER FOR PROPHYLACTIC MEASURES, UNSPECIFIED: ICD-10-CM

## 2025-01-01 DIAGNOSIS — F41.9 ANXIETY DISORDER, UNSPECIFIED: ICD-10-CM

## 2025-01-01 DIAGNOSIS — R33.9 RETENTION OF URINE, UNSPECIFIED: ICD-10-CM

## 2025-01-01 DIAGNOSIS — K59.00 CONSTIPATION, UNSPECIFIED: ICD-10-CM

## 2025-01-01 DIAGNOSIS — R06.00 DYSPNEA, UNSPECIFIED: ICD-10-CM

## 2025-01-01 LAB
ADD ON TEST-SPECIMEN IN LAB: SIGNIFICANT CHANGE UP
ALBUMIN SERPL ELPH-MCNC: 2 G/DL
ALBUMIN SERPL ELPH-MCNC: 2 G/DL — LOW (ref 3.3–5)
ALBUMIN SERPL ELPH-MCNC: 2.1 G/DL — LOW (ref 3.3–5)
ALBUMIN SERPL ELPH-MCNC: 2.3 G/DL — LOW (ref 3.3–5)
ALBUMIN SERPL ELPH-MCNC: 2.3 G/DL — LOW (ref 3.3–5)
ALP BLD-CCNC: 293 U/L
ALP SERPL-CCNC: 350 U/L — HIGH (ref 40–120)
ALP SERPL-CCNC: 360 U/L — HIGH (ref 40–120)
ALP SERPL-CCNC: 369 U/L — HIGH (ref 40–120)
ALP SERPL-CCNC: 373 U/L — HIGH (ref 40–120)
ALP SERPL-CCNC: 391 U/L — HIGH (ref 40–120)
ALP SERPL-CCNC: 412 U/L — HIGH (ref 40–120)
ALT FLD-CCNC: 113 U/L — HIGH (ref 10–45)
ALT FLD-CCNC: 127 U/L — HIGH (ref 10–45)
ALT FLD-CCNC: 149 U/L — HIGH (ref 10–45)
ALT FLD-CCNC: 90 U/L — HIGH (ref 10–45)
ALT FLD-CCNC: 95 U/L — HIGH (ref 10–45)
ALT FLD-CCNC: 98 U/L — HIGH (ref 10–45)
ALT SERPL-CCNC: 103 U/L
ANION GAP SERPL CALC-SCNC: -1 MMOL/L
ANION GAP SERPL CALC-SCNC: 10 MMOL/L — SIGNIFICANT CHANGE UP (ref 5–17)
ANION GAP SERPL CALC-SCNC: 10 MMOL/L — SIGNIFICANT CHANGE UP (ref 5–17)
ANION GAP SERPL CALC-SCNC: 11 MMOL/L — SIGNIFICANT CHANGE UP (ref 5–17)
ANION GAP SERPL CALC-SCNC: 11 MMOL/L — SIGNIFICANT CHANGE UP (ref 5–17)
ANION GAP SERPL CALC-SCNC: 12 MMOL/L — SIGNIFICANT CHANGE UP (ref 5–17)
ANION GAP SERPL CALC-SCNC: 13 MMOL/L — SIGNIFICANT CHANGE UP (ref 5–17)
ANION GAP SERPL CALC-SCNC: 14 MMOL/L — SIGNIFICANT CHANGE UP (ref 5–17)
ANION GAP SERPL CALC-SCNC: 14 MMOL/L — SIGNIFICANT CHANGE UP (ref 5–17)
ANION GAP SERPL CALC-SCNC: 15 MMOL/L — SIGNIFICANT CHANGE UP (ref 5–17)
ANION GAP SERPL CALC-SCNC: 17 MMOL/L — SIGNIFICANT CHANGE UP (ref 5–17)
ANION GAP SERPL CALC-SCNC: 17 MMOL/L — SIGNIFICANT CHANGE UP (ref 5–17)
ANION GAP SERPL CALC-SCNC: 20 MMOL/L — HIGH (ref 5–17)
ANION GAP SERPL CALC-SCNC: 20 MMOL/L — HIGH (ref 5–17)
APPEARANCE UR: ABNORMAL
APPEARANCE UR: ABNORMAL
APPEARANCE UR: CLEAR — SIGNIFICANT CHANGE UP
APPEARANCE UR: CLEAR — SIGNIFICANT CHANGE UP
APTT BLD: 36.9 SEC
APTT BLD: 39.7 SEC — HIGH (ref 24.5–35.6)
APTT BLD: 42.2 SEC — HIGH (ref 24.5–35.6)
APTT BLD: 44.4 SEC — HIGH (ref 24.5–35.6)
APTT BLD: 47.2 SEC — HIGH (ref 24.5–35.6)
APTT BLD: 49.9 SEC — HIGH (ref 24.5–35.6)
APTT BLD: 51.2 SEC — HIGH (ref 24.5–35.6)
AST SERPL-CCNC: 154 U/L — HIGH (ref 10–40)
AST SERPL-CCNC: 162 U/L
AST SERPL-CCNC: 163 U/L — HIGH (ref 10–40)
AST SERPL-CCNC: 200 U/L — HIGH (ref 10–40)
AST SERPL-CCNC: 258 U/L — HIGH (ref 10–40)
AST SERPL-CCNC: 269 U/L — HIGH (ref 10–40)
AST SERPL-CCNC: 356 U/L — HIGH (ref 10–40)
BASOPHILS # BLD AUTO: 0 K/UL — SIGNIFICANT CHANGE UP (ref 0–0.2)
BASOPHILS # BLD AUTO: 0 K/UL — SIGNIFICANT CHANGE UP (ref 0–0.2)
BASOPHILS # BLD AUTO: 0.07 K/UL — SIGNIFICANT CHANGE UP (ref 0–0.2)
BASOPHILS # BLD AUTO: 0.1 K/UL — SIGNIFICANT CHANGE UP (ref 0–0.2)
BASOPHILS # BLD AUTO: 0.1 K/UL — SIGNIFICANT CHANGE UP (ref 0–0.2)
BASOPHILS # BLD AUTO: 0.11 K/UL — SIGNIFICANT CHANGE UP (ref 0–0.2)
BASOPHILS # BLD AUTO: 0.16 K/UL — SIGNIFICANT CHANGE UP (ref 0–0.2)
BASOPHILS # BLD AUTO: 0.17 K/UL — SIGNIFICANT CHANGE UP (ref 0–0.2)
BASOPHILS NFR BLD AUTO: 0 % — SIGNIFICANT CHANGE UP (ref 0–2)
BASOPHILS NFR BLD AUTO: 0 % — SIGNIFICANT CHANGE UP (ref 0–2)
BASOPHILS NFR BLD AUTO: 0.4 % — SIGNIFICANT CHANGE UP (ref 0–2)
BASOPHILS NFR BLD AUTO: 0.5 % — SIGNIFICANT CHANGE UP (ref 0–2)
BASOPHILS NFR BLD AUTO: 0.5 % — SIGNIFICANT CHANGE UP (ref 0–2)
BASOPHILS NFR BLD AUTO: 0.6 % — SIGNIFICANT CHANGE UP (ref 0–2)
BASOPHILS NFR BLD AUTO: 0.8 % — SIGNIFICANT CHANGE UP (ref 0–2)
BASOPHILS NFR BLD AUTO: 0.8 % — SIGNIFICANT CHANGE UP (ref 0–2)
BILIRUB DIRECT SERPL-MCNC: 8.5 MG/DL — HIGH (ref 0–0.3)
BILIRUB DIRECT SERPL-MCNC: 9.2 MG/DL — HIGH (ref 0–0.3)
BILIRUB INDIRECT FLD-MCNC: 2.2 MG/DL — HIGH (ref 0.2–1)
BILIRUB INDIRECT FLD-MCNC: 2.5 MG/DL — HIGH (ref 0.2–1)
BILIRUB SERPL-MCNC: 10.8 MG/DL — HIGH (ref 0.2–1.2)
BILIRUB SERPL-MCNC: 10.8 MG/DL — HIGH (ref 0.2–1.2)
BILIRUB SERPL-MCNC: 11.7 MG/DL — HIGH (ref 0.2–1.2)
BILIRUB SERPL-MCNC: 11.7 MG/DL — HIGH (ref 0.2–1.2)
BILIRUB SERPL-MCNC: 6.4 MG/DL
BILIRUB SERPL-MCNC: 7.3 MG/DL — HIGH (ref 0.2–1.2)
BILIRUB SERPL-MCNC: 7.6 MG/DL — HIGH (ref 0.2–1.2)
BILIRUB SERPL-MCNC: 7.9 MG/DL — HIGH (ref 0.2–1.2)
BILIRUB SERPL-MCNC: 9.3 MG/DL — HIGH (ref 0.2–1.2)
BILIRUB UR-MCNC: ABNORMAL
BLD GP AB SCN SERPL QL: NEGATIVE — SIGNIFICANT CHANGE UP
BLD GP AB SCN SERPL QL: NEGATIVE — SIGNIFICANT CHANGE UP
BUN SERPL-MCNC: 13 MG/DL
BUN SERPL-MCNC: 15 MG/DL — SIGNIFICANT CHANGE UP (ref 7–23)
BUN SERPL-MCNC: 18 MG/DL — SIGNIFICANT CHANGE UP (ref 7–23)
BUN SERPL-MCNC: 20 MG/DL — SIGNIFICANT CHANGE UP (ref 7–23)
BUN SERPL-MCNC: 22 MG/DL — SIGNIFICANT CHANGE UP (ref 7–23)
BUN SERPL-MCNC: 24 MG/DL — HIGH (ref 7–23)
BUN SERPL-MCNC: 26 MG/DL — HIGH (ref 7–23)
BUN SERPL-MCNC: 28 MG/DL — HIGH (ref 7–23)
BUN SERPL-MCNC: 30 MG/DL — HIGH (ref 7–23)
BUN SERPL-MCNC: 33 MG/DL — HIGH (ref 7–23)
BUN SERPL-MCNC: 35 MG/DL — HIGH (ref 7–23)
BUN SERPL-MCNC: 36 MG/DL — HIGH (ref 7–23)
BUN SERPL-MCNC: 37 MG/DL — HIGH (ref 7–23)
BUN SERPL-MCNC: 37 MG/DL — HIGH (ref 7–23)
BUN SERPL-MCNC: 38 MG/DL — HIGH (ref 7–23)
CALCIUM SERPL-MCNC: 7.9 MG/DL — LOW (ref 8.4–10.5)
CALCIUM SERPL-MCNC: 7.9 MG/DL — LOW (ref 8.4–10.5)
CALCIUM SERPL-MCNC: 8.1 MG/DL — LOW (ref 8.4–10.5)
CALCIUM SERPL-MCNC: 8.2 MG/DL — LOW (ref 8.4–10.5)
CALCIUM SERPL-MCNC: 8.3 MG/DL
CALCIUM SERPL-MCNC: 8.4 MG/DL — SIGNIFICANT CHANGE UP (ref 8.4–10.5)
CALCIUM SERPL-MCNC: 8.6 MG/DL — SIGNIFICANT CHANGE UP (ref 8.4–10.5)
CALCIUM SERPL-MCNC: 8.7 MG/DL — SIGNIFICANT CHANGE UP (ref 8.4–10.5)
CALCIUM SERPL-MCNC: 8.8 MG/DL — SIGNIFICANT CHANGE UP (ref 8.4–10.5)
CALCIUM SERPL-MCNC: 8.9 MG/DL — SIGNIFICANT CHANGE UP (ref 8.4–10.5)
CHLORIDE SERPL-SCNC: 105 MMOL/L
CHLORIDE SERPL-SCNC: 86 MMOL/L — LOW (ref 96–108)
CHLORIDE SERPL-SCNC: 86 MMOL/L — LOW (ref 96–108)
CHLORIDE SERPL-SCNC: 87 MMOL/L — LOW (ref 96–108)
CHLORIDE SERPL-SCNC: 88 MMOL/L — LOW (ref 96–108)
CHLORIDE SERPL-SCNC: 89 MMOL/L — LOW (ref 96–108)
CHLORIDE SERPL-SCNC: 89 MMOL/L — LOW (ref 96–108)
CHLORIDE SERPL-SCNC: 90 MMOL/L — LOW (ref 96–108)
CHLORIDE SERPL-SCNC: 91 MMOL/L — LOW (ref 96–108)
CHLORIDE SERPL-SCNC: 92 MMOL/L — LOW (ref 96–108)
CHLORIDE SERPL-SCNC: 93 MMOL/L — LOW (ref 96–108)
CHLORIDE SERPL-SCNC: 93 MMOL/L — LOW (ref 96–108)
CHLORIDE SERPL-SCNC: 96 MMOL/L — SIGNIFICANT CHANGE UP (ref 96–108)
CHLORIDE SERPL-SCNC: 97 MMOL/L — SIGNIFICANT CHANGE UP (ref 96–108)
CHLORIDE SERPL-SCNC: 99 MMOL/L — SIGNIFICANT CHANGE UP (ref 96–108)
CO2 SERPL-SCNC: 16 MMOL/L — LOW (ref 22–31)
CO2 SERPL-SCNC: 17 MMOL/L — LOW (ref 22–31)
CO2 SERPL-SCNC: 18 MMOL/L — LOW (ref 22–31)
CO2 SERPL-SCNC: 20 MMOL/L — LOW (ref 22–31)
CO2 SERPL-SCNC: 21 MMOL/L — LOW (ref 22–31)
CO2 SERPL-SCNC: 23 MMOL/L
COLOR SPEC: SIGNIFICANT CHANGE UP
CREAT ?TM UR-MCNC: 114 MG/DL — SIGNIFICANT CHANGE UP
CREAT ?TM UR-MCNC: 188 MG/DL — SIGNIFICANT CHANGE UP
CREAT ?TM UR-MCNC: 83 MG/DL — SIGNIFICANT CHANGE UP
CREAT ?TM UR-MCNC: 92 MG/DL — SIGNIFICANT CHANGE UP
CREAT SERPL-MCNC: 0.54 MG/DL — SIGNIFICANT CHANGE UP (ref 0.5–1.3)
CREAT SERPL-MCNC: 0.6 MG/DL — SIGNIFICANT CHANGE UP (ref 0.5–1.3)
CREAT SERPL-MCNC: 0.62 MG/DL — SIGNIFICANT CHANGE UP (ref 0.5–1.3)
CREAT SERPL-MCNC: 0.63 MG/DL — SIGNIFICANT CHANGE UP (ref 0.5–1.3)
CREAT SERPL-MCNC: 0.65 MG/DL — SIGNIFICANT CHANGE UP (ref 0.5–1.3)
CREAT SERPL-MCNC: 0.67 MG/DL — SIGNIFICANT CHANGE UP (ref 0.5–1.3)
CREAT SERPL-MCNC: 0.67 MG/DL — SIGNIFICANT CHANGE UP (ref 0.5–1.3)
CREAT SERPL-MCNC: 0.68 MG/DL — SIGNIFICANT CHANGE UP (ref 0.5–1.3)
CREAT SERPL-MCNC: 0.69 MG/DL — SIGNIFICANT CHANGE UP (ref 0.5–1.3)
CREAT SERPL-MCNC: 0.71 MG/DL — SIGNIFICANT CHANGE UP (ref 0.5–1.3)
CREAT SERPL-MCNC: 0.73 MG/DL — SIGNIFICANT CHANGE UP (ref 0.5–1.3)
CREAT SERPL-MCNC: 0.75 MG/DL — SIGNIFICANT CHANGE UP (ref 0.5–1.3)
CREAT SERPL-MCNC: 0.79 MG/DL — SIGNIFICANT CHANGE UP (ref 0.5–1.3)
CREAT SERPL-MCNC: 0.81 MG/DL — SIGNIFICANT CHANGE UP (ref 0.5–1.3)
CREAT SERPL-MCNC: 0.85 MG/DL — SIGNIFICANT CHANGE UP (ref 0.5–1.3)
CREAT SERPL-MCNC: 0.88 MG/DL — SIGNIFICANT CHANGE UP (ref 0.5–1.3)
CREAT SERPL-MCNC: 0.9 MG/DL
CULTURE RESULTS: SIGNIFICANT CHANGE UP
CYSTATIN C SERPL-MCNC: 1.16 MG/L — HIGH (ref 0.62–1.07)
DIFF PNL FLD: NEGATIVE — SIGNIFICANT CHANGE UP
EGFR: 103 ML/MIN/1.73M2 — SIGNIFICANT CHANGE UP
EGFR: 103 ML/MIN/1.73M2 — SIGNIFICANT CHANGE UP
EGFR: 106 ML/MIN/1.73M2 — SIGNIFICANT CHANGE UP
EGFR: 106 ML/MIN/1.73M2 — SIGNIFICANT CHANGE UP
EGFR: 108 ML/MIN/1.73M2 — SIGNIFICANT CHANGE UP
EGFR: 108 ML/MIN/1.73M2 — SIGNIFICANT CHANGE UP
EGFR: 109 ML/MIN/1.73M2 — SIGNIFICANT CHANGE UP
EGFR: 110 ML/MIN/1.73M2 — SIGNIFICANT CHANGE UP
EGFR: 110 ML/MIN/1.73M2 — SIGNIFICANT CHANGE UP
EGFR: 111 ML/MIN/1.73M2 — SIGNIFICANT CHANGE UP
EGFR: 112 ML/MIN/1.73M2 — SIGNIFICANT CHANGE UP
EGFR: 112 ML/MIN/1.73M2 — SIGNIFICANT CHANGE UP
EGFR: 115 ML/MIN/1.73M2 — SIGNIFICANT CHANGE UP
EGFR: 115 ML/MIN/1.73M2 — SIGNIFICANT CHANGE UP
EGFR: 82 ML/MIN/1.73M2 — SIGNIFICANT CHANGE UP
EGFR: 82 ML/MIN/1.73M2 — SIGNIFICANT CHANGE UP
EGFR: 86 ML/MIN/1.73M2 — SIGNIFICANT CHANGE UP
EGFR: 86 ML/MIN/1.73M2 — SIGNIFICANT CHANGE UP
EGFR: 91 ML/MIN/1.73M2 — SIGNIFICANT CHANGE UP
EGFR: 91 ML/MIN/1.73M2 — SIGNIFICANT CHANGE UP
EGFR: 93 ML/MIN/1.73M2 — SIGNIFICANT CHANGE UP
EGFR: 93 ML/MIN/1.73M2 — SIGNIFICANT CHANGE UP
EGFR: 99 ML/MIN/1.73M2 — SIGNIFICANT CHANGE UP
EGFR: 99 ML/MIN/1.73M2 — SIGNIFICANT CHANGE UP
EGFRCR SERPLBLD CKD-EPI 2021: 80 ML/MIN/1.73M2
EOSINOPHIL # BLD AUTO: 0 K/UL — SIGNIFICANT CHANGE UP (ref 0–0.5)
EOSINOPHIL # BLD AUTO: 0 K/UL — SIGNIFICANT CHANGE UP (ref 0–0.5)
EOSINOPHIL # BLD AUTO: 0.01 K/UL — SIGNIFICANT CHANGE UP (ref 0–0.5)
EOSINOPHIL # BLD AUTO: 0.02 K/UL — SIGNIFICANT CHANGE UP (ref 0–0.5)
EOSINOPHIL # BLD AUTO: 0.02 K/UL — SIGNIFICANT CHANGE UP (ref 0–0.5)
EOSINOPHIL # BLD AUTO: 0.03 K/UL — SIGNIFICANT CHANGE UP (ref 0–0.5)
EOSINOPHIL # BLD AUTO: 0.06 K/UL — SIGNIFICANT CHANGE UP (ref 0–0.5)
EOSINOPHIL # BLD AUTO: 0.09 K/UL — SIGNIFICANT CHANGE UP (ref 0–0.5)
EOSINOPHIL NFR BLD AUTO: 0 % — SIGNIFICANT CHANGE UP (ref 0–6)
EOSINOPHIL NFR BLD AUTO: 0.1 % — SIGNIFICANT CHANGE UP (ref 0–6)
EOSINOPHIL NFR BLD AUTO: 0.1 % — SIGNIFICANT CHANGE UP (ref 0–6)
EOSINOPHIL NFR BLD AUTO: 0.2 % — SIGNIFICANT CHANGE UP (ref 0–6)
EOSINOPHIL NFR BLD AUTO: 0.3 % — SIGNIFICANT CHANGE UP (ref 0–6)
EOSINOPHIL NFR BLD AUTO: 0.4 % — SIGNIFICANT CHANGE UP (ref 0–6)
GFR/BSA.PRED SERPLBLD CYS-BASED-ARV: 63 ML/MIN/1.73M2 — SIGNIFICANT CHANGE UP
GLUCOSE SERPL-MCNC: 100 MG/DL — HIGH (ref 70–99)
GLUCOSE SERPL-MCNC: 102 MG/DL — HIGH (ref 70–99)
GLUCOSE SERPL-MCNC: 102 MG/DL — HIGH (ref 70–99)
GLUCOSE SERPL-MCNC: 105 MG/DL — HIGH (ref 70–99)
GLUCOSE SERPL-MCNC: 107 MG/DL — HIGH (ref 70–99)
GLUCOSE SERPL-MCNC: 110 MG/DL — HIGH (ref 70–99)
GLUCOSE SERPL-MCNC: 115 MG/DL — HIGH (ref 70–99)
GLUCOSE SERPL-MCNC: 125 MG/DL — HIGH (ref 70–99)
GLUCOSE SERPL-MCNC: 130 MG/DL — HIGH (ref 70–99)
GLUCOSE SERPL-MCNC: 132 MG/DL
GLUCOSE SERPL-MCNC: 52 MG/DL — CRITICAL LOW (ref 70–99)
GLUCOSE SERPL-MCNC: 55 MG/DL — LOW (ref 70–99)
GLUCOSE SERPL-MCNC: 61 MG/DL — LOW (ref 70–99)
GLUCOSE SERPL-MCNC: 70 MG/DL — SIGNIFICANT CHANGE UP (ref 70–99)
GLUCOSE SERPL-MCNC: 77 MG/DL — SIGNIFICANT CHANGE UP (ref 70–99)
GLUCOSE SERPL-MCNC: 78 MG/DL — SIGNIFICANT CHANGE UP (ref 70–99)
GLUCOSE SERPL-MCNC: 93 MG/DL — SIGNIFICANT CHANGE UP (ref 70–99)
GLUCOSE UR QL: NEGATIVE MG/DL — SIGNIFICANT CHANGE UP
HCT VFR BLD CALC: 33.8 % — LOW (ref 34.5–45)
HCT VFR BLD CALC: 34.2 % — LOW (ref 34.5–45)
HCT VFR BLD CALC: 36 % — SIGNIFICANT CHANGE UP (ref 34.5–45)
HCT VFR BLD CALC: 36.1 % — SIGNIFICANT CHANGE UP (ref 34.5–45)
HCT VFR BLD CALC: 37.4 % — SIGNIFICANT CHANGE UP (ref 34.5–45)
HCT VFR BLD CALC: 37.8 % — SIGNIFICANT CHANGE UP (ref 34.5–45)
HCT VFR BLD CALC: 38.5 % — SIGNIFICANT CHANGE UP (ref 34.5–45)
HCT VFR BLD CALC: 39.2 % — SIGNIFICANT CHANGE UP (ref 34.5–45)
HCT VFR BLD CALC: 39.9 %
HGB BLD-MCNC: 11.5 G/DL — SIGNIFICANT CHANGE UP (ref 11.5–15.5)
HGB BLD-MCNC: 11.6 G/DL — SIGNIFICANT CHANGE UP (ref 11.5–15.5)
HGB BLD-MCNC: 12.2 G/DL — SIGNIFICANT CHANGE UP (ref 11.5–15.5)
HGB BLD-MCNC: 12.3 G/DL — SIGNIFICANT CHANGE UP (ref 11.5–15.5)
HGB BLD-MCNC: 12.4 G/DL — SIGNIFICANT CHANGE UP (ref 11.5–15.5)
HGB BLD-MCNC: 12.7 G/DL — SIGNIFICANT CHANGE UP (ref 11.5–15.5)
HGB BLD-MCNC: 13.1 G/DL — SIGNIFICANT CHANGE UP (ref 11.5–15.5)
HGB BLD-MCNC: 13.2 G/DL — SIGNIFICANT CHANGE UP (ref 11.5–15.5)
HGB BLD-MCNC: 13.5 G/DL
IMM GRANULOCYTES NFR BLD AUTO: 6.3 % — HIGH (ref 0–0.9)
IMM GRANULOCYTES NFR BLD AUTO: 6.6 % — HIGH (ref 0–0.9)
IMM GRANULOCYTES NFR BLD AUTO: 7.3 % — HIGH (ref 0–0.9)
IMM GRANULOCYTES NFR BLD AUTO: 7.4 % — HIGH (ref 0–0.9)
IMM GRANULOCYTES NFR BLD AUTO: 7.4 % — HIGH (ref 0–0.9)
IMM GRANULOCYTES NFR BLD AUTO: 8.5 % — HIGH (ref 0–0.9)
INR BLD: 1.46 — HIGH (ref 0.85–1.16)
INR BLD: 1.88 — HIGH (ref 0.85–1.16)
INR BLD: 2.1 — HIGH (ref 0.85–1.16)
INR BLD: 2.16 — HIGH (ref 0.85–1.16)
INR BLD: 2.55 — HIGH (ref 0.85–1.16)
INR BLD: 2.61 — HIGH (ref 0.85–1.16)
INR PPP: 1.59
KETONES UR-MCNC: 15 MG/DL
KETONES UR-MCNC: 15 MG/DL
KETONES UR-MCNC: ABNORMAL MG/DL
KETONES UR-MCNC: ABNORMAL MG/DL
LEUKOCYTE ESTERASE UR-ACNC: ABNORMAL
LIDOCAIN IGE QN: 75 U/L — HIGH (ref 7–60)
LYMPHOCYTES # BLD AUTO: 0.51 K/UL — LOW (ref 1–3.3)
LYMPHOCYTES # BLD AUTO: 0.52 K/UL — LOW (ref 1–3.3)
LYMPHOCYTES # BLD AUTO: 0.71 K/UL — LOW (ref 1–3.3)
LYMPHOCYTES # BLD AUTO: 0.9 K/UL — LOW (ref 1–3.3)
LYMPHOCYTES # BLD AUTO: 0.98 K/UL — LOW (ref 1–3.3)
LYMPHOCYTES # BLD AUTO: 1 K/UL
LYMPHOCYTES # BLD AUTO: 1.18 K/UL — SIGNIFICANT CHANGE UP (ref 1–3.3)
LYMPHOCYTES # BLD AUTO: 1.3 K/UL — SIGNIFICANT CHANGE UP (ref 1–3.3)
LYMPHOCYTES # BLD AUTO: 1.91 K/UL — SIGNIFICANT CHANGE UP (ref 1–3.3)
LYMPHOCYTES # BLD AUTO: 2.6 % — LOW (ref 13–44)
LYMPHOCYTES # BLD AUTO: 2.6 % — LOW (ref 13–44)
LYMPHOCYTES # BLD AUTO: 4.3 % — LOW (ref 13–44)
LYMPHOCYTES # BLD AUTO: 4.7 % — LOW (ref 13–44)
LYMPHOCYTES # BLD AUTO: 4.9 % — LOW (ref 13–44)
LYMPHOCYTES # BLD AUTO: 5.9 % — LOW (ref 13–44)
LYMPHOCYTES # BLD AUTO: 6.7 % — LOW (ref 13–44)
LYMPHOCYTES # BLD AUTO: 9.3 % — LOW (ref 13–44)
LYMPHOCYTES NFR BLD AUTO: 4.7 %
MAGNESIUM SERPL-MCNC: 1.9 MG/DL — SIGNIFICANT CHANGE UP (ref 1.6–2.6)
MAGNESIUM SERPL-MCNC: 2 MG/DL — SIGNIFICANT CHANGE UP (ref 1.6–2.6)
MAGNESIUM SERPL-MCNC: 2 MG/DL — SIGNIFICANT CHANGE UP (ref 1.6–2.6)
MAGNESIUM SERPL-MCNC: 2.1 MG/DL — SIGNIFICANT CHANGE UP (ref 1.6–2.6)
MAGNESIUM SERPL-MCNC: 2.4 MG/DL — SIGNIFICANT CHANGE UP (ref 1.6–2.6)
MAGNESIUM SERPL-MCNC: 2.4 MG/DL — SIGNIFICANT CHANGE UP (ref 1.6–2.6)
MAGNESIUM SERPL-MCNC: 2.5 MG/DL — SIGNIFICANT CHANGE UP (ref 1.6–2.6)
MAN DIFF?: NO
MCHC RBC-ENTMCNC: 31.3 PG — SIGNIFICANT CHANGE UP (ref 27–34)
MCHC RBC-ENTMCNC: 31.8 PG
MCHC RBC-ENTMCNC: 31.8 PG — SIGNIFICANT CHANGE UP (ref 27–34)
MCHC RBC-ENTMCNC: 31.9 PG — SIGNIFICANT CHANGE UP (ref 27–34)
MCHC RBC-ENTMCNC: 32 PG — SIGNIFICANT CHANGE UP (ref 27–34)
MCHC RBC-ENTMCNC: 32.3 PG — SIGNIFICANT CHANGE UP (ref 27–34)
MCHC RBC-ENTMCNC: 32.4 PG — SIGNIFICANT CHANGE UP (ref 27–34)
MCHC RBC-ENTMCNC: 33.2 G/DL — SIGNIFICANT CHANGE UP (ref 32–36)
MCHC RBC-ENTMCNC: 33.6 G/DL — SIGNIFICANT CHANGE UP (ref 32–36)
MCHC RBC-ENTMCNC: 33.6 G/DL — SIGNIFICANT CHANGE UP (ref 32–36)
MCHC RBC-ENTMCNC: 33.7 G/DL — SIGNIFICANT CHANGE UP (ref 32–36)
MCHC RBC-ENTMCNC: 33.8 G/DL
MCHC RBC-ENTMCNC: 33.9 G/DL — SIGNIFICANT CHANGE UP (ref 32–36)
MCHC RBC-ENTMCNC: 34 G/DL — SIGNIFICANT CHANGE UP (ref 32–36)
MCHC RBC-ENTMCNC: 34.1 G/DL — SIGNIFICANT CHANGE UP (ref 32–36)
MCHC RBC-ENTMCNC: 34.3 G/DL — SIGNIFICANT CHANGE UP (ref 32–36)
MCV RBC AUTO: 92.3 FL — SIGNIFICANT CHANGE UP (ref 80–100)
MCV RBC AUTO: 92.9 FL — SIGNIFICANT CHANGE UP (ref 80–100)
MCV RBC AUTO: 93.4 FL — SIGNIFICANT CHANGE UP (ref 80–100)
MCV RBC AUTO: 93.4 FL — SIGNIFICANT CHANGE UP (ref 80–100)
MCV RBC AUTO: 94.1 FL
MCV RBC AUTO: 94.4 FL — SIGNIFICANT CHANGE UP (ref 80–100)
MCV RBC AUTO: 94.8 FL — SIGNIFICANT CHANGE UP (ref 80–100)
MCV RBC AUTO: 95 FL — SIGNIFICANT CHANGE UP (ref 80–100)
MCV RBC AUTO: 96.1 FL — SIGNIFICANT CHANGE UP (ref 80–100)
MONOCYTES # BLD AUTO: 1.4 K/UL — HIGH (ref 0–0.9)
MONOCYTES # BLD AUTO: 1.67 K/UL — HIGH (ref 0–0.9)
MONOCYTES # BLD AUTO: 1.99 K/UL — HIGH (ref 0–0.9)
MONOCYTES # BLD AUTO: 2.02 K/UL — HIGH (ref 0–0.9)
MONOCYTES # BLD AUTO: 2.09 K/UL — HIGH (ref 0–0.9)
MONOCYTES # BLD AUTO: 2.16 K/UL — HIGH (ref 0–0.9)
MONOCYTES # BLD AUTO: 2.2 K/UL — HIGH (ref 0–0.9)
MONOCYTES # BLD AUTO: 2.44 K/UL — HIGH (ref 0–0.9)
MONOCYTES NFR BLD AUTO: 10.4 % — SIGNIFICANT CHANGE UP (ref 2–14)
MONOCYTES NFR BLD AUTO: 10.4 % — SIGNIFICANT CHANGE UP (ref 2–14)
MONOCYTES NFR BLD AUTO: 10.9 % — SIGNIFICANT CHANGE UP (ref 2–14)
MONOCYTES NFR BLD AUTO: 11.2 % — SIGNIFICANT CHANGE UP (ref 2–14)
MONOCYTES NFR BLD AUTO: 11.9 % — SIGNIFICANT CHANGE UP (ref 2–14)
MONOCYTES NFR BLD AUTO: 12.1 % — SIGNIFICANT CHANGE UP (ref 2–14)
MONOCYTES NFR BLD AUTO: 7 % — SIGNIFICANT CHANGE UP (ref 2–14)
MONOCYTES NFR BLD AUTO: 8.6 % — SIGNIFICANT CHANGE UP (ref 2–14)
NEUTROPHILS # BLD AUTO: 12.4 K/UL — HIGH (ref 1.8–7.4)
NEUTROPHILS # BLD AUTO: 14.45 K/UL — HIGH (ref 1.8–7.4)
NEUTROPHILS # BLD AUTO: 14.63 K/UL — HIGH (ref 1.8–7.4)
NEUTROPHILS # BLD AUTO: 14.77 K/UL — HIGH (ref 1.8–7.4)
NEUTROPHILS # BLD AUTO: 15.19 K/UL — HIGH (ref 1.8–7.4)
NEUTROPHILS # BLD AUTO: 15.3 K/UL — HIGH (ref 1.8–7.4)
NEUTROPHILS # BLD AUTO: 16.61 K/UL — HIGH (ref 1.8–7.4)
NEUTROPHILS # BLD AUTO: 16.99 K/UL — HIGH (ref 1.8–7.4)
NEUTROPHILS # BLD AUTO: 17.9 K/UL
NEUTROPHILS NFR BLD AUTO: 70.2 % — SIGNIFICANT CHANGE UP (ref 43–77)
NEUTROPHILS NFR BLD AUTO: 75.2 % — SIGNIFICANT CHANGE UP (ref 43–77)
NEUTROPHILS NFR BLD AUTO: 75.8 % — SIGNIFICANT CHANGE UP (ref 43–77)
NEUTROPHILS NFR BLD AUTO: 75.8 % — SIGNIFICANT CHANGE UP (ref 43–77)
NEUTROPHILS NFR BLD AUTO: 76 % — SIGNIFICANT CHANGE UP (ref 43–77)
NEUTROPHILS NFR BLD AUTO: 76.1 % — SIGNIFICANT CHANGE UP (ref 43–77)
NEUTROPHILS NFR BLD AUTO: 83.6 % — HIGH (ref 43–77)
NEUTROPHILS NFR BLD AUTO: 84.2 % — HIGH (ref 43–77)
NEUTROPHILS NFR BLD AUTO: 88.2 %
NITRITE UR-MCNC: NEGATIVE — SIGNIFICANT CHANGE UP
NITRITE UR-MCNC: NEGATIVE — SIGNIFICANT CHANGE UP
NITRITE UR-MCNC: POSITIVE
NITRITE UR-MCNC: POSITIVE
NRBC BLD AUTO-RTO: 0 /100 WBCS — SIGNIFICANT CHANGE UP (ref 0–0)
OSMOLALITY UR: 595 MOSM/KG — SIGNIFICANT CHANGE UP (ref 300–900)
OSMOLALITY UR: 680 MOSM/KG — SIGNIFICANT CHANGE UP (ref 300–900)
OSMOLALITY UR: 707 MOSM/KG — SIGNIFICANT CHANGE UP (ref 300–900)
OSMOLALITY UR: 829 MOSM/KG — SIGNIFICANT CHANGE UP (ref 300–900)
PH UR: 5.5 — SIGNIFICANT CHANGE UP (ref 5–8)
PHOSPHATE SERPL-MCNC: 1.6 MG/DL — LOW (ref 2.5–4.5)
PHOSPHATE SERPL-MCNC: 2.4 MG/DL — LOW (ref 2.5–4.5)
PHOSPHATE SERPL-MCNC: 2.6 MG/DL — SIGNIFICANT CHANGE UP (ref 2.5–4.5)
PLATELET # BLD AUTO: 263 K/UL — SIGNIFICANT CHANGE UP (ref 150–400)
PLATELET # BLD AUTO: 272 K/UL
PLATELET # BLD AUTO: 318 K/UL — SIGNIFICANT CHANGE UP (ref 150–400)
PLATELET # BLD AUTO: 322 K/UL — SIGNIFICANT CHANGE UP (ref 150–400)
PLATELET # BLD AUTO: 332 K/UL — SIGNIFICANT CHANGE UP (ref 150–400)
PLATELET # BLD AUTO: 340 K/UL — SIGNIFICANT CHANGE UP (ref 150–400)
PLATELET # BLD AUTO: 381 K/UL — SIGNIFICANT CHANGE UP (ref 150–400)
PLATELET # BLD AUTO: 406 K/UL — HIGH (ref 150–400)
PLATELET # BLD AUTO: 436 K/UL — HIGH (ref 150–400)
POTASSIUM SERPL-MCNC: 4.6 MMOL/L — SIGNIFICANT CHANGE UP (ref 3.5–5.3)
POTASSIUM SERPL-MCNC: 4.8 MMOL/L — SIGNIFICANT CHANGE UP (ref 3.5–5.3)
POTASSIUM SERPL-MCNC: 4.8 MMOL/L — SIGNIFICANT CHANGE UP (ref 3.5–5.3)
POTASSIUM SERPL-MCNC: 5 MMOL/L — SIGNIFICANT CHANGE UP (ref 3.5–5.3)
POTASSIUM SERPL-MCNC: 5.1 MMOL/L — SIGNIFICANT CHANGE UP (ref 3.5–5.3)
POTASSIUM SERPL-MCNC: 5.2 MMOL/L — SIGNIFICANT CHANGE UP (ref 3.5–5.3)
POTASSIUM SERPL-MCNC: 5.3 MMOL/L — SIGNIFICANT CHANGE UP (ref 3.5–5.3)
POTASSIUM SERPL-MCNC: 5.3 MMOL/L — SIGNIFICANT CHANGE UP (ref 3.5–5.3)
POTASSIUM SERPL-MCNC: 5.4 MMOL/L — HIGH (ref 3.5–5.3)
POTASSIUM SERPL-MCNC: 5.5 MMOL/L — HIGH (ref 3.5–5.3)
POTASSIUM SERPL-MCNC: 5.7 MMOL/L — HIGH (ref 3.5–5.3)
POTASSIUM SERPL-MCNC: 5.8 MMOL/L — HIGH (ref 3.5–5.3)
POTASSIUM SERPL-SCNC: 4.6 MMOL/L — SIGNIFICANT CHANGE UP (ref 3.5–5.3)
POTASSIUM SERPL-SCNC: 4.8 MMOL/L — SIGNIFICANT CHANGE UP (ref 3.5–5.3)
POTASSIUM SERPL-SCNC: 4.8 MMOL/L — SIGNIFICANT CHANGE UP (ref 3.5–5.3)
POTASSIUM SERPL-SCNC: 5 MMOL/L — SIGNIFICANT CHANGE UP (ref 3.5–5.3)
POTASSIUM SERPL-SCNC: 5.1 MMOL/L
POTASSIUM SERPL-SCNC: 5.1 MMOL/L — SIGNIFICANT CHANGE UP (ref 3.5–5.3)
POTASSIUM SERPL-SCNC: 5.2 MMOL/L — SIGNIFICANT CHANGE UP (ref 3.5–5.3)
POTASSIUM SERPL-SCNC: 5.3 MMOL/L — SIGNIFICANT CHANGE UP (ref 3.5–5.3)
POTASSIUM SERPL-SCNC: 5.3 MMOL/L — SIGNIFICANT CHANGE UP (ref 3.5–5.3)
POTASSIUM SERPL-SCNC: 5.4 MMOL/L — HIGH (ref 3.5–5.3)
POTASSIUM SERPL-SCNC: 5.5 MMOL/L — HIGH (ref 3.5–5.3)
POTASSIUM SERPL-SCNC: 5.7 MMOL/L — HIGH (ref 3.5–5.3)
POTASSIUM SERPL-SCNC: 5.8 MMOL/L — HIGH (ref 3.5–5.3)
POTASSIUM UR-SCNC: 29 MMOL/L — SIGNIFICANT CHANGE UP
POTASSIUM UR-SCNC: 55 MMOL/L — SIGNIFICANT CHANGE UP
POTASSIUM UR-SCNC: 58 MMOL/L — SIGNIFICANT CHANGE UP
POTASSIUM UR-SCNC: 75 MMOL/L — SIGNIFICANT CHANGE UP
PROT ?TM UR-MCNC: 38 MG/DL — HIGH (ref 0–12)
PROT ?TM UR-MCNC: 38 MG/DL — HIGH (ref 0–12)
PROT ?TM UR-MCNC: 59 MG/DL — HIGH (ref 0–12)
PROT SERPL-MCNC: 5.4 G/DL — LOW (ref 6–8.3)
PROT SERPL-MCNC: 5.5 G/DL — LOW (ref 6–8.3)
PROT SERPL-MCNC: 5.5 G/DL — LOW (ref 6–8.3)
PROT SERPL-MCNC: 5.6 G/DL — LOW (ref 6–8.3)
PROT SERPL-MCNC: 5.7 G/DL
PROT SERPL-MCNC: 5.8 G/DL — LOW (ref 6–8.3)
PROT SERPL-MCNC: 5.9 G/DL — LOW (ref 6–8.3)
PROT UR-MCNC: 30 MG/DL
PROT UR-MCNC: NEGATIVE MG/DL — SIGNIFICANT CHANGE UP
PROT UR-MCNC: NEGATIVE MG/DL — SIGNIFICANT CHANGE UP
PROT UR-MCNC: SIGNIFICANT CHANGE UP MG/DL
PROT/CREAT UR-RTO: 0.3 RATIO — HIGH (ref 0–0.2)
PROT/CREAT UR-RTO: 0.3 RATIO — HIGH (ref 0–0.2)
PROT/CREAT UR-RTO: 0.4 RATIO — HIGH (ref 0–0.2)
PROTHROM AB SERPL-ACNC: 16.7 SEC — HIGH (ref 9.9–13.4)
PROTHROM AB SERPL-ACNC: 21.9 SEC — HIGH (ref 9.9–13.4)
PROTHROM AB SERPL-ACNC: 24 SEC — HIGH (ref 9.9–13.4)
PROTHROM AB SERPL-ACNC: 24.7 SEC — HIGH (ref 9.9–13.4)
PROTHROM AB SERPL-ACNC: 29.1 SEC — HIGH (ref 9.9–13.4)
PROTHROM AB SERPL-ACNC: 30.2 SEC — HIGH (ref 9.9–13.4)
PT BLD: 18.2 SEC
RAPID RVP RESULT: SIGNIFICANT CHANGE UP
RBC # BLD: 3.62 M/UL — LOW (ref 3.8–5.2)
RBC # BLD: 3.68 M/UL — LOW (ref 3.8–5.2)
RBC # BLD: 3.81 M/UL — SIGNIFICANT CHANGE UP (ref 3.8–5.2)
RBC # BLD: 3.9 M/UL — SIGNIFICANT CHANGE UP (ref 3.8–5.2)
RBC # BLD: 3.96 M/UL — SIGNIFICANT CHANGE UP (ref 3.8–5.2)
RBC # BLD: 3.98 M/UL — SIGNIFICANT CHANGE UP (ref 3.8–5.2)
RBC # BLD: 4.08 M/UL — SIGNIFICANT CHANGE UP (ref 3.8–5.2)
RBC # BLD: 4.12 M/UL — SIGNIFICANT CHANGE UP (ref 3.8–5.2)
RBC # BLD: 4.24 M/UL
RBC # FLD: 17.2 %
RBC # FLD: 17.3 % — HIGH (ref 10.3–14.5)
RBC # FLD: 17.3 % — HIGH (ref 10.3–14.5)
RBC # FLD: 17.4 % — HIGH (ref 10.3–14.5)
RBC # FLD: 17.4 % — HIGH (ref 10.3–14.5)
RBC # FLD: 17.5 % — HIGH (ref 10.3–14.5)
RBC # FLD: 17.7 % — HIGH (ref 10.3–14.5)
RH IG SCN BLD-IMP: POSITIVE — SIGNIFICANT CHANGE UP
RH IG SCN BLD-IMP: POSITIVE — SIGNIFICANT CHANGE UP
SARS-COV-2 RNA SPEC QL NAA+PROBE: SIGNIFICANT CHANGE UP
SODIUM SERPL-SCNC: 120 MMOL/L — CRITICAL LOW (ref 135–145)
SODIUM SERPL-SCNC: 120 MMOL/L — CRITICAL LOW (ref 135–145)
SODIUM SERPL-SCNC: 121 MMOL/L — LOW (ref 135–145)
SODIUM SERPL-SCNC: 122 MMOL/L — LOW (ref 135–145)
SODIUM SERPL-SCNC: 122 MMOL/L — LOW (ref 135–145)
SODIUM SERPL-SCNC: 123 MMOL/L — LOW (ref 135–145)
SODIUM SERPL-SCNC: 124 MMOL/L — LOW (ref 135–145)
SODIUM SERPL-SCNC: 125 MMOL/L — LOW (ref 135–145)
SODIUM SERPL-SCNC: 125 MMOL/L — LOW (ref 135–145)
SODIUM SERPL-SCNC: 126 MMOL/L — LOW (ref 135–145)
SODIUM SERPL-SCNC: 127 MMOL/L
SODIUM SERPL-SCNC: 129 MMOL/L — LOW (ref 135–145)
SODIUM UR-SCNC: 20 MMOL/L — SIGNIFICANT CHANGE UP
SODIUM UR-SCNC: <20 MMOL/L — SIGNIFICANT CHANGE UP
SP GR SPEC: 1.02 — SIGNIFICANT CHANGE UP (ref 1–1.03)
SP GR SPEC: 1.02 — SIGNIFICANT CHANGE UP (ref 1–1.03)
SP GR SPEC: >1.03 — HIGH (ref 1–1.03)
SP GR SPEC: >1.03 — HIGH (ref 1–1.03)
SPECIMEN SOURCE: SIGNIFICANT CHANGE UP
T4 AB SER-ACNC: 6.38 UG/DL — SIGNIFICANT CHANGE UP (ref 4.5–11.7)
TSH SERPL-MCNC: 4.03 UIU/ML — SIGNIFICANT CHANGE UP (ref 0.27–4.2)
UROBILINOGEN FLD QL: 0.2 MG/DL — SIGNIFICANT CHANGE UP (ref 0.2–1)
UUN UR-MCNC: 1166 MG/DL — SIGNIFICANT CHANGE UP
UUN UR-MCNC: 1362 MG/DL — SIGNIFICANT CHANGE UP
UUN UR-MCNC: 1398 MG/DL — SIGNIFICANT CHANGE UP
UUN UR-MCNC: 973 MG/DL — SIGNIFICANT CHANGE UP
WBC # BLD: 16.39 K/UL — HIGH (ref 3.8–10.5)
WBC # BLD: 19.24 K/UL — HIGH (ref 3.8–10.5)
WBC # BLD: 19.46 K/UL — HIGH (ref 3.8–10.5)
WBC # BLD: 19.47 K/UL — HIGH (ref 3.8–10.5)
WBC # BLD: 19.97 K/UL — HIGH (ref 3.8–10.5)
WBC # BLD: 20.13 K/UL — HIGH (ref 3.8–10.5)
WBC # BLD: 20.19 K/UL — HIGH (ref 3.8–10.5)
WBC # BLD: 20.57 K/UL — HIGH (ref 3.8–10.5)
WBC # FLD AUTO: 16.39 K/UL — HIGH (ref 3.8–10.5)
WBC # FLD AUTO: 19.24 K/UL — HIGH (ref 3.8–10.5)
WBC # FLD AUTO: 19.46 K/UL — HIGH (ref 3.8–10.5)
WBC # FLD AUTO: 19.47 K/UL — HIGH (ref 3.8–10.5)
WBC # FLD AUTO: 19.97 K/UL — HIGH (ref 3.8–10.5)
WBC # FLD AUTO: 20.13 K/UL — HIGH (ref 3.8–10.5)
WBC # FLD AUTO: 20.19 K/UL — HIGH (ref 3.8–10.5)
WBC # FLD AUTO: 20.3 K/UL
WBC # FLD AUTO: 20.57 K/UL — HIGH (ref 3.8–10.5)

## 2025-01-01 PROCEDURE — 82610 CYSTATIN C: CPT

## 2025-01-01 PROCEDURE — C1729: CPT

## 2025-01-01 PROCEDURE — 99447 NTRPROF PH1/NTRNET/EHR 11-20: CPT

## 2025-01-01 PROCEDURE — 84443 ASSAY THYROID STIM HORMONE: CPT

## 2025-01-01 PROCEDURE — 83615 LACTATE (LD) (LDH) ENZYME: CPT

## 2025-01-01 PROCEDURE — 83935 ASSAY OF URINE OSMOLALITY: CPT

## 2025-01-01 PROCEDURE — 80048 BASIC METABOLIC PNL TOTAL CA: CPT

## 2025-01-01 PROCEDURE — 49083 ABD PARACENTESIS W/IMAGING: CPT

## 2025-01-01 PROCEDURE — 81001 URINALYSIS AUTO W/SCOPE: CPT

## 2025-01-01 PROCEDURE — 82962 GLUCOSE BLOOD TEST: CPT

## 2025-01-01 PROCEDURE — 84540 ASSAY OF URINE/UREA-N: CPT

## 2025-01-01 PROCEDURE — 76705 ECHO EXAM OF ABDOMEN: CPT | Mod: 26

## 2025-01-01 PROCEDURE — C1894: CPT

## 2025-01-01 PROCEDURE — 99233 SBSQ HOSP IP/OBS HIGH 50: CPT

## 2025-01-01 PROCEDURE — 86850 RBC ANTIBODY SCREEN: CPT

## 2025-01-01 PROCEDURE — 99239 HOSP IP/OBS DSCHRG MGMT >30: CPT

## 2025-01-01 PROCEDURE — 89051 BODY FLUID CELL COUNT: CPT

## 2025-01-01 PROCEDURE — 99152 MOD SED SAME PHYS/QHP 5/>YRS: CPT

## 2025-01-01 PROCEDURE — 99214 OFFICE O/P EST MOD 30 MIN: CPT

## 2025-01-01 PROCEDURE — 74177 CT ABD & PELVIS W/CONTRAST: CPT | Mod: MC

## 2025-01-01 PROCEDURE — 76705 ECHO EXAM OF ABDOMEN: CPT

## 2025-01-01 PROCEDURE — 87070 CULTURE OTHR SPECIMN AEROBIC: CPT

## 2025-01-01 PROCEDURE — P9059: CPT

## 2025-01-01 PROCEDURE — 99233 SBSQ HOSP IP/OBS HIGH 50: CPT | Mod: GC

## 2025-01-01 PROCEDURE — G2211 COMPLEX E/M VISIT ADD ON: CPT | Mod: NC

## 2025-01-01 PROCEDURE — 80076 HEPATIC FUNCTION PANEL: CPT

## 2025-01-01 PROCEDURE — 99232 SBSQ HOSP IP/OBS MODERATE 35: CPT

## 2025-01-01 PROCEDURE — 86901 BLOOD TYPING SEROLOGIC RH(D): CPT

## 2025-01-01 PROCEDURE — 87205 SMEAR GRAM STAIN: CPT

## 2025-01-01 PROCEDURE — 99231 SBSQ HOSP IP/OBS SF/LOW 25: CPT

## 2025-01-01 PROCEDURE — 99153 MOD SED SAME PHYS/QHP EA: CPT

## 2025-01-01 PROCEDURE — 74177 CT ABD & PELVIS W/CONTRAST: CPT | Mod: 26

## 2025-01-01 PROCEDURE — C1887: CPT

## 2025-01-01 PROCEDURE — 84436 ASSAY OF TOTAL THYROXINE: CPT

## 2025-01-01 PROCEDURE — 82247 BILIRUBIN TOTAL: CPT

## 2025-01-01 PROCEDURE — 49418 INSERT TUN IP CATH PERC: CPT

## 2025-01-01 PROCEDURE — 93010 ELECTROCARDIOGRAM REPORT: CPT

## 2025-01-01 PROCEDURE — 82570 ASSAY OF URINE CREATININE: CPT

## 2025-01-01 PROCEDURE — 99222 1ST HOSP IP/OBS MODERATE 55: CPT | Mod: GC,25

## 2025-01-01 PROCEDURE — 36415 COLL VENOUS BLD VENIPUNCTURE: CPT

## 2025-01-01 PROCEDURE — 82042 OTHER SOURCE ALBUMIN QUAN EA: CPT

## 2025-01-01 PROCEDURE — 99223 1ST HOSP IP/OBS HIGH 75: CPT

## 2025-01-01 PROCEDURE — 87075 CULTR BACTERIA EXCEPT BLOOD: CPT

## 2025-01-01 PROCEDURE — 85025 COMPLETE CBC W/AUTO DIFF WBC: CPT

## 2025-01-01 PROCEDURE — 84156 ASSAY OF PROTEIN URINE: CPT

## 2025-01-01 PROCEDURE — C1769: CPT

## 2025-01-01 PROCEDURE — 84100 ASSAY OF PHOSPHORUS: CPT

## 2025-01-01 PROCEDURE — 93005 ELECTROCARDIOGRAM TRACING: CPT

## 2025-01-01 PROCEDURE — 87086 URINE CULTURE/COLONY COUNT: CPT

## 2025-01-01 PROCEDURE — 85610 PROTHROMBIN TIME: CPT

## 2025-01-01 PROCEDURE — 88112 CYTOPATH CELL ENHANCE TECH: CPT

## 2025-01-01 PROCEDURE — 99223 1ST HOSP IP/OBS HIGH 75: CPT | Mod: GC

## 2025-01-01 PROCEDURE — 99285 EMERGENCY DEPT VISIT HI MDM: CPT

## 2025-01-01 PROCEDURE — 85730 THROMBOPLASTIN TIME PARTIAL: CPT

## 2025-01-01 PROCEDURE — 82248 BILIRUBIN DIRECT: CPT

## 2025-01-01 PROCEDURE — 87015 SPECIMEN INFECT AGNT CONCNTJ: CPT

## 2025-01-01 PROCEDURE — 88112 CYTOPATH CELL ENHANCE TECH: CPT | Mod: 26

## 2025-01-01 PROCEDURE — 84157 ASSAY OF PROTEIN OTHER: CPT

## 2025-01-01 PROCEDURE — 88305 TISSUE EXAM BY PATHOLOGIST: CPT

## 2025-01-01 PROCEDURE — 84300 ASSAY OF URINE SODIUM: CPT

## 2025-01-01 PROCEDURE — 86900 BLOOD TYPING SEROLOGIC ABO: CPT

## 2025-01-01 PROCEDURE — 82945 GLUCOSE OTHER FLUID: CPT

## 2025-01-01 PROCEDURE — 99222 1ST HOSP IP/OBS MODERATE 55: CPT | Mod: GC

## 2025-01-01 PROCEDURE — 0225U NFCT DS DNA&RNA 21 SARSCOV2: CPT

## 2025-01-01 PROCEDURE — 80053 COMPREHEN METABOLIC PANEL: CPT

## 2025-01-01 PROCEDURE — 99497 ADVNCD CARE PLAN 30 MIN: CPT | Mod: 25

## 2025-01-01 PROCEDURE — 76604 US EXAM CHEST: CPT | Mod: 26,GC

## 2025-01-01 PROCEDURE — P9047: CPT

## 2025-01-01 PROCEDURE — 88305 TISSUE EXAM BY PATHOLOGIST: CPT | Mod: 26

## 2025-01-01 PROCEDURE — 83690 ASSAY OF LIPASE: CPT

## 2025-01-01 PROCEDURE — 99358 PROLONG SERVICE W/O CONTACT: CPT

## 2025-01-01 PROCEDURE — 36430 TRANSFUSION BLD/BLD COMPNT: CPT

## 2025-01-01 PROCEDURE — 83735 ASSAY OF MAGNESIUM: CPT

## 2025-01-01 PROCEDURE — 99221 1ST HOSP IP/OBS SF/LOW 40: CPT

## 2025-01-01 PROCEDURE — 84133 ASSAY OF URINE POTASSIUM: CPT

## 2025-01-01 RX ORDER — DEXTROSE 50 % IN WATER 50 %
50 SYRINGE (ML) INTRAVENOUS ONCE
Refills: 0 | Status: DISCONTINUED | OUTPATIENT
Start: 2025-01-01 | End: 2025-01-01

## 2025-01-01 RX ORDER — URSODIOL 300 MG/1
300 CAPSULE ORAL THREE TIMES A DAY
Refills: 0 | Status: DISCONTINUED | OUTPATIENT
Start: 2025-01-01 | End: 2025-01-01

## 2025-01-01 RX ORDER — URSODIOL 300 MG/1
1 CAPSULE ORAL
Qty: 0 | Refills: 0 | DISCHARGE
Start: 2025-01-01

## 2025-01-01 RX ORDER — ONDANSETRON HCL/PF 4 MG/2 ML
1 VIAL (ML) INJECTION
Qty: 1 | Refills: 0
Start: 2025-01-01

## 2025-01-01 RX ORDER — SULFAMETHOXAZOLE/TRIMETHOPRIM 800-160 MG
1 TABLET ORAL
Refills: 0 | Status: DISCONTINUED | OUTPATIENT
Start: 2025-01-01 | End: 2025-01-01

## 2025-01-01 RX ORDER — SPIRONOLACTONE 25 MG
50 TABLET ORAL DAILY
Refills: 0 | Status: DISCONTINUED | OUTPATIENT
Start: 2025-01-01 | End: 2025-01-01

## 2025-01-01 RX ORDER — SULFAMETHOXAZOLE/TRIMETHOPRIM 800-160 MG
1 TABLET ORAL
Qty: 13 | Refills: 0
Start: 2025-01-01 | End: 2025-05-16

## 2025-01-01 RX ORDER — ACETAMINOPHEN 500 MG/5ML
650 LIQUID (ML) ORAL EVERY 6 HOURS
Refills: 0 | Status: DISCONTINUED | OUTPATIENT
Start: 2025-01-01 | End: 2025-01-01

## 2025-01-01 RX ORDER — HYDROMORPHONE/SOD CHLOR,ISO/PF 2 MG/10 ML
0.5 SYRINGE (ML) INJECTION EVERY 4 HOURS
Refills: 0 | Status: DISCONTINUED | OUTPATIENT
Start: 2025-01-01 | End: 2025-01-01

## 2025-01-01 RX ORDER — BISACODYL 5 MG
10 TABLET, DELAYED RELEASE (ENTERIC COATED) ORAL DAILY
Refills: 0 | Status: DISCONTINUED | OUTPATIENT
Start: 2025-01-01 | End: 2025-01-01

## 2025-01-01 RX ORDER — SODIUM CHLORIDE 3 G/100ML
100 INJECTION, SOLUTION INTRAVENOUS ONCE
Refills: 0 | Status: COMPLETED | OUTPATIENT
Start: 2025-01-01 | End: 2025-01-01

## 2025-01-01 RX ORDER — LISINOPRIL 30 MG/1
12.5 TABLET ORAL EVERY 24 HOURS
Refills: 0 | Status: DISCONTINUED | OUTPATIENT
Start: 2025-01-01 | End: 2025-01-01

## 2025-01-01 RX ORDER — POLYETHYLENE GLYCOL 3350 17 G/17G
17 POWDER, FOR SOLUTION ORAL
Qty: 0 | Refills: 0 | DISCHARGE
Start: 2025-01-01

## 2025-01-01 RX ORDER — SENNA 187 MG
2 TABLET ORAL
Qty: 60 | Refills: 0
Start: 2025-01-01 | End: 2025-05-16

## 2025-01-01 RX ORDER — BISACODYL 5 MG
1 TABLET, DELAYED RELEASE (ENTERIC COATED) ORAL
Qty: 30 | Refills: 0
Start: 2025-01-01 | End: 2025-05-16

## 2025-01-01 RX ORDER — ALBUMIN (HUMAN) 12.5 G/50ML
100 INJECTION, SOLUTION INTRAVENOUS ONCE
Refills: 0 | Status: COMPLETED | OUTPATIENT
Start: 2025-01-01 | End: 2025-01-01

## 2025-01-01 RX ORDER — HYDROMORPHONE/SOD CHLOR,ISO/PF 2 MG/10 ML
1 SYRINGE (ML) INJECTION
Qty: 18 | Refills: 0
Start: 2025-01-01 | End: 2025-01-01

## 2025-01-01 RX ORDER — LORAZEPAM 4 MG/ML
1 VIAL (ML) INJECTION ONCE
Refills: 0 | Status: DISCONTINUED | OUTPATIENT
Start: 2025-01-01 | End: 2025-01-01

## 2025-01-01 RX ORDER — SODIUM ZIRCONIUM CYCLOSILICATE 5 G/5G
10 POWDER, FOR SUSPENSION ORAL ONCE
Refills: 0 | Status: COMPLETED | OUTPATIENT
Start: 2025-01-01 | End: 2025-01-01

## 2025-01-01 RX ORDER — HYDROMORPHONE/SOD CHLOR,ISO/PF 2 MG/10 ML
0.5 SYRINGE (ML) INJECTION
Refills: 0 | Status: DISCONTINUED | OUTPATIENT
Start: 2025-01-01 | End: 2025-01-01

## 2025-01-01 RX ORDER — HYDROMORPHONE/SOD CHLOR,ISO/PF 2 MG/10 ML
0.5 SYRINGE (ML) INJECTION ONCE
Refills: 0 | Status: DISCONTINUED | OUTPATIENT
Start: 2025-01-01 | End: 2025-01-01

## 2025-01-01 RX ORDER — HYDROMORPHONE/SOD CHLOR,ISO/PF 2 MG/10 ML
1 SYRINGE (ML) INJECTION EVERY 4 HOURS
Refills: 0 | Status: DISCONTINUED | OUTPATIENT
Start: 2025-01-01 | End: 2025-01-01

## 2025-01-01 RX ORDER — HYDROMORPHONE/SOD CHLOR,ISO/PF 2 MG/10 ML
1 SYRINGE (ML) INJECTION
Refills: 0 | Status: DISCONTINUED | OUTPATIENT
Start: 2025-01-01 | End: 2025-01-01

## 2025-01-01 RX ORDER — BUMETANIDE 1 MG/1
1 TABLET ORAL ONCE
Refills: 0 | Status: COMPLETED | OUTPATIENT
Start: 2025-01-01 | End: 2025-01-01

## 2025-01-01 RX ORDER — HALOPERIDOL 10 MG/1
2 TABLET ORAL
Refills: 0 | Status: DISCONTINUED | OUTPATIENT
Start: 2025-01-01 | End: 2025-01-01

## 2025-01-01 RX ORDER — ALPRAZOLAM 0.25 MG/1
0.25 TABLET ORAL
Qty: 28 | Refills: 0 | Status: ACTIVE | COMMUNITY
Start: 2025-01-01 | End: 1900-01-01

## 2025-01-01 RX ORDER — SODIUM CHLORIDE 3 G/100ML
100 INJECTION, SOLUTION INTRAVENOUS
Refills: 0 | Status: DISCONTINUED | OUTPATIENT
Start: 2025-01-01 | End: 2025-01-01

## 2025-01-01 RX ORDER — HYDROMORPHONE/SOD CHLOR,ISO/PF 2 MG/10 ML
2 SYRINGE (ML) INJECTION EVERY 4 HOURS
Refills: 0 | Status: DISCONTINUED | OUTPATIENT
Start: 2025-01-01 | End: 2025-01-01

## 2025-01-01 RX ORDER — SENNA 187 MG
2 TABLET ORAL AT BEDTIME
Refills: 0 | Status: DISCONTINUED | OUTPATIENT
Start: 2025-01-01 | End: 2025-01-01

## 2025-01-01 RX ORDER — PREDNISONE 20 MG/1
5 TABLET ORAL EVERY 24 HOURS
Refills: 0 | Status: DISCONTINUED | OUTPATIENT
Start: 2025-01-01 | End: 2025-01-01

## 2025-01-01 RX ORDER — SODIUM ZIRCONIUM CYCLOSILICATE 5 G/5G
10 POWDER, FOR SUSPENSION ORAL ONCE
Refills: 0 | Status: DISCONTINUED | OUTPATIENT
Start: 2025-01-01 | End: 2025-01-01

## 2025-01-01 RX ORDER — POLYETHYLENE GLYCOL 3350 17 G/17G
17 POWDER, FOR SOLUTION ORAL
Qty: 510 | Refills: 0
Start: 2025-01-01 | End: 2025-05-16

## 2025-01-01 RX ORDER — ONDANSETRON HCL/PF 4 MG/2 ML
4 VIAL (ML) INJECTION ONCE
Refills: 0 | Status: COMPLETED | OUTPATIENT
Start: 2025-01-01 | End: 2025-01-01

## 2025-01-01 RX ORDER — OXYCODONE HYDROCHLORIDE 30 MG/1
5 TABLET ORAL ONCE
Refills: 0 | Status: DISCONTINUED | OUTPATIENT
Start: 2025-01-01 | End: 2025-01-01

## 2025-01-01 RX ORDER — OXYCODONE HYDROCHLORIDE 30 MG/1
5 TABLET ORAL EVERY 4 HOURS
Refills: 0 | Status: DISCONTINUED | OUTPATIENT
Start: 2025-01-01 | End: 2025-01-01

## 2025-01-01 RX ORDER — SULFAMETHOXAZOLE/TRIMETHOPRIM 800-160 MG
1 TABLET ORAL
Refills: 0 | DISCHARGE

## 2025-01-01 RX ORDER — LORAZEPAM 4 MG/ML
0.5 VIAL (ML) INJECTION EVERY 12 HOURS
Refills: 0 | Status: DISCONTINUED | OUTPATIENT
Start: 2025-01-01 | End: 2025-01-01

## 2025-01-01 RX ORDER — LORAZEPAM 4 MG/ML
1 VIAL (ML) INJECTION
Qty: 14 | Refills: 0
Start: 2025-01-01 | End: 2025-04-23

## 2025-01-01 RX ORDER — ALPRAZOLAM 0.5 MG
0.25 TABLET, EXTENDED RELEASE 24 HR ORAL AT BEDTIME
Refills: 0 | Status: DISCONTINUED | OUTPATIENT
Start: 2025-01-01 | End: 2025-01-01

## 2025-01-01 RX ORDER — PREDNISONE 20 MG/1
1 TABLET ORAL
Qty: 30 | Refills: 0
Start: 2025-01-01 | End: 2025-05-16

## 2025-01-01 RX ORDER — URSODIOL 300 MG/1
1 CAPSULE ORAL
Qty: 90 | Refills: 0
Start: 2025-01-01 | End: 2025-05-16

## 2025-01-01 RX ORDER — ALPRAZOLAM 0.5 MG
1 TABLET, EXTENDED RELEASE 24 HR ORAL
Refills: 0 | DISCHARGE

## 2025-01-01 RX ORDER — LORAZEPAM 4 MG/ML
0.5 VIAL (ML) INJECTION EVERY 4 HOURS
Refills: 0 | Status: DISCONTINUED | OUTPATIENT
Start: 2025-01-01 | End: 2025-01-01

## 2025-01-01 RX ORDER — APIXABAN 2.5 MG/1
5 TABLET, FILM COATED ORAL EVERY 12 HOURS
Refills: 0 | Status: DISCONTINUED | OUTPATIENT
Start: 2025-01-01 | End: 2025-01-01

## 2025-01-01 RX ORDER — FENTANYL CITRATE-0.9 % NACL/PF 100MCG/2ML
1 SYRINGE (ML) INTRAVENOUS
Qty: 5 | Refills: 0
Start: 2025-01-01 | End: 2025-04-30

## 2025-01-01 RX ORDER — GLYCOPYRROLATE 0.2 MG/ML
0.4 INJECTION INTRAMUSCULAR; INTRAVENOUS EVERY 6 HOURS
Refills: 0 | Status: DISCONTINUED | OUTPATIENT
Start: 2025-01-01 | End: 2025-01-01

## 2025-01-01 RX ORDER — INFLUENZA A VIRUS A/IDAHO/07/2018 (H1N1) ANTIGEN (MDCK CELL DERIVED, PROPIOLACTONE INACTIVATED, INFLUENZA A VIRUS A/INDIANA/08/2018 (H3N2) ANTIGEN (MDCK CELL DERIVED, PROPIOLACTONE INACTIVATED), INFLUENZA B VIRUS B/SINGAPORE/INFTT-16-0610/2016 ANTIGEN (MDCK CELL DERIVED, PROPIOLACTONE INACTIVATED), INFLUENZA B VIRUS B/IOWA/06/2017 ANTIGEN (MDCK CELL DERIVED, PROPIOLACTONE INACTIVATED) 15; 15; 15; 15 UG/.5ML; UG/.5ML; UG/.5ML; UG/.5ML
0.5 INJECTION, SUSPENSION INTRAMUSCULAR ONCE
Refills: 0 | Status: COMPLETED | OUTPATIENT
Start: 2025-01-01 | End: 2025-01-01

## 2025-01-01 RX ORDER — FENTANYL CITRATE-0.9 % NACL/PF 100MCG/2ML
1 SYRINGE (ML) INTRAVENOUS
Refills: 0 | Status: DISCONTINUED | OUTPATIENT
Start: 2025-01-01 | End: 2025-01-01

## 2025-01-01 RX ORDER — APIXABAN 2.5 MG/1
10 TABLET, FILM COATED ORAL EVERY 12 HOURS
Refills: 0 | Status: COMPLETED | OUTPATIENT
Start: 2025-01-01 | End: 2025-01-01

## 2025-01-01 RX ORDER — HYDROMORPHONE/SOD CHLOR,ISO/PF 2 MG/10 ML
3 SYRINGE (ML) INJECTION EVERY 4 HOURS
Refills: 0 | Status: DISCONTINUED | OUTPATIENT
Start: 2025-01-01 | End: 2025-01-01

## 2025-01-01 RX ORDER — BISACODYL 5 MG
5 TABLET, DELAYED RELEASE (ENTERIC COATED) ORAL AT BEDTIME
Refills: 0 | Status: DISCONTINUED | OUTPATIENT
Start: 2025-01-01 | End: 2025-01-01

## 2025-01-01 RX ORDER — SODIUM CHLORIDE 3 G/100ML
500 INJECTION, SOLUTION INTRAVENOUS
Refills: 0 | Status: DISCONTINUED | OUTPATIENT
Start: 2025-01-01 | End: 2025-01-01

## 2025-01-01 RX ORDER — SOD PHOS DI, MONO/K PHOS MONO 250 MG
3 TABLET ORAL ONCE
Refills: 0 | Status: DISCONTINUED | OUTPATIENT
Start: 2025-01-01 | End: 2025-01-01

## 2025-01-01 RX ORDER — POLYETHYLENE GLYCOL 3350 17 G/17G
17 POWDER, FOR SOLUTION ORAL EVERY 24 HOURS
Refills: 0 | Status: DISCONTINUED | OUTPATIENT
Start: 2025-01-01 | End: 2025-01-01

## 2025-01-01 RX ORDER — CEFTRIAXONE 500 MG/1
1000 INJECTION, POWDER, FOR SOLUTION INTRAMUSCULAR; INTRAVENOUS EVERY 24 HOURS
Refills: 0 | Status: DISCONTINUED | OUTPATIENT
Start: 2025-01-01 | End: 2025-01-01

## 2025-01-01 RX ORDER — KETOROLAC TROMETHAMINE 30 MG/ML
15 INJECTION, SOLUTION INTRAMUSCULAR; INTRAVENOUS ONCE
Refills: 0 | Status: DISCONTINUED | OUTPATIENT
Start: 2025-01-01 | End: 2025-01-01

## 2025-01-01 RX ORDER — DEXAMETHASONE 0.5 MG/1
4 TABLET ORAL DAILY
Refills: 0 | Status: DISCONTINUED | OUTPATIENT
Start: 2025-01-01 | End: 2025-01-01

## 2025-01-01 RX ORDER — ONDANSETRON HCL/PF 4 MG/2 ML
4 VIAL (ML) INJECTION EVERY 6 HOURS
Refills: 0 | Status: DISCONTINUED | OUTPATIENT
Start: 2025-01-01 | End: 2025-01-01

## 2025-01-01 RX ORDER — CEFTRIAXONE 500 MG/1
1000 INJECTION, POWDER, FOR SOLUTION INTRAMUSCULAR; INTRAVENOUS EVERY 24 HOURS
Refills: 0 | Status: COMPLETED | OUTPATIENT
Start: 2025-01-01 | End: 2025-01-01

## 2025-01-01 RX ORDER — INFLUENZA A VIRUS A/IDAHO/07/2018 (H1N1) ANTIGEN (MDCK CELL DERIVED, PROPIOLACTONE INACTIVATED, INFLUENZA A VIRUS A/INDIANA/08/2018 (H3N2) ANTIGEN (MDCK CELL DERIVED, PROPIOLACTONE INACTIVATED), INFLUENZA B VIRUS B/SINGAPORE/INFTT-16-0610/2016 ANTIGEN (MDCK CELL DERIVED, PROPIOLACTONE INACTIVATED), INFLUENZA B VIRUS B/IOWA/06/2017 ANTIGEN (MDCK CELL DERIVED, PROPIOLACTONE INACTIVATED) 15; 15; 15; 15 UG/.5ML; UG/.5ML; UG/.5ML; UG/.5ML
0.5 INJECTION, SUSPENSION INTRAMUSCULAR ONCE
Refills: 0 | Status: DISCONTINUED | OUTPATIENT
Start: 2025-01-01 | End: 2025-01-01

## 2025-01-01 RX ORDER — ALPRAZOLAM 0.5 MG
1 TABLET, EXTENDED RELEASE 24 HR ORAL
Qty: 30 | Refills: 0
Start: 2025-01-01 | End: 2025-05-16

## 2025-01-01 RX ORDER — PREDNISONE 20 MG/1
1 TABLET ORAL
Qty: 0 | Refills: 0 | DISCHARGE
Start: 2025-01-01

## 2025-01-01 RX ORDER — FENTANYL CITRATE-0.9 % NACL/PF 100MCG/2ML
1 SYRINGE (ML) INTRAVENOUS
Qty: 0 | Refills: 0 | DISCHARGE
Start: 2025-01-01

## 2025-01-01 RX ORDER — LISINOPRIL 30 MG/1
0.5 TABLET ORAL
Qty: 15 | Refills: 0
Start: 2025-01-01 | End: 2025-05-16

## 2025-01-01 RX ORDER — SODIUM CHLORIDE 3 G/100ML
100 INJECTION, SOLUTION INTRAVENOUS ONCE
Refills: 0 | Status: DISCONTINUED | OUTPATIENT
Start: 2025-01-01 | End: 2025-01-01

## 2025-01-01 RX ORDER — PREDNISONE 20 MG/1
10 TABLET ORAL EVERY 24 HOURS
Refills: 0 | Status: DISCONTINUED | OUTPATIENT
Start: 2025-01-01 | End: 2025-01-01

## 2025-01-01 RX ORDER — PREDNISONE 20 MG/1
2 TABLET ORAL
Refills: 0 | DISCHARGE

## 2025-01-01 RX ORDER — SENNA 187 MG
2 TABLET ORAL
Qty: 0 | Refills: 0 | DISCHARGE
Start: 2025-01-01

## 2025-01-01 RX ORDER — APIXABAN 2.5 MG/1
2 TABLET, FILM COATED ORAL
Refills: 0 | DISCHARGE

## 2025-01-01 RX ADMIN — POLYETHYLENE GLYCOL 3350 17 GRAM(S): 17 POWDER, FOR SOLUTION ORAL at 11:56

## 2025-01-01 RX ADMIN — Medication 0.5 MILLIGRAM(S): at 01:46

## 2025-01-01 RX ADMIN — URSODIOL 300 MILLIGRAM(S): 300 CAPSULE ORAL at 18:11

## 2025-01-01 RX ADMIN — Medication 0.5 MILLIGRAM(S): at 17:10

## 2025-01-01 RX ADMIN — ALBUMIN (HUMAN) 50 MILLILITER(S): 12.5 INJECTION, SOLUTION INTRAVENOUS at 16:39

## 2025-01-01 RX ADMIN — PREDNISONE 10 MILLIGRAM(S): 20 TABLET ORAL at 05:44

## 2025-01-01 RX ADMIN — URSODIOL 300 MILLIGRAM(S): 300 CAPSULE ORAL at 05:44

## 2025-01-01 RX ADMIN — Medication 0.5 MILLIGRAM(S): at 11:56

## 2025-01-01 RX ADMIN — APIXABAN 10 MILLIGRAM(S): 2.5 TABLET, FILM COATED ORAL at 06:24

## 2025-01-01 RX ADMIN — Medication 1 PATCH: at 13:13

## 2025-01-01 RX ADMIN — SODIUM ZIRCONIUM CYCLOSILICATE 10 GRAM(S): 5 POWDER, FOR SUSPENSION ORAL at 16:08

## 2025-01-01 RX ADMIN — SODIUM ZIRCONIUM CYCLOSILICATE 10 GRAM(S): 5 POWDER, FOR SUSPENSION ORAL at 09:50

## 2025-01-01 RX ADMIN — SODIUM CHLORIDE 30 MILLILITER(S): 3 INJECTION, SOLUTION INTRAVENOUS at 20:21

## 2025-01-01 RX ADMIN — ALBUMIN (HUMAN) 50 MILLILITER(S): 12.5 INJECTION, SOLUTION INTRAVENOUS at 14:09

## 2025-01-01 RX ADMIN — PREDNISONE 5 MILLIGRAM(S): 20 TABLET ORAL at 18:12

## 2025-01-01 RX ADMIN — URSODIOL 300 MILLIGRAM(S): 300 CAPSULE ORAL at 12:59

## 2025-01-01 RX ADMIN — Medication 0.5 MILLIGRAM(S): at 16:51

## 2025-01-01 RX ADMIN — URSODIOL 300 MILLIGRAM(S): 300 CAPSULE ORAL at 07:20

## 2025-01-01 RX ADMIN — PREDNISONE 5 MILLIGRAM(S): 20 TABLET ORAL at 17:06

## 2025-01-01 RX ADMIN — URSODIOL 300 MILLIGRAM(S): 300 CAPSULE ORAL at 06:00

## 2025-01-01 RX ADMIN — Medication 0.5 MILLIGRAM(S): at 09:47

## 2025-01-01 RX ADMIN — Medication 1 PATCH: at 08:20

## 2025-01-01 RX ADMIN — SODIUM ZIRCONIUM CYCLOSILICATE 10 GRAM(S): 5 POWDER, FOR SUSPENSION ORAL at 09:06

## 2025-01-01 RX ADMIN — PREDNISONE 10 MILLIGRAM(S): 20 TABLET ORAL at 06:23

## 2025-01-01 RX ADMIN — LISINOPRIL 12.5 MILLIGRAM(S): 30 TABLET ORAL at 06:23

## 2025-01-01 RX ADMIN — Medication 0.5 MILLIGRAM(S): at 21:14

## 2025-01-01 RX ADMIN — SODIUM CHLORIDE 30 MILLILITER(S): 3 INJECTION, SOLUTION INTRAVENOUS at 09:29

## 2025-01-01 RX ADMIN — Medication 4 MILLIGRAM(S): at 22:32

## 2025-01-01 RX ADMIN — Medication 1 TABLET(S): at 05:58

## 2025-01-01 RX ADMIN — Medication 40 MILLIGRAM(S): at 05:56

## 2025-01-01 RX ADMIN — APIXABAN 10 MILLIGRAM(S): 2.5 TABLET, FILM COATED ORAL at 18:12

## 2025-01-01 RX ADMIN — Medication 1 PATCH: at 20:17

## 2025-01-01 RX ADMIN — PREDNISONE 5 MILLIGRAM(S): 20 TABLET ORAL at 18:17

## 2025-01-01 RX ADMIN — URSODIOL 300 MILLIGRAM(S): 300 CAPSULE ORAL at 14:30

## 2025-01-01 RX ADMIN — Medication 1 PATCH: at 12:58

## 2025-01-01 RX ADMIN — Medication 2 MILLIGRAM(S): at 16:13

## 2025-01-01 RX ADMIN — Medication 4 MILLIGRAM(S): at 01:47

## 2025-01-01 RX ADMIN — Medication 1 PATCH: at 06:48

## 2025-01-01 RX ADMIN — Medication 0.5 MILLIGRAM(S): at 20:44

## 2025-01-01 RX ADMIN — Medication 0.5 MILLIGRAM(S): at 05:59

## 2025-01-01 RX ADMIN — PREDNISONE 10 MILLIGRAM(S): 20 TABLET ORAL at 07:20

## 2025-01-01 RX ADMIN — Medication 2 MILLIGRAM(S): at 22:15

## 2025-01-01 RX ADMIN — Medication 40 MILLIGRAM(S): at 06:07

## 2025-01-01 RX ADMIN — APIXABAN 5 MILLIGRAM(S): 2.5 TABLET, FILM COATED ORAL at 05:50

## 2025-01-01 RX ADMIN — PREDNISONE 10 MILLIGRAM(S): 20 TABLET ORAL at 06:43

## 2025-01-01 RX ADMIN — Medication 2 MILLIGRAM(S): at 07:28

## 2025-01-01 RX ADMIN — SODIUM CHLORIDE 600 MILLILITER(S): 3 INJECTION, SOLUTION INTRAVENOUS at 02:35

## 2025-01-01 RX ADMIN — URSODIOL 300 MILLIGRAM(S): 300 CAPSULE ORAL at 21:50

## 2025-01-01 RX ADMIN — Medication 2 MILLIGRAM(S): at 22:18

## 2025-01-01 RX ADMIN — Medication 102 MILLIGRAM(S): at 09:55

## 2025-01-01 RX ADMIN — PREDNISONE 5 MILLIGRAM(S): 20 TABLET ORAL at 18:05

## 2025-01-01 RX ADMIN — Medication 2 MILLIGRAM(S): at 21:31

## 2025-01-01 RX ADMIN — Medication 3 MILLIGRAM(S): at 12:00

## 2025-01-01 RX ADMIN — Medication 0.5 MILLIGRAM(S): at 10:02

## 2025-01-01 RX ADMIN — Medication 5 MILLIGRAM(S): at 21:43

## 2025-01-01 RX ADMIN — Medication 2 MILLIGRAM(S): at 06:42

## 2025-01-01 RX ADMIN — Medication 5 MILLIGRAM(S): at 22:32

## 2025-01-01 RX ADMIN — URSODIOL 300 MILLIGRAM(S): 300 CAPSULE ORAL at 21:43

## 2025-01-01 RX ADMIN — Medication 85 MILLIMOLE(S): at 13:14

## 2025-01-01 RX ADMIN — OXYCODONE HYDROCHLORIDE 5 MILLIGRAM(S): 30 TABLET ORAL at 18:55

## 2025-01-01 RX ADMIN — Medication 2 MILLIGRAM(S): at 21:18

## 2025-01-01 RX ADMIN — Medication 1 MILLIGRAM(S): at 22:45

## 2025-01-01 RX ADMIN — Medication 0.25 MILLIGRAM(S): at 22:32

## 2025-01-01 RX ADMIN — Medication 0.5 MILLIGRAM(S): at 11:38

## 2025-01-01 RX ADMIN — URSODIOL 300 MILLIGRAM(S): 300 CAPSULE ORAL at 15:16

## 2025-01-01 RX ADMIN — Medication 4 MILLIGRAM(S): at 16:13

## 2025-01-01 RX ADMIN — Medication 3 MILLIGRAM(S): at 01:44

## 2025-01-01 RX ADMIN — URSODIOL 300 MILLIGRAM(S): 300 CAPSULE ORAL at 22:02

## 2025-01-01 RX ADMIN — Medication 4 MILLIGRAM(S): at 20:22

## 2025-01-01 RX ADMIN — Medication 3 MILLIGRAM(S): at 02:05

## 2025-01-01 RX ADMIN — Medication 2 TABLET(S): at 21:50

## 2025-01-01 RX ADMIN — PREDNISONE 10 MILLIGRAM(S): 20 TABLET ORAL at 05:50

## 2025-01-01 RX ADMIN — Medication 0.5 MILLIGRAM(S): at 21:30

## 2025-01-01 RX ADMIN — Medication 2 TABLET(S): at 22:01

## 2025-01-01 RX ADMIN — Medication 1 MILLIGRAM(S): at 11:08

## 2025-01-01 RX ADMIN — OXYCODONE HYDROCHLORIDE 5 MILLIGRAM(S): 30 TABLET ORAL at 17:55

## 2025-01-01 RX ADMIN — Medication 650 MILLIGRAM(S): at 09:08

## 2025-01-01 RX ADMIN — LISINOPRIL 12.5 MILLIGRAM(S): 30 TABLET ORAL at 06:02

## 2025-01-01 RX ADMIN — Medication 1 MILLIGRAM(S): at 22:15

## 2025-01-01 RX ADMIN — Medication 40 MILLIGRAM(S): at 05:43

## 2025-01-01 RX ADMIN — Medication 2 MILLIGRAM(S): at 07:35

## 2025-01-01 RX ADMIN — PREDNISONE 5 MILLIGRAM(S): 20 TABLET ORAL at 17:05

## 2025-01-01 RX ADMIN — Medication 2 TABLET(S): at 21:31

## 2025-01-01 RX ADMIN — PREDNISONE 10 MILLIGRAM(S): 20 TABLET ORAL at 06:01

## 2025-01-01 RX ADMIN — Medication 5 MILLIGRAM(S): at 21:02

## 2025-01-01 RX ADMIN — Medication 40 MILLIGRAM(S): at 06:43

## 2025-01-01 RX ADMIN — LISINOPRIL 12.5 MILLIGRAM(S): 30 TABLET ORAL at 07:50

## 2025-01-01 RX ADMIN — Medication 1 PATCH: at 09:17

## 2025-01-01 RX ADMIN — Medication 5 MILLIGRAM(S): at 21:31

## 2025-01-01 RX ADMIN — Medication 40 MILLIGRAM(S): at 07:20

## 2025-01-01 RX ADMIN — Medication 5 MILLIGRAM(S): at 21:50

## 2025-01-01 RX ADMIN — Medication 0.5 MILLIGRAM(S): at 01:31

## 2025-01-01 RX ADMIN — Medication 1 PATCH: at 13:10

## 2025-01-01 RX ADMIN — URSODIOL 300 MILLIGRAM(S): 300 CAPSULE ORAL at 21:31

## 2025-01-01 RX ADMIN — Medication 4 MILLIGRAM(S): at 16:43

## 2025-01-01 RX ADMIN — Medication 3 MILLIGRAM(S): at 23:00

## 2025-01-01 RX ADMIN — URSODIOL 300 MILLIGRAM(S): 300 CAPSULE ORAL at 05:59

## 2025-01-01 RX ADMIN — Medication 40 MILLIGRAM(S): at 06:00

## 2025-01-01 RX ADMIN — Medication 0.5 MILLIGRAM(S): at 12:15

## 2025-01-01 RX ADMIN — Medication 50 MILLIGRAM(S): at 11:57

## 2025-01-01 RX ADMIN — LISINOPRIL 12.5 MILLIGRAM(S): 30 TABLET ORAL at 07:30

## 2025-01-01 RX ADMIN — SODIUM ZIRCONIUM CYCLOSILICATE 10 GRAM(S): 5 POWDER, FOR SUSPENSION ORAL at 13:14

## 2025-01-01 RX ADMIN — Medication 2 TABLET(S): at 21:28

## 2025-01-01 RX ADMIN — BUMETANIDE 1 MILLIGRAM(S): 1 TABLET ORAL at 12:50

## 2025-01-01 RX ADMIN — Medication 1 PATCH: at 17:14

## 2025-01-01 RX ADMIN — APIXABAN 10 MILLIGRAM(S): 2.5 TABLET, FILM COATED ORAL at 17:05

## 2025-01-01 RX ADMIN — URSODIOL 300 MILLIGRAM(S): 300 CAPSULE ORAL at 06:43

## 2025-01-01 RX ADMIN — APIXABAN 10 MILLIGRAM(S): 2.5 TABLET, FILM COATED ORAL at 06:00

## 2025-01-01 RX ADMIN — Medication 2 MILLIGRAM(S): at 06:28

## 2025-01-01 RX ADMIN — PREDNISONE 5 MILLIGRAM(S): 20 TABLET ORAL at 17:42

## 2025-01-01 RX ADMIN — Medication 650 MILLIGRAM(S): at 10:08

## 2025-01-01 RX ADMIN — URSODIOL 300 MILLIGRAM(S): 300 CAPSULE ORAL at 05:50

## 2025-01-01 RX ADMIN — Medication 5 MILLIGRAM(S): at 21:18

## 2025-01-01 RX ADMIN — CEFTRIAXONE 100 MILLIGRAM(S): 500 INJECTION, POWDER, FOR SOLUTION INTRAMUSCULAR; INTRAVENOUS at 13:00

## 2025-01-01 RX ADMIN — URSODIOL 300 MILLIGRAM(S): 300 CAPSULE ORAL at 21:18

## 2025-01-01 RX ADMIN — Medication 40 MILLIGRAM(S): at 05:50

## 2025-01-01 RX ADMIN — Medication 1 MILLIGRAM(S): at 17:40

## 2025-01-01 RX ADMIN — URSODIOL 300 MILLIGRAM(S): 300 CAPSULE ORAL at 22:32

## 2025-01-01 RX ADMIN — Medication 5 MILLIGRAM(S): at 22:01

## 2025-01-01 RX ADMIN — Medication 2 TABLET(S): at 21:18

## 2025-01-01 RX ADMIN — Medication 1 TABLET(S): at 07:26

## 2025-01-01 RX ADMIN — Medication 1 PATCH: at 19:46

## 2025-01-01 RX ADMIN — Medication 40 MILLIGRAM(S): at 06:24

## 2025-01-01 RX ADMIN — Medication 2 TABLET(S): at 21:43

## 2025-01-01 RX ADMIN — Medication 2 TABLET(S): at 21:02

## 2025-01-01 RX ADMIN — CEFTRIAXONE 100 MILLIGRAM(S): 500 INJECTION, POWDER, FOR SOLUTION INTRAMUSCULAR; INTRAVENOUS at 12:05

## 2025-01-01 RX ADMIN — Medication 2 TABLET(S): at 22:32

## 2025-01-01 RX ADMIN — PREDNISONE 5 MILLIGRAM(S): 20 TABLET ORAL at 16:50

## 2025-01-01 RX ADMIN — CEFTRIAXONE 100 MILLIGRAM(S): 500 INJECTION, POWDER, FOR SOLUTION INTRAMUSCULAR; INTRAVENOUS at 12:13

## 2025-01-01 RX ADMIN — Medication 3 MILLIGRAM(S): at 21:43

## 2025-01-01 RX ADMIN — Medication 0.5 MILLIGRAM(S): at 21:00

## 2025-01-01 RX ADMIN — LISINOPRIL 12.5 MILLIGRAM(S): 30 TABLET ORAL at 06:42

## 2025-01-01 RX ADMIN — Medication 2 MILLIGRAM(S): at 10:40

## 2025-01-01 RX ADMIN — Medication 1 MILLIGRAM(S): at 17:25

## 2025-01-01 RX ADMIN — PREDNISONE 10 MILLIGRAM(S): 20 TABLET ORAL at 05:57

## 2025-01-01 RX ADMIN — Medication 2 MILLIGRAM(S): at 22:28

## 2025-01-01 RX ADMIN — Medication 0.5 MILLIGRAM(S): at 16:23

## 2025-01-01 RX ADMIN — Medication 3 MILLIGRAM(S): at 10:10

## 2025-01-01 RX ADMIN — Medication 2 MILLIGRAM(S): at 09:38

## 2025-01-01 RX ADMIN — Medication 0.5 MILLIGRAM(S): at 06:29

## 2025-01-01 RX ADMIN — Medication 500 MILLILITER(S): at 18:06

## 2025-01-01 RX ADMIN — Medication 2 MILLIGRAM(S): at 01:00

## 2025-01-01 RX ADMIN — LISINOPRIL 12.5 MILLIGRAM(S): 30 TABLET ORAL at 05:59

## 2025-01-01 RX ADMIN — URSODIOL 300 MILLIGRAM(S): 300 CAPSULE ORAL at 13:14

## 2025-01-01 RX ADMIN — Medication 0.5 MILLIGRAM(S): at 11:23

## 2025-01-01 RX ADMIN — LISINOPRIL 12.5 MILLIGRAM(S): 30 TABLET ORAL at 06:00

## 2025-01-13 ENCOUNTER — APPOINTMENT (OUTPATIENT)
Dept: ENDOCRINOLOGY | Facility: CLINIC | Age: 45
End: 2025-01-13
Payer: COMMERCIAL

## 2025-01-13 VITALS
DIASTOLIC BLOOD PRESSURE: 80 MMHG | HEART RATE: 79 BPM | BODY MASS INDEX: 23.11 KG/M2 | WEIGHT: 152 LBS | SYSTOLIC BLOOD PRESSURE: 127 MMHG

## 2025-01-13 DIAGNOSIS — E03.9 HYPOTHYROIDISM, UNSPECIFIED: ICD-10-CM

## 2025-01-13 DIAGNOSIS — E05.90 THYROTOXICOSIS, UNSPECIFIED W/OUT THYROTOXIC CRISIS OR STORM: ICD-10-CM

## 2025-01-13 DIAGNOSIS — E06.4 DRUG-INDUCED THYROIDITIS: ICD-10-CM

## 2025-01-13 DIAGNOSIS — E04.1 NONTOXIC SINGLE THYROID NODULE: ICD-10-CM

## 2025-01-13 PROCEDURE — 99215 OFFICE O/P EST HI 40 MIN: CPT

## 2025-01-13 PROCEDURE — 76536 US EXAM OF HEAD AND NECK: CPT

## 2025-01-14 ENCOUNTER — APPOINTMENT (OUTPATIENT)
Dept: HEMATOLOGY ONCOLOGY | Facility: CLINIC | Age: 45
End: 2025-01-14
Payer: COMMERCIAL

## 2025-01-14 ENCOUNTER — LABORATORY RESULT (OUTPATIENT)
Age: 45
End: 2025-01-14

## 2025-01-14 ENCOUNTER — NON-APPOINTMENT (OUTPATIENT)
Age: 45
End: 2025-01-14

## 2025-01-14 LAB
ALBUMIN SERPL ELPH-MCNC: 3.4 G/DL
ALP BLD-CCNC: 109 U/L
ALT SERPL-CCNC: 101 U/L
ANION GAP SERPL CALC-SCNC: 1 MMOL/L
ANION GAP SERPL CALC-SCNC: 12 MMOL/L
AST SERPL-CCNC: 52 U/L
BILIRUB SERPL-MCNC: 0.8 MG/DL
BUN SERPL-MCNC: 13 MG/DL
BUN SERPL-MCNC: 13 MG/DL
CALCIUM SERPL-MCNC: 9.2 MG/DL
CALCIUM SERPL-MCNC: 9.4 MG/DL
CHLORIDE SERPL-SCNC: 104 MMOL/L
CHLORIDE SERPL-SCNC: 108 MMOL/L
CO2 SERPL-SCNC: 25 MMOL/L
CO2 SERPL-SCNC: 27 MMOL/L
CREAT SERPL-MCNC: 0.7 MG/DL
CREAT SERPL-MCNC: 0.8 MG/DL
EGFR: 108 ML/MIN/1.73M2
EGFR: 92 ML/MIN/1.73M2
ESTRADIOL SERPL-MCNC: <5 PG/ML
FSH SERPL-MCNC: 56.2 IU/L
GLUCOSE SERPL-MCNC: 120 MG/DL
GLUCOSE SERPL-MCNC: 122 MG/DL
HCT VFR BLD CALC: 43.4 %
HGB BLD-MCNC: 14.5 G/DL
IGF-1 INTERP: NORMAL
IGF-I BLD-MCNC: 190 NG/ML
LH SERPL-ACNC: 51.1 IU/L
LYMPHOCYTES # BLD AUTO: 0.8 K/UL
LYMPHOCYTES NFR BLD AUTO: 6.4 %
MAN DIFF?: NO
MCHC RBC-ENTMCNC: 29.9 PG
MCHC RBC-ENTMCNC: 33.4 G/DL
MCV RBC AUTO: 89.5 FL
NEUTROPHILS # BLD AUTO: 10.2 K/UL
NEUTROPHILS NFR BLD AUTO: 85.8 %
PLATELET # BLD AUTO: 253 K/UL
POTASSIUM SERPL-SCNC: 4.6 MMOL/L
POTASSIUM SERPL-SCNC: 5.1 MMOL/L
PROLACTIN SERPL-MCNC: 14.7 NG/ML
PROT SERPL-MCNC: 7 G/DL
RBC # BLD: 4.85 M/UL
RBC # FLD: 12.7 %
SODIUM SERPL-SCNC: 136 MMOL/L
SODIUM SERPL-SCNC: 141 MMOL/L
T3 SERPL-MCNC: 149 NG/DL
T4 FREE SERPL-MCNC: 1.9 NG/DL
THYROGLOB AB SERPL-ACNC: 16.2 IU/ML
THYROPEROXIDASE AB SERPL IA-ACNC: <9 IU/ML
TSH SERPL-ACNC: <0.01 UIU/ML
WBC # FLD AUTO: 11.9 K/UL

## 2025-01-14 PROCEDURE — 99214 OFFICE O/P EST MOD 30 MIN: CPT

## 2025-01-14 PROCEDURE — G2211 COMPLEX E/M VISIT ADD ON: CPT | Mod: NC

## 2025-01-14 NOTE — DISCHARGE INSTRUCTIONS: CHEMOTHERAPY - NSFACILITYCONTAFTRHOUR_HEME_A_AMB
[facility] ([phone])
patient worsening leg swelling worsening shortness of breath over the past week.  Patient taking outpatient Lasix p.o. without improvement.  Patient will require admission for IV diuresis and supplemental oxygen given hypoxia on evaluation

## 2025-01-15 ENCOUNTER — NON-APPOINTMENT (OUTPATIENT)
Age: 45
End: 2025-01-15

## 2025-01-15 ENCOUNTER — APPOINTMENT (OUTPATIENT)
Dept: MRI IMAGING | Facility: CLINIC | Age: 45
End: 2025-01-15
Payer: COMMERCIAL

## 2025-01-15 ENCOUNTER — APPOINTMENT (OUTPATIENT)
Dept: CT IMAGING | Facility: CLINIC | Age: 45
End: 2025-01-15
Payer: COMMERCIAL

## 2025-01-15 ENCOUNTER — OUTPATIENT (OUTPATIENT)
Dept: OUTPATIENT SERVICES | Facility: HOSPITAL | Age: 45
LOS: 1 days | End: 2025-01-15

## 2025-01-15 PROCEDURE — 72197 MRI PELVIS W/O & W/DYE: CPT | Mod: 26

## 2025-01-15 PROCEDURE — 71260 CT THORAX DX C+: CPT | Mod: 26

## 2025-01-15 PROCEDURE — 74183 MRI ABD W/O CNTR FLWD CNTR: CPT | Mod: 26

## 2025-01-16 ENCOUNTER — NON-APPOINTMENT (OUTPATIENT)
Age: 45
End: 2025-01-16

## 2025-01-16 LAB — TSH SERPL-ACNC: <0.01 UIU/ML

## 2025-01-21 ENCOUNTER — APPOINTMENT (OUTPATIENT)
Dept: NEUROSURGERY | Facility: CLINIC | Age: 45
End: 2025-01-21

## 2025-01-21 ENCOUNTER — NON-APPOINTMENT (OUTPATIENT)
Age: 45
End: 2025-01-21

## 2025-01-21 ENCOUNTER — APPOINTMENT (OUTPATIENT)
Dept: HEMATOLOGY ONCOLOGY | Facility: CLINIC | Age: 45
End: 2025-01-21

## 2025-01-21 ENCOUNTER — LABORATORY RESULT (OUTPATIENT)
Age: 45
End: 2025-01-21

## 2025-01-21 DIAGNOSIS — C69.90 MALIGNANT NEOPLASM OF UNSPECIFIED SITE OF UNSPECIFIED EYE: ICD-10-CM

## 2025-01-21 DIAGNOSIS — M53.3 SACROCOCCYGEAL DISORDERS, NOT ELSEWHERE CLASSIFIED: ICD-10-CM

## 2025-01-21 LAB
HCT VFR BLD CALC: 43.8 %
HGB BLD-MCNC: 14.9 G/DL
LYMPHOCYTES # BLD AUTO: 0.8 K/UL
LYMPHOCYTES NFR BLD AUTO: 7.2 %
MAN DIFF?: NO
MCHC RBC-ENTMCNC: 30.2 PG
MCHC RBC-ENTMCNC: 34 G/DL
MCV RBC AUTO: 88.7 FL
NEUTROPHILS # BLD AUTO: 8.9 K/UL
NEUTROPHILS NFR BLD AUTO: 84.7 %
PLATELET # BLD AUTO: 260 K/UL
RBC # BLD: 4.94 M/UL
RBC # FLD: 12.7 %
WBC # FLD AUTO: 10.6 K/UL

## 2025-01-22 ENCOUNTER — NON-APPOINTMENT (OUTPATIENT)
Age: 45
End: 2025-01-22

## 2025-01-22 LAB
ALBUMIN SERPL ELPH-MCNC: 3.4 G/DL
ALP BLD-CCNC: 170 U/L
ALT SERPL-CCNC: 147 U/L
ANION GAP SERPL CALC-SCNC: 3 MMOL/L
AST SERPL-CCNC: 96 U/L
BILIRUB SERPL-MCNC: 1.1 MG/DL
BUN SERPL-MCNC: 12 MG/DL
CALCIUM SERPL-MCNC: 9.7 MG/DL
CHLORIDE SERPL-SCNC: 106 MMOL/L
CO2 SERPL-SCNC: 27 MMOL/L
CREAT SERPL-MCNC: 0.8 MG/DL
EGFR: 92 ML/MIN/1.73M2
GLUCOSE SERPL-MCNC: 105 MG/DL
LDH SERPL-CCNC: 908 U/L
POTASSIUM SERPL-SCNC: 4.8 MMOL/L
PROT SERPL-MCNC: 7.5 G/DL
SODIUM SERPL-SCNC: 136 MMOL/L
TSH SERPL-ACNC: <0.01 UIU/ML

## 2025-01-23 ENCOUNTER — TRANSCRIPTION ENCOUNTER (OUTPATIENT)
Age: 45
End: 2025-01-23

## 2025-01-23 RX ORDER — ATENOLOL 25 MG/1
25 TABLET ORAL
Qty: 90 | Refills: 1 | Status: ACTIVE | COMMUNITY
Start: 2025-01-23 | End: 1900-01-01

## 2025-01-28 ENCOUNTER — APPOINTMENT (OUTPATIENT)
Dept: NEUROSURGERY | Facility: CLINIC | Age: 45
End: 2025-01-28
Payer: COMMERCIAL

## 2025-01-28 DIAGNOSIS — C78.7 MALIGNANT NEOPLASM OF UNSPECIFIED CILIARY BODY: ICD-10-CM

## 2025-01-28 DIAGNOSIS — C69.40 MALIGNANT NEOPLASM OF UNSPECIFIED CILIARY BODY: ICD-10-CM

## 2025-01-28 DIAGNOSIS — M89.9 DISORDER OF BONE, UNSPECIFIED: ICD-10-CM

## 2025-01-28 PROCEDURE — 99212 OFFICE O/P EST SF 10 MIN: CPT | Mod: 95

## 2025-02-04 ENCOUNTER — APPOINTMENT (OUTPATIENT)
Dept: RADIATION ONCOLOGY | Facility: CLINIC | Age: 45
End: 2025-02-04

## 2025-02-06 ENCOUNTER — APPOINTMENT (OUTPATIENT)
Dept: HEMATOLOGY ONCOLOGY | Facility: CLINIC | Age: 45
End: 2025-02-06
Payer: COMMERCIAL

## 2025-02-06 PROCEDURE — 99214 OFFICE O/P EST MOD 30 MIN: CPT | Mod: 95

## 2025-02-06 PROCEDURE — G2211 COMPLEX E/M VISIT ADD ON: CPT | Mod: NC,95

## 2025-02-25 ENCOUNTER — APPOINTMENT (OUTPATIENT)
Dept: HEMATOLOGY ONCOLOGY | Facility: CLINIC | Age: 45
End: 2025-02-25

## 2025-02-25 DIAGNOSIS — C69.90 MALIGNANT NEOPLASM OF UNSPECIFIED SITE OF UNSPECIFIED EYE: ICD-10-CM

## 2025-02-25 PROCEDURE — G2211 COMPLEX E/M VISIT ADD ON: CPT | Mod: NC,95

## 2025-02-25 PROCEDURE — 99215 OFFICE O/P EST HI 40 MIN: CPT | Mod: 95

## 2025-03-03 ENCOUNTER — APPOINTMENT (OUTPATIENT)
Dept: HEMATOLOGY ONCOLOGY | Facility: CLINIC | Age: 45
End: 2025-03-03

## 2025-03-03 ENCOUNTER — NON-APPOINTMENT (OUTPATIENT)
Age: 45
End: 2025-03-03

## 2025-03-03 LAB
ALBUMIN SERPL ELPH-MCNC: 2.5 G/DL
ALP BLD-CCNC: 277 U/L
ALT SERPL-CCNC: 85 U/L
ANION GAP SERPL CALC-SCNC: 4 MMOL/L
AST SERPL-CCNC: 82 U/L
BILIRUB SERPL-MCNC: 0.9 MG/DL
BUN SERPL-MCNC: 8 MG/DL
CALCIUM SERPL-MCNC: 9.6 MG/DL
CHLORIDE SERPL-SCNC: 108 MMOL/L
CO2 SERPL-SCNC: 28 MMOL/L
CREAT SERPL-MCNC: 0.8 MG/DL
EGFR: 92 ML/MIN/1.73M2
GLUCOSE SERPL-MCNC: 87 MG/DL
HCT VFR BLD CALC: 39.3 %
HGB BLD-MCNC: 12.6 G/DL
LYMPHOCYTES # BLD AUTO: 0.8 K/UL
LYMPHOCYTES NFR BLD AUTO: 10 %
MAN DIFF?: NO
MCHC RBC-ENTMCNC: 30.2 PG
MCHC RBC-ENTMCNC: 32.1 G/DL
MCV RBC AUTO: 94.2 FL
NEUTROPHILS # BLD AUTO: 6.2 K/UL
NEUTROPHILS NFR BLD AUTO: 83.6 %
PLATELET # BLD AUTO: 330 K/UL
POTASSIUM SERPL-SCNC: 5 MMOL/L
PROT SERPL-MCNC: 7.5 G/DL
RBC # BLD: 4.17 M/UL
RBC # FLD: 16 %
SODIUM SERPL-SCNC: 140 MMOL/L
WBC # FLD AUTO: 7.5 K/UL

## 2025-03-04 ENCOUNTER — NON-APPOINTMENT (OUTPATIENT)
Age: 45
End: 2025-03-04

## 2025-03-04 LAB — TSH SERPL-ACNC: 1.63 UIU/ML

## 2025-03-05 ENCOUNTER — APPOINTMENT (OUTPATIENT)
Dept: CT IMAGING | Facility: CLINIC | Age: 45
End: 2025-03-05
Payer: COMMERCIAL

## 2025-03-05 ENCOUNTER — OUTPATIENT (OUTPATIENT)
Dept: OUTPATIENT SERVICES | Facility: HOSPITAL | Age: 45
LOS: 1 days | End: 2025-03-05

## 2025-03-05 ENCOUNTER — APPOINTMENT (OUTPATIENT)
Dept: MRI IMAGING | Facility: CLINIC | Age: 45
End: 2025-03-05
Payer: COMMERCIAL

## 2025-03-05 DIAGNOSIS — Z98.49 CATARACT EXTRACTION STATUS, UNSPECIFIED EYE: Chronic | ICD-10-CM

## 2025-03-05 PROCEDURE — 71260 CT THORAX DX C+: CPT | Mod: 26

## 2025-03-05 PROCEDURE — 74183 MRI ABD W/O CNTR FLWD CNTR: CPT | Mod: 26

## 2025-03-05 PROCEDURE — 72193 CT PELVIS W/DYE: CPT | Mod: 26

## 2025-03-06 ENCOUNTER — APPOINTMENT (OUTPATIENT)
Dept: HEMATOLOGY ONCOLOGY | Facility: CLINIC | Age: 45
End: 2025-03-06
Payer: COMMERCIAL

## 2025-03-06 PROCEDURE — 99214 OFFICE O/P EST MOD 30 MIN: CPT | Mod: 95

## 2025-03-06 PROCEDURE — G2211 COMPLEX E/M VISIT ADD ON: CPT | Mod: NC,95

## 2025-04-08 PROBLEM — R18.8 ASCITES: Status: ACTIVE | Noted: 2025-01-01

## 2025-04-08 PROBLEM — E80.6 HYPERBILIRUBINEMIA: Status: ACTIVE | Noted: 2025-01-01

## 2025-04-10 NOTE — CONSULT NOTE ADULT - NS ATTEND AMEND GEN_ALL_CORE FT
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 She initiated therapy with tebentafusp at [facility] but then moved to [place]. Due to radiographic progression of a dominant site of disease, she underwent Y90 mapping on 9/7 and Y90 radioembolization on 09/21/23 with no issue. She achieved a treatment effect in the dominant mass treated with Y90, with some progression elsewhere. She subsequently received therapy with immunoembolization x 2 administered concurrently with tebentafusp. In the setting of progression, she received radio-embolization followed by ipilimumab and nivolumab, with the later complicated by the development of colitis, hepatitis and thyroiditis. She is now s/p chemo embolization under the care of Dr. Layla Acharya.  Patient now s/p paracentesis, feeling better.     PRIOR THERAPIES:  03/12/2023-4/30/24 Tebentafusp  9/21/23 Y90 radioembolization of liver  Immunoembolization (#1, right 03/25/2024; #2, left 04/19/2024)    Will discuss further treatment options with Dr. Ruby. Palliative Care Service notified. Continue pain management and supportive care.

## 2025-04-10 NOTE — CONSULT NOTE ADULT - SUBJECTIVE AND OBJECTIVE BOX
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 She initiated therapy with tebentafusp at [facility] but then moved to [place]. Due to radiographic progression of a dominant site of disease, she underwent Y90 mapping on 9/7 and Y90 radioembolization on 09/21/23 with no issue. She achieved a treatment effect in the dominant mass treated with Y90, with some progression elsewhere. She subsequently received therapy with immunoembolization x 2 administered concurrently with tebentafusp. In the setting of progression, she received radioembolization followed by ipilimumab and nivolumab, with the later complicated by the development of colitis, hepatitis and thyroiditis. She is now s/p chemo embolization under the care of Dr. Layla Acharya.    Patient is s/p paracentesis yesterday where 1L of fluid was drained. Now presents to [facility] with increased abdominal pain.         PRIOR THERAPIES:  03/12/2023-4/30/24 Tebentafusp  9/21/23 Y90 radioembolization of liver  Immunoembolization (#1, right 03/25/2024; #2, left 04/19/2024)  Radioembolization (Right: 05/17/2024; Left: 06/13/2024)  Ipi/Nivo (Cycle 1: 08/30/2024; C2: 09/17/2024); treatment now on hold due to colitis, hepatitis and thyroiditis  ?  INTERVAL HISTORY:  Mrs. Hoff is a 44 year-old female with a history of primary uveal melanoma, now with newly diagnosed metastatic disease with liver only involvement who presents for continued management of her disease. She is followed by Dr. Hernan Osborn and currently being treated by Dr. Layla Acharya. She is HLA  positive. Please see my prior notes for her complex history.  ?  Briefly, she initially presented in January 2018 with vision changes in her right eye, with later loss of temporal and upper visual field vision. She was evaluated by Dr. 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 She was treated with Ru-106 brachytherapy from 01/30/2018 until 02/06/2018. She was subsequently placed on expectant observation for systemic recurrence.  ?  She did well until, on 01/29/2023, she underwent an abdominal ultrasound which was significant for a new focal liver lesion measuring 3 cm. A subsequent chest, abdominal and pelvic CT scan, performed on 01/30/2023, demonstrated a segment 7 liver lesion with arterial enhancement and other subcentimeter hepatic lesions measuring up to 4 mm of unclear etiology. On 02/01/2023, she underwent a liver biopsy, with pathology consistent with melanoma. Immunohistochemistry was positive for SOX10 and MART1. An abdominal MRI, performed on 02/16/2023, demonstrated a dominant segment 7 lesion in the liver, with multiple other smaller subcentimeter lesions bilaterally.  ?  She was evaluated at the [facility] on 02/20/2023 by Dr. Miguelito Truong, with recommendations for HLA testing and possible tebentafusp if appropriate or combination checkpoint blockade. She was found to be HLA A*02:01 positive and initiated therapy with tebentafusp on 03/12/2023. On 04/17/2023, she underwent a new baseline chest and pelvis CT as well as a liver MRI. The MRI demonstrated metastatic lesions affecting both lobes of the liver, with some worsening when compared with the prior scans from 02/16/2023 and with the largest lesion now measuring 4.5 x 3.5 cm in segment 7. She had repeat imaging done on 8/18/23 which showed interval increase in size of the hepatic segment 7 metastatic lesion and persistent numerous additional scattered small hemorrhagic liver metastases. 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 She underwent mapping on 9/7/23 and Y90 radioembolization on 9/21/23 with JEROMY Veira. On 01/15/2024, she underwent a repeat liver MRI. This study, when compared with a prior dated 11/17/2023, demonstrated decrease in the size of the dominant segment 7 lesion, with no significant change in the other small bilateral lesions.  ?  She was evaluated by Dr. Miguelito Torres at [facility] on 01/22/2023. The Delcath PHP procedure was discussed at length and the patient opted to not proceed at that time. She underwent a chest CT and abdominal MRI on 03/15/99703. The CT study, when compared with a prior dated 01/25/2024, demonstrated no significant change with no overt evidence for progressive disease. The MRI, when compared with a prior dated 01/15/2024, demonstrated interval decrease in the size of the hepatic segment 7 lesion. There is an increase in size and number of the small liver lesions.  ?  On 3/25/2024, patient underwent right hepatic intra-arterial administration of leukine and lipiodol with a gelfoam slurry embolization of the right hepatic artery and immuno embolization of the left on 04/19/2024 concurrently with tebentafusp. Due to continued disease progression on MRI performed on 05/01/2024 as determined by review at our uveal melanoma tumor board (despite the reported stability on the formal read) as well as her planned travel schedule, her therapy was changed to radioembolization alone.  ?  She then underwent radioembolization (right) on 5/17/2024 andY90 to the left lobe (06/13/2024). She underwent a followup MRI Abdomen on 8/09/2024. Although the formal report states there is overall stable disease, on our review at our uveal melanoma tumor board, there appear be a few lesions on liver that appear slightly larger, with new lesions observed. The case wasdiscussed at our uveal melanoma tumor board; the consensus was for patient to have radiation to her sacral lesion and initiate patient on systemic ipi/nivo.  ?  She was also evaluated by the team at [facility] with recommendations for TACE versus PHP. She initiated ipilimumab and nivolumab, receiving cycle 1 on 08/30/2024 and cycle 2 on 09/17/2024. Treatment was subsequently discontinued due to colitis, hepatitis and thyroiditis requiring a prolonged steroid taper, due to ongoing hepatitis. She underwent a mammogram and breast ultrasound, with findings consistent with a benign breast cyst. Due to recurrent transaminitis, she restarted a slower prednisone taper at 20 mg twice daily with improvement in her bloodwork. She is currently receiving prednisone 10 mg daily. She was evaluated by Dr. Elvia Winchester of endocrinology who initiated her on synthroid.  ?  She underwent repeat imaging studies on 10/10/2024 which demonstrated an interval decrease in diffusion restriction at numerous scattered liver metastases, consistent with a favorable, incomplete response to treatment. On 12/14/2024, she went to urgent care for abdominal pain. She then went to [facility] for further workup which included Ab US which showed diffuse hepatic mets and a CT PE which revealed 3 solid lung nodules seen on 12/2/24 imaging, no PE was found. In addition to above, patient's scans were reviewed with the [place] team. As per their review, they are also in agreement that patient's disease is progressing  ?  She initiated therapy with chemoembolization at [facility]. With the exception of post-procedural right upper quadrant discomfort which is improved with steroids, she is doing well. She was evaluated at [facility] for consideration of treatment on the [id number] trial with darovasertib and crizotinib; however, she has been found to be ineligible due to hyperbilirubinemia. Her course was also complicated by the development of a portal vein thrombosis for which she is on anticoagulation, as well as symptomatic ascites.  ?  4/8/2025  The patient was last seen over a month ago. During her screening for the Phase 1 Daro. Crizo trial at [place], she was found ineligible due to an elevated direct bilirubin level, which was outside the acceptable range. It was anticipated that a dosage of 10 mg of prednisone might help reduce the bilirubin levels. On April 6, 2025, the patient did not pass the screening due to the development of a new portal vein thrombosis and continuously elevated liver function tests. Moreover, she has been experiencing worsening ascites and will require a paracentesis procedure, which is scheduled to be performed by Dr. Sj Viera at [facility].  ?  PAST MEDICAL HISTORY:  None  ?  SOCIAL HISTORY:  The patient has moved from [place] and is now living in [place]. Her partner currently resides in the [place] but is considering relocating, currently here in [place]. She works in the [employer].  ?  FAMILY HISTORY:  Her maternal grandfather had mucosal melanoma arising from the stomach.     Disease: Uveal melanoma    TNM stage: M1c  AJCC Stage: IV      Vital Signs Last 24 Hrs  T(C): 36.9 (10 Apr 2025 13:55), Max: 36.9 (10 Apr 2025 13:55)  T(F): 98.5 (10 Apr 2025 13:55), Max: 98.5 (10 Apr 2025 13:55)  HR: 83 (10 Apr 2025 13:55) (83 - 98)  BP: 99/63 (10 Apr 2025 13:55) (99/63 - 115/83)  BP(mean): --  RR: 18 (10 Apr 2025 13:55) (18 - 18)  SpO2: 94% (10 Apr 2025 13:55) (93% - 94%)    Parameters below as of 10 Apr 2025 13:55  Patient On (Oxygen Delivery Method): room air      PHYSICAL EXAM:  General: in no acute distress  Lungs: CTA B/L. No wheezes, rales, or rhonchi  Cardiovascular: RRR. No murmurs, rubs, or gallops  Abdomen: Soft, non-tender non-distended; No rebound or guarding  Extremities: WWP, No clubbing, cyanosis or edema  Neurological: Alert and oriented  Skin: Warm and dry. 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 She initiated therapy with tebentafusp at [facility] but then moved to [place]. Due to radiographic progression of a dominant site of disease, she underwent Y90 mapping on 9/7 and Y90 radioembolization on 09/21/23 with no issue. She achieved a treatment effect in the dominant mass treated with Y90, with some progression elsewhere. She subsequently received therapy with immunoembolization x 2 administered concurrently with tebentafusp. In the setting of progression, she received radio-embolization followed by ipilimumab and nivolumab, with the later complicated by the development of colitis, hepatitis and thyroiditis. She is now s/p chemo embolization under the care of Dr. Layla Acharya.  Patient now s/p paracentesis yesterday with IR where 1L of fluid was drained (fluid studies sent and cytology).  Now presented to ED with abdominal pain, SOB an weakness.          PRIOR THERAPIES:  03/12/2023-4/30/24 Tebentafusp  9/21/23 Y90 radioembolization of liver  Immunoembolization (#1, right 03/25/2024; #2, left 04/19/2024)  Radioembolization (Right: 05/17/2024; Left: 06/13/2024)  Ipi/Nivo (Cycle 1: 08/30/2024; C2: 09/17/2024); treatment now on hold due to colitis, hepatitis and thyroiditis  ?  INTERVAL HISTORY:  Mrs. Hoff is a 44 year-old female with a history of primary uveal melanoma, now with newly diagnosed metastatic disease with liver only involvement who presents for continued management of her disease. She is followed by Dr. Hernan Osborn and currently being treated by Dr. Layla Acharya. She is HLA  positive. Please see my prior notes for her complex history.  ?  Briefly, she initially presented in January 2018 with vision changes in her right eye, with later loss of temporal and upper visual field vision. She was evaluated by Dr. Konstantin Barros at the [facility] and was found to have a subretinal mass in her right eye with a greatest basal diameter of 14.43 mm and a maximal height of 7 mm. 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 She was treated with Ru-106 brachytherapy from 01/30/2018 until 02/06/2018. She was subsequently placed on expectant observation for systemic recurrence.  ?  She did well until, on 01/29/2023, she underwent an abdominal ultrasound which was significant for a new focal liver lesion measuring 3 cm. A subsequent chest, abdominal and pelvic CT scan, performed on 01/30/2023, demonstrated a segment 7 liver lesion with arterial enhancement and other subcentimeter hepatic lesions measuring up to 4 mm of unclear etiology. On 02/01/2023, she underwent a liver biopsy, with pathology consistent with melanoma. Immunohistochemistry was positive for SOX10 and MART1. An abdominal MRI, performed on 02/16/2023, demonstrated a dominant segment 7 lesion in the liver, with multiple other smaller subcentimeter lesions bilaterally.  ?  She was evaluated at the [facility] on 02/20/2023 by Dr. Miguelito Truong, with recommendations for HLA testing and possible tebentafusp if appropriate or combination checkpoint blockade. She was found to be HLA A*02:01 positive and initiated therapy with tebentafusp on 03/12/2023. On 04/17/2023, she underwent a new baseline chest and pelvis CT as well as a liver MRI. The MRI demonstrated metastatic lesions affecting both lobes of the liver, with some worsening when compared with the prior scans from 02/16/2023 and with the largest lesion now measuring 4.5 x 3.5 cm in segment 7. She had repeat imaging done on 8/18/23 which showed interval increase in size of the hepatic segment 7 metastatic lesion and persistent numerous additional scattered small hemorrhagic liver metastases. 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 She underwent mapping on 9/7/23 and Y90 radioembolization on 9/21/23 with IR Sj Viera. On 01/15/2024, she underwent a repeat liver MRI. This study, when compared with a prior dated 11/17/2023, demonstrated decrease in the size of the dominant segment 7 lesion, with no significant change in the other small bilateral lesions.  ?  She was evaluated by Dr. Miguelito Torres at [facility] on 01/22/2023. The Delcath PHP procedure was discussed at length and the patient opted to not proceed at that time. She underwent a chest CT and abdominal MRI on 03/15/21795. The CT study, when compared with a prior dated 01/25/2024, demonstrated no significant change with no overt evidence for progressive disease. The MRI, when compared with a prior dated 01/15/2024, demonstrated interval decrease in the size of the hepatic segment 7 lesion. There is an increase in size and number of the small liver lesions.  ?  On 3/25/2024, patient underwent right hepatic intra-arterial administration of leukine and lipiodol with a gelfoam slurry embolization of the right hepatic artery and immuno embolization of the left on 04/19/2024 concurrently with tebentafusp. Due to continued disease progression on MRI performed on 05/01/2024 as determined by review at our uveal melanoma tumor board (despite the reported stability on the formal read) as well as her planned travel schedule, her therapy was changed to radioembolization alone.  ?  She then underwent radioembolization (right) on 5/17/2024 andY90 to the left lobe (06/13/2024). She underwent a followup MRI Abdomen on 8/09/2024. Although the formal report states there is overall stable disease, on our review at our uveal melanoma tumor board, there appear be a few lesions on liver that appear slightly larger, with new lesions observed. The case wasdiscussed at our uveal melanoma tumor board; the consensus was for patient to have radiation to her sacral lesion and initiate patient on systemic ipi/nivo.  ?  She was also evaluated by the team at [facility] with recommendations for TACE versus PHP. She initiated ipilimumab and nivolumab, receiving cycle 1 on 08/30/2024 and cycle 2 on 09/17/2024. Treatment was subsequently discontinued due to colitis, hepatitis and thyroiditis requiring a prolonged steroid taper, due to ongoing hepatitis. She underwent a mammogram and breast ultrasound, with findings consistent with a benign breast cyst. Due to recurrent transaminitis, she restarted a slower prednisone taper at 20 mg twice daily with improvement in her bloodwork. She is currently receiving prednisone 10 mg daily. She was evaluated by Dr. Elvia Winchester of endocrinology who initiated her on synthroid.  ?  She underwent repeat imaging studies on 10/10/2024 which demonstrated an interval decrease in diffusion restriction at numerous scattered liver metastases, consistent with a favorable, incomplete response to treatment. On 12/14/2024, she went to urgent care for abdominal pain. She then went to [facility] for further workup which included Ab US which showed diffuse hepatic mets and a CT PE which revealed 3 solid lung nodules seen on 12/2/24 imaging, no PE was found. In addition to above, patient's scans were reviewed with the [facility] team. As per their review, they are also in agreement that patient's disease is progressing  ?  She initiated therapy with chemoembolization at [facility]. With the exception of post-procedural right upper quadrant discomfort which is improved with steroids, she is doing well. She was evaluated at [facility] for consideration of treatment on the [id number] trial with darovasertib and crizotinib; however, she has been found to be ineligible due to hyperbilirubinemia. Her course was also complicated by the development of a portal vein thrombosis for which she is on anticoagulation, as well as symptomatic ascites.  ?  4/8/2025  The patient was last seen over a month ago. During her screening for the Phase 1 Daro. Crizo trial at [place], she was found ineligible due to an elevated direct bilirubin level, which was outside the acceptable range. It was anticipated that a dosage of 10 mg of prednisone might help reduce the bilirubin levels. On April 6, 2025, the patient did not pass the screening due to the development of a new portal vein thrombosis and continuously elevated liver function tests. Moreover, she has been experiencing worsening ascites and will require a paracentesis procedure, which is scheduled to be performed by Dr. Sj Viera at [facility].  ?  PAST MEDICAL HISTORY:  None  ?  SOCIAL HISTORY:  The patient has moved from [place] and is now living in [place]. Her partner currently resides in the [place] but is considering relocating, currently here in [place]. She works in the [employer].  ?  FAMILY HISTORY:  Her maternal grandfather had mucosal melanoma arising from the stomach.     Disease: Uveal melanoma    TNM stage: M1c  AJCC Stage: IV      Vital Signs Last 24 Hrs  T(C): 36.9 (10 Apr 2025 13:55), Max: 36.9 (10 Apr 2025 13:55)  T(F): 98.5 (10 Apr 2025 13:55), Max: 98.5 (10 Apr 2025 13:55)  HR: 83 (10 Apr 2025 13:55) (83 - 98)  BP: 99/63 (10 Apr 2025 13:55) (99/63 - 115/83)  BP(mean): --  RR: 18 (10 Apr 2025 13:55) (18 - 18)  SpO2: 94% (10 Apr 2025 13:55) (93% - 94%)    Parameters below as of 10 Apr 2025 13:55  Patient On (Oxygen Delivery Method): room air      PHYSICAL EXAM:  General: in no acute distress  Lungs: dec R breathe sounds. No wheezes, rales, or rhonchi  Cardiovascular: RRR. No murmurs, rubs, or gallops  Abdomen: Soft, non-tender moderately-distended; No rebound or guarding  Extremities: WWP, No clubbing, BLLE edema   Neurological: Alert and oriented  Skin: Warm and dry. No obvious rash     LABS:                        13.2   19.47 )-----------( 322      ( 10 Apr 2025 12:25 )             39.2     04-10    129[L]  |  99  |  18  ----------------------------<  100[H]  4.8   |  20[L]  |  0.67    Ca    8.2[L]      10 Apr 2025 12:25    TPro  5.8[L]  /  Alb  2.3[L]  /  TBili  7.3[H]  /  DBili  x   /  AST  154[H]  /  ALT  95[H]  /  AlkPhos  412[H]  04-10    PT/INR - ( 10 Apr 2025 12:25 )   PT: 24.0 sec;   INR: 2.10          PTT - ( 10 Apr 2025 12:25 )  PTT:42.2 sec  Urinalysis Basic - ( 10 Apr 2025 12:28 )

## 2025-04-10 NOTE — H&P ADULT - PROBLEM SELECTOR PLAN 5
Acute portal vein thrombosis seen on CT. Received one dose eliquis AM 4/10. Holding iso upcoming paracentesis and thoracentesis. Acute portal vein thrombosis seen on CT. Received one dose eliquis AM 4/10. Holding iso upcoming paracentesis and thoracentesis. Started eliquis 10mg BID on Monday.    -with more procedures planned during her admission, consider heparin gtt after para tomorrow for full AC Acute portal vein thrombosis seen on CT. Received one dose eliquis AM 4/10. Holding iso upcoming paracentesis and thoracentesis. Started eliquis 10mg BID on Monday.    -with her being in the middle of her load of eliquis, figure out if patient can continue eliquis to complete a total of 7 days or if she needs to restart the load

## 2025-04-10 NOTE — H&P ADULT - PROBLEM SELECTOR PLAN 8
IVF: none  Diet: regular  GI: pantoprazole  DVT: none   Dispo: RMF Subjectively feels as though she has incomplete voiding though denies dysuria, vaginal discharge or urinary frequency. Bladder scan in ED showed >500 though likely from ascites as patient did not have the urge to urinate    -strict I/O to ensure pt voiding appropriately  -bladder scans q6, get pvr  -can consider a trial of straight cath if BS persistently elevated w/ sx

## 2025-04-10 NOTE — H&P ADULT - NSHPPHYSICALEXAM_GEN_ALL_CORE
General: Alert and oriented x 3. No acute distress.   HEENT: Moist mucous membranes. Anicteric. No cervical lymphadenopathy.  Cardiovascular: Regular rate and rhythm. No murmur. Normal JVP.  Lungs: Clear to auscultation bilaterally. No accessory muscle use.  Abdomen: Soft, non-tender and non-distended. No palpable masses.  Extremities: No edema. Non-tender. Warm.  Skin: No rashes or lesions.   Neurologic: No apparent focal neurological deficits. CN II-XII grossly intact, but not individually tested.  Psychiatric: Cooperative. Appropriate mood and affect. General: Alert and oriented x 3. No acute distress.   HEENT: Moist mucous membranes.  Cardiovascular: Regular rate and rhythm.   Lungs: Decreased breath sounds RLL, clear elsewhere No accessory muscle use.  Abdomen: Soft, epigastric tenderness, no guarding. Distended.   Extremities: 1+ pitting edema   Skin: No rashes or lesions.   Neurologic: No apparent focal neurological deficits. CN II-XII grossly intact, but not individually tested.  Psychiatric: Cooperative. Appropriate mood and affect.

## 2025-04-10 NOTE — H&P ADULT - PROBLEM SELECTOR PLAN 1
Patient presenting with leukocytosis meeting 2/4 SIRS criteria(HR 98 and wbc 19.4k). Wbc 4/9 20k though in 2/25 wnl. May be reactive iso malignancy vs infectious given abdominal pain.     -f/u paracentesis culture, gram stain, cell counts  -can monitor after abx Patient presenting with leukocytosis meeting 2/4 SIRS criteria(HR 98 and wbc 19.4k). Wbc 4/9 20k though in 2/25 wnl. May be reactive iso malignancy vs infectious given abdominal pain. Given SOB, new pleural effusion, dry cough and fatigue can consider URTI.    -obtain rvp  -f/u paracentesis culture, gram stain, cell counts  -can monitor after abx

## 2025-04-10 NOTE — ED ADULT TRIAGE NOTE - CHIEF COMPLAINT QUOTE
Pt presents to ED C/O worsening abd pain with distention. States, " I was here yesterday for a paracentesis they took off 1L of fluid but I'm still in pain". Pt has CA reports originated in the eye with mets to liver and portal vein thrombosis.

## 2025-04-10 NOTE — ED ADULT NURSE NOTE - NSFALLUNIVINTERV_ED_ALL_ED
Bed/Stretcher in lowest position, wheels locked, appropriate side rails in place/Call bell, personal items and telephone in reach/Instruct patient to call for assistance before getting out of bed/chair/stretcher/Non-slip footwear applied when patient is off stretcher/[place] to call system/Physically safe environment - no spills, clutter or unnecessary equipment/Purposeful proactive rounding/Room/bathroom lighting operational, light cord in reach

## 2025-04-10 NOTE — H&P ADULT - PROBLEM SELECTOR PLAN 2
Patient presenting with leukocytosis meeting 2/4 SIRS criteria. No colitis seen on CT. Pain likely from large ascites and portal vein thrombosis    -pain control  -can monitor off abx at this time  -receiving paracentesis 4/11 Patient presenting with leukocytosis meeting 2/4 SIRS criteria. No colitis seen on CT. Pain likely from large ascites and portal vein thrombosis    -pain control:      -Morphine 3mg IV q4h PRN for Severe Pain      -Oxy IR 5mg PO q4h PRN for Moderate Pain      -Bowel Regimen: Senna 2tabs qhs + Miralax daily PRN  -can monitor off abx at this time  -receiving paracentesis 4/11

## 2025-04-10 NOTE — H&P ADULT - PROBLEM SELECTOR PLAN 4
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 She had paracentesis 4/9 that drained 1L without complications. Patient still distended and complaining of pain. Nucleated cell count 173.    -Repeat paracentesis with IR 4/11  -continue to hold eliquis

## 2025-04-10 NOTE — H&P ADULT - ASSESSMENT
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 Pending paracentesis 4/11 for ascites and thoracentesis 4/12 for new R pleural effusion.

## 2025-04-10 NOTE — CONSULT NOTE ADULT - ASSESSMENT
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 She initiated therapy with tebentafusp at [facility] but then moved to [place]. Due to radiographic progression of a dominant site of disease, she underwent Y90 mapping on 9/7 and Y90 radioembolization on 09/21/23 with no issue. She achieved a treatment effect in the dominant mass treated with Y90, with some progression elsewhere. She subsequently received therapy with immunoembolization x 2 administered concurrently with tebentafusp. In the setting of progression, she received radioembolization followed by ipilimumab and nivolumab, with the later complicated by the development of colitis, hepatitis and thyroiditis. She is now s/p chemo embolization under the care of Dr. Layla Acharya. 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 She initiated therapy with tebentafusp at [facility] but then moved to [place]. Due to radiographic progression of a dominant site of disease, she underwent Y90 mapping on 9/7 and Y90 radioembolization on 09/21/23 with no issue. She achieved a treatment effect in the dominant mass treated with Y90, with some progression elsewhere. She subsequently received therapy with immunoembolization x 2 administered concurrently with tebentafusp. In the setting of progression, she received radioembolization followed by ipilimumab and nivolumab, with the later complicated by the development of colitis, hepatitis and thyroiditis. She is now s/p chemo embolization under the care of Dr. Layla Acharya.    Patient is s/p paracentesis yesterday where 1L of fluid was drained. Now presents to [facility] with increased abdominal pain.  Per IR, during US yesterday there was little fluid to drain despite patient complaining of abdominal distension and appeared to all be liver disease on US.  CT AP performed in the ER today with:  Acute thrombus in main portal vein and both portal vein branches. Increased moderate-large ascites, secondary to portal hypertension with signs of generalized fluid overload. Increased hepatic metastases. Notably, superior intrahepatic IVC and right hepatic vein narrowed due to mass effect from metastases. Increased upper retroperitoneal metastatic lymphadenopathy. Increased moderate right pleural effusion, secondary partial atelectasis right middle and lower lobes.  Unchanged bony metastases. Unchanged visualized lung metastases.     Patient seen and examined at bedside, she continues to endorse abdominal pain, distension and weakness.  Reports BLLE edema as well.  Labs notable for WBC 19.47, bilirubin 7.3.  Patient states she started taking Eliquis on Monday for new portal vein thrombosis, she also remains on prednisone 15mg daily.      Discussed case with palliative care who plan to see the patient.  Pulmonary team also to evaluate patient for thoracentesis.     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 She initiated therapy with tebentafusp at [facility] but then moved to [place]. Due to radiographic progression of a dominant site of disease, she underwent Y90 mapping on 9/7 and Y90 radioembolization on 09/21/23 with no issue. She achieved a treatment effect in the dominant mass treated with Y90, with some progression elsewhere. She subsequently received therapy with immunoembolization x 2 administered concurrently with tebentafusp. In the setting of progression, she received radioembolization followed by ipilimumab and nivolumab, with the later complicated by the development of colitis, hepatitis and thyroiditis. She is now s/p chemo embolization under the care of Dr. Layla Acharya.    Patient is s/p paracentesis yesterday where 1L of fluid was drained. Now presents to [facility] with increased abdominal pain.  Per IR, during US yesterday there was little fluid to drain despite patient complaining of abdominal distension and appeared to all be liver disease on US.  CT AP performed in the ER today with:  Acute thrombus in main portal vein and both portal vein branches. Increased moderate-large ascites, secondary to portal hypertension with signs of generalized fluid overload. Increased hepatic metastases. Notably, superior intrahepatic IVC and right hepatic vein narrowed due to mass effect from metastases. Increased upper retroperitoneal metastatic lymphadenopathy. Increased moderate right pleural effusion, secondary partial atelectasis right middle and lower lobes.  Unchanged bony metastases. Unchanged visualized lung metastases.     Patient seen and examined at bedside, she continues to endorse abdominal pain, distension and weakness.  Reports BLLE edema as well.  Labs notable for WBC 19.47, bilirubin 7.3.  Patient states she started taking Eliquis on Monday for new portal vein thrombosis, she also remains on prednisone 15mg daily.      Discussed case with palliative care who plan to see the patient.  Pulmonary team also to evaluate patient for thoracentesis.     plan  -continue prednisone 15mg daily  -hold home Eliquis in preparation for thoracentesis on Saturday (d/w pulm) patient to be staffed tomorrow   -recommend evaluation for repeat paracentesis given increased moderate-large ascites noted on CT from today despite drainage yesterday   -palliative care consulted   -oncology will continue to follow

## 2025-04-10 NOTE — PATIENT PROFILE ADULT - FUNCTIONAL SCREEN CURRENT LEVEL: SWALLOWING (IF SCORE 2 OR MORE FOR ANY ITEM, CONSULT REHAB SERVICES), MLM)
DC NOTE:  Work note provided.  Pt verbalized understanding of instructions and follow up care.  No s/s of acute distress.   0 = swallows foods/liquids without difficulty

## 2025-04-10 NOTE — CHART NOTE - NSCHARTNOTEFT_GEN_A_CORE
PALLIATIVE MEDICINE COORDINATION OF CARE DOCUMENTATION: [x] Inpatient Consult  Non-Face-to-Face prolonged service provided that relates to (face-to-face) care that has or will occur and ongoing patient management, including one or more of the following: -Documentation review from other physicians and health care professional services -Review of medical records and diagnostic/radiology study results -Coordination with patient's support system    Medical Necessity: Symptom Management  Date of Related E/M Service: 4/11/25  ************************************************************************  Notified by Onc and Outpatient Palliative of patient's admission. Will follow-up tomorrow for symptom management recommendations. ISTOP reviewed, see chart note. Pending paracentesis tomorrow as per H&P.    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 She is s/p paracentesis yesterday where 1L of fluid was drained. Now presents to [facility] with increased abdominal pain despite the fluid removal.  After the metastasis, she went through localized chemoembolization which seemed to have resulted in a shrunk tumor but hardening of the region under the ribs. Post chemoembolization, her bilirubin levels started to rise and she faced an exclusion from a clinical trial. She noted her pain and distension in the abdomen gradually building up and had a recent diagnosis of partial portal vein thrombosis last Thursday and started Eliquis Monday. She notes in the past few days more fatigue, dry cough, notes subjective incomplete voiding, JONES, and runny nose. Denies recent travel, loss of appetite, sick contacts, HA, chest pain, diarrhea, dysuria, vaginal discharge.   ------------------------------------------------------------------------  MEDICATION REVIEW:  MEDICATIONS  (STANDING):  influenza   Vaccine 0.5 milliLiter(s) IntraMuscular once    MEDICATIONS  (PRN):  acetaminophen     Tablet .. 650 milliGRAM(s) Oral every 6 hours PRN Temp greater or equal to 38C (100.4F), Mild Pain (1 - 3)  ALPRAZolam 0.25 milliGRAM(s) Oral at bedtime PRN for anxiety  ondansetron Injectable 4 milliGRAM(s) IV Push every 6 hours PRN Nausea and/or Vomiting    ISTOP REFERENCE: [id number]  - see ISTOP Chart Note    ANALGESIC USE (Scheduled & PRNs) PAST 24 HOURS:  morphine  - Injectable   2 milliGRAM(s) IV Push (04-10-25 @ 16:13)  ondansetron Injectable   4 milliGRAM(s) IV Push (04-10-25 @ 16:13)  ------------------------------------------------------------------------  COORDINATION OF CARE:  - Palliative Care consulted for: GOC / Symptom Management  - Patient (to be) assessed: 4/11/25  - Patient previously seen by Palliative Care service: NO    ADVANCE CARE PLANNING  - Code status: Full Code  - MOLST: NONE found in EMR  - GOC documents: NONE found in EMR  - HCP/Living Will/Other Advanced Directives: NONE found in EMR  ------------------------------------------------------------------------  CARE PROVIDER DOCUMENTATION:  - SW/CM notes: Remains medically active  - Primary Team and Consultant Notes reviewed    PLAN OF CARE  - Current Admit Date: 4/10/25  - LOS: 0  - Current Dispo Plan: TO BE DETERMINED    Full Consult to follow tomorrow  ------------------------------------------------------------------------  - Time Spent/Chart reviewed: 33 Minutes [including time used to gather, review and transfer data]  - Start: 5:20PM  - End: 5:53PM    Prolonged services rendered, as part of this patient's care provided by Palliative Medicine, include: i. chart review for provider and ancillary service documentation, ii. pertinent diagnostics including laboratory and imaging studies, iii. medication review including PRN use, iv. admission history including previous palliative care encounters and GOC notes, v. advance care planning documents including HCP and MOLST forms in [place]. Part of Palliative Medicine extended evaluation and management also involves coordination of care with our IDT, the primary and consulting teams, and unit CM/SW and Hospice if eligible. Recommendations based on the information gathered and discussed are outlined in the A/P of Palliative notes.

## 2025-04-10 NOTE — H&P ADULT - HISTORY OF PRESENT ILLNESS
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 She is s/p paracentesis yesterday where 1L of fluid was drained. Now presents to [facility] with increased abdominal pain despite the fluid removal.     ED COURSE  Vitals: 97.8 F HR 98 /83 RR 18 93% RA  Sig Labs: wbc 19.4k, rdw 17.3, 3.5% metamyelocytes, INR 2.1, PT 24, PTT 42.2, Na 129, bilirubin 7.3, alk phos 412, ast 154, alt 95, lipase 75 UA spec grav >1.030, 15 ketones, large bili, small leuk est  Imaging: CT A/P w/ con: *  Acute thrombus in main portal vein and both portal vein branches.   Increased moderate-large ascites, secondary to portal hypertension with signs of generalized fluid overload.  Increased hepatic metastases. Notably, superior intrahepatic IVC and right hepatic vein narrowed due to mass effect from metastases.  Interventions: none  Oncologist:   Dr. Layla Acharya & Dr. 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 She is s/p paracentesis yesterday where 1L of fluid was drained. Now presents to [facility] with increased abdominal pain despite the fluid removal.  After the metastasis, she went through localized chemoembolization which seemed to have resulted in a shrunk tumor but hardening of the region under the ribs. Post chemoembolization, her bilirubin levels started to rise and she faced an exclusion from a clinical trial. She noted her pain and distension in the abdomen gradually building up and had a recent diagnosis of partial portal vein thrombosis and first signs of ascites     ED COURSE  Vitals: 97.8 F HR 98 /83 RR 18 93% RA  Sig Labs: wbc 19.4k, rdw 17.3, 3.5% metamyelocytes, INR 2.1, PT 24, PTT 42.2, Na 129, bilirubin 7.3, alk phos 412, ast 154, alt 95, lipase 75 UA spec grav >1.030, 15 ketones, large bili, small leuk est  Imaging: CT A/P w/ con: *  Acute thrombus in main portal vein and both portal vein branches.   Increased moderate-large ascites, secondary to portal hypertension with signs of generalized fluid overload.  Increased hepatic metastases. Notably, superior intrahepatic IVC and right hepatic vein narrowed due to mass effect from metastases.  Interventions: none  Oncologist:   Dr. Layla Acharya & Dr. 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 She is s/p paracentesis yesterday where 1L of fluid was drained. Now presents to [facility] with increased abdominal pain despite the fluid removal.  After the metastasis, she went through localized chemoembolization which seemed to have resulted in a shrunk tumor but hardening of the region under the ribs. Post chemoembolization, her bilirubin levels started to rise and she faced an exclusion from a clinical trial. She noted her pain and distension in the abdomen gradually building up and had a recent diagnosis of partial portal vein thrombosis last Thursday and started Eliquis Monday. She notes in the past few days more fatigue, dry cough, notes subjective incomplete voiding, JONES, and runny nose. Denies recent travel, loss of appetite, sick contacts, HA, chest pain, diarrhea, dysuria, vaginal discharge.   ED COURSE  Vitals: 97.8 F HR 98 /83 RR 18 93% RA  Sig Labs: wbc 19.4k, rdw 17.3, 3.5% metamyelocytes, INR 2.1, PT 24, PTT 42.2, Na 129, bilirubin 7.3, alk phos 412, ast 154, alt 95, lipase 75 UA spec grav >1.030, 15 ketones, large bili, small leuk est  Imaging: CT A/P w/ con: *  Acute thrombus in main portal vein and both portal vein branches.   Increased moderate-large ascites, secondary to portal hypertension with signs of generalized fluid overload.  Increased hepatic metastases. Notably, superior intrahepatic IVC and right hepatic vein narrowed due to mass effect from metastases.  Interventions: none  Oncologist:   Dr. Layla Acharya & Dr. Ruby

## 2025-04-10 NOTE — H&P ADULT - ATTENDING COMMENTS
44F with metastatic uveal melanoma, with liver involvement, s/p paracentesis with IR yesterday where 1L of fluid was removed.  Now presenting with increased abdominal pain despite the paracentesis. Ascites fluid negative for SBP collected yesterday.  No other infectious pathology noted on CT scan.  Admitted for further workup.  Additionally, recently diagnosed with a portal vein thrombus, and started on eliquis.    Plan  - IR consult for repeat paracentesis.  Eliquis on hold, per IR request.   - she is afebrile, and abdominal exam is benign.  Palpable fluid wave on exam.    - leukocytosis is likely from prednisone  - if any febrile episodes, low threshold to start on treatment for SBP, however recent fluid studies reassuringly negative  - pulmonology consulted for pleural effusion  - patient reports that she is urinating, but sometimes senses that she does not fully empty bladder- continue with bladder scans, UA negative for UTI. If more symptomatic, will try straight catheterization.  Otherwise, she has been getting up to go to the bathroom on her own and stays on the toilet for a little while until she thinks she has voided completely.     75 minutes spent on this encounter, including face to face with patient, review of chart, care coordination and documentation.

## 2025-04-10 NOTE — H&P ADULT - PROBLEM SELECTOR PLAN 7
IVF: none  Diet: regular  GI: pantoprazole  DVT: none   Dispo: [facility] Noted she was expected to have labs compatible with hypothyroidism and to start synthroid but her labs did not reflect this so she never started. Denies palpitations, chest pain, rash, dysphagia, diarrhea.    -f/u tsh and free t4

## 2025-04-10 NOTE — H&P ADULT - NSHPLABSRESULTS_GEN_ALL_CORE
13.2   19.47 )-----------( 322      ( 10 Apr 2025 12:25 )             39.2     04-10    129[L]  |  99  |  18  ----------------------------<  100[H]  4.8   |  20[L]  |  0.67    Ca    8.2[L]      10 Apr 2025 12:25    TPro  5.8[L]  /  Alb  2.3[L]  /  TBili  7.3[H]  /  DBili  x   /  AST  154[H]  /  ALT  95[H]  /  AlkPhos  412[H]  04-10    PT/INR - ( 10 Apr 2025 12:25 )   PT: 24.0 sec;   INR: 2.10          PTT - ( 10 Apr 2025 12:25 )  PTT:42.2 sec  Urinalysis Basic - ( 10 Apr 2025 12:28 )    Color: Dark Yellow / Appearance: Cloudy / SG: >1.030 / pH: x  Gluc: x / Ketone: 15 mg/dL  / Bili: Large / Urobili: 0.2 mg/dL   Blood: x / Protein: Trace mg/dL / Nitrite: Positive   Leuk Esterase: Small / RBC: 6 /HPF / WBC 2 /HPF   Sq Epi: x / Non Sq Epi: 4 /HPF / Bacteria: Occasional /HPF      CAPILLARY BLOOD GLUCOSE        RADIOLOGY & ADDITIONAL TESTS: Reviewed.

## 2025-04-10 NOTE — ED PROVIDER NOTE - OBJECTIVE STATEMENT
Subjective:    - Summary: Patient reports increasing abdominal pain and distension over the last few weeks after being screened out of a clinical trial due to elevated bilirubin levels. She notes experiencing malaise, along with shortness of breath upon exertion. She was diagnosed with uveal melanoma that metastasized to her liver five years later.    - Chief Complaint (CC): Increasing Abdominal pain and distension    - History of Present Illness (HPI): The patient has a history of uveal melanoma, which metastasized to the liver five years after the initial treatment. After the metastasis, she went through localized chemoembolization which seemed to have resulted in a shrunk tumor but hardening of the region under the ribs. Post chemoembolization, her bilirubin levels started to rise and she faced an exclusion from a clinical trial. She noted her pain and distension in the abdomen gradually building up and had a recent diagnosis of partial portal vein thrombosis and first signs of ascites    Pt is on Eliquis for PVT.    - Past Medical History: Therapies received in Secondary Malignancy (Liver Metastasis), chemoembolization therapy, portal vein thrombosis, and ascites.    - Past Surgical History: Treatment for primary uveal melanoma.    - Family History:     - Social History:     - Review of Systems:      - Growing Abdominal Distension and Pain       - Shortness of breath on excretion       - Difficulty Walking       - General malaise       - Dry cough       - No fever or chills, No blood in cough       - Normal bowel movements     - Medications: 15mg prednisone daily, multivitamin, Eliquis (for portal vein thrombosis)    - Allergies: No known allergies     Objective:    - Diagnostic Results: Recent MRI report shows partial portal vein thrombosis and first signs of ascites. A recent lab report shows elevated white blood cell count (19,000). Liver function tests (LFTs) are also elevated.    - Vital Signs:     - Physical Examination (PE): Abdomen distended and firm to touch, especially under the ribs.  No r/g.  No sig ttp.  1+ bl symmetric pitting edema.  Nl pulses.  Clear lungs, decreased R base.  Nl heart sounds.  CN2-12 intact, 5/5 bl ue and le strength.    No visible signs of infection.     Assessment:    - Summary: Patient presenting with increased abdominal distension and pain. Examination suggests ascites and high WBC count may be due to ongoing steroid taper.  No overt signs or source of infection  Do not suspect SBP as para results from yesterday neg.  Will CT to rule out other abdominal process/infection.    - Problems:      - Uveal melanoma with liver metastasis      - Elevated bilirubin      - Partial Portal Vein Thrombosis      - Ascites      - Abdominal pain and distension      - Elevated white blood cell count    - Differential Diagnosis:      - Progressive liver failure due mets      -Pleural effusion      - Increased ascitic fluid     Plan:    - Summary: Plan is to proceed to a CAT scan to check for any changes compared to the last images. Will consult with the patient's oncology team. If the scan shows more ascites, a paracentesis could be arranged to remove fluid for relief.    - Plan:      - Perform CAT scan      - Consultation with patient's oncologist      - Possibly admit for para/thora and supportive care.

## 2025-04-10 NOTE — PATIENT PROFILE ADULT - NSPROPASSIVESMOKEEXPOSURE_GEN_A_NUR
Patient notified of results. Seen by patient Radha Blanco on 7/16/2024  4:42 PM. Patient is now due for a breast exam with Maryellen Hopkins. Sent to reception to schedule an appointment.  No

## 2025-04-10 NOTE — H&P ADULT - PROBLEM SELECTOR PLAN 3
New right sided pleural effusion seen on CT A/P. Pulmonology agreeable to perform paracentesis Saturday  Likely 2/2 underlying malignancy    -formal pulmonology consult

## 2025-04-10 NOTE — CHART NOTE - NSCHARTNOTEFT_GEN_A_CORE
Reference #: [id number]    Practitioner Count: 2  Pharmacy Count: 1  Current Opioid Prescriptions: 0  Current Benzodiazepine Prescriptions: 0  Current Stimulant Prescriptions: 0    PDI	My Rx	Current Rx	Drug Type	Rx Written	Rx Dispensed	Drug	Quantity	Days Supply	Prescriber Name	Prescriber DOUG #	Payment Method	Dispenser  A	N	N	O	04/19/2024	04/20/2024	oxycodone hcl (ir) 5 mg tablet	10	3	Yaonna Fiore	[id number]	[facility]	[employer] [facility]  B	N	N	O	02/20/2025	02/21/2025	oxycodone hcl (ir) 5 mg tablet	20	5	Jenni Muñoz M, MSN, NP	[id number]	[facility] #[id number]  B	N	N	O	02/05/2025	02/06/2025	oxycodone hcl (ir) 5 mg tablet	20	7	Huong Manrique NP	[id number]	[facility] #[id number]  C	N	N		12/04/2024	12/17/2024	zolpidem tartrate 10 mg tablet	20	20	Christel Carrero	[id number]	Insurance	[facility] #[id number]  C	N	N	O	05/17/2024	05/17/2024	oxycodone hcl (ir) 5 mg tablet	20	5	Yoanna Fiore	[id number]	[facility] #[id number]  C	N	N		04/03/2024	04/11/2024	zolpidem tartrate 10 mg tablet	20	20	Chasity Day	[id number]	Insurance	[facility] #[id number]

## 2025-04-10 NOTE — ED ADULT NURSE NOTE - OBJECTIVE STATEMENT
Female aox4 c/o abdominal pain and distension. Seen yesterday for paracentesis upstairs, but states there has been no improvement. 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 Pt changed into hospital gown and IV access established.

## 2025-04-10 NOTE — H&P ADULT - PROBLEM SELECTOR PLAN 6
She achieved a treatment effect in the dominant mass treated with Y90, with some progression elsewhere. She subsequently received therapy with immunoembolization x 2 administered concurrently with tebentafusp. In the setting of progression, she received radioembolization followed by ipilimumab and nivolumab, with the later complicated by the development of colitis, hepatitis and thyroiditis. She is now s/p chemo embolization under the care of Dr. Layla Acharya.  [place]mi. Dr. Dr. Ruby 4/8    -c/w prednisone 15mg daily  -c/w bactrim DS daily for PJP ppx  -c/w pantoprazole 40 mg daily She achieved a treatment effect in the dominant mass treated with Y90, with some progression elsewhere. She subsequently received therapy with immunoembolization x 2 administered concurrently with tebentafusp. In the setting of progression, she received radioembolization followed by ipilimumab and nivolumab, with the later complicated by the development of colitis, hepatitis and thyroiditis. She is now s/p chemo embolization under the care of Dr. Layla Acharya.  [place]mi. Dr. Dr. Ruby 4/8    -c/w prednisone 15mg daily  -c/w bactrim DS [facility] for PJP ppx  -c/w pantoprazole 40 mg daily  -c/w xanax 0.5 mg twice a day as needed, though she only takes at bedtime She achieved a treatment effect in the dominant mass treated with Y90, with some progression elsewhere. She subsequently received therapy with immunoembolization x 2 administered concurrently with tebentafusp. In the setting of progression, she received radioembolization followed by ipilimumab and nivolumab, with the later complicated by the development of colitis, hepatitis and thyroiditis. She is now s/p chemo embolization under the care of Dr. Layla Ahcarya.  Saw Dr. Dr. Ruby 4/8    -c/w prednisone 10mg daily  -c/w bactrim [facility] for PJP ppx  -c/w pantoprazole 40 mg daily  -c/w xanax 0.5 mg twice a day as needed prescribed, though she only takes at bedtime  -palliative consult She achieved a treatment effect in the dominant mass treated with Y90, with some progression elsewhere. She subsequently received therapy with immunoembolization x 2 administered concurrently with tebentafusp. In the setting of progression, she received radioembolization followed by ipilimumab and nivolumab, with the later complicated by the development of colitis, hepatitis and thyroiditis. She is now s/p chemo embolization under the care of Dr. Layla Acharya.  Saw Dr. Dr. Ruby 4/8      -pain control:      -Morphine 3mg IV q4h PRN for Severe Pain      -Oxy IR 5mg PO q4h PRN for Moderate Pain      -Bowel Regimen: Senna 2tabs qhs + Miralax daily PRN  -c/w prednisone 10mg daily  -c/w bactrim DS MWF for PJP ppx  -c/w pantoprazole 40 mg daily  -c/w xanax 0.25 mg twice a day as needed prescribed, though she only takes at bedtime  -palliative recs

## 2025-04-11 NOTE — CONSULT NOTE ADULT - PROBLEM SELECTOR RECOMMENDATION 3
Problem: Chronic Conditions and Co-morbidities  Goal: Patient's chronic conditions and co-morbidity symptoms are monitored and maintained or improved  Outcome: Progressing  Flowsheets (Taken 3/11/2025 1901)  Care Plan - Patient's Chronic Conditions and Co-Morbidity Symptoms are Monitored and Maintained or Improved: Monitor and assess patient's chronic conditions and comorbid symptoms for stability, deterioration, or improvement     Problem: Discharge Planning  Goal: Discharge to home or other facility with appropriate resources  Outcome: Progressing  Flowsheets (Taken 3/11/2025 1901)  Discharge to home or other facility with appropriate resources:   Identify barriers to discharge with patient and caregiver   Arrange for needed discharge resources and transportation as appropriate   Identify discharge learning needs (meds, wound care, etc)     Problem: Pain  Goal: Verbalizes/displays adequate comfort level or baseline comfort level  Outcome: Progressing  Flowsheets (Taken 3/11/2025 1901)  Verbalizes/displays adequate comfort level or baseline comfort level:   Encourage patient to monitor pain and request assistance   Assess pain using appropriate pain scale   Administer analgesics based on type and severity of pain and evaluate response     Problem: Skin/Tissue Integrity  Goal: Skin integrity remains intact  Description: 1.  Monitor for areas of redness and/or skin breakdown  2.  Assess vascular access sites hourly  3.  Every 4-6 hours minimum:  Change oxygen saturation probe site  4.  Every 4-6 hours:  If on nasal continuous positive airway pressure, respiratory therapy assess nares and determine need for appliance change or resting period  Outcome: Progressing      Recent para with rapid reaccumulation  -pending therapeutic para with IR

## 2025-04-11 NOTE — BH CONSULTATION LIAISON ASSESSMENT NOTE - CURRENT MEDICATION
MEDICATIONS  (STANDING):  bisacodyl 5 milliGRAM(s) Oral at bedtime  cefTRIAXone   IVPB 1000 milliGRAM(s) IV Intermittent every 24 hours  influenza   Vaccine 0.5 milliLiter(s) IntraMuscular once  pantoprazole    Tablet 40 milliGRAM(s) Oral every 24 hours  predniSONE   Tablet 10 milliGRAM(s) Oral every 24 hours  predniSONE   Tablet 5 milliGRAM(s) Oral every 24 hours  senna 2 Tablet(s) Oral at bedtime  trimethoprim  160 mG/sulfamethoxazole 800 mG 1 Tablet(s) Oral <User Schedule>    MEDICATIONS  (PRN):  acetaminophen     Tablet .. 650 milliGRAM(s) Oral every 6 hours PRN Temp greater or equal to 38C (100.4F), Mild Pain (1 - 3)  ALPRAZolam 0.25 milliGRAM(s) Oral at bedtime PRN for anxiety  HYDROmorphone   Tablet 2 milliGRAM(s) Oral every 4 hours PRN Moderate Pain (4 - 6)  HYDROmorphone  Injectable 0.5 milliGRAM(s) IV Push every 3 hours PRN Severe Pain (7 - 10)  ondansetron Injectable 4 milliGRAM(s) IV Push every 6 hours PRN Nausea and/or Vomiting  polyethylene glycol 3350 17 Gram(s) Oral every 24 hours PRN Constipation

## 2025-04-11 NOTE — PROGRESS NOTE ADULT - PROBLEM SELECTOR PLAN 3
New right sided pleural effusion seen on CT A/P. Pulmonology agreeable to perform paracentesis Saturday  Likely 2/2 underlying malignancy    -formal pulmonology consult New right sided pleural effusion seen on CT A/P. Pulmonology agreeable to perform paracentesis Saturday  Likely 2/2 underlying malignancy  Pulm consulted and saw patient noting small untappable pleural effusion and unlikely causing her symptoms.    -f/u pulm recs

## 2025-04-11 NOTE — BH CONSULTATION LIAISON ASSESSMENT NOTE - SUMMARY
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 She underwent an additional paracentesis today 4/11. Psychiatry was consulted to assess pt's coping with metastatic cancer; consult suggested by her outpatient oncologist Dr Ruby.  On exam pt appears euthymic, linear, and fully oriented. She shows appropriate affect which includes occasional tearfulness when speaking about emotionally charged topics. She does not endorse SI/HI/AVH. She is open to continued psychotherapy in the hospital setting and expresses interest in continuing psychiatric/psychological services following discharge. She expresses that she has "searched for meaning" throughout her life and is now approaching the task of finding meaning in her current situation.    RECOMMENDATIONS:  - No indication for 1:1 or psychiatric intervention  - Psychiatry will continue to follow  - Will provide psychological support  - Recommend outpatient follow up with psycho-oncology program

## 2025-04-11 NOTE — PROGRESS NOTE ADULT - SUBJECTIVE AND OBJECTIVE BOX
Progress Note  INCOMPLETE NOTE    INTERVAL EVENTS:   No acute events overnight.    SUBJECTIVE:   Patient seen and examined at bedside. Condition largely unchanged from yesterday. No acute complaints at this time.    ROS:  Negative unless otherwise stated above.    PHYSICAL EXAM:  General: Alert and oriented x 3. No acute distress.   HEENT: Moist mucous membranes. Anicteric. No cervical lymphadenopathy.  Cardiovascular: Regular rate and rhythm. No murmur. Normal JVP.  Lungs: Clear to auscultation bilaterally. No accessory muscle use.  Abdomen: Soft, non-tender and non-distended. No palpable masses.  Extremities: No edema. Non-tender. Warm.  Skin: No rashes or lesions.   Neurologic: No apparent focal neurological deficits. CN II-XII grossly intact, but not individually tested.  Psychiatric: Cooperative. Appropriate mood and affect.    VITAL SIGNS:  Vital Signs Last 24 Hrs  T(C): 36.9 (11 Apr 2025 05:53), Max: 37.2 (10 Apr 2025 17:13)  T(F): 98.4 (11 Apr 2025 05:53), Max: 98.9 (10 Apr 2025 17:13)  HR: 112 (11 Apr 2025 05:53) (83 - 112)  BP: 103/70 (11 Apr 2025 05:53) (99/63 - 123/70)  BP(mean): --  RR: 18 (11 Apr 2025 05:53) (17 - 18)  SpO2: 94% (11 Apr 2025 05:53) (93% - 97%)    Parameters below as of 10 Apr 2025 20:55  Patient On (Oxygen Delivery Method): room air      INPATIENT MEDICATIONS:   MEDICATIONS  (STANDING):  influenza   Vaccine 0.5 milliLiter(s) IntraMuscular once  pantoprazole    Tablet 40 milliGRAM(s) Oral every 24 hours  predniSONE   Tablet 10 milliGRAM(s) Oral every 24 hours  senna 2 Tablet(s) Oral at bedtime  trimethoprim  160 mG/sulfamethoxazole 800 mG 1 Tablet(s) Oral <User Schedule>    MEDICATIONS  (PRN):  acetaminophen     Tablet .. 650 milliGRAM(s) Oral every 6 hours PRN Temp greater or equal to 38C (100.4F), Mild Pain (1 - 3)  ALPRAZolam 0.25 milliGRAM(s) Oral at bedtime PRN for anxiety  morphine  - Injectable 3 milliGRAM(s) IV Push every 4 hours PRN Severe Pain (7 - 10)  ondansetron Injectable 4 milliGRAM(s) IV Push every 6 hours PRN Nausea and/or Vomiting  oxyCODONE    IR 5 milliGRAM(s) Oral every 4 hours PRN Moderate Pain (4 - 6)  polyethylene glycol 3350 17 Gram(s) Oral every 24 hours PRN Constipation    HOME MEDICATIONS  ALPRAZolam 0.25 mg oral tablet: 1 tab(s) orally once a day (at bedtime) as needed for  anxiety  Bactrim  mg-160 mg oral tablet: 1 tab(s) orally Monday, Wednesday, and Friday  Blisovi FE 1/20 oral tablet: 1 tab(s) orally once a day  Eliquis 5 mg oral tablet: 2 tab(s) orally 2 times a day for 7 days  predniSONE 5 mg oral tablet: 2 tab(s) orally once a day    ALLERGIES:  Allergies    No Known Allergies    Intolerances    LABS:                       13.2   19.47 )-----------( 322      ( 10 Apr 2025 12:25 )             39.2     04-10    129[L]  |  99  |  18  ----------------------------<  100[H]  4.8   |  20[L]  |  0.67    Ca    8.2[L]      10 Apr 2025 12:25    TPro  5.8[L]  /  Alb  2.3[L]  /  TBili  7.3[H]  /  DBili  x   /  AST  154[H]  /  ALT  95[H]  /  AlkPhos  412[H]  04-10    PT/INR - ( 10 Apr 2025 12:25 )   PT: 24.0 sec;   INR: 2.10          PTT - ( 10 Apr 2025 12:25 )  PTT:42.2 sec  Urinalysis Basic - ( 10 Apr 2025 12:28 )    Color: Dark Yellow / Appearance: Cloudy / SG: >1.030 / pH: x  Gluc: x / Ketone: 15 mg/dL  / Bili: Large / Urobili: 0.2 mg/dL   Blood: x / Protein: Trace mg/dL / Nitrite: Positive   Leuk Esterase: Small / RBC: 6 /HPF / WBC 2 /HPF   Sq Epi: x / Non Sq Epi: 4 /HPF / Bacteria: Occasional /HPF      CAPILLARY BLOOD GLUCOSE        RADIOLOGY & ADDITIONAL TESTS: Reviewed. Progress Note    INTERVAL EVENTS:   No acute events overnight.    SUBJECTIVE:   Patient still having sensation of incomplete voiding and abdominal discomfort due to distension. Eating well and denies HA, chest pain, SOB, dysuria    ROS:  Negative unless otherwise stated above.    PHYSICAL EXAM:  General: Alert and oriented x 3.   HEENT: Moist mucous membranes.   Cardiovascular: Regular rate and rhythm.  Lungs: RLL diminished breath sounds, clear otherwise. No accessory muscle use.  Abdomen: Soft, epigastric tenderness, tense and distended.   Extremities: 1+ edema bilaterally Non-tender. Warm.  Skin: No rashes or lesions.   Neurologic: No apparent focal neurological deficits. CN II-XII grossly intact, but not individually tested.  Psychiatric: Cooperative. Appropriate mood and affect.    VITAL SIGNS:  Vital Signs Last 24 Hrs  T(C): 36.9 (11 Apr 2025 05:53), Max: 37.2 (10 Apr 2025 17:13)  T(F): 98.4 (11 Apr 2025 05:53), Max: 98.9 (10 Apr 2025 17:13)  HR: 112 (11 Apr 2025 05:53) (83 - 112)  BP: 103/70 (11 Apr 2025 05:53) (99/63 - 123/70)  BP(mean): --  RR: 18 (11 Apr 2025 05:53) (17 - 18)  SpO2: 94% (11 Apr 2025 05:53) (93% - 97%)    Parameters below as of 10 Apr 2025 20:55  Patient On (Oxygen Delivery Method): room air      INPATIENT MEDICATIONS:   MEDICATIONS  (STANDING):  influenza   Vaccine 0.5 milliLiter(s) IntraMuscular once  pantoprazole    Tablet 40 milliGRAM(s) Oral every 24 hours  predniSONE   Tablet 10 milliGRAM(s) Oral every 24 hours  senna 2 Tablet(s) Oral at bedtime  trimethoprim  160 mG/sulfamethoxazole 800 mG 1 Tablet(s) Oral <User Schedule>    MEDICATIONS  (PRN):  acetaminophen     Tablet .. 650 milliGRAM(s) Oral every 6 hours PRN Temp greater or equal to 38C (100.4F), Mild Pain (1 - 3)  ALPRAZolam 0.25 milliGRAM(s) Oral at bedtime PRN for anxiety  morphine  - Injectable 3 milliGRAM(s) IV Push every 4 hours PRN Severe Pain (7 - 10)  ondansetron Injectable 4 milliGRAM(s) IV Push every 6 hours PRN Nausea and/or Vomiting  oxyCODONE    IR 5 milliGRAM(s) Oral every 4 hours PRN Moderate Pain (4 - 6)  polyethylene glycol 3350 17 Gram(s) Oral every 24 hours PRN Constipation    HOME MEDICATIONS  ALPRAZolam 0.25 mg oral tablet: 1 tab(s) orally once a day (at bedtime) as needed for  anxiety  Bactrim  mg-160 mg oral tablet: 1 tab(s) orally Monday, Wednesday, and Friday  Blisovi FE 1/20 oral tablet: 1 tab(s) orally once a day  Eliquis 5 mg oral tablet: 2 tab(s) orally 2 times a day for 7 days  predniSONE 5 mg oral tablet: 2 tab(s) orally once a day    ALLERGIES:  Allergies    No Known Allergies    Intolerances    LABS:                       13.2   19.47 )-----------( 322      ( 10 Apr 2025 12:25 )             39.2     04-10    129[L]  |  99  |  18  ----------------------------<  100[H]  4.8   |  20[L]  |  0.67    Ca    8.2[L]      10 Apr 2025 12:25    TPro  5.8[L]  /  Alb  2.3[L]  /  TBili  7.3[H]  /  DBili  x   /  AST  154[H]  /  ALT  95[H]  /  AlkPhos  412[H]  04-10    PT/INR - ( 10 Apr 2025 12:25 )   PT: 24.0 sec;   INR: 2.10          PTT - ( 10 Apr 2025 12:25 )  PTT:42.2 sec  Urinalysis Basic - ( 10 Apr 2025 12:28 )    Color: Dark Yellow / Appearance: Cloudy / SG: >1.030 / pH: x  Gluc: x / Ketone: 15 mg/dL  / Bili: Large / Urobili: 0.2 mg/dL   Blood: x / Protein: Trace mg/dL / Nitrite: Positive   Leuk Esterase: Small / RBC: 6 /HPF / WBC 2 /HPF   Sq Epi: x / Non Sq Epi: 4 /HPF / Bacteria: Occasional /HPF      CAPILLARY BLOOD GLUCOSE        RADIOLOGY & ADDITIONAL TESTS: Reviewed.

## 2025-04-11 NOTE — BH CONSULTATION LIAISON ASSESSMENT NOTE - NSBHCHARTREVIEWVS_PSY_A_CORE FT
Vital Signs Last 24 Hrs  T(C): 36.8 (11 Apr 2025 10:56), Max: 37.2 (10 Apr 2025 17:13)  T(F): 98.3 (11 Apr 2025 10:56), Max: 98.9 (10 Apr 2025 17:13)  HR: 104 (11 Apr 2025 10:56) (91 - 122)  BP: 115/78 (11 Apr 2025 10:56) (103/70 - 127/82)  BP(mean): 90 (11 Apr 2025 10:56) (90 - 90)  RR: 18 (11 Apr 2025 10:56) (17 - 18)  SpO2: 91% (11 Apr 2025 10:56) (91% - 97%)    Parameters below as of 11 Apr 2025 10:56  Patient On (Oxygen Delivery Method): room air

## 2025-04-11 NOTE — CONSULT NOTE ADULT - ASSESSMENT
Wbc 19.4k, INR 2.1, PTT 42.2        Small right PLEF  Small ascites  Bladder distended on U/S 44-year-old-female 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 CT abdomen showed POD and increased right PLEF compared to 3/5 CT chest. Pulm consulted for potential thoracentesis to alleviate shortness of breath.  She last took Eliquis (for portal vein thrombosis) on the morning of 4/10. Based on our imaging, her right sided pleural effusion first appeared trace/small on the 3/5 CTA chest. She has never had a thoracentesis before. She mentioned that she believes the shortness of breath is due to her abdominal process.    #Metastatic uveal melanoma, progression of disease (inc mets to bone, liver with portal vein thrombosis sequela)  #Small right pleural effusion  #Atelectasis      POCUS:  A-lines bilaterally. Lung sliding present.  Small simple appearing right PLEF; normal diaphragm function.  No PLEF on the left.    Small-moderate volume ascites (biggest pocket located above the bladder)  Bladder distended on U/S      Plan:  -Small pleural effusion with normal diaphragmatic function on POCUS; unlikely etiology of her shortness of breath and risks outweigh benefit in performing thoracentesis  -OOBTC and IS with adequate pain control to prevent splinting and atelectasis  -Consider bladder catheterization for distended bladder    Final recs pending discussion with attending 44-year-old-female 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 CT abdomen showed POD and increased right PLEF compared to 3/5 CT chest. Pulm consulted for potential thoracentesis to alleviate shortness of breath.  She last took Eliquis (for portal vein thrombosis) on the morning of 4/10. Based on our imaging, her right sided pleural effusion first appeared trace/small on the 3/5 CTA chest. She has never had a thoracentesis before. She mentioned that she believes the shortness of breath is due to her abdominal process.  Re-evaluated after paracentesis and she states that she feels like her shortness of breath has improved.    #Metastatic uveal melanoma, progression of disease (inc mets to bone, liver with portal vein thrombosis sequela)  #Small right pleural effusion  #Atelectasis      POCUS:  A-lines bilaterally. Lung sliding present.  Small simple appearing right PLEF; normal diaphragm function.  No PLEF on the left.    Small-moderate volume ascites (biggest pocket located above the bladder)  Bladder distended on U/S      Plan:  -Small pleural effusion with normal diaphragmatic function on POCUS; unlikely etiology of her shortness of breath and risks outweigh benefit in performing thoracentesis. Suspect that etiology may be translocation of ascites through the diaphragmatic pores. So will hold off on thoracentesis for now. If pleural effusion expands or patient develops worsening dyspnea, please do not hesitate to contact us.  -OOBTC and IS with adequate pain control to prevent splinting and atelectasis      Patient discussed with pulmonary attending, Dr. Banerjee.  Please feel free to contact me if questions or concerns arise.  Thank you for your consultation.    Kamron Desai MD  Pulmonary Critical Care Fellow, PGY-6  [phone] 44-year-old-female 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 CT abdomen showed POD and increased right PLEF compared to 3/5 CT chest. Pulm consulted for potential thoracentesis to alleviate shortness of breath.  She last took Eliquis (for portal vein thrombosis) on the morning of 4/10. Based on our imaging, her right sided pleural effusion first appeared trace/small on the 3/5 CTA chest. She has never had a thoracentesis before. She mentioned that she believes the shortness of breath is due to her abdominal process.  Re-evaluated after paracentesis and she states that she feels like her shortness of breath has improved.    #Metastatic uveal melanoma, progression of disease (inc mets to bone, liver with portal vein thrombosis sequela)  #Small right pleural effusion  #Atelectasis      POCUS:  A-lines bilaterally. Lung sliding present.  Small simple appearing right PLEF; normal diaphragm function.  No PLEF on the left.    Small-moderate volume ascites (biggest pocket located above the bladder)  Bladder distended on U/S      Plan:  -Small pleural effusion with normal diaphragmatic function on POCUS; unlikely etiology of her shortness of breath and risks outweigh benefit in performing thoracentesis. Suspect that etiology may be translocation of ascites through the diaphragmatic pores. So will hold off on thoracentesis for now. If pleural effusion expands or patient develops worsening dyspnea, please do not hesitate to contact us.  -OOBTC and IS with adequate pain control to prevent splinting and atelectasis      Patient discussed with pulmonary attending, Dr. Banerjee.  Pulmonary will sign off.  Please feel free to contact me if questions or concerns arise.  Thank you for your consultation.    Kamron Desai MD  Pulmonary Critical Care Fellow, PGY-6  [phone]

## 2025-04-11 NOTE — CONSULT NOTE ADULT - SUBJECTIVE AND OBJECTIVE BOX
PULMONARY SERVICE INITIAL CONSULT NOTE    HPI:  44-year-old-female PMH of metastatic melanoma on immunotherapy with liver involvement presents who presented with the chief complaint of pain to right flank, upper abdomen for the past few days.      She is s/p paracentesis on 4/9 where 1L of fluid was drained. Now presents to [facility] with increased abdominal pain despite the fluid removal.  After the metastasis, she went through localized chemoembolization which seemed to have resulted in a shrunk tumor but hardening of the region under the ribs. Post chemoembolization, her bilirubin levels started to rise and she faced an exclusion from a clinical trial. She noted her pain and distension in the abdomen gradually building up and had a recent diagnosis of partial portal vein thrombosis last Thursday and started Eliquis Monday. She notes in the past few days more fatigue, dry cough, notes subjective incomplete voiding, JONES, and runny nose. Denies recent travel, loss of appetite, sick contacts, HA, chest pain, diarrhea, dysuria, vaginal discharge.       Vitals: 97.8 F HR 98 /83 RR 18 93% RA  CT A/P w/ con: Acute thrombus in main portal vein and both portal vein branches.   Increased moderate-large ascites, secondary to portal hypertension with signs of generalized fluid overload.  Increased hepatic metastases. Notably, superior intrahepatic IVC and right hepatic vein narrowed due to mass effect from metastases.  Oncologist:   Dr. Layla Acharya & Dr. Ruby      REVIEW OF SYSTEMS:  A 12 point ROS was negative other than noted in HPI above.    PAST MEDICAL & SURGICAL HISTORY:  Metastatic melanoma      S/P cataract surgery          FAMILY HISTORY:      SOCIAL HISTORY:  Smoking Status: [ ] Current, [ ] Former, [ ] Never  Pack Years:    MEDICATIONS:  Pulmonary:    Antimicrobials:  trimethoprim  160 mG/sulfamethoxazole 800 mG 1 Tablet(s) Oral <User Schedule>    Anticoagulants:    Onc:    GI/:  pantoprazole    Tablet 40 milliGRAM(s) Oral every 24 hours  polyethylene glycol 3350 17 Gram(s) Oral every 24 hours PRN  senna 2 Tablet(s) Oral at bedtime    Endocrine:  predniSONE   Tablet 10 milliGRAM(s) Oral every 24 hours  predniSONE   Tablet 5 milliGRAM(s) Oral every 24 hours    Cardiac:    Other Medications:  acetaminophen     Tablet .. 650 milliGRAM(s) Oral every 6 hours PRN  ALPRAZolam 0.25 milliGRAM(s) Oral at bedtime PRN  influenza   Vaccine 0.5 milliLiter(s) IntraMuscular once  morphine  - Injectable 3 milliGRAM(s) IV Push every 4 hours PRN  ondansetron Injectable 4 milliGRAM(s) IV Push every 6 hours PRN  oxyCODONE    IR 5 milliGRAM(s) Oral every 4 hours PRN      Allergies    No Known Allergies    Intolerances        Vital Signs Last 24 Hrs  T(C): 36.8 (11 Apr 2025 07:28), Max: 37.2 (10 Apr 2025 17:13)  T(F): 98.2 (11 Apr 2025 07:28), Max: 98.9 (10 Apr 2025 17:13)  HR: 122 (11 Apr 2025 07:28) (83 - 122)  BP: 127/82 (11 Apr 2025 07:28) (99/63 - 127/82)  BP(mean): --  RR: 18 (11 Apr 2025 07:28) (17 - 18)  SpO2: 94% (11 Apr 2025 07:28) (93% - 97%)    Parameters below as of 11 Apr 2025 07:28  Patient On (Oxygen Delivery Method): room air        04-10 @ 07:01  -  04-11 @ 07:00  --------------------------------------------------------  IN: 280 mL / OUT: 515 mL / NET: -235 mL          PHYSICAL EXAM:  Constitutional: well-appearing  Head: NC/AT  EENT: PERRL, anicteric sclera; oropharynx clear, MMM  Neck: supple, no appreciable JVD  Respiratory: CTA B/L; no W/R/R  Cardiovascular: +S1/S2, RRR  Gastrointestinal: soft, NT/ND  Extremities: WWP; no edema, clubbing or cyanosis  Vascular: 2+ radial pulses B/L  Neurological: awake and alert; ROSAS    LABS:      CBC Full  -  ( 11 Apr 2025 08:09 )  WBC Count : 20.57 K/uL  RBC Count : 4.12 M/uL  Hemoglobin : 13.1 g/dL  Hematocrit : 38.5 %  Platelet Count - Automated : 332 K/uL  Mean Cell Volume : 93.4 fl  Mean Cell Hemoglobin : 31.8 pg  Mean Cell Hemoglobin Concentration : 34.0 g/dL  Auto Neutrophil # : x  Auto Lymphocyte # : x  Auto Monocyte # : x  Auto Eosinophil # : x  Auto Basophil # : x  Auto Neutrophil % : x  Auto Lymphocyte % : x  Auto Monocyte % : x  Auto Eosinophil % : x  Auto Basophil % : x    04-11    126[L]  |  96  |  15  ----------------------------<  55[L]  5.0   |  18[L]  |  0.69    Ca    8.4      11 Apr 2025 08:09  Mg     1.9     04-11    TPro  5.9[L]  /  Alb  2.3[L]  /  TBili  7.6[H]  /  DBili  x   /  AST  163[H]  /  ALT  90[H]  /  AlkPhos  391[H]  04-11    PT/INR - ( 11 Apr 2025 08:09 )   PT: 16.7 sec;   INR: 1.46          PTT - ( 11 Apr 2025 08:09 )  PTT:39.7 sec      Urinalysis Basic - ( 11 Apr 2025 08:09 )    Color: x / Appearance: x / SG: x / pH: x  Gluc: 55 mg/dL / Ketone: x  / Bili: x / Urobili: x   Blood: x / Protein: x / Nitrite: x   Leuk Esterase: x / RBC: x / WBC x   Sq Epi: x / Non Sq Epi: x / Bacteria: x                RADIOLOGY & ADDITIONAL STUDIES: PULMONARY SERVICE INITIAL CONSULT NOTE    HPI:  44-year-old-female 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 Prior hospitalization in 2024 presenting at that time with fever/chills, weakness, abd pain and diarrhea admitted for severe sepsis 2/2 EPEC.  She is s/p paracentesis on 4/9 where 1L of fluid was drained and is lymphocytic predominant concerning for malignant ascites. Now presents to [facility] with increased abdominal epigastric pain despite the fluid removal.  She noted her pain and distension in the abdomen gradually building up and had a recent diagnosis of partial portal vein thrombosis last Thursday and started Eliquis Monday. She notes in the past few days more fatigue and incomplete voiding. Denies fever, chills, chest pain, or weight loss.  In the ED her vitals were wnl and satting 93% on RA.  CT A/P w/ con: Acute thrombus in main portal vein and both portal vein branches.  Increased moderate-large ascites, secondary to portal hypertension with signs of generalized fluid overload.  Increased hepatic metastases. Notably, superior intrahepatic IVC and right hepatic vein narrowed due to mass effect from metastases.  Oncologist:   Dr. Layla Acharya & Dr. Ruby  Pulmonary consulted for right sided pleural effusion noted on CT abdomen. She last took Eliquis (for portal vein thrombosis) on the morning of 4/10. Never smoker. Based on our imaging, her right sided pleural effusion first appeared trace/small on the 3/5 CTA chest. She has never had a thoracentesis before. She mentioned that she believes the shortness of breath is due to her abdominal process.    REVIEW OF SYSTEMS:  A 12 point ROS was negative other than noted in HPI above.    PAST MEDICAL & SURGICAL HISTORY:  Metastatic melanoma      S/P cataract surgery          FAMILY HISTORY:      SOCIAL HISTORY:  Smoking Status: [ ] Current, [ ] Former, [x ] Never  Pack Years:    MEDICATIONS:  Pulmonary:    Antimicrobials:  trimethoprim  160 mG/sulfamethoxazole 800 mG 1 Tablet(s) Oral <User Schedule>    Anticoagulants:    Onc:    GI/:  pantoprazole    Tablet 40 milliGRAM(s) Oral every 24 hours  polyethylene glycol 3350 17 Gram(s) Oral every 24 hours PRN  senna 2 Tablet(s) Oral at bedtime    Endocrine:  predniSONE   Tablet 10 milliGRAM(s) Oral every 24 hours  predniSONE   Tablet 5 milliGRAM(s) Oral every 24 hours    Cardiac:    Other Medications:  acetaminophen     Tablet .. 650 milliGRAM(s) Oral every 6 hours PRN  ALPRAZolam 0.25 milliGRAM(s) Oral at bedtime PRN  influenza   Vaccine 0.5 milliLiter(s) IntraMuscular once  morphine  - Injectable 3 milliGRAM(s) IV Push every 4 hours PRN  ondansetron Injectable 4 milliGRAM(s) IV Push every 6 hours PRN  oxyCODONE    IR 5 milliGRAM(s) Oral every 4 hours PRN      Allergies    No Known Allergies    Intolerances        Vital Signs Last 24 Hrs  T(C): 36.8 (11 Apr 2025 07:28), Max: 37.2 (10 Apr 2025 17:13)  T(F): 98.2 (11 Apr 2025 07:28), Max: 98.9 (10 Apr 2025 17:13)  HR: 122 (11 Apr 2025 07:28) (83 - 122)  BP: 127/82 (11 Apr 2025 07:28) (99/63 - 127/82)  BP(mean): --  RR: 18 (11 Apr 2025 07:28) (17 - 18)  SpO2: 94% (11 Apr 2025 07:28) (93% - 97%)    Parameters below as of 11 Apr 2025 07:28  Patient On (Oxygen Delivery Method): room air        04-10 @ 07:01  -  04-11 @ 07:00  --------------------------------------------------------  IN: 280 mL / OUT: 515 mL / NET: -235 mL          PHYSICAL EXAM:  Constitutional: well-appearing  Head: NC/AT  EENT: PERRL, anicteric sclera; oropharynx clear, MMM  Neck: supple, no appreciable JVD  Respiratory: CTA B/L; no W/R/R  Cardiovascular: +S1/S2, RRR  Gastrointestinal: soft, TTP in the epigastric region; hypotympanic on left abdominal quadrants  Extremities: WWP; no edema, clubbing or cyanosis  Vascular: 2+ radial pulses B/L  Neurological: awake and alert; ROSAS    LABS:      CBC Full  -  ( 11 Apr 2025 08:09 )  WBC Count : 20.57 K/uL  RBC Count : 4.12 M/uL  Hemoglobin : 13.1 g/dL  Hematocrit : 38.5 %  Platelet Count - Automated : 332 K/uL  Mean Cell Volume : 93.4 fl  Mean Cell Hemoglobin : 31.8 pg  Mean Cell Hemoglobin Concentration : 34.0 g/dL  Auto Neutrophil # : x  Auto Lymphocyte # : x  Auto Monocyte # : x  Auto Eosinophil # : x  Auto Basophil # : x  Auto Neutrophil % : x  Auto Lymphocyte % : x  Auto Monocyte % : x  Auto Eosinophil % : x  Auto Basophil % : x    04-11    126[L]  |  96  |  15  ----------------------------<  55[L]  5.0   |  18[L]  |  0.69    Ca    8.4      11 Apr 2025 08:09  Mg     1.9     04-11    TPro  5.9[L]  /  Alb  2.3[L]  /  TBili  7.6[H]  /  DBili  x   /  AST  163[H]  /  ALT  90[H]  /  AlkPhos  391[H]  04-11    PT/INR - ( 11 Apr 2025 08:09 )   PT: 16.7 sec;   INR: 1.46          PTT - ( 11 Apr 2025 08:09 )  PTT:39.7 sec      Urinalysis Basic - ( 11 Apr 2025 08:09 )    Color: x / Appearance: x / SG: x / pH: x  Gluc: 55 mg/dL / Ketone: x  / Bili: x / Urobili: x   Blood: x / Protein: x / Nitrite: x   Leuk Esterase: x / RBC: x / WBC x   Sq Epi: x / Non Sq Epi: x / Bacteria: x                RADIOLOGY & ADDITIONAL STUDIES:

## 2025-04-11 NOTE — CONSULT NOTE ADULT - ASSESSMENT
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 She is s/p paracentesis Wednesday where 1L of fluid was drained. S/p localized chemoembolization which seemed to have resulted in a shrunk tumor but hardening of the region under the ribs. Post chemoembolization, her bilirubin levels started to rise and she faced an exclusion from a clinical trial at [facility], started on prednisone to lower bili.  She noted her pain and distension in the abdomen gradually building up and had a recent diagnosis of partial portal vein thrombosis last Thursday and started Eliquis Monday. She notes in the past few days more fatigue, dry cough, notes subjective incomplete voiding, JONES, and runny nose. IR consulted for repeat paracentesis i/s/o of reaccumulation 2/2 portal vein thrombus. Case reviewed with Dr. Farmer, plan for procedure with local anesthesia.     Communicated with: primary team

## 2025-04-11 NOTE — CONSULT NOTE ADULT - ASSESSMENT
·	Oxy IR 5mg PO q4h PRN for Moderate Pain  ·	Dilaudid 0.5mg IV q3h PRN for Severe Pain  ·	will evaluate opiate requirement after therapeutic para to determine if long-acting opiate is warranted  ·	adjusted bowel regimen: Senna 2 tabs qhs, Dulcolax 5mg PO qhs, Miralax daily PRN    Will return for further psychosocial assessment to determine additional referrals that would be helpful for coping  Further exploration of GOC pending clinical course and clarification of possible Onc treatment options 43yo F with PMH of Metastatic Uveal Melanoma p/w abdominal pain and found to have significant ascites. Palliative consulted for complex symptom management in the setting of metastatic malignancy.    ·	Oxy IR 5mg PO q4h PRN for Moderate Pain  ·	Dilaudid 0.5mg IV q3h PRN for Severe Pain  ·	will evaluate opiate requirement after therapeutic para to determine if long-acting opiate is warranted  ·	adjusted bowel regimen: Senna 2 tabs qhs, Dulcolax 5mg PO qhs, Miralax daily PRN    Will return for further psychosocial assessment to determine additional referrals that would be helpful for coping  Further exploration of GOC pending clinical course, patient too overwhelmed and symptomatic at the moment

## 2025-04-11 NOTE — CONSULT NOTE ADULT - SUBJECTIVE AND OBJECTIVE BOX
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 She is s/p paracentesis yesterday where 1L of fluid was drained. Now presents to [facility] with increased abdominal pain despite the fluid removal.  After the metastasis, she went through localized chemoembolization which seemed to have resulted in a shrunk tumor but hardening of the region under the ribs. Post chemoembolization, her bilirubin levels started to rise and she faced an exclusion from a clinical trial. She noted her pain and distension in the abdomen gradually building up and had a recent diagnosis of partial portal vein thrombosis last Thursday and started Eliquis Monday. She notes in the past few days more fatigue, dry cough, notes subjective incomplete voiding, JONES, and runny nose. Denies recent travel, loss of appetite, sick contacts, HA, chest pain, diarrhea, dysuria, vaginal discharge.     Patient seen and examined at bedside. Appears occasionally uncomfortable, especially with movement. Overwhelmed and tearful at times. Comprehensive symptom assessment and GOC exploration as noted below. Further collateral obtained from primary team, chart.    PERTINENT PM/SXH:   Metastatic melanoma  S/P cataract surgery    FAMILY HISTORY:  No history of  in first degree relatives    ITEMS NOT CHECKED ARE NOT PRESENT  SOCIAL HISTORY:   Significant other/partner:  []  Children:  []  Substance hx:  []   Tobacco hx:  []   Alcohol hx: []   Home Opioid hx:  [] I-Stop Reference No:  - no active Rx's / see chart note  Living Situation: []Home  []Long term care  []Rehab []Other  Adventism/Spiritual practice: ; Role of organized Bahai [] important [] some [] unable to assess    ADVANCE DIRECTIVES:    []MOLST  []Living Will  DECISION MAKER(s):  [] Health Care Proxy(s)  [] Surrogate(s)  [] Guardian           Name(s)/Phone Number(s):     BASELINE (I)ADLs (prior to admission):  [place]: []Total  [] Moderate []Dependent    ALLERGIES:  No Known Allergies    MEDICATIONS  (STANDING):  bisacodyl 5 milliGRAM(s) Oral at bedtime  cefTRIAXone   IVPB 1000 milliGRAM(s) IV Intermittent every 24 hours  influenza   Vaccine 0.5 milliLiter(s) IntraMuscular once  pantoprazole    Tablet 40 milliGRAM(s) Oral every 24 hours  predniSONE   Tablet 10 milliGRAM(s) Oral every 24 hours  predniSONE   Tablet 5 milliGRAM(s) Oral every 24 hours  senna 2 Tablet(s) Oral at bedtime  trimethoprim  160 mG/sulfamethoxazole 800 mG 1 Tablet(s) Oral <User Schedule>    MEDICATIONS  (PRN):  acetaminophen     Tablet .. 650 milliGRAM(s) Oral every 6 hours PRN Temp greater or equal to 38C (100.4F), Mild Pain (1 - 3)  ALPRAZolam 0.25 milliGRAM(s) Oral at bedtime PRN for anxiety  HYDROmorphone   Tablet 2 milliGRAM(s) Oral every 4 hours PRN Moderate Pain (4 - 6)  HYDROmorphone  Injectable 0.5 milliGRAM(s) IV Push every 3 hours PRN Severe Pain (7 - 10)  ondansetron Injectable 4 milliGRAM(s) IV Push every 6 hours PRN Nausea and/or Vomiting  polyethylene glycol 3350 17 Gram(s) Oral every 24 hours PRN Constipation    Analgesic Use (Scheduled and PRNs) for past 24 hours:  morphine  - Injectable   2 milliGRAM(s) IV Push (04-10-25 @ 16:13)  morphine  - Injectable   3 milliGRAM(s) IV Push (04-11-25 @ 10:10)   3 milliGRAM(s) IV Push (04-11-25 @ 01:44)  ondansetron Injectable   4 milliGRAM(s) IV Push (04-10-25 @ 16:13)  ondansetron Injectable   4 milliGRAM(s) IV Push (04-11-25 @ 01:47)  oxyCODONE    IR   5 milliGRAM(s) Oral (04-10-25 @ 17:55)    PRESENT SYMPTOMS: []Unable to obtain due to poor mentation/encephalopathy  Source if other than patient:  []Family   []Team     Pain: [] yes [] no  QOL impact -   Location -                    Aggravating Factors -  Quality -  Radiation -  Timing -  Severity (0-10 scale) -   Minimal Acceptable Level (0-10 scale) -    Other Symptoms: See ESAS Section Below  [x]All other review of systems negative     GENERAL:  [] NAD []Alert []Lethargic  []Cachexia  []Unarousable  []Verbal  []Non-Verbal  BEHAVIORAL:   []Anxiety  []Delirium []Agitation []Cooperative []Oriented x  HEENT:  []Normal  [] Moist Mucous Membranes []Dry mouth   []ET Tube/Trach  []Oral lesions  PULMONARY:   []Clear []Tachypnea  []Audible excessive secretions  []Normal Work of Breathing []Labored Breathing  []Rhonchi []Crackles []Wheezing  CARDIOVASCULAR:    []Regular Rate []Regular Rhythm []Irregular []Tachy  []Foreign  GASTROINTESTINAL:  []Soft  []Distended   []+BS  []Non tender []Tender  []PEG []OGT/ NGT  Last BM:  GENITOURINARY:  []Normal [] Incontinent   []Oliguria/Anuria   []Ramirez  MUSCULOSKELETAL:   []Normal Extremities  []Weakness  []Bed/Wheelchair bound []Edema  NEUROLOGIC:   []No focal deficits  []Cognitive impairment  []Dysphagia []Dysarthria []Paresis []Encephalopathic  SKIN:   []Normal   []Pressure ulcer(s)  []Rash    Vital Signs Last 24 Hrs  T(C): 36.8 (11 Apr 2025 10:56), Max: 37.2 (10 Apr 2025 17:13)  T(F): 98.3 (11 Apr 2025 10:56), Max: 98.9 (10 Apr 2025 17:13)  HR: 104 (11 Apr 2025 10:56) (83 - 122)  BP: 115/78 (11 Apr 2025 10:56) (99/63 - 127/82)  BP(mean): 90 (11 Apr 2025 10:56) (90 - 90)  RR: 18 (11 Apr 2025 10:56) (17 - 18)  SpO2: 91% (11 Apr 2025 10:56) (91% - 97%)    Parameters below as of 11 Apr 2025 10:56  Patient On (Oxygen Delivery Method): room air    LABS: Personally reviewed and interpreted                      13.1   20.57 )-----------( 332      ( 11 Apr 2025 08:09 )             38.5   04-11    126[L]  |  96  |  15  ----------------------------<  55[L]  5.0   |  18[L]  |  0.69    Ca    8.4      11 Apr 2025 08:09  Mg     1.9     04-11  TPro  5.9[L]  /  Alb  2.3[L]  /  TBili  7.6[H]  /  DBili  x   /  AST  163[H]  /  ALT  90[H]  /  AlkPhos  391[H]  04-11    RADIOLOGY & ADDITIONAL STUDIES: Personally reviewed and interpreted    DISCUSSION OF CASE: Family - to provide updates and emotional support; Primary Team/RN - to discuss plan of care [facility] Geriatrics and Palliative Medicine  Dre Chicas, Palliative Care Attending  Contact Info: Call [phone] (HEAL Line) or message on [employer] Teams (Dre Chicas)    HPI:  43 y/o f hx HLA  positive female now with an 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 She is s/p paracentesis yesterday where 1L of fluid was drained. Now presents to [facility] with increased abdominal pain despite the fluid removal.  After the metastasis, she went through localized chemoembolization which seemed to have resulted in a shrunk tumor but hardening of the region under the ribs. Post chemoembolization, her bilirubin levels started to rise and she faced an exclusion from a clinical trial. She noted her pain and distension in the abdomen gradually building up and had a recent diagnosis of partial portal vein thrombosis last Thursday and started Eliquis Monday. She notes in the past few days more fatigue, dry cough, notes subjective incomplete voiding, JONES, and runny nose. Denies recent travel, loss of appetite, sick contacts, HA, chest pain, diarrhea, dysuria, vaginal discharge.     Patient seen and examined at bedside. Appears occasionally uncomfortable, especially with movement. Overwhelmed and tearful at times. Comprehensive symptom assessment and GOC exploration as noted below. Further collateral obtained from primary team, chart.    PERTINENT PM/SXH:   Metastatic melanoma  S/P cataract surgery    FAMILY HISTORY:  No history of malignancy in first degree relatives    ITEMS NOT CHECKED ARE NOT PRESENT  SOCIAL HISTORY:   Significant other/partner:  [x]  Children:  []  Substance hx:  []   Tobacco hx:  []   Alcohol hx: []   Home Opioid hx:  [x] I-Stop Reference No: [id number]  - see chart note  Living Situation: [x]Home  []Long term care  []Rehab []Other  Moravian/Spiritual practice: ; Role of organized Hindu [] important [x] some [] unable to assess    ADVANCE DIRECTIVES:    []MOLST  []Living Will  DECISION MAKER(s):  [] Health Care Proxy(s)  [x] Surrogate(s)  [] Guardian           Name(s)/Phone Number(s): Significant Other    BASELINE (I)ADLs (prior to admission):  [place]: [x]Total  [] Moderate []Dependent    ALLERGIES:  No Known Allergies    MEDICATIONS  (STANDING):  bisacodyl 5 milliGRAM(s) Oral at bedtime  cefTRIAXone   IVPB 1000 milliGRAM(s) IV Intermittent every 24 hours  influenza   Vaccine 0.5 milliLiter(s) IntraMuscular once  pantoprazole    Tablet 40 milliGRAM(s) Oral every 24 hours  predniSONE   Tablet 10 milliGRAM(s) Oral every 24 hours  predniSONE   Tablet 5 milliGRAM(s) Oral every 24 hours  senna 2 Tablet(s) Oral at bedtime  trimethoprim  160 mG/sulfamethoxazole 800 mG 1 Tablet(s) Oral <User Schedule>    MEDICATIONS  (PRN):  acetaminophen     Tablet .. 650 milliGRAM(s) Oral every 6 hours PRN Temp greater or equal to 38C (100.4F), Mild Pain (1 - 3)  ALPRAZolam 0.25 milliGRAM(s) Oral at bedtime PRN for anxiety  HYDROmorphone   Tablet 2 milliGRAM(s) Oral every 4 hours PRN Moderate Pain (4 - 6)  HYDROmorphone  Injectable 0.5 milliGRAM(s) IV Push every 3 hours PRN Severe Pain (7 - 10)  ondansetron Injectable 4 milliGRAM(s) IV Push every 6 hours PRN Nausea and/or Vomiting  polyethylene glycol 3350 17 Gram(s) Oral every 24 hours PRN Constipation    Analgesic Use (Scheduled and PRNs) for past 24 hours:  morphine  - Injectable   2 milliGRAM(s) IV Push (04-10-25 @ 16:13)  morphine  - Injectable   3 milliGRAM(s) IV Push (04-11-25 @ 10:10)   3 milliGRAM(s) IV Push (04-11-25 @ 01:44)  ondansetron Injectable   4 milliGRAM(s) IV Push (04-10-25 @ 16:13)  ondansetron Injectable   4 milliGRAM(s) IV Push (04-11-25 @ 01:47)  oxyCODONE    IR   5 milliGRAM(s) Oral (04-10-25 @ 17:55)    PRESENT SYMPTOMS: []Unable to obtain due to poor mentation/encephalopathy  Source if other than patient:  []Family   []Team     Pain: [x] yes [] no  QOL impact - debilitating  Location - abdomen                  Aggravating Factors - abdominal distention  Quality - aching  Radiation - across lower abdomen  Timing - intermittent  Severity (0-10 scale) - 7  Minimal Acceptable Level (0-10 scale) - 4    Other Symptoms: See ESAS Section Below  [x]All other review of systems negative     GENERAL:  [x] NAD [x]Alert []Lethargic  []Cachexia  []Unarousable  [x]Verbal  []Non-Verbal  BEHAVIORAL:   []Anxiety  []Delirium []Agitation [x]Cooperative [x]Oriented x3  HEENT:  [x]Normal  [x] Moist Mucous Membranes []Dry mouth   []ET Tube/Trach  []Oral lesions  PULMONARY:   [x]Clear []Tachypnea  []Audible excessive secretions  [x]Normal Work of Breathing []Labored Breathing  []Rhonchi []Crackles []Wheezing  CARDIOVASCULAR:    [x]Regular Rate []Regular Rhythm []Irregular []Tachy  []Foreign  GASTROINTESTINAL:  [x]Soft  [x]Distended   [x]+BS  []Non tender [x]Tender  []PEG []OGT/ NGT  Last BM:  GENITOURINARY:  [x]Normal [] Incontinent   []Oliguria/Anuria   []Ramirez  MUSCULOSKELETAL:   [x]Normal Extremities  [x]Weakness  []Bed/Wheelchair bound []Edema  NEUROLOGIC:   [x]No focal deficits  []Cognitive impairment  []Dysphagia []Dysarthria []Paresis []Encephalopathic  SKIN:   []Normal   []Pressure ulcer(s)  [x]Jaundice    Vital Signs Last 24 Hrs  T(C): 36.8 (11 Apr 2025 10:56), Max: 37.2 (10 Apr 2025 17:13)  T(F): 98.3 (11 Apr 2025 10:56), Max: 98.9 (10 Apr 2025 17:13)  HR: 104 (11 Apr 2025 10:56) (83 - 122)  BP: 115/78 (11 Apr 2025 10:56) (99/63 - 127/82)  BP(mean): 90 (11 Apr 2025 10:56) (90 - 90)  RR: 18 (11 Apr 2025 10:56) (17 - 18)  SpO2: 91% (11 Apr 2025 10:56) (91% - 97%)    Parameters below as of 11 Apr 2025 10:56  Patient On (Oxygen Delivery Method): room air    LABS: Personally reviewed and interpreted                      13.1   20.57 )-----------( 332      ( 11 Apr 2025 08:09 )             38.5   04-11    126[L]  |  96  |  15  ----------------------------<  55[L]  5.0   |  18[L]  |  0.69    Ca    8.4      11 Apr 2025 08:09  Mg     1.9     04-11  TPro  5.9[L]  /  Alb  2.3[L]  /  TBili  7.6[H]  /  DBili  x   /  AST  163[H]  /  ALT  90[H]  /  AlkPhos  391[H]  04-11    RADIOLOGY & ADDITIONAL STUDIES: Personally reviewed and interpreted  < from: CT Abdomen and Pelvis w/ IV Cont (04.10.25 @ 14:02) >  *  Acute thrombus in main portal vein and both portal vein branches.   Increased moderate-large ascites, secondary to portal hypertension with   signs of generalized fluid overload.  *  Increased hepatic metastases. Notably, superior intrahepatic IVC and   right hepatic vein narrowed due to mass effect from metastases.  *  Increased upper retroperitoneal metastatic lymphadenopathy.  *  Increased moderate right pleural effusion, secondary partial   atelectasis right middle and lower lobes.  *  Unchanged bony metastases.  *  Unchanged visualized lung metastases.    DISCUSSION OF CASE: Family - to provide updates and emotional support; Primary Team/RN - to discuss plan of care

## 2025-04-11 NOTE — CONSULT NOTE ADULT - PROBLEM SELECTOR RECOMMENDATION 6
Complex medical decision making / symptom management in the setting of advanced malignancy.    Will continue to follow for ongoing GOC discussion / titration of palliative regimen. Emotional support provided, questions answered.  Active Psychosocial Referrals:  [x]Social Work/Case management []PT/OT []Chaplaincy []Hospice  []Childlife []Holistic RN [x]Massage Therapy []Music Therapy []Ethics  Coping: [] well [x] with difficulty [] poor coping [] unable to assess  Support system: [] strong [x] adequate [] inadequate    For new or uncontrolled symptoms, please call Palliative Care at [phone]). The service is available 24/7 (including nights & weekends) to provide symptom management recommendations over the phone as appropriate

## 2025-04-11 NOTE — BH CONSULTATION LIAISON ASSESSMENT NOTE - NSBHTIMEACTIVITIESPERFORMED_PSY_A_CORE
The time documented includes the review of chart, labs, tests and other pertinent documents, interview, collaterals, decision-making, psychoeducation, coordination of care etc. and excludes time spent on separately reportable services/teaching during the encounter.

## 2025-04-11 NOTE — PROGRESS NOTE ADULT - PROBLEM SELECTOR PLAN 8
Subjectively feels as though she has incomplete voiding though denies dysuria, vaginal discharge or urinary frequency. Bladder scan in ED showed >500 though likely from ascites as patient did not have the urge to urinate    -strict I/O to ensure pt voiding appropriately  -bladder scans q6, get pvr  -can consider a trial of straight cath if BS persistently elevated w/ sx Subjectively feels as though she has incomplete voiding though denies dysuria, vaginal discharge or urinary frequency. Bladder scan in ED showed >500 thought to be likely from ascites as patient did not have the urge to urinate. POCUS revealing distended bladder.  SC 1x 4/11    -strict I/O to ensure pt voiding appropriately  -bladder scans q6, get pvr  -SC for volume >300

## 2025-04-11 NOTE — PROGRESS NOTE ADULT - ATTENDING COMMENTS
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 Pending paracentesis 4/11 for ascites and thoracentesis 4/12 for new R pleural effusion.    Labs and imaging reviewed    Problem List  #Acute Cystitis  #Urinary Retention  #Stage IV Uveal Melanoma  #Pleural Effusion  #Portal Vein Thrombosis  #Ascites    Plan  -Start Ceftriaxone for UTI given retention and pain  -Ramirez Catheter for now to ensure infection treated  -Discussion with Onc outpatient regarding the immunotherapy patient is currently receiving   -Repeat Paracentesis today, will monitor for re-accumulation  -Oncopsych consult  -Palliative Consult     Rest as above

## 2025-04-11 NOTE — PROGRESS NOTE ADULT - PROBLEM SELECTOR PLAN 7
Noted she was expected to have labs compatible with hypothyroidism and to start synthroid but her labs did not reflect this so she never started. Denies palpitations, chest pain, rash, dysphagia, diarrhea.    -f/u tsh and free t4 Noted she was expected to have labs compatible with hypothyroidism and to start synthroid but her labs did not reflect this so she never started. Denies palpitations, chest pain, rash, dysphagia, diarrhea.  tfts wnl    -takes atenolol 12.5mg QD for tachycardia

## 2025-04-11 NOTE — BH CONSULTATION LIAISON ASSESSMENT NOTE - OTHER PAST PSYCHIATRIC HISTORY (INCLUDE DETAILS REGARDING ONSET, COURSE OF ILLNESS, INPATIENT/OUTPATIENT TREATMENT)
see hpi	  Previous engagement in psychotherapy in 2023  Prior trial of likely SSRI antidepressant for 6 months in 2020; discontinued due to feeling dulled emotions  No known prior hospitalizations or suicide attempts

## 2025-04-11 NOTE — BH CONSULTATION LIAISON ASSESSMENT NOTE - RISK ASSESSMENT
Chronic risk of harm is elevated due to prior hx of psychiatric tx as well as metastatic cancer diagnosis.  No acute risks identified - pt denies SI/HI/AVH; is future oriented, linear, euthymic.  Protective factors include fear of death, supportive family and partner.

## 2025-04-11 NOTE — PROGRESS NOTE ADULT - PROBLEM SELECTOR PLAN 5
Acute portal vein thrombosis seen on CT. Received one dose eliquis AM 4/10. Holding iso upcoming paracentesis and thoracentesis. Started eliquis 10mg BID on Monday.    -with her being in the middle of her load of eliquis, figure out if patient can continue eliquis to complete a total of 7 days or if she needs to restart the load

## 2025-04-11 NOTE — CONSULT NOTE ADULT - PROBLEM SELECTOR RECOMMENDATION 4
Metastatic disease, progressed through previous lines of treatment  -unclear if there are further treatment options available  -if no further DMT were offered/pursued, then patient would be eligible for home hospice

## 2025-04-11 NOTE — PROGRESS NOTE ADULT - PROBLEM SELECTOR PLAN 1
Patient presenting with leukocytosis meeting 2/4 SIRS criteria(HR 98 and wbc 19.4k). Wbc 4/9 20k though in 2/25 wnl. May be reactive iso malignancy vs infectious given abdominal pain. Given SOB, new pleural effusion, dry cough and fatigue can consider URTI.    -obtain rvp  -f/u paracentesis culture, gram stain, cell counts  -can monitor after abx Patient presenting with leukocytosis meeting 2/4 SIRS criteria(HR 98 and wbc 19.4k). Wbc 4/9 20k though in 2/25 wnl. May be reactive iso malignancy vs infectious given abdominal pain. SOB, new pleural effusion, dry cough and fatigue considered URTI though rvp negative    -f/u paracentesis culture, gram stain, cell counts  -can monitor after abx

## 2025-04-11 NOTE — CONSULT NOTE ADULT - PROBLEM SELECTOR RECOMMENDATION 9
-Oxy IR 5mg PO q4h PRN for Moderate Pain  -Dilaudid 0.5mg IV q3h PRN for Severe Pain  -will evaluate opiate requirement after therapeutic para to determine if long-acting opiate is warranted

## 2025-04-11 NOTE — PROGRESS NOTE ADULT - PROBLEM SELECTOR PLAN 2
Patient presenting with leukocytosis meeting 2/4 SIRS criteria. No colitis seen on CT. Pain likely from large ascites and portal vein thrombosis    -pain control:      -Morphine 3mg IV q4h PRN for Severe Pain      -Oxy IR 5mg PO q4h PRN for Moderate Pain      -Bowel Regimen: Senna 2tabs qhs + Miralax daily PRN  -can monitor off abx at this time  -receiving paracentesis 4/11

## 2025-04-11 NOTE — BH CONSULTATION LIAISON ASSESSMENT NOTE - HOMICIDALITY / AGGRESSION (CURRENT/PAST)
Detail Level: Zone
Warm compress and saline flush for the next week. Offered oral antibiotics if not improved. She declines today.
None known in lifetime

## 2025-04-11 NOTE — PROGRESS NOTE ADULT - PROBLEM SELECTOR PLAN 6
She achieved a treatment effect in the dominant mass treated with Y90, with some progression elsewhere. She subsequently received therapy with immunoembolization x 2 administered concurrently with tebentafusp. In the setting of progression, she received radioembolization followed by ipilimumab and nivolumab, with the later complicated by the development of colitis, hepatitis and thyroiditis. She is now s/p chemo embolization under the care of Dr. Layla Acharya.  Saw Dr. Dr. Ruby 4/8      -pain control:      -Morphine 3mg IV q4h PRN for Severe Pain      -Oxy IR 5mg PO q4h PRN for Moderate Pain      -Bowel Regimen: Senna 2tabs qhs + Miralax daily PRN  -c/w prednisone 10mg daily  -c/w bactrim DS MWF for PJP ppx  -c/w pantoprazole 40 mg daily  -c/w xanax 0.25 mg twice a day as needed prescribed, though she only takes at bedtime  -palliative recs She achieved a treatment effect in the dominant mass treated with Y90, with some progression elsewhere. She subsequently received therapy with immunoembolization x 2 administered concurrently with tebentafusp. In the setting of progression, she received radioembolization followed by ipilimumab and nivolumab, with the later complicated by the development of colitis, hepatitis and thyroiditis. She is now s/p chemo embolization under the care of Dr. Layla Acharya.  Saw Dr. Dr. Ruby 4/8      -pain control:      -Morphine 3mg IV q4h PRN for Severe Pain      -Oxy IR 5mg PO q4h PRN for Moderate Pain      -Bowel Regimen: Senna 2tabs qhs + Miralax daily PRN  -c/w prednisone 10mg in AM 5 mg in PM  -c/w bactrim DS MWF for PJP ppx  -c/w pantoprazole 40 mg daily  -c/w xanax 0.25 mg twice a day as needed prescribed, though she only takes at bedtime  -palliative recs

## 2025-04-11 NOTE — CONSULT NOTE ADULT - SUBJECTIVE AND OBJECTIVE BOX
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 She is s/p paracentesis Wednesday where 1L of fluid was drained. S/p localized chemoembolization which seemed to have resulted in a shrunk tumor but hardening of the region under the ribs. Post chemoembolization, her bilirubin levels started to rise and she faced an exclusion from a clinical trial at [facility], started on prednisone to lower bili.  She noted her pain and distension in the abdomen gradually building up and had a recent diagnosis of partial portal vein thrombosis last Thursday and started Eliquis Monday. She notes in the past few days more fatigue, dry cough, notes subjective incomplete voiding, JONES, and runny nose. IR consulted for repeat paracentesis i/s/o of reaccumulation 2/2 portal vein thrombus.     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    Personal history of other medical treatment-Z92.89    Sacrococcygeal disorders, not elsewhere classified-M53.3    Thyrotoxicosis, unspecified w/out thyrotoxic crisis or storm-E05.90    Unspecified lump in unspecified breast-N63.0    Handoff    MEWS Score    Metastatic melanoma    Melanoma metastatic to liver    Abdominal pain    Pleural effusion on right    Ascites    Portal vein thrombosis    Anterior uveal melanoma, unspecified laterality    Prophylactic measure    Severe systemic inflammatory response syndrome (SIRS)    Systemic inflammatory response syndrome (SIRS)    History of thyroiditis    Incomplete bladder emptying    S/P cataract surgery    ABDOMINAL PAIN    90+    [email or web address]        Meds:acetaminophen     Tablet .. 650 milliGRAM(s) Oral every 6 hours PRN  ALPRAZolam 0.25 milliGRAM(s) Oral at bedtime PRN  bisacodyl 5 milliGRAM(s) Oral at bedtime  cefTRIAXone   IVPB 1000 milliGRAM(s) IV Intermittent every 24 hours  HYDROmorphone   Tablet 2 milliGRAM(s) Oral every 4 hours PRN  HYDROmorphone  Injectable 0.5 milliGRAM(s) IV Push every 3 hours PRN  influenza   Vaccine 0.5 milliLiter(s) IntraMuscular once  ondansetron Injectable 4 milliGRAM(s) IV Push every 6 hours PRN  pantoprazole    Tablet 40 milliGRAM(s) Oral every 24 hours  polyethylene glycol 3350 17 Gram(s) Oral every 24 hours PRN  predniSONE   Tablet 10 milliGRAM(s) Oral every 24 hours  predniSONE   Tablet 5 milliGRAM(s) Oral every 24 hours  senna 2 Tablet(s) Oral at bedtime  trimethoprim  160 mG/sulfamethoxazole 800 mG 1 Tablet(s) Oral <User Schedule>      Allergies:No Known Allergies        Labs:                           13.1   20.57 )-----------( 332      ( 11 Apr 2025 08:09 )             38.5     PT/INR - ( 11 Apr 2025 08:09 )   PT: 16.7 sec;   INR: 1.46          PTT - ( 11 Apr 2025 08:09 )  PTT:39.7 sec  04-11    126[L]  |  96  |  15  ----------------------------<  55[L]  5.0   |  18[L]  |  0.69    Ca    8.4      11 Apr 2025 08:09  Mg     1.9     04-11    TPro  5.9[L]  /  Alb  2.3[L]  /  TBili  7.6[H]  /  DBili  x   /  AST  163[H]  /  ALT  90[H]  /  AlkPhos  391[H]  04-11          Imaging Findings: reviewed

## 2025-04-11 NOTE — BH CONSULTATION LIAISON ASSESSMENT NOTE - MSE UNSTRUCTURED FT
Appearance: well groomed, fair hygiene, NAD  Behavior: fair eye contact, no psychomotor abnormalities, well related  Mood: good/fair  Affect: euthymic, full, stable  Thought process: linear, well organized  Thought content: denies SI/HI; no delusional content  Perception: No AVH; not responding to internal stimuli  Cognition: A&O x4; grossly intact  Insight: fair  Judgment: fair

## 2025-04-11 NOTE — PROCEDURE NOTE - NSUS ED ADDITIONAL DETAIL1 FT
A-lines bilaterally. Lung sliding present.  Small simple appearing right PLEF; normal diaphragm function.  No PLEF on the left.

## 2025-04-11 NOTE — BH CONSULTATION LIAISON ASSESSMENT NOTE - DESCRIPTION
See HPI  Born in [place]; grew up with parents and brother.  Her parents still live in [place]; brother lives in [place].  She came to the [place] for college, obtained PhD in Criminal Justice.  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  Moved to [place] in 2023 to work for the [employer].  Has a boyfriend who currently lives in the [place].

## 2025-04-12 NOTE — CONSULT NOTE ADULT - ASSESSMENT
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     GI consulted for elevated bilirubin and to see if patient would be a candidate for possible stenting to reduce bilirubin levels so that she could enter a experimental trial.     Recommendations:   - will review patient's [facility] MRCP from March 2024 with advanced GI (Dr. Peoples) to see if there would be a role for possible stenting  - start ursodiol 300 mg PO TID and can give Atarax at night if itching worsens   - trend CMP, direct bili, and indirect bili daily     Case discussed with Dr. Cohen. GI Team will continue to follow.     Reva Orellana D.O.   Gastroenterology Fellow  Weekday 7am-5pm Pager: [phone]  Weeknights/Weekend/Holiday Coverage: Please call the  for contact info

## 2025-04-12 NOTE — PROGRESS NOTE ADULT - SUBJECTIVE AND OBJECTIVE BOX
[facility] Progress Note    INTERVAL EVENTS:   Underwent paracentesis yesterday with about 1 L drained    SUBJECTIVE:   Patient says she had good relief from paracentesis - abdomen is less hard/tense but still notes abdomen distended and some swelling in legs as well. Reports breathing as somewhat shallow. She feels this is related to abdominal distension. Requesting to be seen by GI to see if elevated bilirubin can be intervened upon in anyway     ROS:  Negative unless otherwise stated above.    PHYSICAL EXAM:  General: Alert and oriented x 3.   HEENT: Moist mucous membranes.   Cardiovascular: Regular rate and rhythm.  Lungs: RLL diminished breath sounds, clear otherwise.   Abdomen: Soft, mildly distended, non-tender to palpation   Extremities: 1+ edema bilaterally Non-tender. WWP  Skin: mild diffuse jaundice    Neurologic: No apparent focal neurological deficits  Psychiatric: Cooperative. Appropriate mood and affect. Remains hopeful     VITAL SIGNS:  Vital Signs Last 24 Hrs  T(C): 36.9 (11 Apr 2025 05:53), Max: 37.2 (10 Apr 2025 17:13)  T(F): 98.4 (11 Apr 2025 05:53), Max: 98.9 (10 Apr 2025 17:13)  HR: 112 (11 Apr 2025 05:53) (83 - 112)  BP: 103/70 (11 Apr 2025 05:53) (99/63 - 123/70)  BP(mean): --  RR: 18 (11 Apr 2025 05:53) (17 - 18)  SpO2: 94% (11 Apr 2025 05:53) (93% - 97%)    Parameters below as of 10 Apr 2025 20:55  Patient On (Oxygen Delivery Method): room air      INPATIENT MEDICATIONS:   MEDICATIONS  (STANDING):  influenza   Vaccine 0.5 milliLiter(s) IntraMuscular once  pantoprazole    Tablet 40 milliGRAM(s) Oral every 24 hours  predniSONE   Tablet 10 milliGRAM(s) Oral every 24 hours  senna 2 Tablet(s) Oral at bedtime  trimethoprim  160 mG/sulfamethoxazole 800 mG 1 Tablet(s) Oral <User Schedule>    MEDICATIONS  (PRN):  acetaminophen     Tablet .. 650 milliGRAM(s) Oral every 6 hours PRN Temp greater or equal to 38C (100.4F), Mild Pain (1 - 3)  ALPRAZolam 0.25 milliGRAM(s) Oral at bedtime PRN for anxiety  morphine  - Injectable 3 milliGRAM(s) IV Push every 4 hours PRN Severe Pain (7 - 10)  ondansetron Injectable 4 milliGRAM(s) IV Push every 6 hours PRN Nausea and/or Vomiting  oxyCODONE    IR 5 milliGRAM(s) Oral every 4 hours PRN Moderate Pain (4 - 6)  polyethylene glycol 3350 17 Gram(s) Oral every 24 hours PRN Constipation    HOME MEDICATIONS  ALPRAZolam 0.25 mg oral tablet: 1 tab(s) orally once a day (at bedtime) as needed for  anxiety  Bactrim  mg-160 mg oral tablet: 1 tab(s) orally Monday, Wednesday, and Friday  Blisovi FE 1/20 oral tablet: 1 tab(s) orally once a day  Eliquis 5 mg oral tablet: 2 tab(s) orally 2 times a day for 7 days  predniSONE 5 mg oral tablet: 2 tab(s) orally once a day    ALLERGIES:  Allergies    No Known Allergies    Intolerances    LABS:                       13.2   19.47 )-----------( 322      ( 10 Apr 2025 12:25 )             39.2     04-10    129[L]  |  99  |  18  ----------------------------<  100[H]  4.8   |  20[L]  |  0.67    Ca    8.2[L]      10 Apr 2025 12:25    TPro  5.8[L]  /  Alb  2.3[L]  /  TBili  7.3[H]  /  DBili  x   /  AST  154[H]  /  ALT  95[H]  /  AlkPhos  412[H]  04-10    PT/INR - ( 10 Apr 2025 12:25 )   PT: 24.0 sec;   INR: 2.10          PTT - ( 10 Apr 2025 12:25 )  PTT:42.2 sec  Urinalysis Basic - ( 10 Apr 2025 12:28 )    Color: Dark Yellow / Appearance: Cloudy / SG: >1.030 / pH: x  Gluc: x / Ketone: 15 mg/dL  / Bili: Large / Urobili: 0.2 mg/dL   Blood: x / Protein: Trace mg/dL / Nitrite: Positive   Leuk Esterase: Small / RBC: 6 /HPF / WBC 2 /HPF   Sq Epi: x / Non Sq Epi: 4 /HPF / Bacteria: Occasional /HPF      CAPILLARY BLOOD GLUCOSE        RADIOLOGY & ADDITIONAL TESTS: Reviewed.

## 2025-04-12 NOTE — PROGRESS NOTE ADULT - ASSESSMENT
43 y/o f hx HLA  positive female now with an M1b, stage IV, uveal melanoma most recently on immunotherapy with liver involvement, recently diagnosed PV thrombus on eliquis who presented with c/o pain to right flank, upper abdomen for the past few days found to have persistent portal vein thrombus, increased moderate-large ascites (s/p paracentesis), increased hepatic mets/RP nodes, increased right pleural effusion, as well as persistent hyperbilirubinemia.       #stage IV uveal melanoma with progression of disease   per oncologist Dr Ruby unlikely to be a candidate for further therapy lang with elevated bili  will await GI input as below although low suspicion this is a reversible process  appreciate ongoing input and support from palliative team  continue with pain regimen/bowel regimen as needed    #ascites 2/2 portal hypertension  s/p multiple paracentesis recently but still feels distended with LE edema as well  cytology negative, suspect fluid (supported by SAAG) 2/2 portal hypertension - will trial 1 time dose of diuretics with close electrolyte monitoring given baseline hyponatremia    #pleural effusion  per pulm likely 2/2 portal htn and thoracentesis felt to be of more risk than benefit  c/w IS  trial of diuretics as above with close electrolyte monitoring    #PV thrombus  c/w full dose eliquis, monitor for bleeding    #hyperbilirubinemia   predominantly direct, highly suspect 2/2 worsening hepatic mets  despite AC for PV thrombus, bilirubin continues to rise -- 7.9 today  as hyperbilirubinemia is a major obstacle to patient receiving further treatment/ going on clinical trial GI consulted per patient/oncologist request to affirm that there is nothing else that can be done to improve bilirubin (previously failed steroid trial)  continue to trend    #tachycardia 2/2 thyroid dysfunction 2/2 immunotherapy   c/w home atenolol     #hx of IO toxicity  c/w prednisone with PJP ppx (bactrim)    #UTI  c/w ctx x 3 days    #leukocytosis  likely reactive to underlying malignancy, appears non-toxic    #hyponatremia  likely 2/2 liver dysfunction, ctm     Diet: regular (given patient likely going palliative care route holding on fluid restriction which would otherwise be indicated)  FULL CODE  Dispo: TBD  DVT ppx; on AC

## 2025-04-12 NOTE — CONSULT NOTE ADULT - SUBJECTIVE AND OBJECTIVE BOX
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 Underwent paracentesis with removal of 1 and 1.3L and is currently pending a possible thoracentesis. GI consulted for elevated bilirubin and to see if patient would be a candidate for possible stenting to reduce bilirubin levels so that she could enter a experimental trial.     CT abdomen/pelvis showing: acute thrombus in main portal vein and both portal vein branches.   Increased moderate-large ascites, secondary to portal hypertension with signs of generalized fluid overload.  Increased hepatic metastases. Notably, superior intrahepatic IVC and right hepatic vein narrowed due to mass effect from metastases.    Patient seen and examined at bedside. Describes that overall she has some abdominal bloating but no acute complaints at this time. Total bilirubin 7.9 and indirect bilirubin 1.7. Patient describes that her oncologist would like to enroll her in a trial at [facility] for metastatic melanoma but that she needs to have a direct bilirubin of less than 0.45 to be enrolled in the study. Believes that additional chemo therapies outside of the trial would be an option for her if her bilirubin were <5. Complains of occasional itching iso elevated bilirubin but says that the symptoms does not bother her significantly. Recently underwent MRCP at [facility] last month but was not evaluated by their GI team as says her visit was mostly for the purposes of being included in the study.       Allergies  No Known Allergies  Intolerances    Home Medications:  ALPRAZolam 0.25 mg oral tablet: 1 tab(s) orally once a day (at bedtime) as needed for  anxiety (10 Apr 2025 17:39)  Bactrim  mg-160 mg oral tablet: 1 tab(s) orally Monday, Wednesday, and Friday (10 Apr 2025 17:40)  Blisovi FE 1/20 oral tablet: 1 tab(s) orally once a day (10 Apr 2025 17:42)  Eliquis 5 mg oral tablet: 2 tab(s) orally 2 times a day for 7 days (10 Apr 2025 17:40)  predniSONE 5 mg oral tablet: 2 tab(s) orally once a day (10 Apr 2025 17:39)    MEDICATIONS:  MEDICATIONS  (STANDING):  apixaban 10 milliGRAM(s) Oral every 12 hours  atenolol  Tablet 12.5 milliGRAM(s) Oral every 24 hours  bisacodyl 5 milliGRAM(s) Oral at bedtime  cefTRIAXone   IVPB 1000 milliGRAM(s) IV Intermittent every 24 hours  influenza   Vaccine 0.5 milliLiter(s) IntraMuscular once  pantoprazole    Tablet 40 milliGRAM(s) Oral every 24 hours  predniSONE   Tablet 10 milliGRAM(s) Oral every 24 hours  predniSONE   Tablet 5 milliGRAM(s) Oral every 24 hours  senna 2 Tablet(s) Oral at bedtime  trimethoprim  160 mG/sulfamethoxazole 800 mG 1 Tablet(s) Oral <User Schedule>  ursodiol Capsule 300 milliGRAM(s) Oral three times a day    MEDICATIONS  (PRN):  acetaminophen     Tablet .. 650 milliGRAM(s) Oral every 6 hours PRN Temp greater or equal to 38C (100.4F), Mild Pain (1 - 3)  ALPRAZolam 0.25 milliGRAM(s) Oral at bedtime PRN for anxiety  HYDROmorphone   Tablet 2 milliGRAM(s) Oral every 4 hours PRN Moderate Pain (4 - 6)  HYDROmorphone  Injectable 0.5 milliGRAM(s) IV Push every 3 hours PRN Severe Pain (7 - 10)  ondansetron Injectable 4 milliGRAM(s) IV Push every 6 hours PRN Nausea and/or Vomiting  polyethylene glycol 3350 17 Gram(s) Oral every 24 hours PRN Constipation    PAST MEDICAL & SURGICAL HISTORY:  Metastatic melanoma      S/P cataract surgery        FAMILY HISTORY:    SOCIAL HISTORY:  [employer]: [ ] Current, [ ] Former, [ ] Never; Pack Years:  Alcohol:  Illicit Drugs:        Vital Signs Last 24 Hrs  T(C): 36.4 (12 Apr 2025 20:39), Max: 37.1 (12 Apr 2025 05:58)  T(F): 97.6 (12 Apr 2025 20:39), Max: 98.8 (12 Apr 2025 05:58)  HR: 97 (12 Apr 2025 20:39) (94 - 106)  BP: 128/83 (12 Apr 2025 20:39) (106/73 - 128/83)  BP(mean): 98 (12 Apr 2025 20:39) (98 - 98)  RR: 18 (12 Apr 2025 20:39) (16 - 18)  SpO2: 94% (12 Apr 2025 20:39) (93% - 94%)    Parameters below as of 12 Apr 2025 20:39  Patient On (Oxygen Delivery Method): room air        04-12 @ 07:01  -  04-12 @ 21:20  --------------------------------------------------------  IN: 480 mL / OUT: 400 mL / NET: 80 mL      PHYSICAL EXAM:  General: No acute distress, mild jaundice   Lungs: Normal respiratory effort and no intercostal retractions  Cardiovascular: RRR  Abdomen: Soft, non-tender, distended, ecchymosis on abdomen  Neurological: Alert and oriented x3  Skin: Warm and dry. No obvious rash      LABS:                        12.7   20.13 )-----------( 318      ( 12 Apr 2025 05:30 )             37.8     04-12    124[L]  |  93[L]  |  22  ----------------------------<  105[H]  5.3   |  20[L]  |  0.65    Ca    8.4      12 Apr 2025 20:00  Mg     2.0     04-12    TPro  5.4[L]  /  Alb  2.1[L]  /  TBili  7.9[H]  /  DBili  6.2[H]  /  AST  200[H]  /  ALT  98[H]  /  AlkPhos  360[H]  04-12        PT/INR - ( 11 Apr 2025 08:09 )   PT: 16.7 sec;   INR: 1.46          PTT - ( 11 Apr 2025 08:09 )  PTT:39.7 sec    Culture - Body Fluid with Gram Stain (collected 11 Apr 2025 15:30)  Source: Peritoneal peritoneal fluid  Gram Stain (12 Apr 2025 06:36):    No polymorphonuclear cells seen    No organisms seen    by cytocentrifuge    Culture - Urine (collected 11 Apr 2025 11:58)  Source: Clean Catch Clean Catch (Midstream)  Final Report (12 Apr 2025 14:12):    <10,000 CFU/mL Normal Urogenital Jamee    Urinalysis with Rflx Culture (collected 10 Apr 2025 12:28)    Culture - Urine (collected 10 Apr 2025 12:28)  Source: Clean Catch None  Final Report (11 Apr 2025 22:58):    <10,000 CFU/mL Normal Urogenital Jamee      RADIOLOGY & ADDITIONAL STUDIES:     Reviewed   Initial GI Consult Note:     HPI:  44F with PMH of HLA  positive female now with an 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 Underwent paracentesis with removal of 1 and 1.3L and is currently pending a possible thoracentesis. GI consulted for elevated bilirubin and to see if patient would be a candidate for possible stenting to reduce bilirubin levels so that she could enter a experimental trial.     CT abdomen/pelvis showing: acute thrombus in main portal vein and both portal vein branches.   Increased moderate-large ascites, secondary to portal hypertension with signs of generalized fluid overload.  Increased hepatic metastases. Notably, superior intrahepatic IVC and right hepatic vein narrowed due to mass effect from metastases.    Patient seen and examined at bedside. Describes that overall she has some abdominal bloating but no acute complaints at this time. Total bilirubin 7.9 and indirect bilirubin 1.7. Patient explains that her oncologist would like to enroll her in a trial at [facility] for metastatic melanoma but that she needs to have a direct bilirubin of less than 0.45 to be enrolled in the study. Believes that additional chemo therapies outside of the trial would be an option for her if her bilirubin were <5. Complains of occasional itching iso elevated bilirubin but says that the itching does not bother her significantly. Recently underwent MRCP at [facility] last month but was not evaluated by their GI team as says her visit was mostly for the purposes of being included in the study.       Allergies  No Known Allergies  Intolerances    Home Medications:  ALPRAZolam 0.25 mg oral tablet: 1 tab(s) orally once a day (at bedtime) as needed for  anxiety (10 Apr 2025 17:39)  Bactrim  mg-160 mg oral tablet: 1 tab(s) orally Monday, Wednesday, and Friday (10 Apr 2025 17:40)  Blisovi FE 1/20 oral tablet: 1 tab(s) orally once a day (10 Apr 2025 17:42)  Eliquis 5 mg oral tablet: 2 tab(s) orally 2 times a day for 7 days (10 Apr 2025 17:40)  predniSONE 5 mg oral tablet: 2 tab(s) orally once a day (10 Apr 2025 17:39)    MEDICATIONS:  MEDICATIONS  (STANDING):  apixaban 10 milliGRAM(s) Oral every 12 hours  atenolol  Tablet 12.5 milliGRAM(s) Oral every 24 hours  bisacodyl 5 milliGRAM(s) Oral at bedtime  cefTRIAXone   IVPB 1000 milliGRAM(s) IV Intermittent every 24 hours  influenza   Vaccine 0.5 milliLiter(s) IntraMuscular once  pantoprazole    Tablet 40 milliGRAM(s) Oral every 24 hours  predniSONE   Tablet 10 milliGRAM(s) Oral every 24 hours  predniSONE   Tablet 5 milliGRAM(s) Oral every 24 hours  senna 2 Tablet(s) Oral at bedtime  trimethoprim  160 mG/sulfamethoxazole 800 mG 1 Tablet(s) Oral <User Schedule>  ursodiol Capsule 300 milliGRAM(s) Oral three times a day    MEDICATIONS  (PRN):  acetaminophen     Tablet .. 650 milliGRAM(s) Oral every 6 hours PRN Temp greater or equal to 38C (100.4F), Mild Pain (1 - 3)  ALPRAZolam 0.25 milliGRAM(s) Oral at bedtime PRN for anxiety  HYDROmorphone   Tablet 2 milliGRAM(s) Oral every 4 hours PRN Moderate Pain (4 - 6)  HYDROmorphone  Injectable 0.5 milliGRAM(s) IV Push every 3 hours PRN Severe Pain (7 - 10)  ondansetron Injectable 4 milliGRAM(s) IV Push every 6 hours PRN Nausea and/or Vomiting  polyethylene glycol 3350 17 Gram(s) Oral every 24 hours PRN Constipation    PAST MEDICAL & SURGICAL HISTORY:  Metastatic melanoma      S/P cataract surgery        FAMILY HISTORY:    SOCIAL HISTORY:  [place]: [ ] Current, [ ] Former, [ ] Never; Pack Years:  Alcohol:  Illicit Drugs:        Vital Signs Last 24 Hrs  T(C): 36.4 (12 Apr 2025 20:39), Max: 37.1 (12 Apr 2025 05:58)  T(F): 97.6 (12 Apr 2025 20:39), Max: 98.8 (12 Apr 2025 05:58)  HR: 97 (12 Apr 2025 20:39) (94 - 106)  BP: 128/83 (12 Apr 2025 20:39) (106/73 - 128/83)  BP(mean): 98 (12 Apr 2025 20:39) (98 - 98)  RR: 18 (12 Apr 2025 20:39) (16 - 18)  SpO2: 94% (12 Apr 2025 20:39) (93% - 94%)    Parameters below as of 12 Apr 2025 20:39  Patient On (Oxygen Delivery Method): room air        04-12 @ 07:01  -  04-12 @ 21:20  --------------------------------------------------------  IN: 480 mL / OUT: 400 mL / NET: 80 mL      PHYSICAL EXAM:  General: No acute distress, mild jaundice   Lungs: Normal respiratory effort and no intercostal retractions  Cardiovascular: RRR  Abdomen: Soft, non-tender, distended, ecchymosis on abdomen  Neurological: Alert and oriented x3  Skin: Warm and dry. No obvious rash      LABS:                        12.7   20.13 )-----------( 318      ( 12 Apr 2025 05:30 )             37.8     04-12    124[L]  |  93[L]  |  22  ----------------------------<  105[H]  5.3   |  20[L]  |  0.65    Ca    8.4      12 Apr 2025 20:00  Mg     2.0     04-12    TPro  5.4[L]  /  Alb  2.1[L]  /  TBili  7.9[H]  /  DBili  6.2[H]  /  AST  200[H]  /  ALT  98[H]  /  AlkPhos  360[H]  04-12        PT/INR - ( 11 Apr 2025 08:09 )   PT: 16.7 sec;   INR: 1.46          PTT - ( 11 Apr 2025 08:09 )  PTT:39.7 sec    Culture - Body Fluid with Gram Stain (collected 11 Apr 2025 15:30)  Source: Peritoneal peritoneal fluid  Gram Stain (12 Apr 2025 06:36):    No polymorphonuclear cells seen    No organisms seen    by cytocentrifuge    Culture - Urine (collected 11 Apr 2025 11:58)  Source: Clean Catch Clean Catch (Midstream)  Final Report (12 Apr 2025 14:12):    <10,000 CFU/mL Normal Urogenital Jamee    Urinalysis with Rflx Culture (collected 10 Apr 2025 12:28)    Culture - Urine (collected 10 Apr 2025 12:28)  Source: Clean Catch None  Final Report (11 Apr 2025 22:58):    <10,000 CFU/mL Normal Urogenital Jamee      RADIOLOGY & ADDITIONAL STUDIES:     Reviewed

## 2025-04-13 NOTE — PROGRESS NOTE ADULT - PROBLEM SELECTOR PLAN 2
Patient presenting with leukocytosis meeting 2/4 SIRS criteria. No colitis seen on CT. Pain likely from large ascites and portal vein thrombosis    -pain control:      -Morphine 3mg IV q4h PRN for Severe Pain      -Oxy IR 5mg PO q4h PRN for Moderate Pain      -Bowel Regimen: Senna 2tabs qhs + Miralax daily PRN  -can monitor off abx at this time  -receiving paracentesis 4/11 Patient presenting with leukocytosis meeting 2/4 SIRS criteria. No colitis seen on CT. Pain likely from large ascites and portal vein thrombosis. Pain is improved after para.  -s/p paracentesis 4/11  -pain control:      -dilaudid 0.5 IV q3 PRN for Severe Pain      -dilaudid 2mg PO q4 PRN for Moderate Pain      -Bowel Regimen: Senna 2tabs qhs + Miralax daily PRN  -can monitor off abx at this time

## 2025-04-13 NOTE — PROGRESS NOTE ADULT - PROBLEM SELECTOR PLAN 1
Patient presenting with leukocytosis meeting 2/4 SIRS criteria(HR 98 and wbc 19.4k). Wbc 4/9 20k though in 2/25 wnl. May be reactive iso malignancy vs infectious given abdominal pain. SOB, new pleural effusion, dry cough and fatigue considered URTI though rvp negative    -f/u paracentesis culture, gram stain, cell counts  -can monitor after abx Patient presenting with leukocytosis meeting 2/4 SIRS criteria(HR 98 and wbc 19.4k). Wbc 4/9 20k though in 2/25 wnl. May be reactive iso malignancy vs infectious given abdominal pain. SOB, new pleural effusion, dry cough and fatigue considered URTI though rvp negative  para nucleated cell count 196  -f/u paracentesis culture, gram stain  -can monitor after abx

## 2025-04-13 NOTE — PROGRESS NOTE ADULT - PROBLEM SELECTOR PLAN 3
Na on admission 129, and has been downtrending throughout admission to 124. Initial urine studies with urine osmol >800 and Na<20 suggesting ADH and RAAS on. Possibly intravascular depletion with third spacing given 1+ bilateral edema and low albumin given liver mets.  s/p bumex and spironolactone with worsening sodium    -ctm Na  -consider small fluid resuscitation with albumin Na on admission 129, and has been downtrending throughout admission to 124. Initial urine studies with urine osmol >800 and Na<20 suggesting ADH and RAAS on. Possibly intravascular depletion with third spacing given 1+ bilateral edema and low albumin given liver mets.  s/p bumex and spironolactone with worsening sodium    -if Na >126, can continue spirinolactone 25 mg daily and bumex 0.5 mg daily

## 2025-04-13 NOTE — PROGRESS NOTE ADULT - ATTENDING COMMENTS
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     Seen by GI yesterday, started on ursodiol for itching  Unfortunately bilirubin continues to rise (9 today) likely driven by hepatic mets  Per GI, MRCP to be reviewed tomorrow with interventionalist but low suspicion of reversible process which will exclude patient from further systemic therapy  Good response to diuretics yesterday - continue with fluid restriction 1500 ml; recheck Na in afternoon -- if > 125 can resume diuretics prn for symptomatic benefit  Reviewed with patient that if she chooses to focus on QOL we can of course lift fluid restriction and use diuretics more freely without close electrolyte monitoring but patient not ready for that route just yet  Continue AC for PV thrombus  Trend Na and hepatic indices  Appreciate ongoing input from oncology, gastroenterology, palliative care     Dispo: TBD pending [facility]

## 2025-04-13 NOTE — PROGRESS NOTE ADULT - PROBLEM SELECTOR PLAN 7
New right sided pleural effusion seen on CT A/P. Pulmonology agreeable to perform paracentesis Saturday  Likely 2/2 underlying malignancy  Pulm consulted and saw patient noting small untappable pleural effusion and unlikely causing her symptoms.    -f/u pulm recs New right sided pleural effusion seen on CT A/P. Pulmonology agreeable to perform paracentesis Saturday  Likely 2/2 underlying malignancy  Pulm consulted and saw patient noting small untappable pleural effusion and unlikely causing her symptoms.

## 2025-04-13 NOTE — PROGRESS NOTE ADULT - PROBLEM SELECTOR PLAN 6
She achieved a treatment effect in the dominant mass treated with Y90, with some progression elsewhere. She subsequently received therapy with immunoembolization x 2 administered concurrently with tebentafusp. In the setting of progression, she received radioembolization followed by ipilimumab and nivolumab, with the later complicated by the development of colitis, hepatitis and thyroiditis. She is now s/p chemo embolization under the care of Dr. Layla Acharya.  Saw Dr. Dr. Ruby 4/8      -pain control:      -Morphine 3mg IV q4h PRN for Severe Pain      -Oxy IR 5mg PO q4h PRN for Moderate Pain      -Bowel Regimen: Senna 2tabs qhs + Miralax daily PRN  -c/w prednisone 10mg in AM 5 mg in PM  -c/w bactrim DS MWF for PJP ppx  -c/w pantoprazole 40 mg daily  -c/w xanax 0.25 mg twice a day as needed prescribed, though she only takes at bedtime  -palliative recs She achieved a treatment effect in the dominant mass treated with Y90, with some progression elsewhere. She subsequently received therapy with immunoembolization x 2 administered concurrently with tebentafusp. In the setting of progression, she received radioembolization followed by ipilimumab and nivolumab, with the later complicated by the development of colitis, hepatitis and thyroiditis. She is now s/p chemo embolization under the care of Dr. Layla Acharya.  Saw Dr. Dr. Ruby 4/8    -pain control:       -dilaudid 0.5 IV q3 PRN for Severe Pain      -dilaudid 2mg PO q4 PRN for Moderate Pain      -Bowel Regimen: Senna 2tabs qhs + Miralax daily PRN  -c/w prednisone 10mg in AM 5 mg in PM  -c/w bactrim DS MWF for PJP ppx  -c/w pantoprazole 40 mg daily  -c/w xanax 0.25 mg twice a day as needed prescribed, though she only takes at bedtime  -palliative recs

## 2025-04-13 NOTE — PROGRESS NOTE ADULT - PROBLEM SELECTOR PLAN 4
Continuing to elevate. Given elevation, patient unable to enroll in clinical study or receive treatment.    -f/u GI recs  -ursodiol 200mg TID  -can give atarax for itching prn, she notes it's very mild at this time

## 2025-04-13 NOTE — PROGRESS NOTE ADULT - PROBLEM SELECTOR PLAN 10
Subjectively feels as though she has incomplete voiding though denies dysuria, vaginal discharge or urinary frequency. Bladder scan in ED showed >500 thought to be likely from ascites as patient did not have the urge to urinate. POCUS revealing distended bladder.  SC 1x 4/11    -strict I/O to ensure pt voiding appropriately  -bladder scans q6, get pvr  -SC for volume >300 Subjectively feels as though she has incomplete voiding though denies dysuria, vaginal discharge or urinary frequency. Bladder scan in ED showed >500 thought to be likely from ascites as patient did not have the urge to urinate. POCUS revealing distended bladder. 100cc on SC 4/11. Incomplete emptying better after para and diuretics given  SC 1x 4/11    -strict I/O to ensure pt voiding appropriately  -bladder scans q6, get pvr  -SC for volume >300

## 2025-04-13 NOTE — PROGRESS NOTE ADULT - PROBLEM SELECTOR PLAN 9
Noted she was expected to have labs compatible with hypothyroidism and to start synthroid but her labs did not reflect this so she never started. Denies palpitations, chest pain, rash, dysphagia, diarrhea.  tfts wnl    -takes atenolol 12.5mg QD for tachycardia

## 2025-04-13 NOTE — PROGRESS NOTE ADULT - PROBLEM SELECTOR PLAN 8
Acute portal vein thrombosis seen on CT. Received one dose eliquis AM 4/10. Holding iso upcoming paracentesis and thoracentesis. Started eliquis 10mg BID on Monday.    -with her being in the middle of her load of eliquis, figure out if patient can continue eliquis to complete a total of 7 days or if she needs to restart the load Acute portal vein thrombosis seen on CT. Received one dose eliquis AM 4/10. Holding iso upcoming paracentesis and thoracentesis. Started eliquis 10mg BID on Monday.    -c/w eliquis 10 mg for 5 doses, then 5mg BID

## 2025-04-13 NOTE — PROGRESS NOTE ADULT - PROBLEM SELECTOR PLAN 5
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 She had paracentesis 4/9 that drained 1L without complications. Patient still distended and complaining of pain. Nucleated cell count 173.    -Repeat paracentesis with IR 4/11  -continue to hold eliquis Patient with mets to the liver from uveal melanoma. She had paracentesis 4/9 that drained 1L without complications. Patient still distended and complaining of pain. Nucleated cell count 173.  -s/p paracentesis with IR 4/11

## 2025-04-13 NOTE — PROGRESS NOTE ADULT - SUBJECTIVE AND OBJECTIVE BOX
Progress Note  INCOMPLETE NOTE    INTERVAL EVENTS:   No acute events overnight.    SUBJECTIVE:   Patient seen and examined at bedside. Condition largely unchanged from yesterday. No acute complaints at this time.    ROS:  Negative unless otherwise stated above.    PHYSICAL EXAM:  General: Alert and oriented x 3. No acute distress.   HEENT: Moist mucous membranes. Anicteric. No cervical lymphadenopathy.  Cardiovascular: Regular rate and rhythm. No murmur. Normal JVP.  Lungs: Clear to auscultation bilaterally. No accessory muscle use.  Abdomen: Soft, non-tender and non-distended. No palpable masses.  Extremities: No edema. Non-tender. Warm.  Skin: No rashes or lesions.   Neurologic: No apparent focal neurological deficits. CN II-XII grossly intact, but not individually tested.  Psychiatric: Cooperative. Appropriate mood and affect.    VITAL SIGNS:  Vital Signs Last 24 Hrs  T(C): 36.7 (13 Apr 2025 06:16), Max: 36.7 (13 Apr 2025 06:16)  T(F): 98.1 (13 Apr 2025 06:16), Max: 98.1 (13 Apr 2025 06:16)  HR: 98 (13 Apr 2025 06:16) (94 - 98)  BP: 106/66 (13 Apr 2025 06:16) (106/66 - 128/83)  BP(mean): 79 (13 Apr 2025 06:16) (79 - 98)  RR: 17 (13 Apr 2025 06:16) (17 - 18)  SpO2: 91% (13 Apr 2025 06:16) (91% - 94%)    Parameters below as of 13 Apr 2025 06:16  Patient On (Oxygen Delivery Method): room air  O2 Flow (L/min): 2    INPATIENT MEDICATIONS:   MEDICATIONS  (STANDING):  apixaban 10 milliGRAM(s) Oral every 12 hours  atenolol  Tablet 12.5 milliGRAM(s) Oral every 24 hours  bisacodyl 5 milliGRAM(s) Oral at bedtime  cefTRIAXone   IVPB 1000 milliGRAM(s) IV Intermittent every 24 hours  influenza   Vaccine 0.5 milliLiter(s) IntraMuscular once  pantoprazole    Tablet 40 milliGRAM(s) Oral every 24 hours  predniSONE   Tablet 10 milliGRAM(s) Oral every 24 hours  predniSONE   Tablet 5 milliGRAM(s) Oral every 24 hours  senna 2 Tablet(s) Oral at bedtime  trimethoprim  160 mG/sulfamethoxazole 800 mG 1 Tablet(s) Oral <User Schedule>  ursodiol Capsule 300 milliGRAM(s) Oral three times a day    MEDICATIONS  (PRN):  acetaminophen     Tablet .. 650 milliGRAM(s) Oral every 6 hours PRN Temp greater or equal to 38C (100.4F), Mild Pain (1 - 3)  ALPRAZolam 0.25 milliGRAM(s) Oral at bedtime PRN for anxiety  HYDROmorphone   Tablet 2 milliGRAM(s) Oral every 4 hours PRN Moderate Pain (4 - 6)  HYDROmorphone  Injectable 0.5 milliGRAM(s) IV Push every 3 hours PRN Severe Pain (7 - 10)  ondansetron Injectable 4 milliGRAM(s) IV Push every 6 hours PRN Nausea and/or Vomiting  polyethylene glycol 3350 17 Gram(s) Oral every 24 hours PRN Constipation    HOME MEDICATIONS  ALPRAZolam 0.25 mg oral tablet: 1 tab(s) orally once a day (at bedtime) as needed for  anxiety  Bactrim  mg-160 mg oral tablet: 1 tab(s) orally Monday, Wednesday, and Friday  Blisovi FE 1/20 oral tablet: 1 tab(s) orally once a day  Eliquis 5 mg oral tablet: 2 tab(s) orally 2 times a day for 7 days  predniSONE 5 mg oral tablet: 2 tab(s) orally once a day    ALLERGIES:  Allergies    No Known Allergies    Intolerances    LABS:                       12.7   20.13 )-----------( 318      ( 12 Apr 2025 05:30 )             37.8     04-12    124[L]  |  93[L]  |  22  ----------------------------<  105[H]  5.3   |  20[L]  |  0.65    Ca    8.4      12 Apr 2025 20:00  Mg     2.0     04-12    TPro  5.4[L]  /  Alb  2.1[L]  /  TBili  7.9[H]  /  DBili  6.2[H]  /  AST  200[H]  /  ALT  98[H]  /  AlkPhos  360[H]  04-12    PT/INR - ( 11 Apr 2025 08:09 )   PT: 16.7 sec;   INR: 1.46          PTT - ( 11 Apr 2025 08:09 )  PTT:39.7 sec  Urinalysis Basic - ( 12 Apr 2025 20:00 )    Color: x / Appearance: x / SG: x / pH: x  Gluc: 105 mg/dL / Ketone: x  / Bili: x / Urobili: x   Blood: x / Protein: x / Nitrite: x   Leuk Esterase: x / RBC: x / WBC x   Sq Epi: x / Non Sq Epi: x / Bacteria: x      CAPILLARY BLOOD GLUCOSE        RADIOLOGY & ADDITIONAL TESTS: Reviewed. Progress Note    INTERVAL EVENTS:   No acute events overnight.    SUBJECTIVE:   Patient reports feeling better after para and with diuretics. Note she has been urinating more and feels as though it is complete. Does feel a little lightheaded this AM but also notes her mouth is dry. She is aware of fluid restriction and will be mindful. Notes she feels like she can eat more today now that her distension is improved    ROS:  Negative unless otherwise stated above.    PHYSICAL EXAM:  General: Alert and oriented x 3.   HEENT: Moist mucous membranes.   Cardiovascular: Regular rate and rhythm.  Lungs: RLL diminished breath sounds, clear otherwise.   Abdomen: Soft, mildly distended, non-tender to palpation   Extremities: 1+ edema bilaterally Non-tender. WWP  Skin: mild diffuse jaundice    Neurologic: No apparent focal neurological deficits  Psychiatric: Cooperative. Appropriate mood and affect. Remains hopeful       VITAL SIGNS:  Vital Signs Last 24 Hrs  T(C): 36.7 (13 Apr 2025 06:16), Max: 36.7 (13 Apr 2025 06:16)  T(F): 98.1 (13 Apr 2025 06:16), Max: 98.1 (13 Apr 2025 06:16)  HR: 98 (13 Apr 2025 06:16) (94 - 98)  BP: 106/66 (13 Apr 2025 06:16) (106/66 - 128/83)  BP(mean): 79 (13 Apr 2025 06:16) (79 - 98)  RR: 17 (13 Apr 2025 06:16) (17 - 18)  SpO2: 91% (13 Apr 2025 06:16) (91% - 94%)    Parameters below as of 13 Apr 2025 06:16  Patient On (Oxygen Delivery Method): room air  O2 Flow (L/min): 2    INPATIENT MEDICATIONS:   MEDICATIONS  (STANDING):  apixaban 10 milliGRAM(s) Oral every 12 hours  atenolol  Tablet 12.5 milliGRAM(s) Oral every 24 hours  bisacodyl 5 milliGRAM(s) Oral at bedtime  cefTRIAXone   IVPB 1000 milliGRAM(s) IV Intermittent every 24 hours  influenza   Vaccine 0.5 milliLiter(s) IntraMuscular once  pantoprazole    Tablet 40 milliGRAM(s) Oral every 24 hours  predniSONE   Tablet 10 milliGRAM(s) Oral every 24 hours  predniSONE   Tablet 5 milliGRAM(s) Oral every 24 hours  senna 2 Tablet(s) Oral at bedtime  trimethoprim  160 mG/sulfamethoxazole 800 mG 1 Tablet(s) Oral <User Schedule>  ursodiol Capsule 300 milliGRAM(s) Oral three times a day    MEDICATIONS  (PRN):  acetaminophen     Tablet .. 650 milliGRAM(s) Oral every 6 hours PRN Temp greater or equal to 38C (100.4F), Mild Pain (1 - 3)  ALPRAZolam 0.25 milliGRAM(s) Oral at bedtime PRN for anxiety  HYDROmorphone   Tablet 2 milliGRAM(s) Oral every 4 hours PRN Moderate Pain (4 - 6)  HYDROmorphone  Injectable 0.5 milliGRAM(s) IV Push every 3 hours PRN Severe Pain (7 - 10)  ondansetron Injectable 4 milliGRAM(s) IV Push every 6 hours PRN Nausea and/or Vomiting  polyethylene glycol 3350 17 Gram(s) Oral every 24 hours PRN Constipation    HOME MEDICATIONS  ALPRAZolam 0.25 mg oral tablet: 1 tab(s) orally once a day (at bedtime) as needed for  anxiety  Bactrim  mg-160 mg oral tablet: 1 tab(s) orally Monday, Wednesday, and Friday  Blisovi FE 1/20 oral tablet: 1 tab(s) orally once a day  Eliquis 5 mg oral tablet: 2 tab(s) orally 2 times a day for 7 days  predniSONE 5 mg oral tablet: 2 tab(s) orally once a day    ALLERGIES:  Allergies    No Known Allergies    Intolerances    LABS:                       12.7   20.13 )-----------( 318      ( 12 Apr 2025 05:30 )             37.8     04-12    124[L]  |  93[L]  |  22  ----------------------------<  105[H]  5.3   |  20[L]  |  0.65    Ca    8.4      12 Apr 2025 20:00  Mg     2.0     04-12    TPro  5.4[L]  /  Alb  2.1[L]  /  TBili  7.9[H]  /  DBili  6.2[H]  /  AST  200[H]  /  ALT  98[H]  /  AlkPhos  360[H]  04-12    PT/INR - ( 11 Apr 2025 08:09 )   PT: 16.7 sec;   INR: 1.46          PTT - ( 11 Apr 2025 08:09 )  PTT:39.7 sec  Urinalysis Basic - ( 12 Apr 2025 20:00 )    Color: x / Appearance: x / SG: x / pH: x  Gluc: 105 mg/dL / Ketone: x  / Bili: x / Urobili: x   Blood: x / Protein: x / Nitrite: x   Leuk Esterase: x / RBC: x / WBC x   Sq Epi: x / Non Sq Epi: x / Bacteria: x      CAPILLARY BLOOD GLUCOSE        RADIOLOGY & ADDITIONAL TESTS: Reviewed.

## 2025-04-13 NOTE — PROGRESS NOTE ADULT - ASSESSMENT
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 Pending paracentesis 4/11 for ascites and thoracentesis 4/12 for new R pleural effusion.

## 2025-04-14 NOTE — CHART NOTE - NSCHARTNOTEFT_GEN_A_CORE
Clinician met with pt to offer psychotherapy services, however pt. was with their partner at the time and declined to meet. They were amendable to meeting tomorrow.

## 2025-04-14 NOTE — PROGRESS NOTE ADULT - SUBJECTIVE AND OBJECTIVE BOX
[facility] Geriatrics and Palliative Medicine  Dre Chicas, Palliative Care Attending  Contact Info: Call [phone] (HEAL Line) or message on Microsoft Teams (Dre Chicas)    SUBJECTIVE AND OBJECTIVE:  INTERVAL HPI/OVERNIGHT EVENTS: Interval events noted. See patient's PRN use for the past 24hrs noted below. Comprehensive symptom assessment and GOC exploration as noted below. Extensive time spent discussing plan of care with patient/family. No unexpected adverse effects of opiates noted: no sedation/dizziness/nausea.    ALLERGIES:  No Known Allergies    MEDICATIONS  (STANDING):  atenolol  Tablet 12.5 milliGRAM(s) Oral every 24 hours  bisacodyl 5 milliGRAM(s) Oral at bedtime  fentaNYL   Patch  12 MICROgram(s)/Hr 1 Patch Transdermal every 72 hours  influenza   Vaccine 0.5 milliLiter(s) IntraMuscular once  pantoprazole    Tablet 40 milliGRAM(s) Oral every 24 hours  predniSONE   Tablet 10 milliGRAM(s) Oral every 24 hours  predniSONE   Tablet 5 milliGRAM(s) Oral every 24 hours  senna 2 Tablet(s) Oral at bedtime  trimethoprim  160 mG/sulfamethoxazole 800 mG 1 Tablet(s) Oral <User Schedule>  ursodiol Capsule 300 milliGRAM(s) Oral three times a day    MEDICATIONS  (PRN):  acetaminophen     Tablet .. 650 milliGRAM(s) Oral every 6 hours PRN Temp greater or equal to 38C (100.4F), Mild Pain (1 - 3)  ALPRAZolam 0.25 milliGRAM(s) Oral at bedtime PRN for anxiety  HYDROmorphone   Tablet 3 milliGRAM(s) Oral every 4 hours PRN Severe Pain (7 - 10)  HYDROmorphone   Tablet 2 milliGRAM(s) Oral every 4 hours PRN Moderate Pain (4 - 6)  ondansetron Injectable 4 milliGRAM(s) IV Push every 6 hours PRN Nausea and/or Vomiting  polyethylene glycol 3350 17 Gram(s) Oral every 24 hours PRN Constipation      Analgesic Use (Scheduled and PRNs) for past 24 hours:  acetaminophen     Tablet ..   650 milliGRAM(s) Oral (04-14-25 @ 09:08)    fentaNYL   Patch  12 MICROgram(s)/Hr   1 Patch Transdermal (04-14-25 @ 12:58)    HYDROmorphone   Tablet   2 milliGRAM(s) Oral (04-13-25 @ 21:18)    HYDROmorphone  Injectable   0.5 milliGRAM(s) IV Push (04-14-25 @ 11:56)    HYDROmorphone  Injectable   0.5 milliGRAM(s) IV Push (04-14-25 @ 01:31)   0.5 milliGRAM(s) IV Push (04-13-25 @ 16:51)    ondansetron Injectable   4 milliGRAM(s) IV Push (04-13-25 @ 16:43)      ITEMS UNCHECKED ARE NOT PRESENT  PRESENT SYMPTOMS/REVIEW OF SYSTEMS: []Unable to obtain due to poor mentation   Source if other than patient:  []Family   []Team         Vital Signs Last 24 Hrs  T(C): 36.6 (14 Apr 2025 12:36), Max: 36.9 (13 Apr 2025 20:10)  T(F): 97.8 (14 Apr 2025 12:36), Max: 98.4 (13 Apr 2025 20:10)  HR: 108 (14 Apr 2025 12:36) (88 - 108)  BP: 115/74 (14 Apr 2025 12:36) (100/64 - 115/74)  BP(mean): 78 (14 Apr 2025 06:13) (78 - 78)  RR: 18 (14 Apr 2025 12:36) (18 - 20)  SpO2: 92% (14 Apr 2025 12:36) (91% - 97%)    Parameters below as of 14 Apr 2025 12:36  Patient On (Oxygen Delivery Method): room air        LABS: Personally reviewed and interpreted                        12.4   19.97 )-----------( 406      ( 14 Apr 2025 05:30 )             37.4   04-14    125[L]  |  90[L]  |  28[H]  ----------------------------<  77  5.1   |  21[L]  |  0.79    Ca    8.2[L]      14 Apr 2025 05:30  Phos  2.4     04-13  Mg     2.1     04-14    TPro  5.6[L]  /  Alb  2.1[L]  /  TBili  10.8[H]  /  DBili  8.5[H]  /  AST  269[H]  /  ALT  127[H]  /  AlkPhos  369[H]  04-14      RADIOLOGY & ADDITIONAL STUDIES: Personally reviewed and interpreted  None new    DISCUSSION OF CASE: Family - to provide updates and emotional support; Primary Team/RN - to discuss plan of care [facility] and Palliative Medicine  Dre Chicas, Palliative Care Attending  Contact Info: Call [phone] (HEAL Line) or message on Microsoft Teams (Dre Chicas)    SUBJECTIVE AND OBJECTIVE:  INTERVAL HPI/OVERNIGHT EVENTS: Interval events noted. Still has intermittent abdominal pain but gets relief from Dilaudid. Abdomen feels distended again. No BM today. See patient's PRN use for the past 24hrs noted below. Comprehensive symptom assessment and GOC exploration as noted below. Extensive time spent discussing plan of care with patient. No unexpected adverse effects of opiates noted: no sedation/dizziness/nausea.    ALLERGIES:  No Known Allergies    MEDICATIONS  (STANDING):  atenolol  Tablet 12.5 milliGRAM(s) Oral every 24 hours  bisacodyl 5 milliGRAM(s) Oral at bedtime  fentaNYL   Patch  12 MICROgram(s)/Hr 1 Patch Transdermal every 72 hours  influenza   Vaccine 0.5 milliLiter(s) IntraMuscular once  pantoprazole    Tablet 40 milliGRAM(s) Oral every 24 hours  predniSONE   Tablet 10 milliGRAM(s) Oral every 24 hours  predniSONE   Tablet 5 milliGRAM(s) Oral every 24 hours  senna 2 Tablet(s) Oral at bedtime  trimethoprim  160 mG/sulfamethoxazole 800 mG 1 Tablet(s) Oral <User Schedule>  ursodiol Capsule 300 milliGRAM(s) Oral three times a day    MEDICATIONS  (PRN):  acetaminophen     Tablet .. 650 milliGRAM(s) Oral every 6 hours PRN Temp greater or equal to 38C (100.4F), Mild Pain (1 - 3)  ALPRAZolam 0.25 milliGRAM(s) Oral at bedtime PRN for anxiety  HYDROmorphone   Tablet 3 milliGRAM(s) Oral every 4 hours PRN Severe Pain (7 - 10)  HYDROmorphone   Tablet 2 milliGRAM(s) Oral every 4 hours PRN Moderate Pain (4 - 6)  ondansetron Injectable 4 milliGRAM(s) IV Push every 6 hours PRN Nausea and/or Vomiting  polyethylene glycol 3350 17 Gram(s) Oral every 24 hours PRN Constipation      Analgesic Use (Scheduled and PRNs) for past 24 hours:  acetaminophen     Tablet ..   650 milliGRAM(s) Oral (04-14-25 @ 09:08)    fentaNYL   Patch  12 MICROgram(s)/Hr   1 Patch Transdermal (04-14-25 @ 12:58)    HYDROmorphone   Tablet   2 milliGRAM(s) Oral (04-13-25 @ 21:18)    HYDROmorphone  Injectable   0.5 milliGRAM(s) IV Push (04-14-25 @ 11:56)    HYDROmorphone  Injectable   0.5 milliGRAM(s) IV Push (04-14-25 @ 01:31)   0.5 milliGRAM(s) IV Push (04-13-25 @ 16:51)    ondansetron Injectable   4 milliGRAM(s) IV Push (04-13-25 @ 16:43)      ITEMS UNCHECKED ARE NOT PRESENT  PRESENT SYMPTOMS/REVIEW OF SYSTEMS: []Unable to obtain due to poor mentation   Source if other than patient:  []Family   []Team         Vital Signs Last 24 Hrs  T(C): 36.6 (14 Apr 2025 12:36), Max: 36.9 (13 Apr 2025 20:10)  T(F): 97.8 (14 Apr 2025 12:36), Max: 98.4 (13 Apr 2025 20:10)  HR: 108 (14 Apr 2025 12:36) (88 - 108)  BP: 115/74 (14 Apr 2025 12:36) (100/64 - 115/74)  BP(mean): 78 (14 Apr 2025 06:13) (78 - 78)  RR: 18 (14 Apr 2025 12:36) (18 - 20)  SpO2: 92% (14 Apr 2025 12:36) (91% - 97%)    Parameters below as of 14 Apr 2025 12:36  Patient On (Oxygen Delivery Method): room air    LABS: Personally reviewed and interpreted                      12.4   19.97 )-----------( 406      ( 14 Apr 2025 05:30 )             37.4   04-14    125[L]  |  90[L]  |  28[H]  ----------------------------<  77  5.1   |  21[L]  |  0.79    Ca    8.2[L]      14 Apr 2025 05:30  Phos  2.4     04-13  Mg     2.1     04-14  TPro  5.6[L]  /  Alb  2.1[L]  /  TBili  10.8[H]  /  DBili  8.5[H]  /  AST  269[H]  /  ALT  127[H]  /  AlkPhos  369[H]  04-14    RADIOLOGY & ADDITIONAL STUDIES: Personally reviewed and interpreted  None new    DISCUSSION OF CASE: GI/Onc/Psych/Primary Team/RN - to discuss plan of care [facility] Geriatrics and Palliative Medicine  Dre Chicas, Palliative Care Attending  Contact Info: Call [phone] (HEAL Line) or message on Microsoft Teams (Dre Chicas)    SUBJECTIVE AND OBJECTIVE:  INTERVAL HPI/OVERNIGHT EVENTS: Interval events noted. Still has intermittent abdominal pain but gets relief from Dilaudid. Abdomen feels distended again. No BM today. See patient's PRN use for the past 24hrs noted below. Comprehensive symptom assessment and GOC exploration as noted below. Extensive time spent discussing plan of care with patient. No unexpected adverse effects of opiates noted: no sedation/dizziness/nausea.    ALLERGIES:  No Known Allergies    MEDICATIONS  (STANDING):  atenolol  Tablet 12.5 milliGRAM(s) Oral every 24 hours  bisacodyl 5 milliGRAM(s) Oral at bedtime  fentaNYL   Patch  12 MICROgram(s)/Hr 1 Patch Transdermal every 72 hours  influenza   Vaccine 0.5 milliLiter(s) IntraMuscular once  pantoprazole    Tablet 40 milliGRAM(s) Oral every 24 hours  predniSONE   Tablet 10 milliGRAM(s) Oral every 24 hours  predniSONE   Tablet 5 milliGRAM(s) Oral every 24 hours  senna 2 Tablet(s) Oral at bedtime  trimethoprim  160 mG/sulfamethoxazole 800 mG 1 Tablet(s) Oral <User Schedule>  ursodiol Capsule 300 milliGRAM(s) Oral three times a day    MEDICATIONS  (PRN):  acetaminophen     Tablet .. 650 milliGRAM(s) Oral every 6 hours PRN Temp greater or equal to 38C (100.4F), Mild Pain (1 - 3)  ALPRAZolam 0.25 milliGRAM(s) Oral at bedtime PRN for anxiety  HYDROmorphone   Tablet 3 milliGRAM(s) Oral every 4 hours PRN Severe Pain (7 - 10)  HYDROmorphone   Tablet 2 milliGRAM(s) Oral every 4 hours PRN Moderate Pain (4 - 6)  ondansetron Injectable 4 milliGRAM(s) IV Push every 6 hours PRN Nausea and/or Vomiting  polyethylene glycol 3350 17 Gram(s) Oral every 24 hours PRN Constipation      Analgesic Use (Scheduled and PRNs) for past 24 hours:  acetaminophen     Tablet ..   650 milliGRAM(s) Oral (04-14-25 @ 09:08)    fentaNYL   Patch  12 MICROgram(s)/Hr   1 Patch Transdermal (04-14-25 @ 12:58)    HYDROmorphone   Tablet   2 milliGRAM(s) Oral (04-13-25 @ 21:18)    HYDROmorphone  Injectable   0.5 milliGRAM(s) IV Push (04-14-25 @ 11:56)    HYDROmorphone  Injectable   0.5 milliGRAM(s) IV Push (04-14-25 @ 01:31)   0.5 milliGRAM(s) IV Push (04-13-25 @ 16:51)    ondansetron Injectable   4 milliGRAM(s) IV Push (04-13-25 @ 16:43)      ITEMS UNCHECKED ARE NOT PRESENT  PRESENT SYMPTOMS/REVIEW OF SYSTEMS: []Unable to obtain due to poor mentation   Source if other than patient:  []Family   []Team     Pain: [x] yes [] no  QOL impact - tolerable  Location - abdomen                  Aggravating Factors - abdominal distention  Quality - aching  Radiation - across lower abdomen  Timing - intermittent  Severity (0-10 scale) - 4  Minimal Acceptable Level (0-10 scale) - 4    Other Symptoms: See ESAS Section Below  [x]All other review of systems negative     GENERAL:  [x] NAD [x]Alert []Lethargic  []Cachexia  []Unarousable  [x]Verbal  []Non-Verbal  BEHAVIORAL:   []Anxiety  []Delirium []Agitation [x]Cooperative [x]Oriented x3  HEENT:  [x]Normal  [x] Moist Mucous Membranes []Dry mouth   []ET Tube/Trach  []Oral lesions  PULMONARY:   [x]Clear []Tachypnea  []Audible excessive secretions  [x]Normal Work of Breathing []Labored Breathing  []Rhonchi []Crackles []Wheezing  CARDIOVASCULAR:    [x]Regular Rate []Regular Rhythm []Irregular []Tachy  []Foreign  GASTROINTESTINAL:  [x]Soft  [x]Distended   [x]+BS  []Non tender [x]Tender  []PEG []OGT/ NGT  Last BM:  GENITOURINARY:  [x]Normal [] Incontinent   []Oliguria/Anuria   []Ramirez  MUSCULOSKELETAL:   [x]Normal Extremities  [x]Weakness  []Bed/Wheelchair bound []Edema  NEUROLOGIC:   [x]No focal deficits  []Cognitive impairment  []Dysphagia []Dysarthria []Paresis []Encephalopathic  SKIN:   []Normal   []Pressure ulcer(s)  [x]Jaundice    Vital Signs Last 24 Hrs  T(C): 36.6 (14 Apr 2025 12:36), Max: 36.9 (13 Apr 2025 20:10)  T(F): 97.8 (14 Apr 2025 12:36), Max: 98.4 (13 Apr 2025 20:10)  HR: 108 (14 Apr 2025 12:36) (88 - 108)  BP: 115/74 (14 Apr 2025 12:36) (100/64 - 115/74)  BP(mean): 78 (14 Apr 2025 06:13) (78 - 78)  RR: 18 (14 Apr 2025 12:36) (18 - 20)  SpO2: 92% (14 Apr 2025 12:36) (91% - 97%)    Parameters below as of 14 Apr 2025 12:36  Patient On (Oxygen Delivery Method): room air    LABS: Personally reviewed and interpreted                      12.4   19.97 )-----------( 406      ( 14 Apr 2025 05:30 )             37.4   04-14    125[L]  |  90[L]  |  28[H]  ----------------------------<  77  5.1   |  21[L]  |  0.79    Ca    8.2[L]      14 Apr 2025 05:30  Phos  2.4     04-13  Mg     2.1     04-14  TPro  5.6[L]  /  Alb  2.1[L]  /  TBili  10.8[H]  /  DBili  8.5[H]  /  AST  269[H]  /  ALT  127[H]  /  AlkPhos  369[H]  04-14    RADIOLOGY & ADDITIONAL STUDIES: Personally reviewed and interpreted  None new    DISCUSSION OF CASE: GI/Onc/Psych/Primary Team/RN - to discuss plan of care

## 2025-04-14 NOTE — PROGRESS NOTE ADULT - PROBLEM SELECTOR PLAN 1
-Fentanyl Patch 12mcg/hr q72hr  -Oxy IR 5mg PO q4h PRN for Moderate Pain  -Dilaudid 0.5mg IV q3h PRN for Severe Pain

## 2025-04-14 NOTE — PROGRESS NOTE ADULT - PROBLEM SELECTOR PLAN 6
Complex medical decision making / symptom management in the setting of advanced malignancy.    Will continue to follow for ongoing GOC discussion / titration of palliative regimen. Emotional support provided, questions answered.  Active Psychosocial Referrals:  [x]Social Work/Case management []PT/OT []Chaplaincy []Hospice  []Childlife []Holistic RN [x]Massage Therapy []Music Therapy []Ethics  Coping: [] well [x] with difficulty [] poor coping [] unable to assess  Support system: [] strong [x] adequate [] inadequate    For new or uncontrolled symptoms, please call Palliative Care at [phone]). The service is available 24/7 (including nights & weekends) to provide symptom management recommendations over the phone as appropriate

## 2025-04-14 NOTE — PROGRESS NOTE ADULT - PROBLEM SELECTOR PLAN 3
Na on admission 129, and has been downtrending throughout admission to 124. Initial urine studies with urine osmol >800 and Na<20 suggesting ADH and RAAS on. Possibly intravascular depletion with third spacing given 1+ bilateral edema and low albumin given liver mets.  s/p bumex and spironolactone with worsening sodium    -if Na >126, can continue spirinolactone 25 mg daily and bumex 0.5 mg daily Na on admission 129, and has been downtrending throughout admission to 124. Initial urine studies with urine osmol >800 and Na<20 suggesting ADH and RAAS on. Possibly intravascular depletion with third spacing given 1+ bilateral edema and low albumin given liver mets.  s/p bumex and spironolactone with worsening sodium    -ctm Na, but likely at baseline  -BUN uptrending but given hypervolemia, apprehensive to give IVF; encourage PO intake

## 2025-04-14 NOTE — PROGRESS NOTE ADULT - SUBJECTIVE AND OBJECTIVE BOX
bilirubin continues to increase (10.8 this morning)  discussed with GI and IR, no role for stenting or drainage at this time  plan for Tenckhoff catheter for palliative relief of recurrent ascites     At length discussion this morning with patient and oncologist Dr. Ruby, we expressed concerns that if there was nothing stentable or drainable then hospice services would be recommended.   The patient expressed understanding.  At the time of the original discussion we had not yet confirmed with GI or IR that nothing could be done.  I personally went back later in the afternoon and had a discussion with palliative care also present where we did tell her that both IR and GI agreed there was nothing to offer at this time to attempt to lower the bilirubin.  The patient would like to proceed with Tenckhoff catheter placement and discuss hospice planning when her parents arrive here from [place] on Wednesday.     her partner is having visa issues and unable to get back into the [place] from the [place], I wrote him a letter to submit       ANTIBIOTICS/RELEVANT:    MEDICATIONS  (STANDING):  atenolol  Tablet 12.5 milliGRAM(s) Oral every 24 hours  bisacodyl 5 milliGRAM(s) Oral at bedtime  fentaNYL   Patch  12 MICROgram(s)/Hr 1 Patch Transdermal every 72 hours  influenza   Vaccine 0.5 milliLiter(s) IntraMuscular once  pantoprazole    Tablet 40 milliGRAM(s) Oral every 24 hours  predniSONE   Tablet 10 milliGRAM(s) Oral every 24 hours  predniSONE   Tablet 5 milliGRAM(s) Oral every 24 hours  senna 2 Tablet(s) Oral at bedtime  trimethoprim  160 mG/sulfamethoxazole 800 mG 1 Tablet(s) Oral <User Schedule>  ursodiol Capsule 300 milliGRAM(s) Oral three times a day    MEDICATIONS  (PRN):  acetaminophen     Tablet .. 650 milliGRAM(s) Oral every 6 hours PRN Temp greater or equal to 38C (100.4F), Mild Pain (1 - 3)  ALPRAZolam 0.25 milliGRAM(s) Oral at bedtime PRN for anxiety  HYDROmorphone   Tablet 3 milliGRAM(s) Oral every 4 hours PRN Severe Pain (7 - 10)  HYDROmorphone   Tablet 2 milliGRAM(s) Oral every 4 hours PRN Moderate Pain (4 - 6)  ondansetron Injectable 4 milliGRAM(s) IV Push every 6 hours PRN Nausea and/or Vomiting  polyethylene glycol 3350 17 Gram(s) Oral every 24 hours PRN Constipation      Vital Signs Last 24 Hrs  T(C): 36.6 (14 Apr 2025 12:36), Max: 36.9 (13 Apr 2025 20:10)  T(F): 97.8 (14 Apr 2025 12:36), Max: 98.4 (13 Apr 2025 20:10)  HR: 108 (14 Apr 2025 12:36) (88 - 108)  BP: 115/74 (14 Apr 2025 12:36) (100/64 - 115/74)  BP(mean): 78 (14 Apr 2025 06:13) (78 - 78)  RR: 18 (14 Apr 2025 12:36) (18 - 20)  SpO2: 92% (14 Apr 2025 12:36) (91% - 97%)    Parameters below as of 14 Apr 2025 12:36  Patient On (Oxygen Delivery Method): room air      PHYSICAL EXAM:  General: in no acute distress  Lungs: CTA B/L. No wheezes, rales, or rhonchi  Cardiovascular: RRR. No murmurs, rubs, or gallops  Abdomen: abdomen is moderately distended, bowel sounds active  Extremities: WWP, No clubbing, BLLE Edema 1+   Neurological: Alert and oriented  Skin: Warm and dry. No obvious rash     LABS:                        12.4   19.97 )-----------( 406      ( 14 Apr 2025 05:30 )             37.4     04-14    125[L]  |  90[L]  |  28[H]  ----------------------------<  77  5.1   |  21[L]  |  0.79    Ca    8.2[L]      14 Apr 2025 05:30  Phos  2.4     04-13  Mg     2.1     04-14    TPro  5.6[L]  /  Alb  2.1[L]  /  TBili  10.8[H]  /  DBili  8.5[H]  /  AST  269[H]  /  ALT  127[H]  /  AlkPhos  369[H]  04-14      Urinalysis Basic - ( 14 Apr 2025 09:06 )

## 2025-04-14 NOTE — BH CONSULTATION LIAISON PROGRESS NOTE - NSBHCONSULTFOLLOWAFTERCARE_PSY_A_CORE FT
Will attempt to coordinate psycho-oncology follow up prior to discharge. to be discussed at discharge

## 2025-04-14 NOTE — PROGRESS NOTE ADULT - NSPROGADDITIONALINFOA_GEN_ALL_CORE
In addition to the E/M visit, an advance care planning meeting was performed. Start time: 1:10PM; End time: 1:26PM; Total time: 16min. A face to face meeting to discuss advance care planning was held today regarding: ART CAGLE. Primary decision maker: Patient is able to participate in decision making; Alternate/surrogate: . Discussed advance directives including, but not limited to, healthcare proxy and code status. Decision regarding code status: DNR/DNI; Documentation completed today: [facility] Hospice Referral [facility] note

## 2025-04-14 NOTE — PROGRESS NOTE ADULT - PROBLEM SELECTOR PLAN 5
Patient is DNR/DNI, MOLST in chart  -significant other would be surrogate decision maker in the absence of HCP  -introduced the role of home hospice services

## 2025-04-14 NOTE — PROGRESS NOTE ADULT - SUBJECTIVE AND OBJECTIVE BOX
Progress Note  INCOMPLETE NOTE    INTERVAL EVENTS:   No acute events overnight.    SUBJECTIVE:   Patient seen and examined at bedside. Condition largely unchanged from yesterday. No acute complaints at this time.    ROS:  Negative unless otherwise stated above.    PHYSICAL EXAM:  General: Alert and oriented x 3. No acute distress.   HEENT: Moist mucous membranes. Anicteric. No cervical lymphadenopathy.  Cardiovascular: Regular rate and rhythm. No murmur. Normal JVP.  Lungs: Clear to auscultation bilaterally. No accessory muscle use.  Abdomen: Soft, non-tender and non-distended. No palpable masses.  Extremities: No edema. Non-tender. Warm.  Skin: No rashes or lesions.   Neurologic: No apparent focal neurological deficits. CN II-XII grossly intact, but not individually tested.  Psychiatric: Cooperative. Appropriate mood and affect.    VITAL SIGNS:  Vital Signs Last 24 Hrs  T(C): 36.9 (14 Apr 2025 06:13), Max: 36.9 (13 Apr 2025 20:10)  T(F): 98.4 (14 Apr 2025 06:13), Max: 98.4 (13 Apr 2025 20:10)  HR: 105 (14 Apr 2025 06:13) (88 - 105)  BP: 102/65 (14 Apr 2025 06:13) (100/64 - 110/72)  BP(mean): 78 (14 Apr 2025 06:13) (78 - 78)  RR: 18 (14 Apr 2025 06:13) (18 - 20)  SpO2: 91% (14 Apr 2025 06:13) (91% - 97%)    Parameters below as of 14 Apr 2025 06:13  Patient On (Oxygen Delivery Method): room air      INPATIENT MEDICATIONS:   MEDICATIONS  (STANDING):  apixaban 5 milliGRAM(s) Oral every 12 hours  atenolol  Tablet 12.5 milliGRAM(s) Oral every 24 hours  bisacodyl 5 milliGRAM(s) Oral at bedtime  influenza   Vaccine 0.5 milliLiter(s) IntraMuscular once  pantoprazole    Tablet 40 milliGRAM(s) Oral every 24 hours  predniSONE   Tablet 10 milliGRAM(s) Oral every 24 hours  predniSONE   Tablet 5 milliGRAM(s) Oral every 24 hours  senna 2 Tablet(s) Oral at bedtime  trimethoprim  160 mG/sulfamethoxazole 800 mG 1 Tablet(s) Oral <User Schedule>  ursodiol Capsule 300 milliGRAM(s) Oral three times a day    MEDICATIONS  (PRN):  acetaminophen     Tablet .. 650 milliGRAM(s) Oral every 6 hours PRN Temp greater or equal to 38C (100.4F), Mild Pain (1 - 3)  ALPRAZolam 0.25 milliGRAM(s) Oral at bedtime PRN for anxiety  HYDROmorphone   Tablet 2 milliGRAM(s) Oral every 4 hours PRN Moderate Pain (4 - 6)  HYDROmorphone  Injectable 0.5 milliGRAM(s) IV Push every 3 hours PRN Severe Pain (7 - 10)  ondansetron Injectable 4 milliGRAM(s) IV Push every 6 hours PRN Nausea and/or Vomiting  polyethylene glycol 3350 17 Gram(s) Oral every 24 hours PRN Constipation    HOME MEDICATIONS  ALPRAZolam 0.25 mg oral tablet: 1 tab(s) orally once a day (at bedtime) as needed for  anxiety  Bactrim  mg-160 mg oral tablet: 1 tab(s) orally Monday, Wednesday, and Friday  Blisovi FE 1/20 oral tablet: 1 tab(s) orally once a day  Eliquis 5 mg oral tablet: 2 tab(s) orally 2 times a day for 7 days  predniSONE 5 mg oral tablet: 2 tab(s) orally once a day    ALLERGIES:  Allergies    No Known Allergies    Intolerances    LABS:                       12.3   19.46 )-----------( 340      ( 13 Apr 2025 05:30 )             36.1     04-13    124[L]  |  91[L]  |  24[H]  ----------------------------<  107[H]  4.6   |  18[L]  |  0.75    Ca    7.9[L]      13 Apr 2025 15:47  Phos  2.4     04-13  Mg     2.0     04-13    TPro  5.5[L]  /  Alb  2.0[L]  /  TBili  9.3[H]  /  DBili  7.1[H]  /  AST  258[H]  /  ALT  113[H]  /  AlkPhos  373[H]  04-13      Urinalysis Basic - ( 13 Apr 2025 15:47 )    Color: x / Appearance: x / SG: x / pH: x  Gluc: 107 mg/dL / Ketone: x  / Bili: x / Urobili: x   Blood: x / Protein: x / Nitrite: x   Leuk Esterase: x / RBC: x / WBC x   Sq Epi: x / Non Sq Epi: x / Bacteria: x      CAPILLARY BLOOD GLUCOSE        RADIOLOGY & ADDITIONAL TESTS: Reviewed. Progress Note  INTERVAL EVENTS:   No acute events overnight.    SUBJECTIVE:   Patient reports feeling well but more distended this AM. Notes eating well yesterday.     ROS:  Negative unless otherwise stated above.    PHYSICAL EXAM:  General: Alert and oriented x 3.   HEENT: Moist mucous membranes.   Cardiovascular: Regular rate and rhythm.  Lungs: RLL diminished breath sounds, clear otherwise.   Abdomen: Soft, mildly distended, non-tender to palpation   Extremities: 1+ edema bilaterally Non-tender. WWP  Skin: mild diffuse jaundice    Neurologic: No apparent focal neurological deficits  Psychiatric: Cooperative. Appropriate mood and affect. Remains hopeful     VITAL SIGNS:  Vital Signs Last 24 Hrs  T(C): 36.9 (14 Apr 2025 06:13), Max: 36.9 (13 Apr 2025 20:10)  T(F): 98.4 (14 Apr 2025 06:13), Max: 98.4 (13 Apr 2025 20:10)  HR: 105 (14 Apr 2025 06:13) (88 - 105)  BP: 102/65 (14 Apr 2025 06:13) (100/64 - 110/72)  BP(mean): 78 (14 Apr 2025 06:13) (78 - 78)  RR: 18 (14 Apr 2025 06:13) (18 - 20)  SpO2: 91% (14 Apr 2025 06:13) (91% - 97%)    Parameters below as of 14 Apr 2025 06:13  Patient On (Oxygen Delivery Method): room air      INPATIENT MEDICATIONS:   MEDICATIONS  (STANDING):  apixaban 5 milliGRAM(s) Oral every 12 hours  atenolol  Tablet 12.5 milliGRAM(s) Oral every 24 hours  bisacodyl 5 milliGRAM(s) Oral at bedtime  influenza   Vaccine 0.5 milliLiter(s) IntraMuscular once  pantoprazole    Tablet 40 milliGRAM(s) Oral every 24 hours  predniSONE   Tablet 10 milliGRAM(s) Oral every 24 hours  predniSONE   Tablet 5 milliGRAM(s) Oral every 24 hours  senna 2 Tablet(s) Oral at bedtime  trimethoprim  160 mG/sulfamethoxazole 800 mG 1 Tablet(s) Oral <User Schedule>  ursodiol Capsule 300 milliGRAM(s) Oral three times a day    MEDICATIONS  (PRN):  acetaminophen     Tablet .. 650 milliGRAM(s) Oral every 6 hours PRN Temp greater or equal to 38C (100.4F), Mild Pain (1 - 3)  ALPRAZolam 0.25 milliGRAM(s) Oral at bedtime PRN for anxiety  HYDROmorphone   Tablet 2 milliGRAM(s) Oral every 4 hours PRN Moderate Pain (4 - 6)  HYDROmorphone  Injectable 0.5 milliGRAM(s) IV Push every 3 hours PRN Severe Pain (7 - 10)  ondansetron Injectable 4 milliGRAM(s) IV Push every 6 hours PRN Nausea and/or Vomiting  polyethylene glycol 3350 17 Gram(s) Oral every 24 hours PRN Constipation    HOME MEDICATIONS  ALPRAZolam 0.25 mg oral tablet: 1 tab(s) orally once a day (at bedtime) as needed for  anxiety  Bactrim  mg-160 mg oral tablet: 1 tab(s) orally Monday, Wednesday, and Friday  Blisovi FE 1/20 oral tablet: 1 tab(s) orally once a day  Eliquis 5 mg oral tablet: 2 tab(s) orally 2 times a day for 7 days  predniSONE 5 mg oral tablet: 2 tab(s) orally once a day    ALLERGIES:  Allergies    No Known Allergies    Intolerances    LABS:                       12.3   19.46 )-----------( 340      ( 13 Apr 2025 05:30 )             36.1     04-13    124[L]  |  91[L]  |  24[H]  ----------------------------<  107[H]  4.6   |  18[L]  |  0.75    Ca    7.9[L]      13 Apr 2025 15:47  Phos  2.4     04-13  Mg     2.0     04-13    TPro  5.5[L]  /  Alb  2.0[L]  /  TBili  9.3[H]  /  DBili  7.1[H]  /  AST  258[H]  /  ALT  113[H]  /  AlkPhos  373[H]  04-13      Urinalysis Basic - ( 13 Apr 2025 15:47 )    Color: x / Appearance: x / SG: x / pH: x  Gluc: 107 mg/dL / Ketone: x  / Bili: x / Urobili: x   Blood: x / Protein: x / Nitrite: x   Leuk Esterase: x / RBC: x / WBC x   Sq Epi: x / Non Sq Epi: x / Bacteria: x      CAPILLARY BLOOD GLUCOSE        RADIOLOGY & ADDITIONAL TESTS: Reviewed.

## 2025-04-14 NOTE — PROGRESS NOTE ADULT - SUBJECTIVE AND OBJECTIVE BOX
GI Consult Progress Note:     OVERNIGHT EVENTS: JIMMY.    SUBJECTIVE / INTERVAL HPI:   Patient seen and examined at bedside. Discussed with patient no role for stenting at this time. Bilirubin up-trending and now 10.       VITAL SIGNS:  Vital Signs Last 24 Hrs  T(C): 36.6 (2025 12:36), Max: 36.9 (2025 20:10)  T(F): 97.8 (2025 12:36), Max: 98.4 (2025 20:10)  HR: 108 (2025 12:36) (88 - 108)  BP: 115/74 (2025 12:36) (100/64 - 115/74)  BP(mean): 78 (2025 06:13) (78 - 78)  RR: 18 (2025 12:36) (18 - 20)  SpO2: 92% (2025 12:36) (91% - 97%)    Parameters below as of 2025 12:36  Patient On (Oxygen Delivery Method): room air        25 @ 07:  -  25 @ 07:00  --------------------------------------------------------  IN: 230 mL / OUT: 950 mL / NET: -720 mL    25 @ 07:01  -  25 @ 13:13  --------------------------------------------------------  IN: 0 mL / OUT: 100 mL / NET: -100 mL      PHYSICAL EXAM:  General: No acute distress  Lungs: Normal respiratory effort and no intercostal retractions  Cardiovascular: RRR  Abdomen: Soft, non-tender, non-distended  Neurological: Alert and oriented x3  Skin: Warm and dry. No obvious rash      MEDICATIONS:  MEDICATIONS  (STANDING):  atenolol  Tablet 12.5 milliGRAM(s) Oral every 24 hours  bisacodyl 5 milliGRAM(s) Oral at bedtime  fentaNYL   Patch  12 MICROgram(s)/Hr 1 Patch Transdermal every 72 hours  influenza   Vaccine 0.5 milliLiter(s) IntraMuscular once  pantoprazole    Tablet 40 milliGRAM(s) Oral every 24 hours  predniSONE   Tablet 10 milliGRAM(s) Oral every 24 hours  predniSONE   Tablet 5 milliGRAM(s) Oral every 24 hours  senna 2 Tablet(s) Oral at bedtime  trimethoprim  160 mG/sulfamethoxazole 800 mG 1 Tablet(s) Oral <User Schedule>  ursodiol Capsule 300 milliGRAM(s) Oral three times a day    MEDICATIONS  (PRN):  acetaminophen     Tablet .. 650 milliGRAM(s) Oral every 6 hours PRN Temp greater or equal to 38C (100.4F), Mild Pain (1 - 3)  ALPRAZolam 0.25 milliGRAM(s) Oral at bedtime PRN for anxiety  HYDROmorphone   Tablet 3 milliGRAM(s) Oral every 4 hours PRN Severe Pain (7 - 10)  HYDROmorphone   Tablet 2 milliGRAM(s) Oral every 4 hours PRN Moderate Pain (4 - 6)  ondansetron Injectable 4 milliGRAM(s) IV Push every 6 hours PRN Nausea and/or Vomiting  polyethylene glycol 3350 17 Gram(s) Oral every 24 hours PRN Constipation      ALLERGIES:  Allergies    No Known Allergies    Intolerances        LABS:                        12.4   19.97 )-----------( 406      ( 2025 05:30 )             37.4     04-14    125[L]  |  90[L]  |  28[H]  ----------------------------<  77  5.1   |  21[L]  |  0.79    Ca    8.2[L]      2025 05:30  Phos  2.4     04-13  Mg     2.1     04-14    TPro  5.6[L]  /  Alb  2.1[L]  /  TBili  10.8[H]  /  DBili  8.5[H]  /  AST  269[H]  /  ALT  127[H]  /  AlkPhos  369[H]  04-14      Urinalysis Basic - ( 2025 09:06 )    Color: Dark Yellow / Appearance: Clear / S.025 / pH: x  Gluc: x / Ketone: 15 mg/dL  / Bili: Large / Urobili: 0.2 mg/dL   Blood: x / Protein: Negative mg/dL / Nitrite: Negative   Leuk Esterase: Small / RBC: 10 /HPF / WBC 15 /HPF   Sq Epi: x / Non Sq Epi: 5 /HPF / Bacteria: Negative /HPF      CAPILLARY BLOOD GLUCOSE  RADIOLOGY & ADDITIONAL TESTS: Reviewed.

## 2025-04-14 NOTE — CHART NOTE - NSCHARTNOTEFT_GEN_A_CORE
Patient's chart and imaging reviewed in detail with advanced GI attending.     Went to see patient in her room to explain that there is no role for stenting based on imaging findings.   Palliative care team and heme onc team said that this messaged has already been relayed to her, and she is emotional at this time, so it would be better to give her space.     Recommendations:   - reviewed patient's most recent MRCPs and there is no role for stenting at this time   - continue ursodiol 300 mg PO TID and can give Atarax at night if itching worsens   - trend CMP, direct bili, and indirect bili daily   - can consider abdominal Pleurx if patient is planning to pursue home hospice     Case discussed with Dr. Peoples. GI Team will sign off. Please re-consult with any questions or concerns.     Reva Orellana D.O.   Gastroenterology Fellow  Weekday 7am-5pm Pager: [phone]  Weeknights/Weekend/Holiday Coverage: Please call the  for contact info Patient's chart and imaging reviewed in detail with advanced GI attending.     Went to see patient in her room to explain that there is no role for stenting based on imaging findings.   Palliative care team and heme onc team said that this message has already been relayed to her, and she is emotional at this time, so it would be better to give her space.     Recommendations:   - reviewed patient's most recent MRCPs, and there is no role for stenting at this time   - continue ursodiol 300 mg PO TID and can give Atarax at night if itching worsens   - trend CMP, direct bili, and indirect bili daily   - can consider abdominal Pleurx for malignant ascites if patient is planning to pursue home hospice     Case discussed with Dr. Peoples. GI Team will sign off. Please re-consult with any questions or concerns.     Reva Orellana D.O.   Gastroenterology Fellow  Weekday 7am-5pm Pager: [phone]  Weeknights/Weekend/Holiday Coverage: Please call the  for contact info

## 2025-04-14 NOTE — PROGRESS NOTE ADULT - ASSESSMENT
·	ordered Fentanyl Patch 12mcg/hr q72hr as long-acting opiate  ·	recommend continuing Dilaudid 0.5mg IV q3h PRN for Severe Pain as the patch becomes therapeutic  ·	would benefit from abdominal catheter for ascites management  ·	introduced the role/benefits of home hospice, patient will deliberate further  ·	MOLST completed with DNR/DNI 45yo F with PMH of Metastatic Uveal Melanoma p/w abdominal pain and found to have significant ascites. Palliative consulted for complex symptom management in the setting of metastatic malignancy.    ·	ordered Fentanyl Patch 12mcg/hr q72hr as long-acting opiate  ·	recommend continuing Dilaudid 0.5mg IV q3h PRN for Severe Pain as the patch becomes therapeutic  ·	would benefit from abdominal catheter for ascites management  ·	introduced the role/benefits of home hospice, patient will deliberate further  ·	MOLST completed with DNR/DNI

## 2025-04-14 NOTE — PROGRESS NOTE ADULT - PROBLEM SELECTOR PLAN 4
Metastatic disease, progressed through previous lines of treatment  -further treatment options unlikely due to hyperbilirubinemia  -if no further DMT were offered/pursued, then patient would be eligible for home hospice

## 2025-04-14 NOTE — PROGRESS NOTE ADULT - PROBLEM SELECTOR PLAN 2
Patient presenting with leukocytosis meeting 2/4 SIRS criteria. No colitis seen on CT. Pain likely from large ascites and portal vein thrombosis. Pain is improved after para.  -s/p paracentesis 4/11  -pain control:      -dilaudid 0.5 IV q3 PRN for Severe Pain      -dilaudid 2mg PO q4 PRN for Moderate Pain      -Bowel Regimen: Senna 2tabs qhs + Miralax daily PRN  -can monitor off abx at this time

## 2025-04-14 NOTE — PROGRESS NOTE ADULT - PROBLEM SELECTOR PLAN 3
Recent para with rapid reaccumulation  -s/p therapeutic para with IR  -would benefit from abdominal catheter placement

## 2025-04-14 NOTE — PROGRESS NOTE ADULT - PROBLEM SELECTOR PLAN 7
New right sided pleural effusion seen on CT A/P. Pulmonology agreeable to perform paracentesis Saturday  Likely 2/2 underlying malignancy  Pulm consulted and saw patient noting small untappable pleural effusion and unlikely causing her symptoms.

## 2025-04-14 NOTE — PROGRESS NOTE ADULT - PROBLEM SELECTOR PLAN 1
Patient presenting with leukocytosis meeting 2/4 SIRS criteria(HR 98 and wbc 19.4k). Wbc 4/9 20k though in 2/25 wnl. May be reactive iso malignancy vs infectious given abdominal pain. SOB, new pleural effusion, dry cough and fatigue considered URTI though rvp negative  para nucleated cell count 196  -f/u paracentesis culture, gram stain  -can monitor after abx

## 2025-04-14 NOTE — PROGRESS NOTE ADULT - PROBLEM SELECTOR PLAN 10
Subjectively feels as though she has incomplete voiding though denies dysuria, vaginal discharge or urinary frequency. Bladder scan in ED showed >500 thought to be likely from ascites as patient did not have the urge to urinate. POCUS revealing distended bladder. 100cc on SC 4/11. Incomplete emptying better after para and diuretics given  SC 1x 4/11    -strict I/O to ensure pt voiding appropriately  -bladder scans q6, get pvr  -SC for volume >300

## 2025-04-14 NOTE — PROGRESS NOTE ADULT - ATTENDING COMMENTS
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     VS reviewed and stable on room air, mildly tachycardic     Exam:   Appears comfortable sitting up in bed   MMM  Normal WOB on RA, CTAB   RRR, no mrg   Abdomen soft, mildly distended, mild tenderness to deep palpation   Extremities warm, 1+ pitting edema to the knees of both legs   AOX3, no focal neuro deficits     Labs reviewed   WBC 19, CBC stable   Na 125, bicarb 21, Cr at baseline   Total bili up to 10.8  //  Urine osm high, urine sodium <20     Imaging reviewed, none new interval     A/P:   -SIRS criteria on presentation, leukocytosis: likely to be related to malignancy/reactive. Paracentesis cell count/gram stain not suggestive of active bacterial infection. Pain control with PRN dilaudid, continue standing bowel regimen. Low threshold for panculture if fevers.  -Hyponatremia: repeat urine lytes suggestive of hypovolemia (likely intravascularly volume down though she has ascites/third spacing), remove fluid restriction and repeat BMP   -Hyperbilirubinemia: appreciate GI recs, continue ursodiol, may be candidate for biliary stenting   -Anterior uveal  melanoma: s/p multiple rounds immunotherapy, pain control as above, continue prednisone, bactrim for PJP ppx and pantoprazole. Appreciate oncology and palliative involvement for GOC today.   -R pleural effusion: per pulmonary not tappable effusion and unlikely to be causing symptoms. Repeat CXR/POCUS if any respiratory sx or desaturation.   -Portal vein thrombosis, acute: continue eliquis     Rest as per resident note.

## 2025-04-14 NOTE — PROGRESS NOTE ADULT - ASSESSMENT
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     GI consulted for elevated bilirubin and to see if patient would be a candidate for possible stenting to reduce bilirubin levels so that she could enter a experimental trial.     Recommendations:   - reviewed patient's most recent MRCPs with advanced GI (Dr. Peoples) and there is no role for stenting at this time   - continue ursodiol 300 mg PO TID and can give Atarax at night if itching worsens   - trend CMP, direct bili, and indirect bili daily   - could consider abdominal pleurx placement for malignant ascites if patient wishes to pursue home hospice     Case discussed with Dr. Peoples. GI Team will sign off. Please re-consult with any questions or concerns.     Reva Orellana D.O.   Gastroenterology Fellow  Weekday 7am-5pm Pager: [phone]  Weeknights/Weekend/Holiday Coverage: Please call the  for contact info

## 2025-04-14 NOTE — PROGRESS NOTE ADULT - PROBLEM SELECTOR PLAN 8
Acute portal vein thrombosis seen on CT. Received one dose eliquis AM 4/10. Holding iso upcoming paracentesis and thoracentesis. Started eliquis 10mg BID on Monday.    -c/w eliquis 10 mg for 5 doses, then 5mg BID

## 2025-04-14 NOTE — PROGRESS NOTE ADULT - CONVERSATION DETAILS
Reviewed patient's hospital course and interval developments. Discussed advanced directives including code status, HCP completion, and hospice eligibility. Patient made aware that further DMT is not safe due to hyperbilirubinemia. She elected to forego aggressive treatments at end of life and MOLST was completed with DNR/DNI. Introduced the role of hospice services, delineated the benefits provided, and explained the intended philosophy of care. Patient is appropriate for home hospice at this time and amenable to referral.

## 2025-04-14 NOTE — PROGRESS NOTE ADULT - PROBLEM SELECTOR PLAN 5
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 She had paracentesis 4/9 that drained 1L without complications. Patient still distended and complaining of pain. Nucleated cell count 173.  -s/p paracentesis with IR 4/11

## 2025-04-14 NOTE — PROGRESS NOTE ADULT - PROBLEM SELECTOR PLAN 6
She achieved a treatment effect in the dominant mass treated with Y90, with some progression elsewhere. She subsequently received therapy with immunoembolization x 2 administered concurrently with tebentafusp. In the setting of progression, she received radioembolization followed by ipilimumab and nivolumab, with the later complicated by the development of colitis, hepatitis and thyroiditis. She is now s/p chemo embolization under the care of Dr. Layla Acharya.  Saw Dr. Dr. Ruby 4/8    -pain control:       -dilaudid 0.5 IV q3 PRN for Severe Pain      -dilaudid 2mg PO q4 PRN for Moderate Pain      -Bowel Regimen: Senna 2tabs qhs + Miralax daily PRN  -c/w prednisone 10mg in AM 5 mg in PM  -c/w bactrim DS MWF for PJP ppx  -c/w pantoprazole 40 mg daily  -c/w xanax 0.25 mg twice a day as needed prescribed, though she only takes at bedtime  -palliative recs

## 2025-04-14 NOTE — GOALS OF CARE CONVERSATION - ADVANCED CARE PLANNING - CONVERSATION DETAILS
Discussion with pt by bedside regarding lack of further treatment options, and overall goals of care. Decision jointly made to transition to DNR/DNI. Further discussion will follow with palliative team regarding ?hospice

## 2025-04-14 NOTE — PROGRESS NOTE ADULT - ASSESSMENT
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 She initiated therapy with tebentafusp at [facility] but then moved to [place]. Due to radiographic progression of a dominant site of disease, she underwent Y90 mapping on 9/7 and Y90 radioembolization on 09/21/23 with no issue. She achieved a treatment effect in the dominant mass treated with Y90, with some progression elsewhere. She subsequently received therapy with immunoembolization x 2 administered concurrently with tebentafusp. In the setting of progression, she received radioembolization followed by ipilimumab and nivolumab, with the later complicated by the development of colitis, hepatitis and thyroiditis. She is now s/p chemo embolization under the care of Dr. Layla Acharya.    Patient is s/p paracentesis yesterday where 1L of fluid was drained. Now presents to [facility] with increased abdominal pain.  Per IR, during US yesterday there was little fluid to drain despite patient complaining of abdominal distension and appeared to all be liver disease on US.  CT AP performed in the ER today with:  Acute thrombus in main portal vein and both portal vein branches. Increased moderate-large ascites, secondary to portal hypertension with signs of generalized fluid overload. Increased hepatic metastases. Notably, superior intrahepatic IVC and right hepatic vein narrowed due to mass effect from metastases. Increased upper retroperitoneal metastatic lymphadenopathy. Increased moderate right pleural effusion, secondary partial atelectasis right middle and lower lobes.  Unchanged bony metastases. Unchanged visualized lung metastases.     Patient seen and examined at bedside, she continues to endorse abdominal pain, distension and weakness.  Reports BLLE edema as well.  Labs notable for WBC 19.47, bilirubin 7.3.  Patient states she started taking Eliquis on Monday for new portal vein thrombosis, she also remains on prednisone 15mg daily.  S/p 1.3L abdominal fluid drained on friday.         plan  -continue prednisone 15mg daily  -hold home Eliquis in preparation tenckhoff catheter placement. please place IR referral, d/w Dr. Viera   -appreciate palliative care consult  -ongoing hospice discussions to be had over the next few days while we plan for home vs inpatient, patient wants to wait until her parents arrive on wednesday  -oncology will continue to follow

## 2025-04-15 NOTE — DIETITIAN INITIAL EVALUATION ADULT - PROBLEM SELECTOR PLAN 1
Patient presenting with leukocytosis meeting 2/4 SIRS criteria(HR 98 and wbc 19.4k). Wbc 4/9 20k though in 2/25 wnl. May be reactive iso malignancy vs infectious given abdominal pain. Given SOB, new pleural effusion, dry cough and fatigue can consider URTI.    -obtain rvp  -f/u paracentesis culture, gram stain, cell counts  -can monitor after abx

## 2025-04-15 NOTE — PROGRESS NOTE ADULT - ATTENDING COMMENTS
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    VSS, remains on minimal supplemental O2.  Patient appears comfortable, abd is distended but soft, dry mucus membranes.  Labs notable for stable leukocytosis.  Na downtrended 125 --> 122 with hyperK to 5.7, also AGMA with HCO3 16 and AG 20, BUN 36.  Bilirubin and transaminases are continuing to uptrend.  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 Continue ursodiol, no role for biliary stenting per GI review of MRCP.    #hyponatremia #hyperkalemia #AGMA - urine studies indicate hypovolemia, patient is intravascularly depleted despite large volume ascites. Trial albumin for resuscitation, limit free water intake and encourage diet. S/p Lokelma, repeat BMP in PM. Likely also component of renal injury 2/2 hypovolemia with bile cast nephropathy, cystatin C pending. 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    VSS, remains on minimal supplemental O2.  Patient appears comfortable, abd is distended but soft, dry mucus membranes.  Labs notable for stable leukocytosis.  Na downtrended 125 --> 122 with hyperK to 5.7, also AGMA with HCO3 16 and AG 20, BUN 36.  Bilirubin and transaminases are continuing to uptrend.  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 Continue ursodiol, no role for biliary stenting per GI review of MRCP.    #hyponatremia #hyperkalemia #AGMA - urine studies indicate hypovolemia, patient is intravascularly depleted despite large volume ascites. Trial albumin for resuscitation, limit free water intake and encourage diet. Give Lokelma, repeat BMP in PM. Likely also component of renal injury 2/2 hypovolemia with bile cast nephropathy, cystatin C pending. Hold Bactrim due to hyperK.

## 2025-04-15 NOTE — PROGRESS NOTE ADULT - ASSESSMENT
43yo F with PMH of Metastatic Uveal Melanoma p/w abdominal pain and found to have significant ascites. Palliative consulted for complex symptom management in the setting of metastatic malignancy.

## 2025-04-15 NOTE — DIETITIAN INITIAL EVALUATION ADULT - PERTINENT LABORATORY DATA
04-15    122[L]  |  86[L]  |  36[H]  ----------------------------<  61[L]  5.7[H]   |  16[L]  |  0.85    Ca    8.2[L]      15 Apr 2025 05:30  Phos  2.6     04-15  Mg     2.5     04-15    TPro  5.5[L]  /  Alb  2.1[L]  /  TBili  11.7[H]  /  DBili  9.2[H]  /  AST  356[H]  /  ALT  149[H]  /  AlkPhos  350[H]  04-15

## 2025-04-15 NOTE — PROGRESS NOTE ADULT - PROBLEM SELECTOR PLAN 6
She achieved a treatment effect in the dominant mass treated with Y90, with some progression elsewhere. She subsequently received therapy with immunoembolization x 2 administered concurrently with tebentafusp. In the setting of progression, she received radioembolization followed by ipilimumab and nivolumab, with the later complicated by the development of colitis, hepatitis and thyroiditis. She is now s/p chemo embolization under the care of Dr. Layla Acharya.  Saw Dr. Dr. Ruby 4/8    -pain control:       -dilaudid 0.5 IV q3 PRN for Severe Pain      -dilaudid 2mg PO q4 PRN for Moderate Pain      -Bowel Regimen: Senna 2tabs qhs + Miralax daily PRN  -c/w prednisone 10mg in AM 5 mg in PM  -c/w bactrim DS MWF for PJP ppx  -c/w pantoprazole 40 mg daily  -c/w xanax 0.25 mg twice a day as needed prescribed, though she only takes at bedtime  -palliative recs Patient with mets to the liver from uveal melanoma. She had paracentesis 4/9 that drained 1L without complications. Patient still distended and complaining of pain. Nucleated cell count 173.  -s/p paracentesis with IR 4/11

## 2025-04-15 NOTE — PROGRESS NOTE ADULT - PROBLEM SELECTOR PLAN 4
Continuing to elevate. Given elevation, patient unable to enroll in clinical study or receive treatment.    -f/u GI recs  -ursodiol 200mg TID  -can give atarax for itching prn, she notes it's very mild at this time Continuing to elevate. Given elevation, patient unable to enroll in clinical study or receive treatment.    -no role for stenting per GI  -ursodiol 200mg TID  -can give atarax for itching prn, she notes it's very mild at this time Na on admission 129, and has been downtrending throughout admission to 124. Initial urine studies with urine osmol >800 and Na<20 suggesting ADH and RAAS on. Possibly intravascular depletion with third spacing given 1+ bilateral edema and low albumin given liver mets.  s/p bumex and spironolactone with worsening sodium  Placed back on 1L fluid restriction; s/p 25% albumin given likely intravascular depletion though third spacing    -ctm Na  -f/u cystatin c as BUN may be climbing iso worsening renal function

## 2025-04-15 NOTE — PROGRESS NOTE ADULT - PROBLEM SELECTOR PLAN 6
Complex medical decision making / symptom management in the setting of advanced malignancy.    Will continue to follow for ongoing GOC discussion / titration of palliative regimen. Emotional support provided, questions answered.  Active Psychosocial Referrals:  [x]Social Work/Case management []PT/OT []Chaplaincy [x]Hospice  []Childlife []Holistic RN [x]Massage Therapy []Music Therapy []Ethics  Coping: [] well [x] with difficulty [] poor coping [] unable to assess  Support system: [] strong [x] adequate [] inadequate    For new or uncontrolled symptoms, please call Palliative Care at [phone]). The service is available 24/7 (including nights & weekends) to provide symptom management recommendations over the phone as appropriate

## 2025-04-15 NOTE — DIETITIAN INITIAL EVALUATION ADULT - PROBLEM SELECTOR PLAN 3
New right sided pleural effusion seen on CT A/P. Pulmonology agreeable to perform paracentesis Saturday  Likely 2/2 underlying malignancy    -formal pulmonology consult yes

## 2025-04-15 NOTE — PROGRESS NOTE ADULT - PROBLEM SELECTOR PLAN 2
Patient presenting with leukocytosis meeting 2/4 SIRS criteria. No colitis seen on CT. Pain likely from large ascites and portal vein thrombosis. Pain is improved after para.  -s/p paracentesis 4/11, planned for palliative catheter placement and arrangement for home hospice  -pain control:      -dilaudid 0.5 IV q3 PRN for Severe Pain      -dilaudid 2mg PO q4 PRN for Moderate Pain      -Bowel Regimen: Senna 2tabs qhs + Miralax daily PRN She achieved a treatment effect in the dominant mass treated with Y90, with some progression elsewhere. She subsequently received therapy with immunoembolization x 2 administered concurrently with tebentafusp. In the setting of progression, she received radioembolization followed by ipilimumab and nivolumab, with the later complicated by the development of colitis, hepatitis and thyroiditis. She is now s/p chemo embolization under the care of Dr. Layla Acharya.  Saw Dr. Dr. Ruby 4/8    -pain control:       -dilaudid 0.5 IV q3 PRN for Severe Pain      -dilaudid 2mg PO q4 PRN for Moderate Pain      -Bowel Regimen: Senna 2tabs qhs + Miralax daily PRN  -c/w prednisone 10mg in AM 5 mg in PM  -c/w bactrim DS MWF for PJP ppx  -c/w pantoprazole 40 mg daily  -c/w xanax 0.25 mg twice a day as needed prescribed, though she only takes at bedtime  -palliative recs. She achieved a treatment effect in the dominant mass treated with Y90, with some progression elsewhere. She subsequently received therapy with immunoembolization x 2 administered concurrently with tebentafusp. In the setting of progression, she received radioembolization followed by ipilimumab and nivolumab, with the later complicated by the development of colitis, hepatitis and thyroiditis. She is now s/p chemo embolization under the care of Dr. aLyla Acharya.  Saw Dr. Dr. Ruby 4/8    -pain control:       -dilaudid 0.5 IV q3 PRN for Severe Pain      -dilaudid 2mg PO q4 PRN for Moderate Pain      -Bowel Regimen: Senna 2tabs qhs + Miralax daily PRN  -c/w prednisone 10mg in AM 5 mg in PM  -HOLD bactrim DS MWF for PJP ppx iso hyperK  -c/w pantoprazole 40 mg daily  -c/w xanax 0.25 mg twice a day as needed prescribed, though she only takes at bedtime  -palliative recs.

## 2025-04-15 NOTE — BH CONSULTATION LIAISON PROGRESS NOTE - NSBHTIMEACTIVITIESPERFORMED_PSY_A_CORE
The time documented includes the review of chart, labs, tests and other pertinent documents, interview, collaterals, decision-making, psychoeducation, coordination of care etc. and excludes time spent on separately reportable services/teaching during the encounter.  
The time documented includes the review of chart, labs, tests and other pertinent documents, interview, collaterals, decision-making, psychoeducation, coordination of care etc. and excludes time spent on separately reportable services/teaching during the encounter.

## 2025-04-15 NOTE — DIETITIAN INITIAL EVALUATION ADULT - ADD RECOMMEND
1. Continue Regular diet. Defer fluids to team.   >>Dietary to provide fortified oatmeal (245kcal, 19g protein) x1/day, and pending fluid liberalization, strawberry vanilla fortified smoothie (230kcal, 17g protein) x1/day.   >>Encourage and monitor PO intake. [place] dietary preferences as able.   2. Monitor GI tolerance, weight trends, labs, and skin integrity.  3. Defer bowel and pain regimens to team.   4. RD to remain available for diet education/intervention prn.

## 2025-04-15 NOTE — DIETITIAN INITIAL EVALUATION ADULT - PROBLEM SELECTOR PLAN 7
Noted she was expected to have labs compatible with hypothyroidism and to start synthroid but her labs did not reflect this so she never started. Denies palpitations, chest pain, rash, dysphagia, diarrhea.    -f/u tsh and free t4

## 2025-04-15 NOTE — BH CONSULTATION LIAISON PROGRESS NOTE - NSBHATTESTBILLING_PSY_A_CORE
19658-Yoopereize OBS or IP - high complexity OR 50-79 mins
64956-Adbkpagofe OBS or IP - high complexity OR 50-79 mins

## 2025-04-15 NOTE — CONSULT NOTE ADULT - SUBJECTIVE AND OBJECTIVE BOX
NEPHROLOGY SERVICE INITIAL CONSULT NOTE    HPI:  43 y/o F  w/ h/o metastatic Melanoma , consulted on 4/15 for hyponatremia  w/ Serum Na of 122,  Urine Na 20, Urine osmolality 595 after primary team was using fluid restriction from 4/10 when Sna  was 129.  Pt is currently admitted for the management of ascites,  scheduled for Pigtail placement in preparation for hospice care for the chronic management of the ascites.     ED COURSE  Vitals: 97.8 F HR 98 /83 RR 18 93% RA  Sig Labs: wbc 19.4k, rdw 17.3, 3.5% metamyelocytes, INR 2.1, PT 24, PTT 42.2, Na 129, bilirubin 7.3, alk phos 412, ast 154, alt 95, lipase 75 UA spec grav >1.030, 15 ketones, large bili, small leuk est  Imaging: CT A/P w/ con: *  Acute thrombus in main portal vein and both portal vein branches.   Increased moderate-large ascites, secondary to portal hypertension with signs of generalized fluid overload.  Increased hepatic metastases. Notably, superior intrahepatic IVC and right hepatic vein narrowed due to mass effect from metastases.  Interventions: none  Oncologist:   Dr. Layla Acharya & Dr. Ruby (10 Apr 2025 15:41)        REVIEW OF SYSTEMS: Otherwise negative except as specified in HPI  PAST MEDICAL & SURGICAL HISTORY:  Metastatic melanoma      S/P cataract surgery        FAMILY HISTORY:    SOCIAL HISTORY:  HOME MEDICATIONS:    Allergies    No Known Allergies    Intolerances        ACTIVE MEDICATIONS:  MEDICATIONS  (STANDING):  atenolol  Tablet 12.5 milliGRAM(s) Oral every 24 hours  bisacodyl 5 milliGRAM(s) Oral at bedtime  fentaNYL   Patch  12 MICROgram(s)/Hr 1 Patch Transdermal every 72 hours  influenza   Vaccine 0.5 milliLiter(s) IntraMuscular once  pantoprazole    Tablet 40 milliGRAM(s) Oral every 24 hours  predniSONE   Tablet 10 milliGRAM(s) Oral every 24 hours  predniSONE   Tablet 5 milliGRAM(s) Oral every 24 hours  senna 2 Tablet(s) Oral at bedtime  ursodiol Capsule 300 milliGRAM(s) Oral three times a day    MEDICATIONS  (PRN):  acetaminophen     Tablet .. 650 milliGRAM(s) Oral every 6 hours PRN Temp greater or equal to 38C (100.4F), Mild Pain (1 - 3)  HYDROmorphone   Tablet 3 milliGRAM(s) Oral every 4 hours PRN Severe Pain (7 - 10)  HYDROmorphone   Tablet 2 milliGRAM(s) Oral every 4 hours PRN Moderate Pain (4 - 6)  LORazepam     Tablet 0.5 milliGRAM(s) Oral every 12 hours PRN Anxiety  ondansetron Injectable 4 milliGRAM(s) IV Push every 6 hours PRN Nausea and/or Vomiting  polyethylene glycol 3350 17 Gram(s) Oral every 24 hours PRN Constipation        VITAL SIGNS:  Vital Signs Last 24 Hrs  T(C): 36.7 (15 Apr 2025 07:00), Max: 36.7 (15 Apr 2025 07:00)  T(F): 98 (15 Apr 2025 07:00), Max: 98 (15 Apr 2025 07:00)  HR: 100 (15 Apr 2025 07:00) (100 - 108)  BP: 112/74 (15 Apr 2025 07:00) (110/73 - 115/76)  BP(mean): --  RR: 18 (15 Apr 2025 07:) (18 - 18)  SpO2: 92% (15 Apr 2025 07:00) (91% - 92%)    Parameters below as of 15 Apr 2025 07:00  Patient On (Oxygen Delivery Method): room air        25 @ 07:01  -  04-15-25 @ 07:00  --------------------------------------------------------  IN:  Total IN: 0 mL    OUT:    Voided (mL): 300 mL  Total OUT: 300 mL    Total NET: -300 mL          PE:  General: Not in acute distress,   Chest: CTAP b/l,   Heart: RRR, S1/S2 wnl, no MRG  Abdomen: Soft,distended  Extremities:+1 pitting  edema  Neuro:  Alert,, answer questions appropriately    Pertinent labs & Imagin.2   20.19 )-----------( 436      ( 15 Apr 2025 05:30 )             36.0     04-15    122[L]  |  86[L]  |  38[H]  ----------------------------<  130[H]  5.8[H]   |  16[L]  |  0.88    Ca    8.9      15 Apr 2025 16:00  Phos  2.6     04-15  Mg     2.5     04-15    TPro  5.8[L]  /  Alb  2.8[L]  /  TBili  12.8[H]  /  DBili  9.7[H]  /  AST  395[H]  /  ALT  164[H]  /  AlkPhos  306[H]  04-15      Urinalysis Basic - ( 15 Apr 2025 16:00 )    Color: x / Appearance: x / SG: x / pH: x  Gluc: 130 mg/dL / Ketone: x  / Bili: x / Urobili: x   Blood: x / Protein: x / Nitrite: x   Leuk Esterase: x / RBC: x / WBC x   Sq Epi: x / Non Sq Epi: x / Bacteria: x          CAPILLARY BLOOD GLUCOSE              RADIOLOGY & ADDITIONAL TESTS: Reviewed.

## 2025-04-15 NOTE — PROGRESS NOTE ADULT - PROBLEM SELECTOR PLAN 8
Acute portal vein thrombosis seen on CT. Received one dose eliquis AM 4/10. Holding iso upcoming paracentesis and thoracentesis. Started eliquis 10mg BID on Monday.    -c/w eliquis 10 mg for 5 doses, then 5mg BID Acute portal vein thrombosis seen on CT. Received one dose eliquis AM 4/10. Holding iso upcoming paracentesis and thoracentesis. Started eliquis 10mg BID on Monday.    -holding eliquis iso procedure tomorrow

## 2025-04-15 NOTE — DISCHARGE NOTE PROVIDER - CARE PROVIDER_API CALL
Steve Ruby.  Medical Oncology  [address] [place] [id number]  Phone: [phone]  Fax: [phone]  Established Patient  Follow Up Time: 1 week

## 2025-04-15 NOTE — PROGRESS NOTE ADULT - PROBLEM SELECTOR PLAN 3
Recent para with rapid reaccumulation  -s/p therapeutic para with IR  -would benefit from abdominal catheter placement, ongoing coordination

## 2025-04-15 NOTE — CONSULT NOTE ADULT - ATTENDING COMMENTS
case reviewed with Dr. No.  for 3% saline to improve serum Na with limited volume
Patient seen, examined, and discussed with Dr. Orellana. Agree with above. 44F with a h/o HLA  positive female now with an 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 Personally reviewed recent MRCP images patient has in her phone from her health portal. Also personally reviewed CT imaging from this admission. I do not foresee biliary stenting offering significant benefit in bringing down her bilirubin appreciably. Overall, metastases often affect the small biliary radicles causing an elevated serum bilirubin, and her clinical picture appears consistent with this. Does endorse some pruritus, can start ursodiol.     Coral Cohen MD  Gastroenterology
43 yo woman with met malanoma, malignant ascites, small pleural effusion  Feeling better s/p paracentesis  Pleural effusion appears too small to tap

## 2025-04-15 NOTE — DIETITIAN INITIAL EVALUATION ADULT - PERTINENT MEDS FT
MEDICATIONS  (STANDING):  albumin human 25% IVPB 100 milliLiter(s) IV Intermittent once  atenolol  Tablet 12.5 milliGRAM(s) Oral every 24 hours  bisacodyl 5 milliGRAM(s) Oral at bedtime  fentaNYL   Patch  12 MICROgram(s)/Hr 1 Patch Transdermal every 72 hours  influenza   Vaccine 0.5 milliLiter(s) IntraMuscular once  pantoprazole    Tablet 40 milliGRAM(s) Oral every 24 hours  predniSONE   Tablet 10 milliGRAM(s) Oral every 24 hours  predniSONE   Tablet 5 milliGRAM(s) Oral every 24 hours  senna 2 Tablet(s) Oral at bedtime  sodium zirconium cyclosilicate 10 Gram(s) Oral once  trimethoprim  160 mG/sulfamethoxazole 800 mG 1 Tablet(s) Oral <User Schedule>  ursodiol Capsule 300 milliGRAM(s) Oral three times a day    MEDICATIONS  (PRN):  acetaminophen     Tablet .. 650 milliGRAM(s) Oral every 6 hours PRN Temp greater or equal to 38C (100.4F), Mild Pain (1 - 3)  HYDROmorphone   Tablet 3 milliGRAM(s) Oral every 4 hours PRN Severe Pain (7 - 10)  HYDROmorphone   Tablet 2 milliGRAM(s) Oral every 4 hours PRN Moderate Pain (4 - 6)  LORazepam     Tablet 0.5 milliGRAM(s) Oral every 12 hours PRN Anxiety  ondansetron Injectable 4 milliGRAM(s) IV Push every 6 hours PRN Nausea and/or Vomiting  polyethylene glycol 3350 17 Gram(s) Oral every 24 hours PRN Constipation

## 2025-04-15 NOTE — PROGRESS NOTE ADULT - PROBLEM SELECTOR PLAN 5
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 She had paracentesis 4/9 that drained 1L without complications. Patient still distended and complaining of pain. Nucleated cell count 173.  -s/p paracentesis with IR 4/11 Continuing to elevate. Given elevation, patient unable to enroll in clinical study or receive treatment.    -no role for stenting per GI  -ursodiol 200mg TID  -can give atarax for itching prn, she notes it's very mild at this time

## 2025-04-15 NOTE — BH CONSULTATION LIAISON PROGRESS NOTE - CURRENT MEDICATION
MEDICATIONS  (STANDING):  atenolol  Tablet 12.5 milliGRAM(s) Oral every 24 hours  bisacodyl 5 milliGRAM(s) Oral at bedtime  fentaNYL   Patch  12 MICROgram(s)/Hr 1 Patch Transdermal every 72 hours  influenza   Vaccine 0.5 milliLiter(s) IntraMuscular once  pantoprazole    Tablet 40 milliGRAM(s) Oral every 24 hours  predniSONE   Tablet 10 milliGRAM(s) Oral every 24 hours  predniSONE   Tablet 5 milliGRAM(s) Oral every 24 hours  senna 2 Tablet(s) Oral at bedtime  sodium zirconium cyclosilicate 10 Gram(s) Oral once  trimethoprim  160 mG/sulfamethoxazole 800 mG 1 Tablet(s) Oral <User Schedule>  ursodiol Capsule 300 milliGRAM(s) Oral three times a day    MEDICATIONS  (PRN):  acetaminophen     Tablet .. 650 milliGRAM(s) Oral every 6 hours PRN Temp greater or equal to 38C (100.4F), Mild Pain (1 - 3)  ALPRAZolam 0.25 milliGRAM(s) Oral at bedtime PRN for anxiety  HYDROmorphone   Tablet 3 milliGRAM(s) Oral every 4 hours PRN Severe Pain (7 - 10)  HYDROmorphone   Tablet 2 milliGRAM(s) Oral every 4 hours PRN Moderate Pain (4 - 6)  ondansetron Injectable 4 milliGRAM(s) IV Push every 6 hours PRN Nausea and/or Vomiting  polyethylene glycol 3350 17 Gram(s) Oral every 24 hours PRN Constipation  
MEDICATIONS  (STANDING):  apixaban 5 milliGRAM(s) Oral every 12 hours  atenolol  Tablet 12.5 milliGRAM(s) Oral every 24 hours  bisacodyl 5 milliGRAM(s) Oral at bedtime  influenza   Vaccine 0.5 milliLiter(s) IntraMuscular once  pantoprazole    Tablet 40 milliGRAM(s) Oral every 24 hours  predniSONE   Tablet 10 milliGRAM(s) Oral every 24 hours  predniSONE   Tablet 5 milliGRAM(s) Oral every 24 hours  senna 2 Tablet(s) Oral at bedtime  trimethoprim  160 mG/sulfamethoxazole 800 mG 1 Tablet(s) Oral <User Schedule>  ursodiol Capsule 300 milliGRAM(s) Oral three times a day    MEDICATIONS  (PRN):  acetaminophen     Tablet .. 650 milliGRAM(s) Oral every 6 hours PRN Temp greater or equal to 38C (100.4F), Mild Pain (1 - 3)  ALPRAZolam 0.25 milliGRAM(s) Oral at bedtime PRN for anxiety  HYDROmorphone   Tablet 2 milliGRAM(s) Oral every 4 hours PRN Moderate Pain (4 - 6)  HYDROmorphone  Injectable 0.5 milliGRAM(s) IV Push every 3 hours PRN Severe Pain (7 - 10)  ondansetron Injectable 4 milliGRAM(s) IV Push every 6 hours PRN Nausea and/or Vomiting  polyethylene glycol 3350 17 Gram(s) Oral every 24 hours PRN Constipation

## 2025-04-15 NOTE — PROGRESS NOTE ADULT - SUBJECTIVE AND OBJECTIVE BOX
Progress Note  INCOMPLETE NOTE    INTERVAL EVENTS:   No acute events overnight.    SUBJECTIVE:   Patient seen and examined at bedside. Condition largely unchanged from yesterday. No acute complaints at this time.    ROS:  Negative unless otherwise stated above.    PHYSICAL EXAM:  General: Alert and oriented x 3. No acute distress.   HEENT: Moist mucous membranes. Anicteric. No cervical lymphadenopathy.  Cardiovascular: Regular rate and rhythm. No murmur. Normal JVP.  Lungs: Clear to auscultation bilaterally. No accessory muscle use.  Abdomen: Soft, non-tender and non-distended. No palpable masses.  Extremities: No edema. Non-tender. Warm.  Skin: No rashes or lesions.   Neurologic: No apparent focal neurological deficits. CN II-XII grossly intact, but not individually tested.  Psychiatric: Cooperative. Appropriate mood and affect.    VITAL SIGNS:  Vital Signs Last 24 Hrs  T(C): 36.6 (15 Apr 2025 05:50), Max: 36.6 (2025 12:36)  T(F): 97.9 (15 Apr 2025 05:50), Max: 97.9 (2025 22:42)  HR: 103 (15 Apr 2025 05:50) (103 - 108)  BP: 110/73 (15 Apr 2025 05:50) (110/73 - 115/76)  BP(mean): --  RR: 18 (15 Apr 2025 05:50) (18 - 18)  SpO2: 91% (15 Apr 2025 05:50) (91% - 92%)    Parameters below as of 15 Apr 2025 05:50  Patient On (Oxygen Delivery Method): room air      INPATIENT MEDICATIONS:   MEDICATIONS  (STANDING):  atenolol  Tablet 12.5 milliGRAM(s) Oral every 24 hours  bisacodyl 5 milliGRAM(s) Oral at bedtime  fentaNYL   Patch  12 MICROgram(s)/Hr 1 Patch Transdermal every 72 hours  influenza   Vaccine 0.5 milliLiter(s) IntraMuscular once  pantoprazole    Tablet 40 milliGRAM(s) Oral every 24 hours  predniSONE   Tablet 10 milliGRAM(s) Oral every 24 hours  predniSONE   Tablet 5 milliGRAM(s) Oral every 24 hours  senna 2 Tablet(s) Oral at bedtime  trimethoprim  160 mG/sulfamethoxazole 800 mG 1 Tablet(s) Oral <User Schedule>  ursodiol Capsule 300 milliGRAM(s) Oral three times a day    MEDICATIONS  (PRN):  acetaminophen     Tablet .. 650 milliGRAM(s) Oral every 6 hours PRN Temp greater or equal to 38C (100.4F), Mild Pain (1 - 3)  ALPRAZolam 0.25 milliGRAM(s) Oral at bedtime PRN for anxiety  HYDROmorphone   Tablet 3 milliGRAM(s) Oral every 4 hours PRN Severe Pain (7 - 10)  HYDROmorphone   Tablet 2 milliGRAM(s) Oral every 4 hours PRN Moderate Pain (4 - 6)  ondansetron Injectable 4 milliGRAM(s) IV Push every 6 hours PRN Nausea and/or Vomiting  polyethylene glycol 3350 17 Gram(s) Oral every 24 hours PRN Constipation    HOME MEDICATIONS  ALPRAZolam 0.25 mg oral tablet: 1 tab(s) orally once a day (at bedtime) as needed for  anxiety  Bactrim  mg-160 mg oral tablet: 1 tab(s) orally Monday, Wednesday, and Friday  Blisovi FE  oral tablet: 1 tab(s) orally once a day  Eliquis 5 mg oral tablet: 2 tab(s) orally 2 times a day for 7 days  predniSONE 5 mg oral tablet: 2 tab(s) orally once a day    ALLERGIES:  Allergies    No Known Allergies    Intolerances    LABS:                       12.4   19.97 )-----------( 406      ( 2025 05:30 )             37.4     -    125[L]  |  90[L]  |  28[H]  ----------------------------<  77  5.1   |  21[L]  |  0.79    Ca    8.2[L]      2025 05:30  Mg     2.1         TPro  5.6[L]  /  Alb  2.1[L]  /  TBili  10.8[H]  /  DBili  8.5[H]  /  AST  269[H]  /  ALT  127[H]  /  AlkPhos  369[H]        Urinalysis Basic - ( 2025 09:06 )    Color: Dark Yellow / Appearance: Clear / S.025 / pH: x  Gluc: x / Ketone: 15 mg/dL  / Bili: Large / Urobili: 0.2 mg/dL   Blood: x / Protein: Negative mg/dL / Nitrite: Negative   Leuk Esterase: Small / RBC: 10 /HPF / WBC 15 /HPF   Sq Epi: x / Non Sq Epi: 5 /HPF / Bacteria: Negative /HPF      CAPILLARY BLOOD GLUCOSE        RADIOLOGY & ADDITIONAL TESTS: Reviewed. Progress Note    INTERVAL EVENTS:   No acute events overnight.    SUBJECTIVE:   Patient notes she is still considering home hospice and if she would like     ROS:  Negative unless otherwise stated above.    PHYSICAL EXAM:  General: Alert and oriented x 3.   HEENT: Moist mucous membranes.   Cardiovascular: Regular rate and rhythm.  Lungs: RLL diminished breath sounds, clear otherwise.   Abdomen: Soft, mildly distended, non-tender to palpation   Extremities: 1+ edema bilaterally Non-tender. WWP  Skin: mild diffuse jaundice    Neurologic: No apparent focal neurological deficits  Psychiatric: Cooperative. Appropriate mood and affect. Remains hopeful       VITAL SIGNS:  Vital Signs Last 24 Hrs  T(C): 36.6 (15 Apr 2025 05:50), Max: 36.6 (2025 12:36)  T(F): 97.9 (15 Apr 2025 05:50), Max: 97.9 (2025 22:42)  HR: 103 (15 Apr 2025 05:50) (103 - 108)  BP: 110/73 (15 Apr 2025 05:50) (110/73 - 115/76)  BP(mean): --  RR: 18 (15 Apr 2025 05:50) (18 - 18)  SpO2: 91% (15 Apr 2025 05:50) (91% - 92%)    Parameters below as of 15 Apr 2025 05:50  Patient On (Oxygen Delivery Method): room air      INPATIENT MEDICATIONS:   MEDICATIONS  (STANDING):  atenolol  Tablet 12.5 milliGRAM(s) Oral every 24 hours  bisacodyl 5 milliGRAM(s) Oral at bedtime  fentaNYL   Patch  12 MICROgram(s)/Hr 1 Patch Transdermal every 72 hours  influenza   Vaccine 0.5 milliLiter(s) IntraMuscular once  pantoprazole    Tablet 40 milliGRAM(s) Oral every 24 hours  predniSONE   Tablet 10 milliGRAM(s) Oral every 24 hours  predniSONE   Tablet 5 milliGRAM(s) Oral every 24 hours  senna 2 Tablet(s) Oral at bedtime  trimethoprim  160 mG/sulfamethoxazole 800 mG 1 Tablet(s) Oral <User Schedule>  ursodiol Capsule 300 milliGRAM(s) Oral three times a day    MEDICATIONS  (PRN):  acetaminophen     Tablet .. 650 milliGRAM(s) Oral every 6 hours PRN Temp greater or equal to 38C (100.4F), Mild Pain (1 - 3)  ALPRAZolam 0.25 milliGRAM(s) Oral at bedtime PRN for anxiety  HYDROmorphone   Tablet 3 milliGRAM(s) Oral every 4 hours PRN Severe Pain (7 - 10)  HYDROmorphone   Tablet 2 milliGRAM(s) Oral every 4 hours PRN Moderate Pain (4 - 6)  ondansetron Injectable 4 milliGRAM(s) IV Push every 6 hours PRN Nausea and/or Vomiting  polyethylene glycol 3350 17 Gram(s) Oral every 24 hours PRN Constipation    HOME MEDICATIONS  ALPRAZolam 0.25 mg oral tablet: 1 tab(s) orally once a day (at bedtime) as needed for  anxiety  Bactrim  mg-160 mg oral tablet: 1 tab(s) orally Monday, Wednesday, and Friday  Blisovi FE  oral tablet: 1 tab(s) orally once a day  Eliquis 5 mg oral tablet: 2 tab(s) orally 2 times a day for 7 days  predniSONE 5 mg oral tablet: 2 tab(s) orally once a day    ALLERGIES:  Allergies    No Known Allergies    Intolerances    LABS:                       12.4   19.97 )-----------( 406      ( 2025 05:30 )             37.4     04-14    125[L]  |  90[L]  |  28[H]  ----------------------------<  77  5.1   |  21[L]  |  0.79    Ca    8.2[L]      2025 05:30  Mg     2.1     04-14    TPro  5.6[L]  /  Alb  2.1[L]  /  TBili  10.8[H]  /  DBili  8.5[H]  /  AST  269[H]  /  ALT  127[H]  /  AlkPhos  369[H]  -      Urinalysis Basic - ( 2025 09:06 )    Color: Dark Yellow / Appearance: Clear / S.025 / pH: x  Gluc: x / Ketone: 15 mg/dL  / Bili: Large / Urobili: 0.2 mg/dL   Blood: x / Protein: Negative mg/dL / Nitrite: Negative   Leuk Esterase: Small / RBC: 10 /HPF / WBC 15 /HPF   Sq Epi: x / Non Sq Epi: 5 /HPF / Bacteria: Negative /HPF      CAPILLARY BLOOD GLUCOSE        RADIOLOGY & ADDITIONAL TESTS: Reviewed. 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 IR drained about 1.5 L early in admission and patient found to be distended within 2 days with 8cm pocket seen on formal abd US. GI also consulted to look at her overall case to see if there would be a role for stenting given elevation in bilirubin being limiting factor in her obtaining further chemotherapy. Stenting was found to not be an option and patient will be going home on hospice care. She is pending palliative ascites drainage catheter . Hospital course c/b hyponatremia likely 2/2 liver mets causing cirrhosis like picture and low albumin. She has third spacing with urinary studies suggestive of intravascular depletion. Placed on fluid restriction and given 25% albumin 4/15. Uptrending BUN and hyperkalemia seen 4/15 possibly suggestive of worsening renal function despite normal creatinine. Pending cystatin c. Likely going home with hospice by the end of the week.     INTERVAL EVENTS:   No acute events overnight.    SUBJECTIVE:   Patient notes she is still considering home hospice and if she would like     ROS:  Negative unless otherwise stated above.    PHYSICAL EXAM:  General: Alert and oriented x 3.   HEENT: Moist mucous membranes.   Cardiovascular: Regular rate and rhythm.  Lungs: RLL diminished breath sounds, clear otherwise.   Abdomen: Soft, mildly distended, non-tender to palpation   Extremities: 1+ edema bilaterally Non-tender. WWP  Skin: mild diffuse jaundice    Neurologic: No apparent focal neurological deficits  Psychiatric: Cooperative. Appropriate mood and affect. Remains hopeful       VITAL SIGNS:  Vital Signs Last 24 Hrs  T(C): 36.6 (15 Apr 2025 05:50), Max: 36.6 (2025 12:36)  T(F): 97.9 (15 Apr 2025 05:50), Max: 97.9 (2025 22:42)  HR: 103 (15 Apr 2025 05:50) (103 - 108)  BP: 110/73 (15 Apr 2025 05:50) (110/73 - 115/76)  BP(mean): --  RR: 18 (15 Apr 2025 05:50) (18 - 18)  SpO2: 91% (15 Apr 2025 05:50) (91% - 92%)    Parameters below as of 15 Apr 2025 05:50  Patient On (Oxygen Delivery Method): room air      INPATIENT MEDICATIONS:   MEDICATIONS  (STANDING):  atenolol  Tablet 12.5 milliGRAM(s) Oral every 24 hours  bisacodyl 5 milliGRAM(s) Oral at bedtime  fentaNYL   Patch  12 MICROgram(s)/Hr 1 Patch Transdermal every 72 hours  influenza   Vaccine 0.5 milliLiter(s) IntraMuscular once  pantoprazole    Tablet 40 milliGRAM(s) Oral every 24 hours  predniSONE   Tablet 10 milliGRAM(s) Oral every 24 hours  predniSONE   Tablet 5 milliGRAM(s) Oral every 24 hours  senna 2 Tablet(s) Oral at bedtime  trimethoprim  160 mG/sulfamethoxazole 800 mG 1 Tablet(s) Oral <User Schedule>  ursodiol Capsule 300 milliGRAM(s) Oral three times a day    MEDICATIONS  (PRN):  acetaminophen     Tablet .. 650 milliGRAM(s) Oral every 6 hours PRN Temp greater or equal to 38C (100.4F), Mild Pain (1 - 3)  ALPRAZolam 0.25 milliGRAM(s) Oral at bedtime PRN for anxiety  HYDROmorphone   Tablet 3 milliGRAM(s) Oral every 4 hours PRN Severe Pain (7 - 10)  HYDROmorphone   Tablet 2 milliGRAM(s) Oral every 4 hours PRN Moderate Pain (4 - 6)  ondansetron Injectable 4 milliGRAM(s) IV Push every 6 hours PRN Nausea and/or Vomiting  polyethylene glycol 3350 17 Gram(s) Oral every 24 hours PRN Constipation    HOME MEDICATIONS  ALPRAZolam 0.25 mg oral tablet: 1 tab(s) orally once a day (at bedtime) as needed for  anxiety  Bactrim  mg-160 mg oral tablet: 1 tab(s) orally Monday, Wednesday, and Friday  Blisovi FE  oral tablet: 1 tab(s) orally once a day  Eliquis 5 mg oral tablet: 2 tab(s) orally 2 times a day for 7 days  predniSONE 5 mg oral tablet: 2 tab(s) orally once a day    ALLERGIES:  Allergies    No Known Allergies    Intolerances    LABS:                       12.4   19.97 )-----------( 406      ( 2025 05:30 )             37.4     04-14    125[L]  |  90[L]  |  28[H]  ----------------------------<  77  5.1   |  21[L]  |  0.79    Ca    8.2[L]      2025 05:30  Mg     2.1         TPro  5.6[L]  /  Alb  2.1[L]  /  TBili  10.8[H]  /  DBili  8.5[H]  /  AST  269[H]  /  ALT  127[H]  /  AlkPhos  369[H]        Urinalysis Basic - ( 2025 09:06 )    Color: Dark Yellow / Appearance: Clear / S.025 / pH: x  Gluc: x / Ketone: 15 mg/dL  / Bili: Large / Urobili: 0.2 mg/dL   Blood: x / Protein: Negative mg/dL / Nitrite: Negative   Leuk Esterase: Small / RBC: 10 /HPF / WBC 15 /HPF   Sq Epi: x / Non Sq Epi: 5 /HPF / Bacteria: Negative /HPF      CAPILLARY BLOOD GLUCOSE        RADIOLOGY & ADDITIONAL TESTS: Reviewed.

## 2025-04-15 NOTE — BH CONSULTATION LIAISON PROGRESS NOTE - NSBHCHARTREVIEWVS_PSY_A_CORE FT
Vital Signs Last 24 Hrs  T(C): 36.7 (15 Apr 2025 07:00), Max: 36.7 (15 Apr 2025 07:00)  T(F): 98 (15 Apr 2025 07:00), Max: 98 (15 Apr 2025 07:00)  HR: 100 (15 Apr 2025 07:00) (100 - 108)  BP: 112/74 (15 Apr 2025 07:00) (110/73 - 115/76)  BP(mean): --  RR: 18 (15 Apr 2025 07:00) (18 - 18)  SpO2: 92% (15 Apr 2025 07:00) (91% - 92%)    Parameters below as of 15 Apr 2025 07:00  Patient On (Oxygen Delivery Method): room air    
Vital Signs Last 24 Hrs  T(C): 36.9 (14 Apr 2025 06:13), Max: 36.9 (13 Apr 2025 20:10)  T(F): 98.4 (14 Apr 2025 06:13), Max: 98.4 (13 Apr 2025 20:10)  HR: 105 (14 Apr 2025 06:13) (88 - 105)  BP: 102/65 (14 Apr 2025 06:13) (100/64 - 110/72)  BP(mean): 78 (14 Apr 2025 06:13) (78 - 78)  RR: 18 (14 Apr 2025 06:13) (18 - 20)  SpO2: 91% (14 Apr 2025 06:13) (91% - 97%)    Parameters below as of 14 Apr 2025 06:13  Patient On (Oxygen Delivery Method): room air

## 2025-04-15 NOTE — BH CONSULTATION LIAISON PROGRESS NOTE - NSBHASSESSMENTFT_PSY_ALL_CORE
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 Psychiatry following for anxiety and coping with worsening medical condition. Patient with fluctuating mood and anxiety in context of discussions of prognosis and hospice care.   Plan:  -patient currently prescribed xanax 0.25mg q12h prn anxiety ; consider switching to lorazepam 0.5mg po q12h, a better option in context of the current liver function  -[facility] to follow for supportive psychotherapy while the patient is at [facility];  -please call with questions/concerns    
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 Psychiatry following for anxiety and coping with worsening medical condition. Patient with fluctuating mood and anxiety in context of discussions of prognosis and hospice care.   Plan:  -patient currently prescribed xanax 0.5mg q12h prn anxiety ; consider switching to lorazepam 0.5mg po q12h, a better option in context of the current liver function  -[facility] to follow for supportive psychotherapy while the patient is at [facility]  -please call with questions/concerns

## 2025-04-15 NOTE — PROGRESS NOTE ADULT - PROBLEM SELECTOR PLAN 3
Na on admission 129, and has been downtrending throughout admission to 124. Initial urine studies with urine osmol >800 and Na<20 suggesting ADH and RAAS on. Possibly intravascular depletion with third spacing given 1+ bilateral edema and low albumin given liver mets.  s/p bumex and spironolactone with worsening sodium    -ctm Na, but likely at baseline  -BUN uptrending but given hypervolemia, apprehensive to give IVF; encourage PO intake Na on admission 129, and has been downtrending throughout admission to 124. Initial urine studies with urine osmol >800 and Na<20 suggesting ADH and RAAS on. Possibly intravascular depletion with third spacing given 1+ bilateral edema and low albumin given liver mets.  s/p bumex and spironolactone with worsening sodium  Placed back on 1L fluid restriction; s/p 25% albumin given likely intravascular depletion though third spacing    -ctm Na  -f/u cystatin c as BUN may be climbing iso worsening renal function Patient presenting with leukocytosis meeting 2/4 SIRS criteria. No colitis seen on CT. Pain likely from large ascites and portal vein thrombosis. Pain is improved after para.  -s/p paracentesis 4/11, planned for palliative catheter placement and arrangement for home hospice  -pain control:      -dilaudid 0.5 IV q3 PRN for Severe Pain      -dilaudid 2mg PO q4 PRN for Moderate Pain      -Bowel Regimen: Senna 2tabs qhs + Miralax daily PRN

## 2025-04-15 NOTE — DISCHARGE NOTE PROVIDER - HOSPITAL COURSE
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 Pending paracentesis 4/11 for ascites and thoracentesis 4/12 for new R pleural effusion. Patient going to home hospice with ascites drainage catheter given that she is not a candidate for further treatment  Hospital course (by problem):        Problem/Plan - 1:  ·  Problem: Systemic inflammatory response syndrome (SIRS).   ·  Plan: Patient presenting with leukocytosis meeting 2/4 SIRS criteria(HR 98 and wbc 19.4k). Wbc 4/9 20k though in 2/25 wnl. May be reactive iso malignancy vs infectious given abdominal pain. SOB, new pleural effusion, dry cough and fatigue considered URTI vs malignancy. No underlying infection found during the admission       Problem/Plan - 2:  ·  Problem: Melanoma metastatic to liver.   ·  Plan: She achieved a treatment effect in the dominant mass treated with Y90, with some progression elsewhere. She subsequently received therapy with immunoembolization x 2 administered concurrently with tebentafusp. In the setting of progression, she received radioembolization followed by ipilimumab and nivolumab, with the later complicated by the development of colitis, hepatitis and thyroiditis. She is now s/p chemo embolization under the care of Dr. Layla Acharya. GI reviewed her case to consider possibility of biliary stenting given her elevated bilirubin being barrier to further treatment. She is not a candidate for stenting at this time. Going to home hospice.  Saw Dr. Dr. Ruby 4/8    -pain control:    -fentanyl patch 12 mcg/hr  -dilaudid 3mg PO q4 PRN for severe Pain      -Bowel Regimen: Senna 2tabs qhs + Miralax daily PRN  -c/w prednisone 10mg in AM 5 mg in PM  -HOLD bactrim DS MWF for PJP ppx iso hyperK  -c/w pantoprazole 40 mg daily  -c/w xanax 0.25 mg twice a day as needed prescribed, though she only takes at bedtime  -palliative outpatient  -catheter placement for ascites     Problem/Plan - 3:  ·  Problem: Abdominal pain.   ·  Plan: Patient presenting with leukocytosis meeting 2/4 SIRS criteria. No colitis seen on CT. Pain likely from large ascites and portal vein thrombosis. Pain is improved after para.  -s/p paracentesis 4/11, planned for palliative catheter placement and arrangement for home hospice.   -pain control:  -fentanyl patch 12 mcg/hr  -dilaudid 3mg PO q4 PRN for severe Pain      -Bowel Regimen: Senna 2tabs qhs + Miralax daily PRN.     Problem/Plan - 4:  ·  Problem: Malignant ascites.   ·  Plan: Patient with mets to the liver from uveal melanoma. She had paracentesis 4/9 that drained 1L without complications. Patient still distended and complaining of pain. Nucleated cell count 173.  -s/p paracentesis with IR 4/11.    -catheter placement for ascites     Problem/Plan - 5:  ·  Problem: Hyponatremia.   ·  Plan: Na on admission 129, and has been downtrending throughout admission to 124. Initial urine studies with urine osmol >800 and Na<20 suggesting ADH and RAAS on. Possibly intravascular depletion with third spacing given 1+ bilateral edema and low albumin given liver mets. Nephrology consulted for Na that was not improving with fluid restriction or albumin.  s/p bumex and spironolactone with worsening sodium    -recs:***     Problem/Plan - 6:  ·  Problem: Incomplete bladder emptying.   ·  Plan; Subjectively feels as though she has incomplete voiding though denies dysuria, vaginal discharge or urinary frequency. Bladder scan in ED showed >500 thought to be likely from ascites as patient did not have the urge to urinate. POCUS revealing distended bladder. 100cc on SC 4/11. Incomplete emptying better after para and diuretics given. Not likely retaining, and pressure is from ascites. S/p 3 day course ctx for initial thoughts it may be 2/2 uti.  SC 1x 4/11     Problem/Plan - 7:  ·  Problem: portal vein thrombosis   Acute portal vein thrombosis seen on CT. Received one dose eliquis AM 4/10. Holding iso upcoming paracentesis and thoracentesis. Started eliquis 10mg BID on Monday.    -holding eliquis iso procedure tomorrow.?? resume??    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 Pending paracentesis 4/11 for ascites and thoracentesis 4/12 for new R pleural effusion. Patient going to home hospice with ascites drainage catheter given that she is not a candidate for further treatment  Hospital course (by problem):      Problem/Plan - 1:  ·  Problem: Systemic inflammatory response syndrome (SIRS).   ·  Plan: Patient presenting with leukocytosis meeting 2/4 SIRS criteria(HR 98 and wbc 19.4k). Wbc 4/9 20k though in 2/25 wnl. May be reactive iso malignancy vs infectious given abdominal pain. SOB, new pleural effusion, dry cough and fatigue considered URTI vs malignancy. No underlying infection found during the admission     Problem/Plan - 2:  ·  Problem: Melanoma metastatic to liver.   ·  Plan: She achieved a treatment effect in the dominant mass treated with Y90, with some progression elsewhere. She subsequently received therapy with immunoembolization x 2 administered concurrently with tebentafusp. In the setting of progression, she received radioembolization followed by ipilimumab and nivolumab, with the later complicated by the development of colitis, hepatitis and thyroiditis. She is now s/p chemo embolization under the care of Dr. Layla Acharya. GI reviewed her case to consider possibility of biliary stenting given her elevated bilirubin being barrier to further treatment. She is not a candidate for stenting at this time. Going to home hospice.  Saw Dr. Dr. Ruby 4/8  -pain control while hospitalized: pain control: dilaudid 4mg PO q4 PRN for Severe Pain, dilaudid 2mg PO q4 PRN for Moderate Pain, fentanyl patch 12 mcg/hr  -also continued on home prednisone 10mg in AM 5 mg in PM, xanax and pantoprazole  -bactrim PJP ppx held inpatient as patient hyperkalemic     Problem/Plan - 3:  ·  Problem: Abdominal pain.   ·  Plan: Patient presenting with leukocytosis meeting 2/4 SIRS criteria. No colitis seen on CT. Pain likely from large ascites and portal vein thrombosis. Pain is improved after para.  -s/p paracentesis 4/11, planned for palliative catheter placement and arrangement for home hospice.____      Problem/Plan - 4:  ·  Problem: Malignant ascites.   ·  Plan: Patient with mets to the liver from uveal melanoma. She had paracentesis 4/9 that drained 1L without complications. Patient still distended and complaining of pain. Nucleated cell count 173.  -s/p paracentesis with IR 4/11. _______     Problem/Plan - 5:  ·  Problem: Hyponatremia.   ·  Plan: Patient with downtrending Na on admission, ____ at time of discharge; likely i/s/o ADH overproduction i/s/o malignancy  -recs:***     Problem/Plan - 6:  ·  Problem: portal vein thrombosis   Acute portal vein thrombosis seen on CT. Received one dose eliquis AM 4/10. 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 Pending paracentesis 4/11 for ascites and thoracentesis 4/12 for new R pleural effusion. Patient going to home hospice with ascites drainage catheter given that she is not a candidate for further treatment  Hospital course (by problem):      Problem/Plan - 1:  ·  Problem: Systemic inflammatory response syndrome (SIRS).   ·  Plan: Patient presenting with leukocytosis meeting 2/4 SIRS criteria(HR 98 and wbc 19.4k). Wbc 4/9 20k though in 2/25 wnl. May be reactive iso malignancy vs infectious given abdominal pain. SOB, new pleural effusion, dry cough and fatigue considered URTI vs malignancy. No underlying infection found during the admission     Problem/Plan - 2:  ·  Problem: Melanoma metastatic to liver.   ·  Plan: She achieved a treatment effect in the dominant mass treated with Y90, with some progression elsewhere. She subsequently received therapy with immunoembolization x 2 administered concurrently with tebentafusp. In the setting of progression, she received radioembolization followed by ipilimumab and nivolumab, with the later complicated by the development of colitis, hepatitis and thyroiditis. She is now s/p chemo embolization under the care of Dr. Layla Acharya. GI reviewed her case to consider possibility of biliary stenting given her elevated bilirubin being barrier to further treatment. She is not a candidate for stenting at this time. Going to home hospice.  Saw Dr. Dr. Ruby 4/8  -pain control while hospitalized: pain control: dilaudid 4mg PO q4 PRN for Severe Pain, dilaudid 2mg PO q4 PRN for Moderate Pain, fentanyl patch 12 mcg/hr  -also continued on home prednisone 10mg in AM 5 mg in PM, xanax and pantoprazole  -bactrim PJP ppx held inpatient as patient hyperkalemic     Problem/Plan - 3:  ·  Problem: Abdominal pain.   ·  Plan: Patient presenting with leukocytosis meeting 2/4 SIRS criteria. No colitis seen on CT. Pain likely from large ascites and portal vein thrombosis. Pain is improved after para.  -s/p paracentesis 4/11, planned for palliative catheter placement and arrangement for home hospice.     Problem/Plan - 4:  ·  Problem: Malignant ascites.   ·  Plan: Patient with mets to the liver from uveal melanoma. She had paracentesis 4/9 that drained 1L without complications. Patient still distended and complaining of pain. Nucleated cell count 173.  -s/p paracentesis with IR 4/11; s/p pigtail placement with IR on 4/17     Problem/Plan - 5:  ·  Problem: Hyponatremia.   ·  Plan: Patient with downtrending Na on admission, 124 at time of discharge; likely i/s/o ADH overproduction i/s/o malignancy  Treated with hypertonic saline, albumin, and tolvaptan  - no interventions outpatient     Problem/Plan - 6:  ·  Problem: portal vein thrombosis   Acute portal vein thrombosis seen on CT. Received one dose eliquis AM 4/10.     Patient was discharged to: home hospice    New medications: dilaudid for pain, bowel regimen (senna/miralax), zofran, ativan  Changes to old medications: _________  Medications that were stopped:    Items to follow up as outpatient: none    Physical exam at the time of discharge:  General: NAD  HEENT: PERRL, EOM intact, sclera anicteric, MMM  Cardiovascular: RRR; no MRG;   Respiratory: CTAB; no WRR  GI/: soft; NTND; BS x4  Extremities: WWP; 2+ peripheral pulses bilaterally; 1+ edema bilaterally  Skin: normal color & turgor; no rash  Neurologic: aox3; no focal deficits     #Discharge: do not delete    45 y/o f hx HLA  positive female now with an 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 Pending paracentesis 4/11 for ascites and thoracentesis 4/12 for new R pleural effusion. Patient going to home hospice with ascites drainage catheter given that she is not a candidate for further treatment  Hospital course (by problem):      Problem/Plan - 1:  ·  Problem: Systemic inflammatory response syndrome (SIRS).   ·  Plan: Patient presenting with leukocytosis meeting 2/4 SIRS criteria(HR 98 and wbc 19.4k). Wbc 4/9 20k though in 2/25 wnl. May be reactive iso malignancy vs infectious given abdominal pain. SOB, new pleural effusion, dry cough and fatigue considered URTI vs malignancy. No underlying infection found during the admission     Problem/Plan - 2:  ·  Problem: Melanoma metastatic to liver.   ·  Plan: She achieved a treatment effect in the dominant mass treated with Y90, with some progression elsewhere. She subsequently received therapy with immunoembolization x 2 administered concurrently with tebentafusp. In the setting of progression, she received radioembolization followed by ipilimumab and nivolumab, with the later complicated by the development of colitis, hepatitis and thyroiditis. She is now s/p chemo embolization under the care of Dr. Layla Acharya. GI reviewed her case to consider possibility of biliary stenting given her elevated bilirubin being barrier to further treatment. She is not a candidate for stenting at this time. Going to home hospice.  Saw Dr. Dr. Ruby 4/8  -pain control while hospitalized: pain control: dilaudid 4mg PO q4 PRN for Severe Pain, dilaudid 2mg PO q4 PRN for Moderate Pain, fentanyl patch 12 mcg/hr  -also continued on home prednisone 10mg in AM 5 mg in PM, xanax and pantoprazole  -bactrim PJP ppx held inpatient as patient hyperkalemic     Problem/Plan - 3:  ·  Problem: Abdominal pain.   ·  Plan: Patient presenting with leukocytosis meeting 2/4 SIRS criteria. No colitis seen on CT. Pain likely from large ascites and portal vein thrombosis. Pain is improved after para.  -s/p paracentesis 4/11, planned for palliative catheter placement and arrangement for home hospice.     Problem/Plan - 4:  ·  Problem: Malignant ascites.   ·  Plan: Patient with mets to the liver from uveal melanoma. She had paracentesis 4/9 that drained 1L without complications. Patient still distended and complaining of pain. Nucleated cell count 173.  -s/p paracentesis with IR 4/11; s/p pigtail placement with IR on 4/17     Problem/Plan - 5:  ·  Problem: Hyponatremia.   ·  Plan: Patient with downtrending Na on admission, 124 at time of discharge; likely i/s/o ADH overproduction i/s/o malignancy  Treated with hypertonic saline, albumin, and tolvaptan  - no interventions outpatient     Problem/Plan - 6:  ·  Problem: portal vein thrombosis   Acute portal vein thrombosis seen on CT. Received one dose eliquis AM 4/10.     Patient was discharged to: home hospice    New medications: dilaudid for pain, bowel regimen (senna/miralax), zofran, ativan  Changes to old medications: discontinue bactrim, eliquis  Medications that were stopped: bactrim, eliquis    Items to follow up as outpatient: none    Physical exam at the time of discharge:  General: NAD  HEENT: PERRL, EOM intact, scleral icterus; MMM  Cardiovascular: RRR; no MRG;   Respiratory: CTAB; no WRR  GI/: soft; NTND; BS x4  Extremities: WWP; 2+ peripheral pulses bilaterally; 1+ edema bilaterally  Skin: normal color & turgor; no rash  Neurologic: aox3; no focal deficits     #Discharge: do not delete    45 y/o f hx HLA  positive female now with an 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 Pending paracentesis 4/11 for ascites and thoracentesis 4/12 for new R pleural effusion. Patient going to home hospice with ascites drainage catheter given that she is not a candidate for further treatment  Hospital course (by problem):      Problem/Plan - 1:  ·  Problem: Systemic inflammatory response syndrome (SIRS).   ·  Plan: Patient presenting with leukocytosis meeting 2/4 SIRS criteria(HR 98 and wbc 19.4k). Wbc 4/9 20k though in 2/25 wnl. May be reactive iso malignancy vs infectious given abdominal pain. SOB, new pleural effusion, dry cough and fatigue considered URTI vs malignancy. No underlying infection found during the admission     Problem/Plan - 2:  ·  Problem: Melanoma metastatic to liver.   ·  Plan: She achieved a treatment effect in the dominant mass treated with Y90, with some progression elsewhere. She subsequently received therapy with immunoembolization x 2 administered concurrently with tebentafusp. In the setting of progression, she received radioembolization followed by ipilimumab and nivolumab, with the later complicated by the development of colitis, hepatitis and thyroiditis. She is now s/p chemo embolization under the care of Dr. Layla Acharya. GI reviewed her case to consider possibility of biliary stenting given her elevated bilirubin being barrier to further treatment. She is not a candidate for stenting at this time. Going to home hospice.  Saw Dr. Dr. Ruby 4/8  -pain control while hospitalized: pain control: dilaudid 4mg PO q4 PRN for Severe Pain, dilaudid 2mg PO q4 PRN for Moderate Pain, fentanyl patch 12 mcg/hr  -also continued on home prednisone 10mg in AM 5 mg in PM, xanax and pantoprazole  -bactrim PJP ppx held inpatient as patient hyperkalemic     Problem/Plan - 3:  ·  Problem: Abdominal pain.   ·  Plan: Patient presenting with leukocytosis meeting 2/4 SIRS criteria. No colitis seen on CT. Pain likely from large ascites and portal vein thrombosis. Pain is improved after para.  - as below     Problem/Plan - 4:  ·  Problem: Malignant ascites.   ·  Plan: Patient with mets to the liver from uveal melanoma. She had paracentesis 4/9 that drained 1L without complications. Patient still distended and complaining of pain. Nucleated cell count 173.  -s/p paracentesis with IR 4/11; s/p peritoneal drain 4/17     Problem/Plan - 5:  ·  Problem: Hyponatremia.   ·  Plan: Patient with downtrending Na on admission, 124 at time of discharge; likely i/s/o ADH overproduction i/s/o malignancy  Treated with hypertonic saline, albumin, and tolvaptan  - no interventions outpatient     Problem/Plan - 6:  ·  Problem: portal vein thrombosis   Acute portal vein thrombosis seen on CT. Received one dose eliquis AM 4/10.   - hold further AC    Patient was discharged to: home hospice    New medications: dilaudid for pain, bowel regimen (senna/miralax), zofran, ativan  Changes to old medications: discontinue bactrim, eliquis  Medications that were stopped: bactrim, eliquis    Items to follow up as outpatient: none    Physical exam at the time of discharge:  General: NAD  HEENT: PERRL, EOM intact, scleral icterus; MMM  Cardiovascular: RRR; no MRG;   Respiratory: CTAB; no WRR  GI/: soft; NTND; BS x4  Extremities: WWP; 2+ peripheral pulses bilaterally; 1+ edema bilaterally  Skin: normal color & turgor; no rash  Neurologic: aox3; no focal deficits

## 2025-04-15 NOTE — BH CONSULTATION LIAISON PROGRESS NOTE - NSBHMSEMOOD_PSY_A_CORE
Called pt to inform of the providers update of plan of care. Pt acknowledged and is appreciative of the call. Central Scheduling phone number provided      Sent pt a [employer] message.
Anxious
Anxious

## 2025-04-15 NOTE — DIETITIAN INITIAL EVALUATION ADULT - PERSON TAUGHT/METHOD
Educated on protein dense foods to promote lean body mass preservation and strategies to promote overall PO intake. Discussed fluid restriction per MD and dietary sources of free water to be mindful of. RD answered all pt questions. Reviewed fortified foods offered. Pt aware RD remains available for additional questions/concerns./verbal instruction/patient instructed

## 2025-04-15 NOTE — PROGRESS NOTE ADULT - PROBLEM SELECTOR PLAN 1
Patient presenting with leukocytosis meeting 2/4 SIRS criteria(HR 98 and wbc 19.4k). Wbc 4/9 20k though in 2/25 wnl. May be reactive iso malignancy vs infectious given abdominal pain. SOB, new pleural effusion, dry cough and fatigue considered URTI though rvp negative  para nucleated cell count 196  -f/u paracentesis culture, gram stain  -can monitor after abx Patient presenting with leukocytosis meeting 2/4 SIRS criteria(HR 98 and wbc 19.4k). Wbc 4/9 20k though in 2/25 wnl. May be reactive iso malignancy vs infectious given abdominal pain. SOB, new pleural effusion, dry cough and fatigue considered URTI though rvp negative  para nucleated cell count 196    -can monitor after abx

## 2025-04-15 NOTE — BH CONSULTATION LIAISON PROGRESS NOTE - NSBHFUPINTERVALHXFT_PSY_A_CORE
Patient was seen at bedside twice (in am with Psychology intern and then later in the afternoon). Patient acknowledges difficult coping with her current medical state and anticipated prognosis. She endorses feeling overwhelmed with anxiety and sadness, however still actively trying to use coping strategies. No SI/HI/AVH/PI. She continues to feel ambivalence about psychotropics, but agrees to consider taking the anxiolytic tonight if she's struggling with falling asleep. 
Patient was seen at bedside. She reports that she is focusing on problem-solving at this time, by trying to clarify the logistics of hospice care and the arrival of her family. Pt reports fluctuating mood, "on and off" sleep, limited response to taking alprazolam last night. No SI/HI/AVH/PI.  Patient discusses accessing mental health support after leaving the hospital and accepts the referral for the Psycho-Oncology program.

## 2025-04-15 NOTE — DIETITIAN INITIAL EVALUATION ADULT - PROBLEM SELECTOR PLAN 6
She achieved a treatment effect in the dominant mass treated with Y90, with some progression elsewhere. She subsequently received therapy with immunoembolization x 2 administered concurrently with tebentafusp. In the setting of progression, she received radioembolization followed by ipilimumab and nivolumab, with the later complicated by the development of colitis, hepatitis and thyroiditis. She is now s/p chemo embolization under the care of Dr. Layla Acharya.  Saw Dr. Dr. Ruby 4/8      -pain control:      -Morphine 3mg IV q4h PRN for Severe Pain      -Oxy IR 5mg PO q4h PRN for Moderate Pain      -Bowel Regimen: Senna 2tabs qhs + Miralax daily PRN  -c/w prednisone 10mg daily  -c/w bactrim DS MWF for PJP ppx  -c/w pantoprazole 40 mg daily  -c/w xanax 0.25 mg twice a day as needed prescribed, though she only takes at bedtime  -palliative recs

## 2025-04-15 NOTE — PROGRESS NOTE ADULT - SUBJECTIVE AND OBJECTIVE BOX
[facility]  Dre Chicas, Palliative Care Attending  Contact Info: Call [phone] (HEAL Line) or message on Microsoft Teams (Dre Chicas)    SUBJECTIVE AND OBJECTIVE:  INTERVAL HPI/OVERNIGHT EVENTS: Interval events noted. See patient's PRN use for the past 24hrs noted below. Comprehensive symptom assessment and GOC exploration as noted below. Extensive time spent discussing plan of care with patient/family. No unexpected adverse effects of opiates noted: no sedation/dizziness/nausea.    ALLERGIES:  No Known Allergies    MEDICATIONS  (STANDING):  atenolol  Tablet 12.5 milliGRAM(s) Oral every 24 hours  bisacodyl 5 milliGRAM(s) Oral at bedtime  fentaNYL   Patch  12 MICROgram(s)/Hr 1 Patch Transdermal every 72 hours  influenza   Vaccine 0.5 milliLiter(s) IntraMuscular once  pantoprazole    Tablet 40 milliGRAM(s) Oral every 24 hours  predniSONE   Tablet 10 milliGRAM(s) Oral every 24 hours  predniSONE   Tablet 5 milliGRAM(s) Oral every 24 hours  senna 2 Tablet(s) Oral at bedtime  ursodiol Capsule 300 milliGRAM(s) Oral three times a day    MEDICATIONS  (PRN):  acetaminophen     Tablet .. 650 milliGRAM(s) Oral every 6 hours PRN Temp greater or equal to 38C (100.4F), Mild Pain (1 - 3)  HYDROmorphone   Tablet 3 milliGRAM(s) Oral every 4 hours PRN Severe Pain (7 - 10)  HYDROmorphone   Tablet 2 milliGRAM(s) Oral every 4 hours PRN Moderate Pain (4 - 6)  LORazepam     Tablet 0.5 milliGRAM(s) Oral every 12 hours PRN Anxiety  ondansetron Injectable 4 milliGRAM(s) IV Push every 6 hours PRN Nausea and/or Vomiting  polyethylene glycol 3350 17 Gram(s) Oral every 24 hours PRN Constipation      Analgesic Use (Scheduled and PRNs) for past 24 hours:    ALPRAZolam   0.25 milliGRAM(s) Oral (04-14-25 @ 22:32)    ondansetron Injectable   4 milliGRAM(s) IV Push (04-14-25 @ 22:32)    sodium zirconium cyclosilicate   10 Gram(s) Oral (04-15-25 @ 16:08)      ITEMS UNCHECKED ARE NOT PRESENT  PRESENT SYMPTOMS/REVIEW OF SYSTEMS: []Unable to obtain due to poor mentation   Source if other than patient:  []Family   []Team         Vital Signs Last 24 Hrs  T(C): 36.7 (15 Apr 2025 07:00), Max: 36.7 (15 Apr 2025 07:00)  T(F): 98 (15 Apr 2025 07:00), Max: 98 (15 Apr 2025 07:00)  HR: 100 (15 Apr 2025 07:00) (100 - 108)  BP: 112/74 (15 Apr 2025 07:00) (110/73 - 115/76)  BP(mean): --  RR: 18 (15 Apr 2025 07:00) (18 - 18)  SpO2: 92% (15 Apr 2025 07:00) (91% - 92%)    Parameters below as of 15 Apr 2025 07:00  Patient On (Oxygen Delivery Method): room air        LABS: Personally reviewed and interpreted                        12.2   20.19 )-----------( 436      ( 15 Apr 2025 05:30 )             36.0   04-15    122[L]  |  86[L]  |  38[H]  ----------------------------<  130[H]  5.8[H]   |  16[L]  |  0.88    Ca    8.9      15 Apr 2025 16:00  Phos  2.6     04-15  Mg     2.5     04-15    TPro  5.8[L]  /  Alb  2.8[L]  /  TBili  12.8[H]  /  DBili  9.7[H]  /  AST  395[H]  /  ALT  164[H]  /  AlkPhos  306[H]  04-15      RADIOLOGY & ADDITIONAL STUDIES: Personally reviewed and interpreted  None new    DISCUSSION OF CASE: Family - to provide updates and emotional support; Primary Team/RN - to discuss plan of care [facility]  Dre Chicas, Palliative Care Attending  Contact Info: Call [phone] (HEAL Line) or message on Microsoft Teams (Dre Chicas)    SUBJECTIVE AND OBJECTIVE:  INTERVAL HPI/OVERNIGHT EVENTS: Interval events noted. See patient's PRN use for the past 24hrs noted below. Comprehensive symptom assessment and GOC exploration as noted below. Extensive time spent discussing plan of care with patient/family. No unexpected adverse effects of opiates noted: no sedation/dizziness/nausea.    ALLERGIES:  No Known Allergies    MEDICATIONS  (STANDING):  atenolol  Tablet 12.5 milliGRAM(s) Oral every 24 hours  bisacodyl 5 milliGRAM(s) Oral at bedtime  fentaNYL   Patch  12 MICROgram(s)/Hr 1 Patch Transdermal every 72 hours  influenza   Vaccine 0.5 milliLiter(s) IntraMuscular once  pantoprazole    Tablet 40 milliGRAM(s) Oral every 24 hours  predniSONE   Tablet 10 milliGRAM(s) Oral every 24 hours  predniSONE   Tablet 5 milliGRAM(s) Oral every 24 hours  senna 2 Tablet(s) Oral at bedtime  ursodiol Capsule 300 milliGRAM(s) Oral three times a day    MEDICATIONS  (PRN):  acetaminophen     Tablet .. 650 milliGRAM(s) Oral every 6 hours PRN Temp greater or equal to 38C (100.4F), Mild Pain (1 - 3)  HYDROmorphone   Tablet 3 milliGRAM(s) Oral every 4 hours PRN Severe Pain (7 - 10)  HYDROmorphone   Tablet 2 milliGRAM(s) Oral every 4 hours PRN Moderate Pain (4 - 6)  LORazepam     Tablet 0.5 milliGRAM(s) Oral every 12 hours PRN Anxiety  ondansetron Injectable 4 milliGRAM(s) IV Push every 6 hours PRN Nausea and/or Vomiting  polyethylene glycol 3350 17 Gram(s) Oral every 24 hours PRN Constipation      Analgesic Use (Scheduled and PRNs) for past 24 hours:    ALPRAZolam   0.25 milliGRAM(s) Oral (04-14-25 @ 22:32)    ondansetron Injectable   4 milliGRAM(s) IV Push (04-14-25 @ 22:32)    sodium zirconium cyclosilicate   10 Gram(s) Oral (04-15-25 @ 16:08)      ITEMS UNCHECKED ARE NOT PRESENT  PRESENT SYMPTOMS/REVIEW OF SYSTEMS: []Unable to obtain due to poor mentation   Source if other than patient:  []Family   []Team     Pain: [x] yes [] no  QOL impact - tolerable  Location - abdomen                  Aggravating Factors - abdominal distention  Quality - aching  Radiation - across lower abdomen  Timing - intermittent  Severity (0-10 scale) - 4  Minimal Acceptable Level (0-10 scale) - 4    Other Symptoms: See ESAS Section Below  [x]All other review of systems negative     GENERAL:  [x] NAD [x]Alert []Lethargic  []Cachexia  []Unarousable  [x]Verbal  []Non-Verbal  BEHAVIORAL:   []Anxiety  []Delirium []Agitation [x]Cooperative [x]Oriented x3  HEENT:  [x]Normal  [x] Moist Mucous Membranes []Dry mouth   []ET Tube/Trach  []Oral lesions  PULMONARY:   [x]Clear []Tachypnea  []Audible excessive secretions  [x]Normal Work of Breathing []Labored Breathing  []Rhonchi []Crackles []Wheezing  CARDIOVASCULAR:    [x]Regular Rate []Regular Rhythm []Irregular []Tachy  []Foreign  GASTROINTESTINAL:  [x]Soft  [x]Distended   [x]+BS  []Non tender [x]Tender  []PEG []OGT/ NGT  Last BM:  GENITOURINARY:  [x]Normal [] Incontinent   []Oliguria/Anuria   []Ramirez  MUSCULOSKELETAL:   [x]Normal Extremities  [x]Weakness  []Bed/Wheelchair bound []Edema  NEUROLOGIC:   [x]No focal deficits  []Cognitive impairment  []Dysphagia []Dysarthria []Paresis []Encephalopathic  SKIN:   []Normal   []Pressure ulcer(s)  [x]Jaundice    Vital Signs Last 24 Hrs  T(C): 36.7 (15 Apr 2025 07:00), Max: 36.7 (15 Apr 2025 07:00)  T(F): 98 (15 Apr 2025 07:00), Max: 98 (15 Apr 2025 07:00)  HR: 100 (15 Apr 2025 07:00) (100 - 108)  BP: 112/74 (15 Apr 2025 07:00) (110/73 - 115/76)  BP(mean): --  RR: 18 (15 Apr 2025 07:00) (18 - 18)  SpO2: 92% (15 Apr 2025 07:00) (91% - 92%)    Parameters below as of 15 Apr 2025 07:00  Patient On (Oxygen Delivery Method): room air    LABS: Personally reviewed and interpreted                      12.2   20.19 )-----------( 436      ( 15 Apr 2025 05:30 )             36.0   04-15    122[L]  |  86[L]  |  38[H]  ----------------------------<  130[H]  5.8[H]   |  16[L]  |  0.88    Ca    8.9      15 Apr 2025 16:00  Phos  2.6     04-15  Mg     2.5     04-15  TPro  5.8[L]  /  Alb  2.8[L]  /  TBili  12.8[H]  /  DBili  9.7[H]  /  AST  395[H]  /  ALT  164[H]  /  AlkPhos  306[H]  04-15    RADIOLOGY & ADDITIONAL STUDIES: Personally reviewed and interpreted  None new    DISCUSSION OF CASE: Family - to provide updates and emotional support; Primary Team/RN - to discuss plan of care

## 2025-04-15 NOTE — DISCHARGE NOTE PROVIDER - ATTENDING DISCHARGE PHYSICAL EXAMINATION:
******************************************************  ATTENDING ATTESTATION    I have read and agree with the resident Discharge Note above. Patient seen and discussed with resident team on the day of discharge.     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      Physical Exam:  T(C): 36.7  HR: 99  BP: 101/60  RR: 28  SpO2: 97%    Gen: lethargic, intermittently awake  HEENT: dry mucus membranes  Pulm: tachypneic on 2LNC  Abd: diffusely mildly tender, peritoneal drain in RLQ  Skin: jaundiced  Ext: WWP, 2+ pitting edema  Neuro: A&Ox2    I was physically present for the evaluation and management services provided. I agree with the included history, physical, and plan which I reviewed and edited where appropriate. I spent 33 minutes on direct patient care and discharge planning with more than 50% of the visit spent on counseling and/or coordination of care.

## 2025-04-15 NOTE — DIETITIAN INITIAL EVALUATION ADULT - PROBLEM SELECTOR PLAN 8
Subjectively feels as though she has incomplete voiding though denies dysuria, vaginal discharge or urinary frequency. Bladder scan in ED showed >500 though likely from ascites as patient did not have the urge to urinate    -strict I/O to ensure pt voiding appropriately  -bladder scans q6, get pvr  -can consider a trial of straight cath if BS persistently elevated w/ sx

## 2025-04-15 NOTE — DISCHARGE NOTE PROVIDER - NSDCMRMEDTOKEN_GEN_ALL_CORE_FT
Bactrim  mg-160 mg oral tablet: 1 tab(s) orally Monday, Wednesday, and Friday  Eliquis 5 mg oral tablet: 2 tab(s) orally 2 times a day for 7 days  fentaNYL 12 mcg/hr transdermal film, extended release: 1 patch transdermal every 72 hours  pantoprazole 40 mg oral delayed release tablet: 1 tab(s) orally every 24 hours  polyethylene glycol 3350 oral powder for reconstitution: 17 gram(s) orally every 24 hours As needed Constipation  predniSONE 10 mg oral tablet: 1 tab(s) orally every 24 hours  predniSONE 5 mg oral tablet: 1 tab(s) orally every 24 hours  senna leaf extract oral tablet: 2 tab(s) orally once a day (at bedtime)  ursodiol 300 mg oral capsule: 1 cap(s) orally 3 times a day   ALPRAZolam 0.25 mg oral tablet: 1 tab(s) orally once a day (at bedtime) as needed for  anxiety MDD: 0.5 MG  atenolol 25 mg oral tablet: 0.5 tab(s) orally once a day  Ativan 0.5 mg oral tablet: 1 tab(s) orally every 12 hours as needed for Anxiety MDD: 1mg  Bactrim  mg-160 mg oral tablet: 1 tab(s) orally Monday, Wednesday, and Friday  bisacodyl 5 mg oral delayed release tablet: 1 tab(s) orally once a day (at bedtime)  Blisovi FE  oral tablet: 1 tab(s) orally once a day  Eliquis 5 mg oral tablet: 2 tab(s) orally 2 times a day for 7 days  fentaNYL 12 mcg/hr transdermal film, extended release: 1 patch transdermal every 72 hours MDD: 288mcg  HYDROmorphone 4 mg oral tablet: 1 tab(s) orally every 4 hours as needed for  severe pain MDD: 24mg  ondansetron 4 mg oral tablet, disintegratin tab(s) orally as needed for  nausea  pantoprazole 40 mg oral delayed release tablet: 1 tab(s) orally every 24 hours  polyethylene glycol 3350 oral powder for reconstitution: 17 gram(s) orally every 24 hours  predniSONE 10 mg oral tablet: 1 tab(s) orally every 24 hours  predniSONE 5 mg oral tablet: 1 tab(s) orally every 24 hours  senna leaf extract oral tablet: 2 tab(s) orally once a day (at bedtime)  ursodiol 300 mg oral capsule: 1 cap(s) orally 3 times a day   ALPRAZolam 0.25 mg oral tablet: 1 tab(s) orally once a day (at bedtime) as needed for  anxiety MDD: 0.5 MG  atenolol 25 mg oral tablet: 0.5 tab(s) orally once a day  Ativan 0.5 mg oral tablet: 1 tab(s) orally every 12 hours as needed for Anxiety MDD: 1mg  bisacodyl 5 mg oral delayed release tablet: 1 tab(s) orally once a day (at bedtime)  fentaNYL 12 mcg/hr transdermal film, extended release: 1 patch transdermal every 72 hours MDD: 288mcg  HYDROmorphone 4 mg oral tablet: 1 tab(s) orally every 4 hours as needed for  severe pain MDD: 24mg  ondansetron 4 mg oral tablet, disintegratin tab(s) orally every 8 hours as needed for  nausea  pantoprazole 40 mg oral delayed release tablet: 1 tab(s) orally every 24 hours  polyethylene glycol 3350 oral powder for reconstitution: 17 gram(s) orally every 24 hours  predniSONE 10 mg oral tablet: 1 tab(s) orally every 24 hours  predniSONE 5 mg oral tablet: 1 tab(s) orally every 24 hours  senna leaf extract oral tablet: 2 tab(s) orally once a day (at bedtime)  ursodiol 300 mg oral capsule: 1 cap(s) orally 3 times a day

## 2025-04-15 NOTE — PROGRESS NOTE ADULT - PROBLEM SELECTOR PLAN 5
Patient is DNR/DNI, MOLST in chart  -significant other would be surrogate decision maker in the absence of HCP  -introduced the role of home hospice services, ongoing coordination

## 2025-04-15 NOTE — DISCHARGE NOTE PROVIDER - NSDCCPCAREPLAN_GEN_ALL_CORE_FT
PRINCIPAL DISCHARGE DIAGNOSIS  Diagnosis: Ascites  Assessment and Plan of Treatment: Ascites is the abnormal buildup of fluid within the peritoneal cavity, the space between the abdominal organs and the abdominal wall. This is likely from your underlying cancer. A catheter was placed for your comfort to allow for more frequent drainage.      SECONDARY DISCHARGE DIAGNOSES  Diagnosis: Encounter for home hospice care  Assessment and Plan of Treatment: Home hospice care provides specialized medical, emotional, and spiritual support to individuals with life-limiting illnesses, such as cancer,  in their own homes, focusing on comfort and quality of life rather than curing the disease. It's a compassionate care approach that helps manage symptoms, ensuring dignity and improving your well-being.     PRINCIPAL DISCHARGE DIAGNOSIS  Diagnosis: Ascites  Assessment and Plan of Treatment: Ascites is the abnormal buildup of fluid within the peritoneal cavity, the space between the abdominal organs and the abdominal wall. This is likely from your underlying cancer. A catheter was placed for your comfort to allow for more frequent drainage.  These are the medications that we are discharging you on:   - Ativan 0.5 mg daily as needed for anxiety  - Atenolol 12.5 mg daily for tachycardia  - Bisacodyl 5 mg daily for constipation  - Miralax 17 g daily for constipation  - Senna leaf extract 2 tablets daily for constipation  - Fentanyl 12 mcg/hr transdermal film to be replaced every 72 hours  - Hydromorphone 4 mg every 4 hours for severe pain  - Zofran 4 mg every 8 hours as needed for nausea  - Pantoprazole 40 mg daily for acid reflux  - Prednisone 10 mg daily  - Prednisone 5 mg daily  - Ursodiol 300 mg three times a day  The most important medications that you take are the pain medications (hydromorphone, fentanyl patch), nausea medication (zofran), and medications for constipation (miralax, bisacodyl, senna), as our priority for you is to maximize your comfort at end of life.   PLEASE STOP/DON'T TAKE THE FOLLOWING:  - Bactrim (trimethoprim/sulfamethoxazole)   - Eliquis        SECONDARY DISCHARGE DIAGNOSES  Diagnosis: Encounter for home hospice care  Assessment and Plan of Treatment: Home hospice care provides specialized medical, emotional, and spiritual support to individuals with life-limiting illnesses, such as cancer,  in their own homes, focusing on comfort and quality of life rather than curing the disease. It's a compassionate care approach that helps manage symptoms, ensuring dignity and improving your well-being.

## 2025-04-15 NOTE — DIETITIAN INITIAL EVALUATION ADULT - NS FNS DIET ORDER
Diet, Regular:   1000mL Fluid Restriction ([id number])   Tube Feed Start Time: 06:00 (04-15-25 @ 10:53)

## 2025-04-15 NOTE — PROGRESS NOTE ADULT - PROBLEM SELECTOR PLAN 1
-Fentanyl Patch 12mcg/hr q72hr  -Dilaudid 2mg PO q4h PRN for Moderate Pain  -Dilaudid 0.5mg IV q3h PRN for Severe Pain  -Ativan 0.5mg PO q12h PRN for Anxiety

## 2025-04-15 NOTE — DISCHARGE NOTE PROVIDER - NSDCFUSCHEDAPPT_GEN_ALL_CORE_FT
Michael Black  [facility]  [facility] [address]  Scheduled Appointment: 04/29/2025

## 2025-04-15 NOTE — DIETITIAN INITIAL EVALUATION ADULT - OTHER INFO
44F with PMH of HLA  positive female now with an M1b, stage IV, uveal melanoma (on immunotherapy) with liver involvement who presented with pain to right flank and upper abdomen, admitted for management. Status post paracentesis 4/11. Pending possible IR palliative pigtail placement of intraperitoneal drain 4/16. Palliative consulted, goals of care ongoing, dispo likely home hospice.     Pt seen on 7WO for assessment. Labs and medication orders reviewed. Ordered for albumin 25%, prednisone. Na 122 <low>, K 5.7 <high>, Mg/Phos WNL, BUN/Cr 36 <high>/0.85 WNL. On Regular diet, now with 1L fluid restriction. Pt resting comfortably in bed on visit this AM. Reports fairly good appetite and intake, notes following high protein diet. States UBW ~150-155 pounds, endorses wt gain due to fluid retention; admission wt 160 pounds / 72.6 kilograms. No overt signs of nutritional wasting identified. Pt denies difficulty chewing/swallowing. Reports no nausea/vomiting/diarrhea, endorses constipation in setting of pain regimen with last BM x3d ago - bowel regimen ordered. Ongoing abdominal distension noted, no pain reported. Pt confirms no known food allergies or intolerances. No Church/ethnic/cultural food preferences noted. No pressure injuries or edema documented, Armando score 21. RD reviewed [facility] fortified food options, pt agreeable for use of oatmeal and smoothie - preferences updated in ORD system, will defer smoothie at this time due to fluid restriction. See nutrition recommendations. RD to remain available.

## 2025-04-15 NOTE — DIETITIAN INITIAL EVALUATION ADULT - OTHER CALCULATIONS
Estimated needs based on dosing wt as within %  pounds / 63.6 kilograms (114%). Needs adjusted for age and cancer on treatment.   Defer fluids to team.

## 2025-04-15 NOTE — CONSULT NOTE ADULT - ASSESSMENT
43 y/o F  w/ h/o metastatic Melanoma , consulted on 4/15 for hyponatremia  w/ Serum Na of 122,  Urine Na 20, Urine osmolality 595 after primary team was using fluid restriction from 4/10 when Sna  was 129.  Pt is currently admitted for the management of ascites,  scheduled for Pigtail placement in preparation for hospice care for the chronic management of the ascites.       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  -Limit Na correction 128 , no more than 130 by 4/18 4 pm  -Fluid restriction 1L /d   -high solute diet  -Repeat Sandy and urine osmo w/ labs  -will follow 43 y/o F  w/ h/o metastatic Melanoma , consulted on 4/15 for hyponatremia  w/ Serum Na of 122,  Urine Na 20, Urine osmolality 595 after primary team was using fluid restriction from 4/10 when Sna  was 129.  Pt is currently admitted for the management of ascites,  scheduled for Pigtail placement in preparation for hospice care for the chronic management of the ascites.       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  -Limit Na correction 128 , no more than 130 by 4/18 4 pm  -Fluid restriction 1L /d   -high solute diet  -Repeat Sandy and urine osmo w/ labs w/  am labs  -will follow

## 2025-04-16 NOTE — CHART NOTE - NSCHARTNOTEFT_GEN_A_CORE
patient experience is going to see the patient at bedside and offer her a manicure which she mentioned would make her feel better   OK for patient to have her nails filed and painted by patient experience, no cuticle removal as INR is elevated

## 2025-04-16 NOTE — PROGRESS NOTE ADULT - PROBLEM SELECTOR PLAN 3
Patient presenting with leukocytosis meeting 2/4 SIRS criteria. No colitis seen on CT. Pain likely from large ascites and portal vein thrombosis. Pain is improved after para.  -s/p paracentesis 4/11, planned for palliative catheter placement and arrangement for home hospice  -pain control:      -dilaudid 0.5 IV q3 PRN for Severe Pain      -dilaudid 2mg PO q4 PRN for Moderate Pain      -Bowel Regimen: Senna 2tabs qhs + Miralax daily PRN Patient presenting with leukocytosis meeting 2/4 SIRS criteria. No colitis seen on CT. Pain likely from large ascites and portal vein thrombosis. Pain is improved after para.  -s/p paracentesis 4/11, planned for palliative catheter placement and arrangement for home hospice  -planning for IR pigtail on 4/16, pending optimization medically  -pain control: as above

## 2025-04-16 NOTE — PROGRESS NOTE ADULT - PROBLEM SELECTOR PLAN 1
Patient presenting with leukocytosis meeting 2/4 SIRS criteria(HR 98 and wbc 19.4k). Wbc 4/9 20k though in 2/25 wnl. May be reactive iso malignancy vs infectious given abdominal pain. SOB, new pleural effusion, dry cough and fatigue considered URTI though rvp negative  para nucleated cell count 196    -can monitor after abx

## 2025-04-16 NOTE — PROGRESS NOTE ADULT - SUBJECTIVE AND OBJECTIVE BOX
[facility] Geriatrics and Palliative Medicine  Dre Chicas, Palliative Care Attending  Contact Info: Call [phone] (HEAL Line) or message on Microsoft Teams (Dre Chicas)    SUBJECTIVE AND OBJECTIVE:  INTERVAL HPI/OVERNIGHT EVENTS: Interval events noted. See patient's PRN use for the past 24hrs noted below. Comprehensive symptom assessment and GOC exploration as noted below. Extensive time spent discussing plan of care with patient. No unexpected adverse effects of opiates noted.    ALLERGIES:  No Known Allergies    MEDICATIONS  (STANDING):  albumin human 25% IVPB 100 milliLiter(s) IV Intermittent once  atenolol  Tablet 12.5 milliGRAM(s) Oral every 24 hours  bisacodyl 5 milliGRAM(s) Oral at bedtime  fentaNYL   Patch  12 MICROgram(s)/Hr 1 Patch Transdermal every 72 hours  influenza   Vaccine 0.5 milliLiter(s) IntraMuscular once  pantoprazole    Tablet 40 milliGRAM(s) Oral every 24 hours  predniSONE   Tablet 10 milliGRAM(s) Oral every 24 hours  predniSONE   Tablet 5 milliGRAM(s) Oral every 24 hours  senna 2 Tablet(s) Oral at bedtime  ursodiol Capsule 300 milliGRAM(s) Oral three times a day    MEDICATIONS  (PRN):  acetaminophen     Tablet .. 650 milliGRAM(s) Oral every 6 hours PRN Temp greater or equal to 38C (100.4F), Mild Pain (1 - 3)  HYDROmorphone   Tablet 3 milliGRAM(s) Oral every 4 hours PRN Severe Pain (7 - 10)  HYDROmorphone   Tablet 2 milliGRAM(s) Oral every 4 hours PRN Moderate Pain (4 - 6)  LORazepam     Tablet 0.5 milliGRAM(s) Oral every 12 hours PRN Anxiety  ondansetron Injectable 4 milliGRAM(s) IV Push every 6 hours PRN Nausea and/or Vomiting  polyethylene glycol 3350 17 Gram(s) Oral every 24 hours PRN Constipation      Analgesic Use (Scheduled and PRNs) for past 24 hours:    HYDROmorphone   Tablet   3 milliGRAM(s) Oral (04-15-25 @ 21:43)    HYDROmorphone   Tablet   2 milliGRAM(s) Oral (04-16-25 @ 09:38)    ondansetron Injectable   4 milliGRAM(s) IV Push (04-15-25 @ 20:22)    sodium zirconium cyclosilicate   10 Gram(s) Oral (04-16-25 @ 09:06)    sodium zirconium cyclosilicate   10 Gram(s) Oral (04-15-25 @ 16:08)      ITEMS UNCHECKED ARE NOT PRESENT  PRESENT SYMPTOMS/REVIEW OF SYSTEMS: []Unable to obtain due to poor mentation   Source if other than patient:  []Family   []Team         Vital Signs Last 24 Hrs  T(C): 36.7 (16 Apr 2025 11:03), Max: 36.8 (15 Apr 2025 20:42)  T(F): 98 (16 Apr 2025 11:03), Max: 98.2 (15 Apr 2025 20:42)  HR: 101 (16 Apr 2025 11:03) (87 - 101)  BP: 110/71 (16 Apr 2025 11:03) (106/67 - 110/71)  BP(mean): --  RR: 20 (16 Apr 2025 11:03) (18 - 20)  SpO2: 93% (16 Apr 2025 11:03) (91% - 93%)    Parameters below as of 16 Apr 2025 11:03  Patient On (Oxygen Delivery Method): room air        LABS: Personally reviewed and interpreted                        11.6   19.24 )-----------( 381      ( 16 Apr 2025 07:30 )             33.8   04-16    121[L]  |  88[L]  |  33[H]  ----------------------------<  102[H]  5.1   |  18[L]  |  0.63    Ca    8.4      16 Apr 2025 11:55  Phos  2.6     04-15  Mg     2.4     04-16    TPro  5.8[L]  /  Alb  2.8[L]  /  TBili  12.8[H]  /  DBili  9.7[H]  /  AST  395[H]  /  ALT  164[H]  /  AlkPhos  306[H]  04-15      RADIOLOGY & ADDITIONAL STUDIES: Personally reviewed and interpreted  None new    DISCUSSION OF CASE: Family - to provide updates and emotional support; Primary Team/RN - to discuss plan of care [facility] Geriatrics and Palliative Medicine  Dre Chicas, Palliative Care Attending  Contact Info: Call [phone] (HEAL Line) or message on Microsoft Teams (Dre Chicas)    SUBJECTIVE AND OBJECTIVE:  INTERVAL HPI/OVERNIGHT EVENTS: Interval events noted. Still has intermittent abdominal discomfort. Had a BM. See patient's PRN use for the past 24hrs noted below. Comprehensive symptom assessment and GOC exploration as noted below. Extensive time spent discussing plan of care with patient. No unexpected adverse effects of opiates noted.    ALLERGIES:  No Known Allergies    MEDICATIONS  (STANDING):  albumin human 25% IVPB 100 milliLiter(s) IV Intermittent once  atenolol  Tablet 12.5 milliGRAM(s) Oral every 24 hours  bisacodyl 5 milliGRAM(s) Oral at bedtime  fentaNYL   Patch  12 MICROgram(s)/Hr 1 Patch Transdermal every 72 hours  influenza   Vaccine 0.5 milliLiter(s) IntraMuscular once  pantoprazole    Tablet 40 milliGRAM(s) Oral every 24 hours  predniSONE   Tablet 10 milliGRAM(s) Oral every 24 hours  predniSONE   Tablet 5 milliGRAM(s) Oral every 24 hours  senna 2 Tablet(s) Oral at bedtime  ursodiol Capsule 300 milliGRAM(s) Oral three times a day    MEDICATIONS  (PRN):  acetaminophen     Tablet .. 650 milliGRAM(s) Oral every 6 hours PRN Temp greater or equal to 38C (100.4F), Mild Pain (1 - 3)  HYDROmorphone   Tablet 3 milliGRAM(s) Oral every 4 hours PRN Severe Pain (7 - 10)  HYDROmorphone   Tablet 2 milliGRAM(s) Oral every 4 hours PRN Moderate Pain (4 - 6)  LORazepam     Tablet 0.5 milliGRAM(s) Oral every 12 hours PRN Anxiety  ondansetron Injectable 4 milliGRAM(s) IV Push every 6 hours PRN Nausea and/or Vomiting  polyethylene glycol 3350 17 Gram(s) Oral every 24 hours PRN Constipation      Analgesic Use (Scheduled and PRNs) for past 24 hours:    HYDROmorphone   Tablet   3 milliGRAM(s) Oral (04-15-25 @ 21:43)    HYDROmorphone   Tablet   2 milliGRAM(s) Oral (04-16-25 @ 09:38)    ondansetron Injectable   4 milliGRAM(s) IV Push (04-15-25 @ 20:22)    sodium zirconium cyclosilicate   10 Gram(s) Oral (04-16-25 @ 09:06)    sodium zirconium cyclosilicate   10 Gram(s) Oral (04-15-25 @ 16:08)      ITEMS UNCHECKED ARE NOT PRESENT  PRESENT SYMPTOMS/REVIEW OF SYSTEMS: []Unable to obtain due to poor mentation   Source if other than patient:  []Family   []Team     Pain: [x] yes [] no  QOL impact - tolerable  Location - abdomen                  Aggravating Factors - abdominal distention  Quality - aching  Radiation - across lower abdomen  Timing - intermittent  Severity (0-10 scale) - 4  Minimal Acceptable Level (0-10 scale) - 4    Other Symptoms: See ESAS Section Below  [x]All other review of systems negative     GENERAL:  [x] NAD [x]Alert []Lethargic  []Cachexia  []Unarousable  [x]Verbal  []Non-Verbal  BEHAVIORAL:   []Anxiety  []Delirium []Agitation [x]Cooperative [x]Oriented x3  HEENT:  [x]Normal  [x] Moist Mucous Membranes []Dry mouth   []ET Tube/Trach  []Oral lesions  PULMONARY:   [x]Clear []Tachypnea  []Audible excessive secretions  [x]Normal Work of Breathing []Labored Breathing  []Rhonchi []Crackles []Wheezing  CARDIOVASCULAR:    [x]Regular Rate []Regular Rhythm []Irregular []Tachy  []Foreign  GASTROINTESTINAL:  [x]Soft  [x]Distended   [x]+BS  []Non tender [x]Tender  []PEG []OGT/ NGT  Last BM:  GENITOURINARY:  [x]Normal [] Incontinent   []Oliguria/Anuria   []Ramirez  MUSCULOSKELETAL:   [x]Normal Extremities  [x]Weakness  []Bed/Wheelchair bound []Edema  NEUROLOGIC:   [x]No focal deficits  []Cognitive impairment  []Dysphagia []Dysarthria []Paresis []Encephalopathic  SKIN:   []Normal   []Pressure ulcer(s)  [x]Jaundice    Vital Signs Last 24 Hrs  T(C): 36.7 (16 Apr 2025 11:03), Max: 36.8 (15 Apr 2025 20:42)  T(F): 98 (16 Apr 2025 11:03), Max: 98.2 (15 Apr 2025 20:42)  HR: 101 (16 Apr 2025 11:03) (87 - 101)  BP: 110/71 (16 Apr 2025 11:03) (106/67 - 110/71)  BP(mean): --  RR: 20 (16 Apr 2025 11:03) (18 - 20)  SpO2: 93% (16 Apr 2025 11:03) (91% - 93%)    Parameters below as of 16 Apr 2025 11:03  Patient On (Oxygen Delivery Method): room air    LABS: Personally reviewed and interpreted                        11.6   19.24 )-----------( 381      ( 16 Apr 2025 07:30 )             33.8   04-16    121[L]  |  88[L]  |  33[H]  ----------------------------<  102[H]  5.1   |  18[L]  |  0.63    Ca    8.4      16 Apr 2025 11:55  Phos  2.6     04-15  Mg     2.4     04-16  TPro  5.8[L]  /  Alb  2.8[L]  /  TBili  12.8[H]  /  DBili  9.7[H]  /  AST  395[H]  /  ALT  164[H]  /  AlkPhos  306[H]  04-15    RADIOLOGY & ADDITIONAL STUDIES: Personally reviewed and interpreted  None new    DISCUSSION OF CASE: Primary Team/RN - to discuss plan of care

## 2025-04-16 NOTE — PROGRESS NOTE ADULT - ATTENDING COMMENTS
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    VSS, on room air satting low 90s.  Labs reviewed. Stably elevated leukocytosis, worsening coagulopathy witn INR 2.6 this AM, Na persistently low despite multiple rounds of hypertonic saline overnight and this AM, K improved with lokelma.  On exam she appears unchanged other than slightly worsening abd distension and jaundice, is tearful and nervous about what may happen.    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 #hyperbilirubinemia #large volume malignant ascites  #PVT  #hyponatremia #coagulopathy  - further DMT options as per Oncology discussion with patient, planned for home hospice  - hypoNa is not responsive to hypertonic saline, coagulopathy is worsening - all likely driven by progression of malignancy and hepatic failure  - IR consulted for placement of indwelling abd catheter for management of ascites, pending optimization of coagulopathy  - discussed with Renal and given lack of response to multiple doses of hypertonic saline, it is not likely that further treatment would significantly alter her hyponatremia and further salt loading may lead to further fluid retention  - giving FFP now, repeat coags  - holding Bactrim given intermittent hyperK  - discuss with Onc risk/benefit for AC for PVT given worsening coagulopathy 44F HLA + female with 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    VSS, on room air satting low 90s.  Labs reviewed. Stably elevated leukocytosis, worsening coagulopathy witn INR 2.6 this AM, Na persistently low despite multiple rounds of hypertonic saline overnight and this AM, K improved with lokelma.  On exam she appears unchanged other than slightly worsening abd distension and jaundice, is tearful and nervous about what may happen.    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 #hyperbilirubinemia #large volume malignant ascites  #PVT  #hyponatremia #coagulopathy  - further DMT options as per Oncology discussion with patient, planned for home hospice  - hypoNa is not responsive to hypertonic saline, coagulopathy is worsening - all likely driven by progression of malignancy and hepatic failure  - IR consulted for placement of indwelling abd catheter for management of ascites, pending optimization of coagulopathy  - discussed with Renal and given lack of response to multiple doses of hypertonic saline, it is not likely that further salt loading would lead to improvement in serum sodium but may worsen fluid retention; trial albumin, tolvaptan for the purposes of optimization for procedure  - giving FFP now, repeat coags  - holding Bactrim given intermittent hyperK  - discuss with Onc risk/benefit for AC for PVT given worsening coagulopathy

## 2025-04-16 NOTE — PROGRESS NOTE ADULT - PROBLEM SELECTOR PLAN 6
Complex medical decision making / symptom management in the setting of advanced malignancy.    Will continue to follow for ongoing GOC discussion / titration of palliative regimen. Emotional support provided, questions answered.  Active Psychosocial Referrals:  [x]Social Work/Case management []PT/OT []Chaplaincy [x]Hospice  []Childlife []Holistic RN [x]Massage Therapy []Music Therapy []Ethics  Coping: [] well [x] with difficulty [] poor coping [] unable to assess  Support system: [] strong [x] adequate [] inadequate    For new or uncontrolled symptoms, please call Palliative Care at [phone]). The service is available 24/7 (including nights & weekends) to provide symptom management recommendations over the phone as appropriate

## 2025-04-16 NOTE — PROGRESS NOTE ADULT - ATTENDING COMMENTS
43 y/o F with metastatic melanoma admitted for R flank pain. Awaiting peritoneal drian placement/paracentesis. Nephrology consulted to help with hyponatremia prior to the procedure.     #Hyponatremia  -> Refractory to 3% saline and in fact it is just making her volume status worse.   -> We can try give her one dose of Tolvaptan to see if that helps. Would not give further dose given liver mets.   -> Guarded prognosis.

## 2025-04-16 NOTE — PROGRESS NOTE ADULT - PROBLEM SELECTOR PLAN 5
Continuing to elevate. Given elevation, patient unable to enroll in clinical study or receive treatment.    -no role for stenting per GI  -ursodiol 200mg TID  -can give atarax for itching prn, she notes it's very mild at this time Continuing to elevate. Given elevation, patient unable to enroll in clinical study or receive treatment.    -no role for stenting per GI  -ursodiol 300mg TID  -can give atarax for itching prn, she notes it's very mild at this time

## 2025-04-16 NOTE — PROGRESS NOTE ADULT - PROBLEM SELECTOR PLAN 4
Na on admission 129, and has been downtrending throughout admission to 124. Initial urine studies with urine osmol >800 and Na<20 suggesting ADH and RAAS on. Possibly intravascular depletion with third spacing given 1+ bilateral edema and low albumin given liver mets.  s/p bumex and spironolactone with worsening sodium  Placed back on 1L fluid restriction; s/p 25% albumin given likely intravascular depletion though third spacing    -ctm Na  -f/u cystatin c as BUN may be climbing iso worsening renal function Na on admission 129, and has been downtrending throughout admission to 124. Initial urine studies with urine osmol >800 and Na<20 suggesting ADH and RAAS on. Possibly intravascular depletion with third spacing given 1+ bilateral edema and low albumin given liver mets.  s/p bumex and spironolactone with worsening sodium  Placed back on 1L fluid restriction; s/p 25% albumin given likely intravascular depletion though third spacing  Received 100mL bolus 3% saline overnight 4/15 as Na dropped to 120  - On 4/16, AM Na 123, will give 30cc/hr for 4 hours of 3% saline and recheck

## 2025-04-16 NOTE — PROGRESS NOTE ADULT - PROBLEM SELECTOR PLAN 9
Noted she was expected to have labs compatible with hypothyroidism and to start synthroid but her labs did not reflect this so she never started. Denies palpitations, chest pain, rash, dysphagia, diarrhea.  tfts wnl    -takes atenolol 12.5mg QD for tachycardia Noted she was expected to have labs compatible with hypothyroidism and to start synthroid but her labs did not reflect this so she never started. Denies palpitations, chest pain, rash, dysphagia, diarrhea.  tfts wnl    -takes atenolol 12.5mg QD for tachycardia at home (not ordered currently)

## 2025-04-16 NOTE — PROGRESS NOTE ADULT - ASSESSMENT
45yo F with PMH of Metastatic Uveal Melanoma p/w abdominal pain and found to have significant ascites. Palliative consulted for complex symptom management in the setting of metastatic malignancy.    ·	ongoing coordination for home hospice, tentative plan for abdominal catheter pending improvement in INR  ·	recommend Dilaudid 0.5mg IV q3h PRN for Severe Pain until discharge, this will not affect discharge planning  ·	Xanax switched to Ativan

## 2025-04-16 NOTE — PROGRESS NOTE ADULT - SUBJECTIVE AND OBJECTIVE BOX
**INCOMPLETE NOTE    INTERVAL/OVERNIGHT EVENTS: None    SUBJECTIVE:  Patient seen and examined at bedside, comfortable, NAD. Denied fever, chest pain, dyspnea, abdominal pain.     Vital Signs Last 12 Hrs  T(F): 97.7 (04-16-25 @ 05:53), Max: 98.2 (04-15-25 @ 20:42)  HR: 87 (04-16-25 @ 05:53) (87 - 100)  BP: 106/67 (04-16-25 @ 05:53) (106/67 - 106/70)  BP(mean): --  RR: 18 (04-16-25 @ 05:53) (18 - 18)  SpO2: 91% (04-16-25 @ 05:53) (91% - 93%)  I&O's Summary      PHYSICAL EXAM:  General: NAD  HEENT: PERRL, EOM intact, sclera anicteric, MMM  Cardiovascular: RRR; no MRG;   Respiratory: CTAB; no WRR  GI/: soft; NTND; BS x4  Extremities: WWP; 2+ peripheral pulses bilaterally; no LE edema  Skin: normal color & turgor; no rash  Neurologic: aox3; no focal deficits    LABS:                        11.6   19.24 )-----------( 381      ( 16 Apr 2025 07:30 )             33.8     04-16    x   |  x   |  x   ----------------------------<  93  x    |  x   |  0.67    Ca    8.4      16 Apr 2025 07:30  Phos  2.6     04-15  Mg     2.4     04-16    TPro  5.8[L]  /  Alb  2.8[L]  /  TBili  12.8[H]  /  DBili  9.7[H]  /  AST  395[H]  /  ALT  164[H]  /  AlkPhos  306[H]  04-15    PT/INR - ( 16 Apr 2025 07:30 )   PT: 30.2 sec;   INR: 2.61          PTT - ( 16 Apr 2025 07:30 )  PTT:49.9 sec  Urinalysis Basic - ( 16 Apr 2025 07:30 )    Color: x / Appearance: x / SG: x / pH: x  Gluc: 93 mg/dL / Ketone: x  / Bili: x / Urobili: x   Blood: x / Protein: x / Nitrite: x   Leuk Esterase: x / RBC: x / WBC x   Sq Epi: x / Non Sq Epi: x / Bacteria: x          RADIOLOGY & ADDITIONAL TESTS:    MEDICATIONS  (STANDING):  atenolol  Tablet 12.5 milliGRAM(s) Oral every 24 hours  bisacodyl 5 milliGRAM(s) Oral at bedtime  fentaNYL   Patch  12 MICROgram(s)/Hr 1 Patch Transdermal every 72 hours  influenza   Vaccine 0.5 milliLiter(s) IntraMuscular once  pantoprazole    Tablet 40 milliGRAM(s) Oral every 24 hours  predniSONE   Tablet 10 milliGRAM(s) Oral every 24 hours  predniSONE   Tablet 5 milliGRAM(s) Oral every 24 hours  senna 2 Tablet(s) Oral at bedtime  sodium chloride 3%. 100 milliLiter(s) (30 mL/Hr) IV Continuous <Continuous>  ursodiol Capsule 300 milliGRAM(s) Oral three times a day    MEDICATIONS  (PRN):  acetaminophen     Tablet .. 650 milliGRAM(s) Oral every 6 hours PRN Temp greater or equal to 38C (100.4F), Mild Pain (1 - 3)  HYDROmorphone   Tablet 3 milliGRAM(s) Oral every 4 hours PRN Severe Pain (7 - 10)  HYDROmorphone   Tablet 2 milliGRAM(s) Oral every 4 hours PRN Moderate Pain (4 - 6)  LORazepam     Tablet 0.5 milliGRAM(s) Oral every 12 hours PRN Anxiety  ondansetron Injectable 4 milliGRAM(s) IV Push every 6 hours PRN Nausea and/or Vomiting  polyethylene glycol 3350 17 Gram(s) Oral every 24 hours PRN Constipation   INTERVAL/OVERNIGHT EVENTS: Na 120 on repeat, ordered 3% 100mL bolus.    SUBJECTIVE:  Patient seen and examined at bedside, comfortable, NAD. Denied fever, chest pain, dyspnea, abdominal pain. Reports feeling tired but overall well this morning, denies any pain.     Vital Signs Last 12 Hrs  T(F): 97.7 (04-16-25 @ 05:53), Max: 98.2 (04-15-25 @ 20:42)  HR: 87 (04-16-25 @ 05:53) (87 - 100)  BP: 106/67 (04-16-25 @ 05:53) (106/67 - 106/70)  BP(mean): --  RR: 18 (04-16-25 @ 05:53) (18 - 18)  SpO2: 91% (04-16-25 @ 05:53) (91% - 93%)  I&O's Summary      PHYSICAL EXAM:  General: NAD  HEENT: PERRL, EOM intact, sclera anicteric, MMM  Cardiovascular: RRR; no MRG;   Respiratory: CTAB; no WRR  GI/: soft; NTND; BS x4  Extremities: WWP; 2+ peripheral pulses bilaterally; 1+ edema bilaterally  Skin: normal color & turgor; no rash  Neurologic: aox3; no focal deficits    LABS:                        11.6   19.24 )-----------( 381      ( 16 Apr 2025 07:30 )             33.8     04-16    x   |  x   |  x   ----------------------------<  93  x    |  x   |  0.67    Ca    8.4      16 Apr 2025 07:30  Phos  2.6     04-15  Mg     2.4     04-16    TPro  5.8[L]  /  Alb  2.8[L]  /  TBili  12.8[H]  /  DBili  9.7[H]  /  AST  395[H]  /  ALT  164[H]  /  AlkPhos  306[H]  04-15    PT/INR - ( 16 Apr 2025 07:30 )   PT: 30.2 sec;   INR: 2.61          PTT - ( 16 Apr 2025 07:30 )  PTT:49.9 sec  Urinalysis Basic - ( 16 Apr 2025 07:30 )    Color: x / Appearance: x / SG: x / pH: x  Gluc: 93 mg/dL / Ketone: x  / Bili: x / Urobili: x   Blood: x / Protein: x / Nitrite: x   Leuk Esterase: x / RBC: x / WBC x   Sq Epi: x / Non Sq Epi: x / Bacteria: x          RADIOLOGY & ADDITIONAL TESTS:    MEDICATIONS  (STANDING):  atenolol  Tablet 12.5 milliGRAM(s) Oral every 24 hours  bisacodyl 5 milliGRAM(s) Oral at bedtime  fentaNYL   Patch  12 MICROgram(s)/Hr 1 Patch Transdermal every 72 hours  influenza   Vaccine 0.5 milliLiter(s) IntraMuscular once  pantoprazole    Tablet 40 milliGRAM(s) Oral every 24 hours  predniSONE   Tablet 10 milliGRAM(s) Oral every 24 hours  predniSONE   Tablet 5 milliGRAM(s) Oral every 24 hours  senna 2 Tablet(s) Oral at bedtime  sodium chloride 3%. 100 milliLiter(s) (30 mL/Hr) IV Continuous <Continuous>  ursodiol Capsule 300 milliGRAM(s) Oral three times a day    MEDICATIONS  (PRN):  acetaminophen     Tablet .. 650 milliGRAM(s) Oral every 6 hours PRN Temp greater or equal to 38C (100.4F), Mild Pain (1 - 3)  HYDROmorphone   Tablet 3 milliGRAM(s) Oral every 4 hours PRN Severe Pain (7 - 10)  HYDROmorphone   Tablet 2 milliGRAM(s) Oral every 4 hours PRN Moderate Pain (4 - 6)  LORazepam     Tablet 0.5 milliGRAM(s) Oral every 12 hours PRN Anxiety  ondansetron Injectable 4 milliGRAM(s) IV Push every 6 hours PRN Nausea and/or Vomiting  polyethylene glycol 3350 17 Gram(s) Oral every 24 hours PRN Constipation

## 2025-04-16 NOTE — PROGRESS NOTE ADULT - PROBLEM SELECTOR PLAN 8
Acute portal vein thrombosis seen on CT. Received one dose eliquis AM 4/10. Holding iso upcoming paracentesis and thoracentesis. Started eliquis 10mg BID on Monday.    -holding eliquis iso procedure tomorrow Acute portal vein thrombosis seen on CT. Received one dose eliquis AM 4/10. Holding iso upcoming paracentesis and thoracentesis. Started eliquis 10mg BID on Monday.    -holding eliquis iso pending procedure, will resume when appropriate

## 2025-04-16 NOTE — PROGRESS NOTE ADULT - PROBLEM SELECTOR PLAN 2
She achieved a treatment effect in the dominant mass treated with Y90, with some progression elsewhere. She subsequently received therapy with immunoembolization x 2 administered concurrently with tebentafusp. In the setting of progression, she received radioembolization followed by ipilimumab and nivolumab, with the later complicated by the development of colitis, hepatitis and thyroiditis. She is now s/p chemo embolization under the care of Dr. Layla Acharya.  Saw Dr. Dr. Ruby 4/8    -pain control:       -dilaudid 0.5 IV q3 PRN for Severe Pain      -dilaudid 2mg PO q4 PRN for Moderate Pain      -Bowel Regimen: Senna 2tabs qhs + Miralax daily PRN  -c/w prednisone 10mg in AM 5 mg in PM  -HOLD bactrim DS MWF for PJP ppx iso hyperK  -c/w pantoprazole 40 mg daily  -c/w xanax 0.25 mg twice a day as needed prescribed, though she only takes at bedtime  -palliative recs. She achieved a treatment effect in the dominant mass treated with Y90, with some progression elsewhere. She subsequently received therapy with immunoembolization x 2 administered concurrently with tebentafusp. In the setting of progression, she received radioembolization followed by ipilimumab and nivolumab, with the later complicated by the development of colitis, hepatitis and thyroiditis. She is now s/p chemo embolization under the care of Dr. Layla Acharya.  Saw Dr. Dr. Ruby 4/8    -pain control:       -dilaudid 4mg PO q4 PRN for Severe Pain      -dilaudid 2mg PO q4 PRN for Moderate Pain      -Bowel Regimen: Senna 2tabs qhs + Miralax daily PRN  -c/w prednisone 10mg in AM 5 mg in PM  -HOLD bactrim DS MWF for PJP ppx iso hyperK  -c/w pantoprazole 40 mg daily  -c/w xanax 0.25 mg twice a day as needed prescribed, though she only takes at bedtime  -palliative recs.

## 2025-04-16 NOTE — PROGRESS NOTE ADULT - ASSESSMENT
43 y/o F  w/ h/o metastatic Melanoma , consulted on 4/15 for hyponatremia  w/ Serum Na of 122,  Urine Na 20, Urine osmolality 595 after primary team was using fluid restriction from 4/10 when Sna  was 129.  Pt is currently admitted for the management of ascites,  scheduled for Pigtail placement in preparation for hospice care for the chronic management of the ascites.       Assessment  -Chronic hypotonic  hypovolemic  hyponatremia due to third spacing   Sandy <20   Likely non- osmotic ADH secretion    Plan  -Limit  Sodium 128-130 meq   4 pm today  -Infuse 3% Nacl  30 ml / hr  x 4 hrs  -repeat BMP w/ urine osmo and urine Na at 12 pm and then Q hr  - I/O chart  - Am cortisol stat 45 y/o F  w/ h/o metastatic Melanoma , consulted on 4/15 for hyponatremia  w/ Serum Na of 122,  Urine Na 20, Urine osmolality 595 after primary team was using fluid restriction from 4/10 when Sna  was 129.  Pt is currently admitted for the management of ascites,  scheduled for Pigtail placement in preparation for hospice care for the chronic management of the ascites.       Assessment  -Chronic hypotonic  hypovolemic  hyponatremia due to third spacing   Sandy <20   Likely non- osmotic ADH secretion    Plan  -Limit  Sodium 128-130 meq   4 pm today, but it's a difficult task as there is continuous ADH activity  -Infuse iv albumin 25% 100 ml once   -Infuse 3% Nacl  30 ml / hr  x 4 hrs, and then stop  -No further hypertonic saline infusion  - I/O chart  - Am cortisol stat  -Consider paracentesis for comfort care 45 y/o F  w/ h/o metastatic Melanoma , consulted on 4/15 for hyponatremia  w/ Serum Na of 122,  Urine Na 20, Urine osmolality 595 after primary team was using fluid restriction from 4/10 when Sna  was 129.  Pt is currently admitted for the management of ascites,  scheduled for Pigtail placement in preparation for hospice care for the chronic management of the ascites.       Assessment  -Chronic hypotonic  hypovolemic  hyponatremia due to third spacing   Sandy <20   Likely non- osmotic ADH secretion    Plan  -Limit  Sodium 128-130 meq   4 pm today, but it's a difficult task as there is continuous ADH activity  -Infuse iv albumin 25% 100 ml once   -Infuse 3% Nacl  30 ml / hr  x 4 hrs, and then stop  -No further hypertonic saline infusion  - I/O chart  - Am cortisol stat  -Consider paracentesis for comfort care    Addendum  Can give tolvaptan 15 mg stat

## 2025-04-16 NOTE — PROGRESS NOTE ADULT - SUBJECTIVE AND OBJECTIVE BOX
NEPHROLOGY PROGRESS NOTE:    Interval history:  No overnight events. Patient seen and examined at bedside.    Vitals:  T(F): 97.7 (25 @ 05:53), Max: 98.2 (04-15-25 @ 20:42)  HR: 87 (25 @ 05:53)  BP: 106/67 (25 @ 05:53)  RR: 18 (25 @ 05:53)  SpO2: 91% (25 @ 05:53)  Wt(kg): --     @ 07:01  -  04-15 @ 07:00  --------------------------------------------------------  IN: 0 mL / OUT: 300 mL / NET: -300 mL          MEDICATIONS  (STANDING):  atenolol  Tablet 12.5 milliGRAM(s) Oral every 24 hours  bisacodyl 5 milliGRAM(s) Oral at bedtime  fentaNYL   Patch  12 MICROgram(s)/Hr 1 Patch Transdermal every 72 hours  influenza   Vaccine 0.5 milliLiter(s) IntraMuscular once  pantoprazole    Tablet 40 milliGRAM(s) Oral every 24 hours  predniSONE   Tablet 10 milliGRAM(s) Oral every 24 hours  predniSONE   Tablet 5 milliGRAM(s) Oral every 24 hours  senna 2 Tablet(s) Oral at bedtime  sodium chloride 3%. 100 milliLiter(s) (30 mL/Hr) IV Continuous <Continuous>  ursodiol Capsule 300 milliGRAM(s) Oral three times a day    MEDICATIONS  (PRN):  acetaminophen     Tablet .. 650 milliGRAM(s) Oral every 6 hours PRN Temp greater or equal to 38C (100.4F), Mild Pain (1 - 3)  HYDROmorphone   Tablet 3 milliGRAM(s) Oral every 4 hours PRN Severe Pain (7 - 10)  HYDROmorphone   Tablet 2 milliGRAM(s) Oral every 4 hours PRN Moderate Pain (4 - 6)  LORazepam     Tablet 0.5 milliGRAM(s) Oral every 12 hours PRN Anxiety  ondansetron Injectable 4 milliGRAM(s) IV Push every 6 hours PRN Nausea and/or Vomiting  polyethylene glycol 3350 17 Gram(s) Oral every 24 hours PRN Constipation      PE:  General: Not in acute distress, well-nourished  Chest: CTAP b/l, no use of accessory respiratory muscles  Heart: RRR, S1/S2 wnl, no MRG  Abdomen: Soft,distended  Extremities: No edema  Neuro:  Alert, no apparent focal deficits, answer questions appropriately      Pertinent labs & Imagin-16    x   |  x   |  x   ----------------------------<  93  x    |  x   |  0.67    Ca    8.4      2025 07:30  Phos  2.6     04-15  Mg     2.4         TPro  5.8[L]  /  Alb  2.8[L]  /  TBili  12.8[H]  /  DBili  9.7[H]  /  AST  395[H]  /  ALT  164[H]  /  AlkPhos  306[H]  04-15                          11.6   19.24 )-----------( 381      ( 2025 07:30 )             33.8       Urine Studies:  Creatinine Trend: 0.67<--, 0.73<--, 0.81<--, 0.88<--, 0.85<--, 0.79<--  Urinalysis Basic - ( 2025 07:30 )    Color:  / Appearance:  / SG:  / pH:   Gluc: 93 mg/dL / Ketone:   / Bili:  / Urobili:    Blood:  / Protein:  / Nitrite:    Leuk Esterase:  / RBC:  / WBC    Sq Epi:  / Non Sq Epi:  / Bacteria:       Sodium, Random Urine: <20 mmol/L (04-15 @ 11:33)  Creatinine, Random Urine: 92 mg/dL (04-15 @ 11:33)  Protein/Creatinine Ratio Calculation: 0.4 Ratio (04-15 @ 11:33)  Osmolality, Random Urine: 595 mosm/kg (04-15 @ 11:33)  Potassium, Random Urine: 55 mmol/L (04-15 @ 11:33)  Sodium, Random Urine: <20 mmol/L ( @ 09:06)  Creatinine, Random Urine: 114 mg/dL ( @ 09:06)  Protein/Creatinine Ratio Calculation: 0.3 Ratio (04-14 @ 09:06)  Osmolality, Random Urine: 680 mosm/kg ( @ 09:06)  Potassium, Random Urine: 58 mmol/L ( @ 09:06)  Sodium, Random Urine: <20 mmol/L ( @ :58)  Creatinine, Random Urine: 188 mg/dL ( @ 09:58)  Protein/Creatinine Ratio Calculation: 0.3 Ratio (:58)  Osmolality, Random Urine: 829 mosm/kg (:58)  Potassium, Random Urine: 75 mmol/L (:58)

## 2025-04-17 NOTE — CONSULT NOTE ADULT - CONSULT REQUESTED DATE/TIME
11-Apr-2025 14:20
11-Apr-2025
11-Apr-2025 13:51
15-Apr-2025 18:41
12-Apr-2025 21:35
10-Apr-2025 14:27
17-Apr-2025 08:29

## 2025-04-17 NOTE — DISCHARGE NOTE NURSING/CASE MANAGEMENT/SOCIAL WORK - PATIENT PORTAL LINK FT
You can access the FollowMyHealth Patient Portal offered by [facility] by registering at the following website: [email or web address]. By joining [employer]’s FollowMyHealth portal, you will also be able to view your health information using other applications (apps) compatible with our system.

## 2025-04-17 NOTE — PROGRESS NOTE ADULT - PROBLEM SELECTOR PLAN 3
Recent para with rapid reaccumulation  -s/p therapeutic para with IR  -s/p abdominal catheter placement

## 2025-04-17 NOTE — CONSULT NOTE ADULT - PROVIDER SPECIALTY LIST ADULT
Intervent Radiology
Palliative Care
Gastroenterology
Heme/Onc
Pulmonology
Intervent Radiology
Nephrology

## 2025-04-17 NOTE — PROGRESS NOTE ADULT - PROBLEM SELECTOR PLAN 10
Subjectively feels as though she has incomplete voiding though denies dysuria, vaginal discharge or urinary frequency. Bladder scan in ED showed >500 thought to be likely from ascites as patient did not have the urge to urinate. POCUS revealing distended bladder. 100cc on SC 4/11. Incomplete emptying better after para and diuretics given  SC 1x 4/11    -strict I/O to ensure pt voiding appropriately  -bladder scans q6, get pvr  -SC for volume >300 Subjectively feels as though she has incomplete voiding though denies dysuria, vaginal discharge or urinary frequency. Bladder scan in ED showed >500 thought to be likely from ascites as patient did not have the urge to urinate. POCUS revealing distended bladder. 100cc on SC 4/11. Incomplete emptying better after para and diuretics given  -strict I/O to ensure pt voiding appropriately  -bladder scans q6, get pvr

## 2025-04-17 NOTE — PROGRESS NOTE ADULT - NS ATTEST RISK PROBLEM GEN_ALL_CORE FT
stage IV malignancy with progression of disease and associated liver dysfunction, ascites, and worsening pleural effusion  significant lab derangements (hyponatremia, hyperbilirubinemia)  monitoring while on pain meds including IV opiates
Chronic Illness With Severe Exacerbation/Progression  Decision Made To De-escalate Care Due To Poor Prognosis  Prescription Of Parenteral Controlled Substances
Chronic Illness With Severe Exacerbation/Progression  Decision Made To Not Resuscitate (DNR)  Prescription Of Parenteral Controlled Substances
Chronic Illness With Severe Exacerbation/Progression  Decision Made To De-escalate Care Due To Poor Prognosis  Prescription Of Parenteral Controlled Substances

## 2025-04-17 NOTE — PROGRESS NOTE ADULT - PROBLEM SELECTOR PLAN 9
Noted she was expected to have labs compatible with hypothyroidism and to start synthroid but her labs did not reflect this so she never started. Denies palpitations, chest pain, rash, dysphagia, diarrhea.  tfts wnl    -takes atenolol 12.5mg QD for tachycardia at home (not ordered currently) Noted she was expected to have labs compatible with hypothyroidism and to start synthroid but her labs did not reflect this so she never started. Denies palpitations, chest pain, rash, dysphagia, diarrhea.  tfts wnl  -takes atenolol 12.5mg QD for tachycardia at home

## 2025-04-17 NOTE — PROGRESS NOTE ADULT - SUBJECTIVE AND OBJECTIVE BOX
[facility] Geriatrics and Palliative Medicine  Dre Chicas, Palliative Care Attending  Contact Info: Call [phone] (HEAL Line) or message on [employer] Teams (Dre Chicas)    SUBJECTIVE AND OBJECTIVE:  INTERVAL HPI/OVERNIGHT EVENTS: Interval events noted. See patient's PRN use for the past 24hrs noted below. Comprehensive symptom assessment and GOC exploration as noted below. Extensive time spent discussing plan of care with patient/family. No unexpected adverse effects of opiates noted: no sedation/dizziness/nausea.    ALLERGIES:  No Known Allergies    MEDICATIONS  (STANDING):  atenolol  Tablet 12.5 milliGRAM(s) Oral every 24 hours  bisacodyl 5 milliGRAM(s) Oral at bedtime  fentaNYL   Patch  12 MICROgram(s)/Hr 1 Patch Transdermal every 72 hours  influenza   Vaccine 0.5 milliLiter(s) IntraMuscular once  pantoprazole    Tablet 40 milliGRAM(s) Oral every 24 hours  predniSONE   Tablet 10 milliGRAM(s) Oral every 24 hours  predniSONE   Tablet 5 milliGRAM(s) Oral every 24 hours  senna 2 Tablet(s) Oral at bedtime  ursodiol Capsule 300 milliGRAM(s) Oral three times a day    MEDICATIONS  (PRN):  acetaminophen     Tablet .. 650 milliGRAM(s) Oral every 6 hours PRN Temp greater or equal to 38C (100.4F), Mild Pain (1 - 3)  HYDROmorphone   Tablet 2 milliGRAM(s) Oral every 4 hours PRN Moderate Pain (4 - 6)  HYDROmorphone  Injectable 0.5 milliGRAM(s) IV Push every 3 hours PRN Severe Pain (7 - 10)  LORazepam     Tablet 0.5 milliGRAM(s) Oral every 12 hours PRN Anxiety  ondansetron Injectable 4 milliGRAM(s) IV Push every 6 hours PRN Nausea and/or Vomiting  polyethylene glycol 3350 17 Gram(s) Oral every 24 hours PRN Constipation      Analgesic Use (Scheduled and PRNs) for past 24 hours:    fentaNYL   Patch  12 MICROgram(s)/Hr   1 Patch Transdermal (04-17-25 @ 13:13)    HYDROmorphone   Tablet   2 milliGRAM(s) Oral (04-17-25 @ 06:42)   2 milliGRAM(s) Oral (04-16-25 @ 21:31)    sodium zirconium cyclosilicate   10 Gram(s) Oral (04-17-25 @ 13:14)      ITEMS UNCHECKED ARE NOT PRESENT  PRESENT SYMPTOMS/REVIEW OF SYSTEMS: []Unable to obtain due to poor mentation   Source if other than patient:  []Family   []Team         Vital Signs Last 24 Hrs  T(C): 36.6 (17 Apr 2025 12:00), Max: 36.6 (17 Apr 2025 12:00)  T(F): 97.8 (17 Apr 2025 12:00), Max: 97.8 (17 Apr 2025 12:00)  HR: 96 (17 Apr 2025 12:00) (87 - 97)  BP: 96/63 (17 Apr 2025 12:00) (96/63 - 122/79)  BP(mean): 82 (17 Apr 2025 06:37) (82 - 93)  RR: 18 (17 Apr 2025 12:00) (18 - 20)  SpO2: 93% (17 Apr 2025 12:00) (90% - 93%)    Parameters below as of 17 Apr 2025 12:00  Patient On (Oxygen Delivery Method): nasal cannula  O2 Flow (L/min): 2      LABS: Personally reviewed and interpreted                        11.5   16.39 )-----------( 263      ( 17 Apr 2025 05:30 )             34.2   04-17    124[L]  |  89[L]  |  26[H]  ----------------------------<  52[LL]  5.5[H]   |  18[L]  |  0.54    Ca    8.8      17 Apr 2025 05:30  Phos  1.6     04-17  Mg     2.4     04-17        RADIOLOGY & ADDITIONAL STUDIES: Personally reviewed and interpreted  None new    DISCUSSION OF CASE: Family - to provide updates and emotional support; Primary Team/RN - to discuss plan of care [facility] Geriatrics and Palliative Medicine  Dre Chicas, Palliative Care Attending  Contact Info: Call [phone] (HEAL Line) or message on [employer] Teams (Dre Chicas)    SUBJECTIVE AND OBJECTIVE:  INTERVAL HPI/OVERNIGHT EVENTS: Interval events noted. Feels weaker. See patient's PRN use for the past 24hrs noted below. Comprehensive symptom assessment and GOC exploration as noted below. Extensive time spent discussing plan of care with patient/family. No unexpected adverse effects of opiates noted: no sedation/dizziness/nausea.    ALLERGIES:  No Known Allergies    MEDICATIONS  (STANDING):  atenolol  Tablet 12.5 milliGRAM(s) Oral every 24 hours  bisacodyl 5 milliGRAM(s) Oral at bedtime  fentaNYL   Patch  12 MICROgram(s)/Hr 1 Patch Transdermal every 72 hours  influenza   Vaccine 0.5 milliLiter(s) IntraMuscular once  pantoprazole    Tablet 40 milliGRAM(s) Oral every 24 hours  predniSONE   Tablet 10 milliGRAM(s) Oral every 24 hours  predniSONE   Tablet 5 milliGRAM(s) Oral every 24 hours  senna 2 Tablet(s) Oral at bedtime  ursodiol Capsule 300 milliGRAM(s) Oral three times a day    MEDICATIONS  (PRN):  acetaminophen     Tablet .. 650 milliGRAM(s) Oral every 6 hours PRN Temp greater or equal to 38C (100.4F), Mild Pain (1 - 3)  HYDROmorphone   Tablet 2 milliGRAM(s) Oral every 4 hours PRN Moderate Pain (4 - 6)  HYDROmorphone  Injectable 0.5 milliGRAM(s) IV Push every 3 hours PRN Severe Pain (7 - 10)  LORazepam     Tablet 0.5 milliGRAM(s) Oral every 12 hours PRN Anxiety  ondansetron Injectable 4 milliGRAM(s) IV Push every 6 hours PRN Nausea and/or Vomiting  polyethylene glycol 3350 17 Gram(s) Oral every 24 hours PRN Constipation      Analgesic Use (Scheduled and PRNs) for past 24 hours:    fentaNYL   Patch  12 MICROgram(s)/Hr   1 Patch Transdermal (04-17-25 @ 13:13)    HYDROmorphone   Tablet   2 milliGRAM(s) Oral (04-17-25 @ 06:42)   2 milliGRAM(s) Oral (04-16-25 @ 21:31)    sodium zirconium cyclosilicate   10 Gram(s) Oral (04-17-25 @ 13:14)      ITEMS UNCHECKED ARE NOT PRESENT  PRESENT SYMPTOMS/REVIEW OF SYSTEMS: []Unable to obtain due to poor mentation   Source if other than patient:  []Family   []Team     Pain: [x] yes [] no  QOL impact - tolerable  Location - abdomen                  Aggravating Factors - abdominal distention  Quality - aching  Radiation - across lower abdomen  Timing - intermittent  Severity (0-10 scale) - 4  Minimal Acceptable Level (0-10 scale) - 4    Other Symptoms: See ESAS Section Below  [x]All other review of systems negative     GENERAL:  [x] NAD [x]Alert []Lethargic  []Cachexia  []Unarousable  [x]Verbal  []Non-Verbal  BEHAVIORAL:   []Anxiety  []Delirium []Agitation [x]Cooperative [x]Oriented x3  HEENT:  [x]Normal  [x] Moist Mucous Membranes []Dry mouth   []ET Tube/Trach  []Oral lesions  PULMONARY:   [x]Clear []Tachypnea  []Audible excessive secretions  [x]Normal Work of Breathing []Labored Breathing  []Rhonchi []Crackles []Wheezing  CARDIOVASCULAR:    [x]Regular Rate []Regular Rhythm []Irregular []Tachy  []Foreign  GASTROINTESTINAL:  [x]Soft  [x]Distended   [x]+BS  []Non tender [x]Tender  []PEG []OGT/ NGT  Last BM:  GENITOURINARY:  [x]Normal [] Incontinent   []Oliguria/Anuria   []Ramirez  MUSCULOSKELETAL:   [x]Normal Extremities  [x]Weakness  []Bed/Wheelchair bound []Edema  NEUROLOGIC:   [x]No focal deficits  []Cognitive impairment  []Dysphagia []Dysarthria []Paresis []Encephalopathic  SKIN:   []Normal   []Pressure ulcer(s)  [x]Jaundice    Vital Signs Last 24 Hrs  T(C): 36.6 (17 Apr 2025 12:00), Max: 36.6 (17 Apr 2025 12:00)  T(F): 97.8 (17 Apr 2025 12:00), Max: 97.8 (17 Apr 2025 12:00)  HR: 96 (17 Apr 2025 12:00) (87 - 97)  BP: 96/63 (17 Apr 2025 12:00) (96/63 - 122/79)  BP(mean): 82 (17 Apr 2025 06:37) (82 - 93)  RR: 18 (17 Apr 2025 12:00) (18 - 20)  SpO2: 93% (17 Apr 2025 12:00) (90% - 93%)    Parameters below as of 17 Apr 2025 12:00  Patient On (Oxygen Delivery Method): nasal cannula  O2 Flow (L/min): 2      LABS: Personally reviewed and interpreted                        11.5   16.39 )-----------( 263      ( 17 Apr 2025 05:30 )             34.2   04-17    124[L]  |  89[L]  |  26[H]  ----------------------------<  52[LL]  5.5[H]   |  18[L]  |  0.54    Ca    8.8      17 Apr 2025 05:30  Phos  1.6     04-17  Mg     2.4     04-17        RADIOLOGY & ADDITIONAL STUDIES: Personally reviewed and interpreted  None new    DISCUSSION OF CASE: Family - to provide updates and emotional support; Primary Team/RN - to discuss plan of care

## 2025-04-17 NOTE — PROGRESS NOTE ADULT - PROBLEM SELECTOR PLAN 5
Patient is DNR/DNI, MOLST in chart  -significant other would be surrogate decision maker in the absence of HCP  -tentative plan for home hospice tomorrow

## 2025-04-17 NOTE — PROVIDER CONTACT NOTE (CRITICAL VALUE NOTIFICATION) - ACTION/TREATMENT ORDERED:
MD said to call IR to get Fingerstick but IR not answering, MD bello aware who said she will reach out to IR  to see if anything was done regarding reading

## 2025-04-17 NOTE — PROGRESS NOTE ADULT - PROBLEM SELECTOR PLAN 6
Complex medical decision making / symptom management in the setting of advanced malignancy.    Will continue to follow for ongoing GOC discussion / titration of palliative regimen. Emotional support provided, questions answered.  Active Psychosocial Referrals:  [x]Social Work/Case management []PT/OT []Chaplaincy [x]Hospice  []Childlife []Holistic RN [x]Massage Therapy []Music Therapy []Ethics  Coping: [] well [x] with difficulty [] poor coping [] unable to assess  Support system: [] strong [x] adequate [] inadequate    For new or uncontrolled symptoms, please call Palliative Care at [phone] [phone]). The service is available 24/7 (including nights & weekends) to provide symptom management recommendations over the phone as appropriate

## 2025-04-17 NOTE — PROGRESS NOTE ADULT - ASSESSMENT
43yo F with PMH of Metastatic Uveal Melanoma p/w abdominal pain and found to have significant ascites. Palliative consulted for complex symptom management in the setting of metastatic malignancy.    ·	tentative plan for discharge home tomorrow morning with hospice  ·	sent Rx's for Fentanyl Patch, Dilaudid, Ativan, Zofran, and Bowel Regimen to [facility] (ensure this is picked up in the morning)  ·	allow Dilaudid IV PRNs overnight until discharge, Dilaudid 0.5mg IV q3h PRN for Severe Pain

## 2025-04-17 NOTE — CONSULT NOTE ADULT - SUBJECTIVE AND OBJECTIVE BOX
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 S/p localized chemoembolization which seemed to have resulted in a shrunk tumor but hardening of the region under the ribs. Post chemoembolization, her bilirubin levels started to rise and she faced an exclusion from a clinical trial at [place], started on prednisone to lower bili. She noted her pain and distension in the abdomen gradually building up and had a recent diagnosis of partial portal vein thrombosis last week and started Eliquis. S/p para 4/9, 4/11. With reaccumulation, on home hospice. IR consulted for tunneled peritoneal catheter placement.     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    Personal history of other medical treatment-Z92.89    Sacrococcygeal disorders, not elsewhere classified-M53.3    Thyrotoxicosis, unspecified w/out thyrotoxic crisis or storm-E05.90    Unspecified lump in unspecified breast-N63.0    DNI    Handoff    MEWS Score    Metastatic melanoma    Adjustment disorder    Melanoma metastatic to liver    Ascites    Abdominal pain    Pleural effusion on right    Ascites    Portal vein thrombosis    Anterior uveal melanoma, unspecified laterality    Prophylactic measure    Severe systemic inflammatory response syndrome (SIRS)    Systemic inflammatory response syndrome (SIRS)    History of thyroiditis    Incomplete bladder emptying    Hyponatremia    Hyperbilirubinemia    Melanoma metastatic to liver    Malignant ascites    S/P cataract surgery    ABDOMINAL PAIN    90+    Encounter for home hospice care    [email or web address]        Meds:acetaminophen     Tablet .. 650 milliGRAM(s) Oral every 6 hours PRN  atenolol  Tablet 12.5 milliGRAM(s) Oral every 24 hours  bisacodyl 5 milliGRAM(s) Oral at bedtime  fentaNYL   Patch  12 MICROgram(s)/Hr 1 Patch Transdermal every 72 hours  HYDROmorphone   Tablet 3 milliGRAM(s) Oral every 4 hours PRN  HYDROmorphone   Tablet 2 milliGRAM(s) Oral every 4 hours PRN  influenza   Vaccine 0.5 milliLiter(s) IntraMuscular once  LORazepam     Tablet 0.5 milliGRAM(s) Oral every 12 hours PRN  ondansetron Injectable 4 milliGRAM(s) IV Push every 6 hours PRN  pantoprazole    Tablet 40 milliGRAM(s) Oral every 24 hours  polyethylene glycol 3350 17 Gram(s) Oral every 24 hours PRN  predniSONE   Tablet 10 milliGRAM(s) Oral every 24 hours  predniSONE   Tablet 5 milliGRAM(s) Oral every 24 hours  senna 2 Tablet(s) Oral at bedtime  ursodiol Capsule 300 milliGRAM(s) Oral three times a day      Allergies:No Known Allergies        Labs:                           11.5   16.39 )-----------( 263      ( 17 Apr 2025 05:30 )             34.2     PT/INR - ( 17 Apr 2025 05:30 )   PT: 21.9 sec;   INR: 1.88          PTT - ( 17 Apr 2025 05:30 )  PTT:47.2 sec  04-17    124[L]  |  89[L]  |  26[H]  ----------------------------<  52[LL]  5.5[H]   |  18[L]  |  0.54    Ca    8.8      17 Apr 2025 05:30  Phos  1.6     04-17  Mg     2.4     04-17

## 2025-04-17 NOTE — CHART NOTE - NSCHARTNOTEFT_GEN_A_CORE
Pt has chronic asymptomatic hyponatremia not responding to medical therapy due to underlying malignancy w/ high ADH states. Pt will not benefit getting extra volume as she is already volume overloaded with ascites  Palliative and comfort care at this stage    Nephrology will sign off  Reconsult when needed

## 2025-04-17 NOTE — PROGRESS NOTE ADULT - TIME BILLING
Emotional Support/Supportive Care and Clarification of Potential Disease Trajectory related to Metastatic Melanoma  Assessment of Symptom Ronda and Palliative regimen  Exploration of Goals of Care and Discussion regarding Treatment Expectations  Education and Monitoring of Opiates including titration and management of high risk medications  Discharge Facilitation with ongoing coordination for home hospice    Time exclusive of ACP discussion  Time inclusive of chart review, medication ordering, discussion with primary team, clinical documentation, and communication with family/caregiver
Bedside exam and interview   Reviewed vitals, labs   Discussed patient's plan of care with housestaff   Documentation of encounter    Time documented on encounter excludes teaching and separately reported services
Bedside exam and interview   Reviewed vitals, labs   Discussed patient's plan of care with housestaff   Documentation of encounter  Excludes teaching and separately reported services
Bedside exam and interview   Reviewed vitals, labs   Discussed patient's plan of care with housestaff   Documentation of encounter  Excludes teaching time and/or separately reported services

## 2025-04-17 NOTE — PROGRESS NOTE ADULT - PROBLEM SELECTOR PLAN 6
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 She had paracentesis 4/9 that drained 1L without complications. Patient still distended and complaining of pain. Nucleated cell count 173.  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 She had paracentesis 4/9 that drained 1L without complications. Patient still distended and complaining of pain. Nucleated cell count 173.  -s/p paracentesis with IR 4/11  -s/p peritoneal drain placement 4/17

## 2025-04-17 NOTE — PROGRESS NOTE ADULT - SUBJECTIVE AND OBJECTIVE BOX
**INCOMPLETE NOTE    INTERVAL/OVERNIGHT EVENTS: None    SUBJECTIVE:  Patient seen and examined at bedside, comfortable, NAD. Denied fever, chest pain, dyspnea, abdominal pain.     Vital Signs Last 12 Hrs  T(F): 97.4 (04-17-25 @ 06:37), Max: 97.5 (04-16-25 @ 20:56)  HR: 97 (04-17-25 @ 06:37) (87 - 97)  BP: 114/67 (04-17-25 @ 06:37) (114/67 - 122/79)  BP(mean): 82 (04-17-25 @ 06:37) (82 - 93)  RR: 18 (04-17-25 @ 06:37) (18 - 20)  SpO2: 90% (04-17-25 @ 06:37) (90% - 92%)  I&O's Summary      PHYSICAL EXAM:  General: NAD  HEENT: PERRL, EOM intact, sclera anicteric, MMM  Cardiovascular: RRR; no MRG;   Respiratory: CTAB; no WRR  GI/: soft; NTND; BS x4  Extremities: WWP; 2+ peripheral pulses bilaterally; no LE edema  Skin: normal color & turgor; no rash  Neurologic: aox3; no focal deficits    LABS:                        11.6   19.24 )-----------( 381      ( 16 Apr 2025 07:30 )             33.8     04-16    123[L]  |  88[L]  |  30[H]  ----------------------------<  102[H]  5.2   |  20[L]  |  0.60    Ca    8.4      16 Apr 2025 17:10  Mg     2.4     04-16    TPro  5.8[L]  /  Alb  2.8[L]  /  TBili  12.8[H]  /  DBili  9.7[H]  /  AST  395[H]  /  ALT  164[H]  /  AlkPhos  306[H]  04-15    PT/INR - ( 16 Apr 2025 17:10 )   PT: 24.7 sec;   INR: 2.16          PTT - ( 16 Apr 2025 17:10 )  PTT:44.4 sec  Urinalysis Basic - ( 16 Apr 2025 17:10 )    Color: x / Appearance: x / SG: x / pH: x  Gluc: 102 mg/dL / Ketone: x  / Bili: x / Urobili: x   Blood: x / Protein: x / Nitrite: x   Leuk Esterase: x / RBC: x / WBC x   Sq Epi: x / Non Sq Epi: x / Bacteria: x          RADIOLOGY & ADDITIONAL TESTS:    MEDICATIONS  (STANDING):  atenolol  Tablet 12.5 milliGRAM(s) Oral every 24 hours  bisacodyl 5 milliGRAM(s) Oral at bedtime  fentaNYL   Patch  12 MICROgram(s)/Hr 1 Patch Transdermal every 72 hours  influenza   Vaccine 0.5 milliLiter(s) IntraMuscular once  pantoprazole    Tablet 40 milliGRAM(s) Oral every 24 hours  predniSONE   Tablet 10 milliGRAM(s) Oral every 24 hours  predniSONE   Tablet 5 milliGRAM(s) Oral every 24 hours  senna 2 Tablet(s) Oral at bedtime  ursodiol Capsule 300 milliGRAM(s) Oral three times a day    MEDICATIONS  (PRN):  acetaminophen     Tablet .. 650 milliGRAM(s) Oral every 6 hours PRN Temp greater or equal to 38C (100.4F), Mild Pain (1 - 3)  HYDROmorphone   Tablet 2 milliGRAM(s) Oral every 4 hours PRN Moderate Pain (4 - 6)  HYDROmorphone   Tablet 3 milliGRAM(s) Oral every 4 hours PRN Severe Pain (7 - 10)  LORazepam     Tablet 0.5 milliGRAM(s) Oral every 12 hours PRN Anxiety  ondansetron Injectable 4 milliGRAM(s) IV Push every 6 hours PRN Nausea and/or Vomiting  polyethylene glycol 3350 17 Gram(s) Oral every 24 hours PRN Constipation   INTERVAL/OVERNIGHT EVENTS: None    SUBJECTIVE:  Patient seen and examined at bedside, comfortable, NAD.     Vital Signs Last 12 Hrs  T(F): 97.4 (04-17-25 @ 06:37), Max: 97.5 (04-16-25 @ 20:56)  HR: 97 (04-17-25 @ 06:37) (87 - 97)  BP: 114/67 (04-17-25 @ 06:37) (114/67 - 122/79)  BP(mean): 82 (04-17-25 @ 06:37) (82 - 93)  RR: 18 (04-17-25 @ 06:37) (18 - 20)  SpO2: 90% (04-17-25 @ 06:37) (90% - 92%)  I&O's Summary      PHYSICAL EXAM:  General: NAD  HEENT: PERRL, EOM intact, sclera anicteric, MMM  Cardiovascular: RRR; no MRG;   Respiratory: CTAB; no WRR  GI/: soft; NTND; BS x4  Extremities: WWP; 2+ peripheral pulses bilaterally; no LE edema  Skin: normal color & turgor; no rash  Neurologic: aox3; no focal deficits    LABS:                        11.6   19.24 )-----------( 381      ( 16 Apr 2025 07:30 )             33.8     04-16    123[L]  |  88[L]  |  30[H]  ----------------------------<  102[H]  5.2   |  20[L]  |  0.60    Ca    8.4      16 Apr 2025 17:10  Mg     2.4     04-16    TPro  5.8[L]  /  Alb  2.8[L]  /  TBili  12.8[H]  /  DBili  9.7[H]  /  AST  395[H]  /  ALT  164[H]  /  AlkPhos  306[H]  04-15    PT/INR - ( 16 Apr 2025 17:10 )   PT: 24.7 sec;   INR: 2.16          PTT - ( 16 Apr 2025 17:10 )  PTT:44.4 sec  Urinalysis Basic - ( 16 Apr 2025 17:10 )    Color: x / Appearance: x / SG: x / pH: x  Gluc: 102 mg/dL / Ketone: x  / Bili: x / Urobili: x   Blood: x / Protein: x / Nitrite: x   Leuk Esterase: x / RBC: x / WBC x   Sq Epi: x / Non Sq Epi: x / Bacteria: x          RADIOLOGY & ADDITIONAL TESTS:    MEDICATIONS  (STANDING):  atenolol  Tablet 12.5 milliGRAM(s) Oral every 24 hours  bisacodyl 5 milliGRAM(s) Oral at bedtime  fentaNYL   Patch  12 MICROgram(s)/Hr 1 Patch Transdermal every 72 hours  influenza   Vaccine 0.5 milliLiter(s) IntraMuscular once  pantoprazole    Tablet 40 milliGRAM(s) Oral every 24 hours  predniSONE   Tablet 10 milliGRAM(s) Oral every 24 hours  predniSONE   Tablet 5 milliGRAM(s) Oral every 24 hours  senna 2 Tablet(s) Oral at bedtime  ursodiol Capsule 300 milliGRAM(s) Oral three times a day    MEDICATIONS  (PRN):  acetaminophen     Tablet .. 650 milliGRAM(s) Oral every 6 hours PRN Temp greater or equal to 38C (100.4F), Mild Pain (1 - 3)  HYDROmorphone   Tablet 2 milliGRAM(s) Oral every 4 hours PRN Moderate Pain (4 - 6)  HYDROmorphone   Tablet 3 milliGRAM(s) Oral every 4 hours PRN Severe Pain (7 - 10)  LORazepam     Tablet 0.5 milliGRAM(s) Oral every 12 hours PRN Anxiety  ondansetron Injectable 4 milliGRAM(s) IV Push every 6 hours PRN Nausea and/or Vomiting  polyethylene glycol 3350 17 Gram(s) Oral every 24 hours PRN Constipation

## 2025-04-17 NOTE — PROGRESS NOTE ADULT - PROBLEM SELECTOR PLAN 8
Acute portal vein thrombosis seen on CT. Received one dose eliquis AM 4/10. Holding iso upcoming paracentesis and thoracentesis. Started eliquis 10mg BID on Monday.    -holding eliquis iso pending procedure, will resume when appropriate Acute portal vein thrombosis seen on CT. Received one dose eliquis AM 4/10. Holding iso upcoming paracentesis and thoracentesis. Started eliquis 10mg BID on Monday.  - HOLD eliquis indefinitely given worsening liver dysfunction and coagulopathy

## 2025-04-17 NOTE — PROGRESS NOTE ADULT - PROBLEM SELECTOR PLAN 2
She achieved a treatment effect in the dominant mass treated with Y90, with some progression elsewhere. She subsequently received therapy with immunoembolization x 2 administered concurrently with tebentafusp. In the setting of progression, she received radioembolization followed by ipilimumab and nivolumab, with the later complicated by the development of colitis, hepatitis and thyroiditis. She is now s/p chemo embolization under the care of Dr. Layla Acharya.  Saw Dr. Dr. Ruby 4/8    -pain control:       -dilaudid 4mg PO q4 PRN for Severe Pain      -dilaudid 2mg PO q4 PRN for Moderate Pain      -Bowel Regimen: Senna 2tabs qhs + Miralax daily PRN  -c/w prednisone 10mg in AM 5 mg in PM  -HOLD bactrim DS MWF for PJP ppx iso hyperK  -c/w pantoprazole 40 mg daily  -c/w xanax 0.25 mg twice a day as needed prescribed, though she only takes at bedtime  -palliative recs.

## 2025-04-17 NOTE — PROGRESS NOTE ADULT - ASSESSMENT
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 She initiated therapy with tebentafusp at [facility] but then moved to [place]. Due to radiographic progression of a dominant site of disease, she underwent Y90 mapping on 9/7 and Y90 radioembolization on 09/21/23 with no issue. She achieved a treatment effect in the dominant mass treated with Y90, with some progression elsewhere. She subsequently received therapy with immunoembolization x 2 administered concurrently with tebentafusp. In the setting of progression, she received radioembolization followed by ipilimumab and nivolumab, with the later complicated by the development of colitis, hepatitis and thyroiditis. She is now s/p chemo embolization under the care of Dr. Layla Acharya.    Patient is s/p paracentesis yesterday where 1L of fluid was drained. Now presents to [facility] with increased abdominal pain.  Per IR, during US yesterday there was little fluid to drain despite patient complaining of abdominal distension and appeared to all be liver disease on US.  CT AP performed in the ER today with:  Acute thrombus in main portal vein and both portal vein branches. Increased moderate-large ascites, secondary to portal hypertension with signs of generalized fluid overload. Increased hepatic metastases. Notably, superior intrahepatic IVC and right hepatic vein narrowed due to mass effect from metastases. Increased upper retroperitoneal metastatic lymphadenopathy. Increased moderate right pleural effusion, secondary partial atelectasis right middle and lower lobes.  Unchanged bony metastases. Unchanged visualized lung metastases.     Patient seen and examined at bedside, she continues to endorse abdominal pain, distension and weakness.  Reports BLLE edema as well.  Labs notable for WBC 19.47, bilirubin 7.3.  Patient states she started taking Eliquis on Monday for new portal vein thrombosis, she also remains on prednisone 15mg daily.  S/p 1.3L abdominal fluid drained last friday.      Patient now s/p abdominal pleruX placement, feels slightly better overall but still reports pain.  Primary team is setting up home hospice services and will teach the patient and her father (whom is an MD in [place]) how to use the pleurX for home.  Patient is also pending home o2 delivery.

## 2025-04-17 NOTE — PROGRESS NOTE ADULT - PROBLEM SELECTOR PLAN 4
Na on admission 129, and has been downtrending throughout admission to 124. Initial urine studies with urine osmol >800 and Na<20 suggesting ADH and RAAS on. Possibly intravascular depletion with third spacing given 1+ bilateral edema and low albumin given liver mets.  s/p bumex and spironolactone with worsening sodium  Placed back on 1L fluid restriction; s/p 25% albumin given likely intravascular depletion though third spacing  Received 100mL bolus 3% saline overnight 4/15 as Na dropped to 120; gave albumin and tolvaptan on 4/16  - continue to trend BMP  - f/u renal recs Na on admission 129, and has been downtrending throughout admission to 124. Initial urine studies with urine osmol >800 and Na<20 suggesting ADH and RAAS on. Possibly intravascular depletion with third spacing given 1+ bilateral edema and low albumin given liver mets. Not responsive to multiple doses of hypertonic saline.  - improved to 124 with further volume expansion with albumin

## 2025-04-17 NOTE — CONSULT NOTE ADULT - CONSULT REASON
oncology management
Elevated bilirubin
Hyponatremia
request for paracentesis
request for tunneled peritoneal catheter placement
right pleural effusion
Symptom Management and Complex Medical Decision Making/GOC in the setting of Metastatic Malignancy

## 2025-04-17 NOTE — DISCHARGE NOTE NURSING/CASE MANAGEMENT/SOCIAL WORK - FINANCIAL ASSISTANCE
[facility] provides services at a reduced cost to those who are determined to be eligible through [facility]’s financial assistance program. Information regarding [facility]’s financial assistance program can be found by going to [email or web address] or by calling [phone].

## 2025-04-17 NOTE — PROGRESS NOTE ADULT - PROBLEM SELECTOR PLAN 3
Patient presenting with leukocytosis meeting 2/4 SIRS criteria. No colitis seen on CT. Pain likely from large ascites and portal vein thrombosis. Pain is improved after para.  -s/p paracentesis 4/11, planned for palliative catheter placement and arrangement for home hospice  -planning for IR pigtail on 4/17, pending optimization medically; if unable to do procedure plan for therapeutic paracentesis (however suboptimal as would only be temporizing measure)  -pain control: as above

## 2025-04-17 NOTE — PROGRESS NOTE ADULT - SUBJECTIVE AND OBJECTIVE BOX
patient seen and examined at bedside with parents present s/p Tenckhoff placement   patient reports feeling overall improved slightly s/p 1L fluid drained from Tenckhoff   patient is still deciding whether she would ultimately like to fly to [place] with her family vs stay at her home in [place], we discussed that this could always be decided later and we are here to support her wishes.  Patient is pending oxygen delivery to the home, which was cancelled today because there is nobody home to accept the delivery.  Parents can be home to accept delivery once a time is re-scheduled.  Home hospice set up.       ANTIBIOTICS/RELEVANT:  MEDICATIONS  (STANDING):  atenolol  Tablet 12.5 milliGRAM(s) Oral every 24 hours  bisacodyl 5 milliGRAM(s) Oral at bedtime  fentaNYL   Patch  12 MICROgram(s)/Hr 1 Patch Transdermal every 72 hours  influenza   Vaccine 0.5 milliLiter(s) IntraMuscular once  pantoprazole    Tablet 40 milliGRAM(s) Oral every 24 hours  predniSONE   Tablet 10 milliGRAM(s) Oral every 24 hours  predniSONE   Tablet 5 milliGRAM(s) Oral every 24 hours  senna 2 Tablet(s) Oral at bedtime  ursodiol Capsule 300 milliGRAM(s) Oral three times a day    MEDICATIONS  (PRN):  acetaminophen     Tablet .. 650 milliGRAM(s) Oral every 6 hours PRN Temp greater or equal to 38C (100.4F), Mild Pain (1 - 3)  HYDROmorphone   Tablet 3 milliGRAM(s) Oral every 4 hours PRN Severe Pain (7 - 10)  HYDROmorphone   Tablet 2 milliGRAM(s) Oral every 4 hours PRN Moderate Pain (4 - 6)  LORazepam     Tablet 0.5 milliGRAM(s) Oral every 12 hours PRN Anxiety  ondansetron Injectable 4 milliGRAM(s) IV Push every 6 hours PRN Nausea and/or Vomiting  polyethylene glycol 3350 17 Gram(s) Oral every 24 hours PRN Constipation      Vital Signs Last 24 Hrs  T(C): 36.6 (17 Apr 2025 12:00), Max: 36.8 (16 Apr 2025 17:20)  T(F): 97.8 (17 Apr 2025 12:00), Max: 98.3 (16 Apr 2025 17:20)  HR: 96 (17 Apr 2025 12:00) (87 - 97)  BP: 96/63 (17 Apr 2025 12:00) (96/63 - 122/79)  BP(mean): 82 (17 Apr 2025 06:37) (82 - 93)  RR: 18 (17 Apr 2025 12:00) (18 - 20)  SpO2: 93% (17 Apr 2025 12:00) (90% - 93%)    Parameters below as of 17 Apr 2025 12:00  Patient On (Oxygen Delivery Method): nasal cannula  O2 Flow (L/min): 2      PHYSICAL EXAM:  General: in no acute distress  abdomen is less distended with active bowel sounds, new tenckhoff drain site is cdi   deferred rest of exam in alignment with [facility]    LABS:                        11.5   16.39 )-----------( 263      ( 17 Apr 2025 05:30 )             34.2     04-17    124[L]  |  89[L]  |  26[H]  ----------------------------<  52[LL]  5.5[H]   |  18[L]  |  0.54    Ca    8.8      17 Apr 2025 05:30  Phos  1.6     04-17  Mg     2.4     04-17    TPro  5.8[L]  /  Alb  2.8[L]  /  TBili  12.8[H]  /  DBili  9.7[H]  /  AST  395[H]  /  ALT  164[H]  /  AlkPhos  306[H]  04-15    PT/INR - ( 17 Apr 2025 05:30 )   PT: 21.9 sec;   INR: 1.88          PTT - ( 17 Apr 2025 05:30 )  PTT:47.2 sec  Urinalysis Basic - ( 17 Apr 2025 05:30 )

## 2025-04-17 NOTE — PROGRESS NOTE ADULT - PROBLEM SELECTOR PLAN 5
Continuing to elevate. Given elevation, patient unable to enroll in clinical study or receive treatment.    -no role for stenting per GI  -ursodiol 300mg TID  -can give atarax for itching prn, she notes it's very mild at this time Continuing to elevate. Given elevation, patient unable to enroll in clinical study or receive treatment.  -no role for stenting per GI  -ursodiol 300mg TID  -can give atarax for itching prn, she notes it's very mild at this time

## 2025-04-17 NOTE — CONSULT NOTE ADULT - ASSESSMENT
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 S/p para 4/9, 4/11. IR consulted for tunneled peritoneal catheter placement. Case reviewed with Dr. Viera, plan for procedure with sedation pending optimization of lytes and INR.     Recommendations: Maintain NPO x 8 hours prior to procedure. Hold eliquis for 48 hours prior to procedure.     Communicated with: Dr. Varela primary team

## 2025-04-18 NOTE — PROGRESS NOTE ADULT - PROBLEM SELECTOR PLAN 4
Na on admission 129, and has been downtrending throughout admission to 124. Initial urine studies with urine osmol >800 and Na<20 suggesting ADH and RAAS on. Possibly intravascular depletion with third spacing given 1+ bilateral edema and low albumin given liver mets. Not responsive to multiple doses of hypertonic saline.  - improved to 124 with further volume expansion with albumin

## 2025-04-18 NOTE — H&P ADULT - NSHPPHYSICALEXAM_GEN_ALL_CORE
PHYSICAL EXAM:  GENERAL:  [] NAD []Alert [x]Lethargic  []Cachexia  [x]Unarousable  []Verbal  [x]Non-Verbal  Behavioral:   []Anxiety  [x]Delirium []Agitation []Cooperative [x]Oriented x0  HEENT:  [x]Normal  [] Moist Mucous Membranes [x]Dry mouth   []ET Tube/Trach  []Oral lesions  PULMONARY:   []Clear []Tachypnea  []Audible excessive secretions  []Normal Work of Breathing []Labored Breathing  []Rhonchi []Crackles []Wheezing  CARDIOVASCULAR:    []Regular Rate []Regular Rhythm []Irregular []Tachy  []Foreign  GASTROINTESTINAL:  [x]Soft  [x]Distended   []+BS  []Non tender []Tender  []PEG []OGT/ NGT  Last BM:  GENITOURINARY:  []Normal [x] Incontinent   []Oliguria/Anuria   []Ramirez  MUSCULOSKELETAL:   []Normal Extremities  [x]Weakness  [x]Bed/Wheelchair bound []Edema  NEUROLOGIC:   []No focal deficits  []Cognitive impairment  [x]Dysphagia []Dysarthria []Paresis [x]Encephalopathic  SKIN:   []Normal   []Pressure ulcer(s)  []Rash    Vital Signs Last 24 Hrs  T(C): 36.7 (18 Apr 2025 05:40), Max: 36.7 (18 Apr 2025 05:40)  T(F): 98.1 (18 Apr 2025 05:40), Max: 98.1 (18 Apr 2025 05:40)  HR: 99 (18 Apr 2025 11:23) (93 - 101)  BP: 101/60 (18 Apr 2025 05:40) (101/60 - 102/68)  BP(mean): --  RR: 28 (18 Apr 2025 11:23) (18 - 28)  SpO2: 97% (18 Apr 2025 11:23) (93% - 97%) PHYSICAL EXAM:  GENERAL:  [] NAD []Alert [x]Lethargic  []Cachexia  [x]Unarousable  []Verbal  [x]Non-Verbal  Behavioral:   []Anxiety  [x]Delirium []Agitation []Cooperative [x]Oriented x0  HEENT:  [x]Normal  [] Moist Mucous Membranes [x]Dry mouth   []ET Tube/Trach  []Oral lesions  PULMONARY:   []Clear []Tachypnea  []Audible excessive secretions  []Normal Work of Breathing [x]Labored Breathing  []Rhonchi [x]Crackles []Wheezing  CARDIOVASCULAR:    [x]Regular Rate []Regular Rhythm []Irregular []Tachy  []Foreign  GASTROINTESTINAL:  [x]Soft  [x]Distended   []+BS  []Non tender [x]Tender  []PEG []OGT/ NGT  Last BM:  GENITOURINARY:  []Normal [x] Incontinent   []Oliguria/Anuria   []Ramirez  MUSCULOSKELETAL:   []Normal Extremities  [x]Weakness  [x]Bed/Wheelchair bound []Edema  NEUROLOGIC:   []No focal deficits  []Cognitive impairment  [x]Dysphagia []Dysarthria []Paresis [x]Encephalopathic  SKIN:   []Normal   []Pressure ulcer(s)  [x]Jaundice    Vital Signs Last 24 Hrs  T(C): 36.7 (18 Apr 2025 05:40), Max: 36.7 (18 Apr 2025 05:40)  T(F): 98.1 (18 Apr 2025 05:40), Max: 98.1 (18 Apr 2025 05:40)  HR: 99 (18 Apr 2025 11:23) (93 - 101)  BP: 101/60 (18 Apr 2025 05:40) (101/60 - 102/68)  BP(mean): --  RR: 28 (18 Apr 2025 11:23) (18 - 28)  SpO2: 97% (18 Apr 2025 11:23) (93% - 97%)

## 2025-04-18 NOTE — H&P ADULT - NSHPSOCIALHISTORY_GEN_ALL_CORE
SOCIAL HISTORY:   Significant other/partner:  []  Children:  []   Substance hx:  []   Tobacco hx:  []   Alcohol hx: []  Living Situation: []Home  []Long term care  []Rehab []Other  Faith/Spiritual practice: ; Role of organized Orthodoxy [] important [] some [] unable to assess  Coping: [] well [] with difficulty [] poor coping [] unable to assess  Support system: [] strong [] adequate [] inadequate    ADVANCE DIRECTIVES: See GOC note  [x]MOLST: DNR/DNI SOCIAL HISTORY:   Significant other/partner:  [x]  Children:  []   Substance hx:  []   Tobacco hx:  []   Alcohol hx: []  Living Situation: []Home  []Long term care  []Rehab []Other  Yarsanism/Spiritual practice: ; Role of organized Islam [] important [] some [] unable to assess  Coping: [] well [] with difficulty [] poor coping [x] unable to assess  Support system: [x] strong [] adequate [] inadequate    ADVANCE DIRECTIVES: See GOC note  [x]MOLST: DNR/DNI

## 2025-04-18 NOTE — PROGRESS NOTE ADULT - PROBLEM SELECTOR PLAN 9
Noted she was expected to have labs compatible with hypothyroidism and to start synthroid but her labs did not reflect this so she never started. Denies palpitations, chest pain, rash, dysphagia, diarrhea.  tfts wnl  -takes atenolol 12.5mg QD for tachycardia at home

## 2025-04-18 NOTE — H&P ADULT - NS ATTEST RISK PROBLEM GEN_ALL_CORE FT
Actively Dying  Abrupt Change In Neurological Status  Chronic Illness With Severe Exacerbation/Progression  Decision Made To De-escalate Care Due To Poor Prognosis  Prescription Of Parenteral Controlled Substances

## 2025-04-18 NOTE — H&P ADULT - PROBLEM SELECTOR PLAN 6
Patient is DNR/DNI, MOLST in chart  -significant other is out of the country ([place], number in handoff)  -immediate family is present at bedside, brother has been communicating for [place]-speaking parents (Ace, alexey in handoff)  -CMO MEWS Exempt

## 2025-04-18 NOTE — CHART NOTE - NSCHARTNOTEFT_GEN_A_CORE
Patient acutely decompensating and appears to be entering an active dying process. Tentative plan for discharge home with hospice cancelled, will try to admit patient to the inpatient hospice service pending an admission nurse. Discussed with mother and brother at bedside. They are in agreement with maximizing comfort and fully symptom-directed care. Medications and orders streamlined appropriately. Inpatient hospice referral started.    Dre Chicas, Palliative Care Attending  Questions/Concerns: Call [phone] (including Nights/Weekend) or message on [employer] Teams (Dre Chicas

## 2025-04-18 NOTE — PROGRESS NOTE ADULT - PROBLEM SELECTOR PLAN 6
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 She had paracentesis 4/9 that drained 1L without complications. Patient still distended and complaining of pain. Nucleated cell count 173.  -s/p paracentesis with IR 4/11  -s/p peritoneal drain placement 4/17

## 2025-04-18 NOTE — PROGRESS NOTE ADULT - PROBLEM SELECTOR PLAN 8
Acute portal vein thrombosis seen on CT. Received one dose eliquis AM 4/10. Holding iso upcoming paracentesis and thoracentesis. Started eliquis 10mg BID on Monday.  - HOLD eliquis indefinitely given worsening liver dysfunction and coagulopathy

## 2025-04-18 NOTE — PROGRESS NOTE ADULT - PROBLEM SELECTOR PLAN 10
Subjectively feels as though she has incomplete voiding though denies dysuria, vaginal discharge or urinary frequency. Bladder scan in ED showed >500 thought to be likely from ascites as patient did not have the urge to urinate. POCUS revealing distended bladder. 100cc on SC 4/11. Incomplete emptying better after para and diuretics given  -strict I/O to ensure pt voiding appropriately  -bladder scans q6, get pvr

## 2025-04-18 NOTE — PROGRESS NOTE ADULT - PROBLEM SELECTOR PROBLEM 1
Systemic inflammatory response syndrome (SIRS)
Neoplasm related pain
Prophylactic measure
Neoplasm related pain
Systemic inflammatory response syndrome (SIRS)
Systemic inflammatory response syndrome (SIRS)
Neoplasm related pain
Systemic inflammatory response syndrome (SIRS)
Neoplasm related pain

## 2025-04-18 NOTE — H&P ADULT - NSHPSOURCEINFOTX_GEN_ALL_CORE
[facility] / [facility]: Call [facility] at [phone] or [facility] at [phone] for symptom management or to request interdisciplinary team intervention (RN/CM, SW, ) for patient/family

## 2025-04-18 NOTE — H&P ADULT - HISTORY OF PRESENT ILLNESS
Hospice GIP Admission    SYMPTOMS REQUIRING [facility] LEVEL OF CARE:  [x]Pain  [x]Dyspnea  [x]Anxiety/Agitation  []Nausea  []Active Seizure    HPI:    Patient appears to be imminently dying, symptomatic, and requiring IV medications due to unreliable oral route.    [x]Pain/Dyspnea managed by hourly monitoring and titration of IV Dilaudid  [x]Pain/Dyspnea improved as a result of aggressive titration of IV Dilaudid  [x]GIP to manage uncontrolled pain/dyspnea and continuous titrations of IV Dilaudid and Ativan  [x]Requires frequent skilled nursing assessment for non-verbal signs of pain/dyspnea/agitation through grimacing, groaning, tachypnea, writhing, rigidity  [x]Effectiveness of pain/tachypnea management is continuously reevaluated hourly to achieve maximal comfort    Comprehensive symptom assessment and justification of GIP level of care as noted. Patient continues to require symptom monitoring through multiple daily bedside assessments and daily intervention through the administration of PRN medications and adjustment of scheduled opiates. See patient's PRN use for the past 24hrs noted below. Extensive time spent discussing care plan with family. No unexpected adverse effects of opiates noted. Plan of care discussed collaboratively with Hospice and Facility staff.    REFERRALS: [x]Hospice [x]Social Work  []Chaplaincy [x]Massage Therapy []Music Therapy    DISCUSSION OF CASE: Family - to obtain additional history and to provide emotional support; Hospice Liaison - to discuss plan of care; RN - to discuss symptom burden and regimen adjustment Hospice GIP Admission    SYMPTOMS REQUIRING GIP LEVEL OF CARE:  [x]Pain  [x]Dyspnea  [x]Anxiety/Agitation  []Nausea  []Active Seizure    HPI: 45yo F with PMH of Metastatic Uveal Melanoma p/w abdominal pain and found to have significant ascites. Palliative consulted for complex symptom management in the setting of metastatic malignancy. Received multiple paracentesis and abdominal catheter for malignant ascites management. Ultimately unable to pursue further DMT due to hyperbilirubinemia that was not amenable to stenting. Patient decided to be DNR/DNI and pursue home hospice. ON the expected day of discharge, patient was acutely symptomatic with pain, dyspnea, and agitation. Family wished to prioritize symptom management and the decision was made to transition to inpatient hospice for continued end of life care.     Patient appears to be imminently dying, symptomatic, and requiring IV medications due to unreliable oral route.    [x]Pain/Dyspnea managed by hourly monitoring and titration of IV Dilaudid  [x]Pain/Dyspnea improved as a result of aggressive titration of IV Dilaudid  [x]GIP to manage uncontrolled pain/dyspnea and continuous titrations of IV Dilaudid and Ativan  [x]Requires frequent skilled nursing assessment for non-verbal signs of pain/dyspnea/agitation through grimacing, groaning, tachypnea, writhing, rigidity  [x]Effectiveness of pain/tachypnea management is continuously reevaluated hourly to achieve maximal comfort    Comprehensive symptom assessment and justification of GIP level of care as noted. Patient continues to require symptom monitoring through multiple daily bedside assessments and daily intervention through the administration of PRN medications and adjustment of scheduled opiates. See patient's PRN use for the past 24hrs noted below. Extensive time spent discussing care plan with family. No unexpected adverse effects of opiates noted. Plan of care discussed collaboratively with Hospice and Facility staff.    REFERRALS: [x]Hospice [x]Social Work  []Chaplaincy [x]Massage Therapy []Music Therapy    DISCUSSION OF CASE: Family - to obtain additional history and to provide emotional support; Hospice Liaison - to discuss plan of care; RN - to discuss symptom burden and regimen adjustment

## 2025-04-18 NOTE — PROGRESS NOTE ADULT - PROBLEM SELECTOR PROBLEM 10
Incomplete bladder emptying

## 2025-04-18 NOTE — H&P ADULT - PROBLEM SELECTOR PLAN 1
-Dilaudid 1mg IV q2h PRN for Moderate/Severe Pain or RR>22  -Dilaudid 1mg IV q4h ATC  -Robinul 0.4mg IV q6h PRN for Excessive Secretions    If patient requires >2 Dilaudid PRNs in 24hrs, then discontinue scheduled Dilaudid and order Dilaudid gtt at 0.3mg/hr  If patient has refractory symptoms despite PRNs, then increase dose of PRN Ativan to 1mg

## 2025-04-18 NOTE — H&P ADULT - NSHPREVIEWOFSYSTEMS_GEN_ALL_CORE
PRESENT SYMPTOMS/REVIEW OF SYSTEMS: [x]Unable to obtain due to poor mentation/encephalopathy  Source if other than patient:  [x]Family   [x]Team     Pain: [x] yes [] no - see PAINAD  QOL Impact -   Location -                    Aggravating Factors -  Quality -  Radiation -  Timing -  Severity (0-10 scale) -   Minimal Acceptable Level (0-10 scale) -    Dyspnea:                           []Mild  []Moderate []Severe  Anxiety:                             []Mild []Moderate []Severe  Fatigue:                             []Mild []Moderate []Severe  Nausea:                             []Mild []Moderate []Severe  Loss of Appetite:              []Mild []Moderate []Severe  Constipation:                    []Mild []Moderate []Severe    Other Symptoms:  [x]All Other Review Of Systems Negative PRESENT SYMPTOMS/REVIEW OF SYSTEMS: [x]Unable to obtain due to poor mentation/encephalopathy  Source if other than patient:  [x]Family   [x]Team     Pain: [x] yes [] no - see PAINAD  QOL Impact -   Location -                    Aggravating Factors -  Quality -  Radiation -  Timing -  Severity (0-10 scale) -   Minimal Acceptable Level (0-10 scale) -    Dyspnea:                           []Mild  [x]Moderate []Severe  Anxiety:                             []Mild [x]Moderate []Severe  Fatigue:                             []Mild []Moderate []Severe  Nausea:                             []Mild []Moderate []Severe  Loss of Appetite:              []Mild []Moderate []Severe  Constipation:                    []Mild []Moderate []Severe    Other Symptoms:  [x]All Other Review Of Systems Negative

## 2025-04-18 NOTE — PROGRESS NOTE ADULT - SUBJECTIVE AND OBJECTIVE BOX
INTERVAL/OVERNIGHT EVENTS: None    SUBJECTIVE:  Patient seen and examined at bedside. Family expressing concerns that patient is more confused than usual, not sure if home hospice is right for her. On initial exam, patient appeared more lethargic than usual but overall comfortable; later she became tachypneic and more confused.     Vital Signs Last 12 Hrs  T(F): 98.1 (04-18-25 @ 05:40), Max: 98.1 (04-18-25 @ 05:40)  HR: 99 (04-18-25 @ 11:23) (99 - 101)  BP: 101/60 (04-18-25 @ 05:40) (101/60 - 101/60)  BP(mean): --  RR: 28 (04-18-25 @ 11:23) (18 - 28)  SpO2: 97% (04-18-25 @ 11:23) (95% - 97%)  I&O's Summary    17 Apr 2025 07:01  -  18 Apr 2025 07:00  --------------------------------------------------------  IN: 0 mL / OUT: 250 mL / NET: -250 mL        PHYSICAL EXAM:  General: patient tachypneic, confused, jaundiced  HEENT: PERRL, EOM intact, scleral icterus, MMM  Cardiovascular: RRR; no MRG;   Respiratory: CTAB; no WRR  GI/: soft; NTND; BS x4  Extremities: WWP; 2+ peripheral pulses bilaterally; 1+ edema bilaterally  Skin: normal color & turgor; no rash  Neurologic: aox2; no focal deficits    LABS:                        11.5   16.39 )-----------( 263      ( 17 Apr 2025 05:30 )             34.2     04-17    124[L]  |  89[L]  |  26[H]  ----------------------------<  52[LL]  5.5[H]   |  18[L]  |  0.54    Ca    8.8      17 Apr 2025 05:30  Phos  1.6     04-17  Mg     2.4     04-17      PT/INR - ( 17 Apr 2025 05:30 )   PT: 21.9 sec;   INR: 1.88          PTT - ( 17 Apr 2025 05:30 )  PTT:47.2 sec  Urinalysis Basic - ( 17 Apr 2025 05:30 )    Color: x / Appearance: x / SG: x / pH: x  Gluc: 52 mg/dL / Ketone: x  / Bili: x / Urobili: x   Blood: x / Protein: x / Nitrite: x   Leuk Esterase: x / RBC: x / WBC x   Sq Epi: x / Non Sq Epi: x / Bacteria: x          RADIOLOGY & ADDITIONAL TESTS:    MEDICATIONS  (STANDING):  fentaNYL   Patch  12 MICROgram(s)/Hr 1 Patch Transdermal every 72 hours  HYDROmorphone  Injectable 0.5 milliGRAM(s) IV Push every 4 hours    MEDICATIONS  (PRN):  bisacodyl Suppository 10 milliGRAM(s) Rectal daily PRN Constipation  glycopyrrolate Injectable 0.4 milliGRAM(s) IV Push four times a day PRN Secretions  haloperidol    Injectable 2 milliGRAM(s) IV Push every 2 hours PRN Agitation  HYDROmorphone  Injectable 0.5 milliGRAM(s) IV Push every 2 hours PRN Moderate pain (4-6), Severe pain (7-10), Respiratory rate greater than 22  LORazepam   Injectable 0.5 milliGRAM(s) IV Push every 4 hours PRN Anxiety  ondansetron Injectable 4 milliGRAM(s) IV Push every 6 hours PRN Nausea and/or Vomiting   Ear Wedge Repair Text: A wedge excision was completed by carrying down an excision through the full thickness of the ear and cartilage with an inward facing Burow's triangle. The wound was then closed in a layered fashion.

## 2025-04-18 NOTE — PROGRESS NOTE ADULT - PROVIDER SPECIALTY LIST ADULT
Palliative Care
Nephrology
Palliative Care
Heme/Onc
Heme/Onc
Palliative Care
Palliative Care
Gastroenterology
Internal Medicine

## 2025-04-18 NOTE — PATIENT PROFILE ADULT - LIVING ENVIRONMENT
[FreeTextEntry1] : The patient has not had chest pain or SOB . Echo showed normal LV systolic functio mod AS TWILA 1.3 cm2 mild to mod MR mild TR Mild AI Ascending aorta was 3.9 cm  no

## 2025-04-18 NOTE — PATIENT PROFILE ADULT - DO YOU NEED ADDITIONAL SERVICES TO MANAGE ANY OF THESE MEDICAL CONDITIONS AT HOME?
Glucometer for blood sugar testing. Please provide brand covered by insurance 1 kit 0     No current facility-administered medications for this visit. Review of Systems :  Review of Systems   Constitutional: Negative for chills and fever. Eyes: Negative for photophobia and visual disturbance. Respiratory: Positive for cough. Negative for shortness of breath. Cardiovascular: Negative for chest pain, palpitations and leg swelling. Gastrointestinal: Positive for abdominal pain. Negative for blood in stool. Genitourinary: Negative for hematuria. Musculoskeletal: Positive for back pain. Neurological: Negative for dizziness and weakness. ______________________________________________________________________    Physical Exam :    Vitals: /69 (Site: Left Upper Arm, Position: Sitting, Cuff Size: Medium Adult)   Pulse 98   Temp 98.4 °F (36.9 °C) (Oral)   Resp 12   Ht 5' 4\" (1.626 m)   Wt 164 lb (74.4 kg)   LMP 01/09/2020   SpO2 100%   BMI 28.15 kg/m²   General Appearance: Well developed, awake, alert, oriented, and in NAD  HEENT: NCAT, MMM, no pallor or icterus. Throat w/o erythema  Neck: Supple, symmetrical, trachea midline. Chest wall/Lung: CTAB, respirations unlabored. No ronchi/wheezing/rales  Heart: RRR, normal S1 and S2, no murmurs, rubs or gallops. Abdomen: SNTND,  Extremities: Extremities normal, atraumatic, no cyanosis, clubbing or edema. Skin: Skin color, texture, turgor normal, no rashes or lesions  Musculokeletal: ROM grossly normal in all joints of extremities, no obvious joint swelling. Lymphnodes: No lymph node enlargement appreciated  Neurologic: Alert&Oriented x3. No focal motor deficits detected. Psychiatric: Normal mood. Normal affect. Normal behavior. ______________________________________________________________________    Assessment & Plan :    1.  Type 2 diabetes mellitus without complication, without long-term current use of insulin yes

## 2025-04-18 NOTE — PROGRESS NOTE ADULT - PROBLEM SELECTOR PROBLEM 7
Pleural effusion on right
History of thyroiditis
Pleural effusion on right
Pleural effusion on right

## 2025-04-18 NOTE — H&P ADULT - PROBLEM SELECTOR PLAN 2
-Ativan 0.5mg IV q4h PRN for Anxiety/Agitation  -Haldol 2mg IV q2h PRN for Agitation  -monitor for urinary retention

## 2025-04-18 NOTE — PATIENT PROFILE ADULT - FALL HARM RISK - RISK INTERVENTIONS
Assistance OOB with selected safe patient handling equipment/Assistance with ambulation/Communicate Fall Risk and Risk Factors to all staff, patient, and family/Monitor for mental status changes/Move patient closer to nurses' station/Reinforce activity limits and safety measures with patient and family/Reorient to person, place and time as needed/Toileting schedule using arm’s reach rule for commode and bathroom/Use of alarms - bed, chair and/or voice tab/Visual Cue: Yellow wristband/Bed in lowest position, wheels locked, appropriate side rails in place/Call bell, personal items and telephone in reach/Instruct patient to call for assistance before getting out of bed or chair/Non-slip footwear when patient is out of bed/[place] to call system/Physically safe environment - no spills, clutter or unnecessary equipment/Purposeful Proactive Rounding/Room/bathroom lighting operational, light cord in reach

## 2025-04-18 NOTE — PROGRESS NOTE ADULT - PROBLEM SELECTOR PROBLEM 8
Portal vein thrombosis
Incomplete bladder emptying
Portal vein thrombosis

## 2025-04-18 NOTE — PROGRESS NOTE ADULT - PROBLEM SELECTOR PLAN 5
Continuing to elevate. Given elevation, patient unable to enroll in clinical study or receive treatment.  -no role for stenting per GI  -ursodiol 300mg TID  -can give atarax for itching prn, she notes it's very mild at this time

## 2025-04-18 NOTE — PROGRESS NOTE ADULT - PROBLEM SELECTOR PROBLEM 9
Prophylactic measure
History of thyroiditis

## 2025-04-18 NOTE — H&P ADULT - NSHPLABSRESULTS_GEN_ALL_CORE
LABS:                        11.5   16.39 )-----------( 263      ( 17 Apr 2025 05:30 )             34.2   04-17    124[L]  |  89[L]  |  26[H]  ----------------------------<  52[LL]  5.5[H]   |  18[L]  |  0.54    Ca    8.8      17 Apr 2025 05:30  Phos  1.6     04-17  Mg     2.4     04-17      RADIOLOGY & ADDITIONAL STUDIES: LABS:                        11.5   16.39 )-----------( 263      ( 17 Apr 2025 05:30 )             34.2   04-17    124[L]  |  89[L]  |  26[H]  ----------------------------<  52[LL]  5.5[H]   |  18[L]  |  0.54    Ca    8.8      17 Apr 2025 05:30  Phos  1.6     04-17  Mg     2.4     04-17      RADIOLOGY & ADDITIONAL STUDIES:  < from: CT Abdomen and Pelvis w/ IV Cont (04.10.25 @ 14:02) >  *  Acute thrombus in main portal vein and both portal vein branches. Increased moderate-large ascites, secondary to portal hypertension with signs of generalized fluid overload.  *  Increased hepatic metastases. Notably, superior intrahepatic IVC and right hepatic vein narrowed due to mass effect from metastases.  *  Increased upper retroperitoneal metastatic lymphadenopathy.  *  Increased moderate right pleural effusion, secondary partial atelectasis right middle and lower lobes.  *  Unchanged bony metastases.  *  Unchanged visualized lung metastases.

## 2025-04-18 NOTE — H&P ADULT - PROBLEM SELECTOR PLAN 5
Metastatic disease, progressed through multiple lines of treatment  -no longer a candidate for DMT due to hyperbilirubinemia  -hospice qualifying diagnosis

## 2025-04-18 NOTE — CHART NOTE - NSCHARTNOTESELECT_GEN_ALL_CORE
ISTOP
Palliative Care Coordination
Event Note
GI
ONCOLOGY/Event Note
Palliative Care Coordination
nephrology

## 2025-04-18 NOTE — H&P ADULT - ASSESSMENT
·	Call [facility] ([phone]) for any issues 24/7 regarding symptom management.  ·	Call [facility] ([phone]) for symptom management or to request interdisciplinary team intervention (RN/CM, SW, ) for patient/family. 45yo F with PMH of Metastatic Uveal Melanoma p/w abdominal pain and found to have progression of disease. Transitioned to inpatient hospice for management of pain, dyspnea, and agitation at end of life due to advanced malignancy.    ·	Call [facility] ([phone]) for any issues 24/7 regarding symptom management.  ·	Call [facility] ([phone]) for symptom management or to request interdisciplinary team intervention (RN/CM, SW, ) for patient/family.

## 2025-04-18 NOTE — PROGRESS NOTE ADULT - PROBLEM SELECTOR PLAN 11
IVF: none  Diet: regular  GI: pantoprazole  DVT: none   Dispo: RMF

## 2025-04-18 NOTE — H&P ADULT - PROBLEM SELECTOR PLAN 7
Complex symptom management in the setting of end of life.    Requires GIP for titration of palliative regimen for adequate symptom relief at end of life. Emotional support provided, questions answered.  Active Psychosocial Referrals:  [x]Social Work/Case management []PT/OT []Chaplaincy [x]Hospice  []Childlife []Holistic RN []Massage Therapy []Music Therapy []Ethics  Coping: [] well [] with difficulty [] poor coping [x] unable to assess  Support system: [x] strong [] adequate [] inadequate    For new or uncontrolled symptoms, please call Palliative Care at [phone]). The service is available 24/7 (including nights & weekends) to provide symptom management recommendations over the phone as appropriate

## 2025-04-19 NOTE — DISCHARGE NOTE FOR THE EXPIRED PATIENT - HOSPITAL COURSE
45yo F with PMH of Metastatic Uveal Melanoma p/w abdominal pain and found to have significant ascites. Palliative consulted for complex symptom management in the setting of metastatic malignancy. Received multiple paracentesis and abdominal catheter for malignant ascites management. Ultimately unable to pursue further DMT due to hyperbilirubinemia that was not amenable to stenting. Patient decided to be DNR/DNI and pursue home hospice. ON the expected day of discharge, patient was acutely symptomatic with pain, dyspnea, and agitation. Family wished to prioritize symptom management and the decision was made to transition to inpatient hospice for continued end of life care.

## 2025-04-23 DIAGNOSIS — R13.10 DYSPHAGIA, UNSPECIFIED: ICD-10-CM

## 2025-04-23 DIAGNOSIS — R17 UNSPECIFIED JAUNDICE: ICD-10-CM

## 2025-04-23 DIAGNOSIS — F41.9 ANXIETY DISORDER, UNSPECIFIED: ICD-10-CM

## 2025-04-23 DIAGNOSIS — C69.40 MALIGNANT NEOPLASM OF UNSPECIFIED CILIARY BODY: ICD-10-CM

## 2025-04-23 DIAGNOSIS — G89.3 NEOPLASM RELATED PAIN (ACUTE) (CHRONIC): ICD-10-CM

## 2025-04-23 DIAGNOSIS — Z51.5 ENCOUNTER FOR PALLIATIVE CARE: ICD-10-CM

## 2025-04-23 DIAGNOSIS — R18.0 MALIGNANT ASCITES: ICD-10-CM

## 2025-04-23 DIAGNOSIS — Z98.49 CATARACT EXTRACTION STATUS, UNSPECIFIED EYE: ICD-10-CM

## 2025-04-23 DIAGNOSIS — Z79.52 LONG TERM (CURRENT) USE OF SYSTEMIC STEROIDS: ICD-10-CM

## 2025-04-23 DIAGNOSIS — Z79.899 OTHER LONG TERM (CURRENT) DRUG THERAPY: ICD-10-CM

## 2025-04-23 DIAGNOSIS — Z66 DO NOT RESUSCITATE: ICD-10-CM

## 2025-04-23 DIAGNOSIS — R45.1 RESTLESSNESS AND AGITATION: ICD-10-CM

## 2025-04-23 DIAGNOSIS — C79.9 SECONDARY MALIGNANT NEOPLASM OF UNSPECIFIED SITE: ICD-10-CM

## 2025-04-23 DIAGNOSIS — G93.40 ENCEPHALOPATHY, UNSPECIFIED: ICD-10-CM

## 2025-04-23 DIAGNOSIS — Z74.01 BED CONFINEMENT STATUS: ICD-10-CM

## 2025-04-24 DIAGNOSIS — J98.11 ATELECTASIS: ICD-10-CM

## 2025-04-24 DIAGNOSIS — R33.9 RETENTION OF URINE, UNSPECIFIED: ICD-10-CM

## 2025-04-24 DIAGNOSIS — R65.10 SYSTEMIC INFLAMMATORY RESPONSE SYNDROME (SIRS) OF NON-INFECTIOUS ORIGIN WITHOUT ACUTE ORGAN DYSFUNCTION: ICD-10-CM

## 2025-04-24 DIAGNOSIS — N30.00 ACUTE CYSTITIS WITHOUT HEMATURIA: ICD-10-CM

## 2025-04-24 DIAGNOSIS — Z51.5 ENCOUNTER FOR PALLIATIVE CARE: ICD-10-CM

## 2025-04-24 DIAGNOSIS — E87.1 HYPO-OSMOLALITY AND HYPONATREMIA: ICD-10-CM

## 2025-04-24 DIAGNOSIS — R18.0 MALIGNANT ASCITES: ICD-10-CM

## 2025-04-24 DIAGNOSIS — D68.9 COAGULATION DEFECT, UNSPECIFIED: ICD-10-CM

## 2025-04-24 DIAGNOSIS — Z66 DO NOT RESUSCITATE: ICD-10-CM

## 2025-04-24 DIAGNOSIS — C78.7 SECONDARY MALIGNANT NEOPLASM OF LIVER AND INTRAHEPATIC BILE DUCT: ICD-10-CM

## 2025-04-24 DIAGNOSIS — E87.5 HYPERKALEMIA: ICD-10-CM

## 2025-04-24 DIAGNOSIS — K59.00 CONSTIPATION, UNSPECIFIED: ICD-10-CM

## 2025-04-24 DIAGNOSIS — J90 PLEURAL EFFUSION, NOT ELSEWHERE CLASSIFIED: ICD-10-CM

## 2025-04-24 DIAGNOSIS — C79.51 SECONDARY MALIGNANT NEOPLASM OF BONE: ICD-10-CM

## 2025-04-24 DIAGNOSIS — C69.90 MALIGNANT NEOPLASM OF UNSPECIFIED SITE OF UNSPECIFIED EYE: ICD-10-CM

## 2025-04-24 DIAGNOSIS — G89.3 NEOPLASM RELATED PAIN (ACUTE) (CHRONIC): ICD-10-CM

## 2025-04-24 DIAGNOSIS — E80.7 DISORDER OF BILIRUBIN METABOLISM, UNSPECIFIED: ICD-10-CM

## 2025-04-24 DIAGNOSIS — C78.00 SECONDARY MALIGNANT NEOPLASM OF UNSPECIFIED LUNG: ICD-10-CM

## 2025-04-24 DIAGNOSIS — E07.89 OTHER SPECIFIED DISORDERS OF THYROID: ICD-10-CM

## 2025-04-24 DIAGNOSIS — E87.20 ACIDOSIS, UNSPECIFIED: ICD-10-CM

## 2025-04-24 DIAGNOSIS — F43.20 ADJUSTMENT DISORDER, UNSPECIFIED: ICD-10-CM

## 2025-04-24 DIAGNOSIS — C77.2 SECONDARY AND UNSPECIFIED MALIGNANT NEOPLASM OF INTRA-ABDOMINAL LYMPH NODES: ICD-10-CM

## 2025-04-24 DIAGNOSIS — I81 PORTAL VEIN THROMBOSIS: ICD-10-CM

## 2025-04-24 DIAGNOSIS — K76.6 PORTAL HYPERTENSION: ICD-10-CM

## 2025-04-29 ENCOUNTER — APPOINTMENT (OUTPATIENT)
Dept: ENDOCRINOLOGY | Facility: CLINIC | Age: 45
End: 2025-04-29

## 2025-05-08 NOTE — ASSESSMENT
[FreeTextEntry1] : 43 year old female w/uveal melanoma hepatic metastases receiving immunotherapy referred for consultation regarding Y90 radioembolization treatment of enlarging dominant hepatic segment 7 lesion. Plan to proceed with Y90 mapping.
Acute Diarrhea    WHAT YOU NEED TO KNOW:    Acute diarrhea starts quickly and lasts a short time, usually 1 to 3 days. It can last up to 2 weeks. You may not be able to control your diarrhea. Acute diarrhea usually stops on its own.    DISCHARGE INSTRUCTIONS:    Return to the emergency department if:    You feel confused.    Your heartbeat is faster than usual.    Your eyes look deeply sunken, or you have no tears when you cry.    You urinate less than usual, or your urine is dark yellow.    You have blood or mucus in your bowel movements.    You have severe abdominal pain.    You are unable to drink any liquids.  Contact your healthcare provider if:    Your symptoms do not get better with treatment.    You have a fever higher than 101.3°F (38.5°C).    You have trouble eating and drinking because you are vomiting.    Your diarrhea does not get better in 7 days.    You have questions or concerns about your condition or care.  Follow up with your healthcare provider as directed: Write down your questions so you remember to ask them during your visits.    Medicines:    Diarrhea medicine is an over-the-counter medicine that helps slow or stop your diarrhea. Do not take this medicine unless your healthcare provider says it is okay.    Antibiotics may be given to help treat an infection caused by bacteria.    Antiparasitics may be given to treat an infection caused by parasites.    Take your medicine as directed. Contact your healthcare provider if you think your medicine is not helping or if you have side effects. Tell him of her if you are allergic to any medicine. Keep a list of the medicines, vitamins, and herbs you take. Include the amounts, and when and why you take them. Bring the list or the pill bottles to follow-up visits. Carry your medicine list with you in case of an emergency.  Self-care:    Drink liquids as directed. Liquids will help prevent dehydration caused by diarrhea. Ask your healthcare provider how much liquid to drink each day and which liquids are best for you. You may need to drink an oral rehydration solution (ORS). An ORS has the right amounts of water, salts, and sugar you need to replace body fluids. You can buy an ORS at most grocery stores and pharmacies.    Eat foods that are easy to digest. Examples include rice, lentils, cereal, bananas, potatoes, and bread. It also includes some fruits (bananas, melon), well-cooked vegetables, and lean meats. Do not eat foods high in fiber, fat, and sugar. Do not drink alcohol until your diarrhea is gone.  Prevent acute diarrhea:    Wash your hands often. Use soap and water. Wash your hands before you eat or prepare food. Also wash your hands after you use the bathroom. Use an alcohol-based hand gel when soap and water are not available.  Handwashing      Keep bathroom surfaces clean. This helps prevent the spread of germs that cause acute diarrhea.    Wash fruits and vegetables well before you eat them. This can help remove germs that cause diarrhea. If possible, remove the skin from fruits and vegetables, or cook them well before you eat them.    Cook meat and poultry as directed. Meat includes beef and pork. Poultry includes chicken, turkey, and duck.  Cook ground meat to 160°F.    Cook ground poultry, whole poultry, or cuts of poultry to at least 165°F. Remove the poultry from heat. Let it stand for 3 minutes before you eat it.    Cook whole cuts of meat other than poultry to at least 145°F. Remove the meat from heat. Let it stand for 3 minutes before you eat it.    Wash dishes that have touched raw meat or poultry with hot water and soap. This includes cutting boards, utensils, dishes, and serving containers.    Place raw or cooked meat or poultry in the refrigerator as soon as possible. Bacteria can grow in meat or poultry that is left at room temperature too long.    Do not eat raw or undercooked oysters, clams, or mussels. These foods may be contaminated and cause infection.    Drink only filtered or treated water when you travel. Do not put ice in your drinks. Drink bottled water whenever possible.    Diarrea aguda    LO QUE NECESITA SABER:    La diarrea aguda comienza rápidamente y dura poco tiempo, usualmente de 1 a 3 días. Puede durar hasta 2 semanas. Es posible que usted no pueda controlar salomon diarrea. La diarrea aguda por lo general se detiene por sí karina.    INSTRUCCIONES SOBRE EL [facility]:    Regrese a la lori de emergencias si:    Se siente confundido.    Salomon ritmo cardíaco es más rápido de lo normal.    [place]ugenia ojos se vaishnavi demasiado hundidos o no le salen lágrimas cuando llora.    Usted orina menos de lo normal o salomon orina es de color amarillo oscuro.    Usted tiene pritesh o mucosidad en eugenia movimientos intestinales.    Usted tiene dolor abdominal intenso.    Usted no puede paul ningún líquido.  Comuníquese con salomon médico si:    [place] síntomas no mejoran con el tratamiento.    Usted tiene fiebre que supera los 101.3°F (38.5°C).    Tiene dificultad para ingerir alimentos y líquidos porque tiene vómitos.    Salomon diarrea no mejora dentro de 7 días.    Usted tiene preguntas o inquietudes acerca de salomon condición o cuidado.  Acuda a eugenia consultas de control con salomon médico según le indicaron.Anote eugenia preguntas para que se acuerde de hacerlas jackson eugenia visitas.    Medicamentos:    El medicamento para la diarreaes un medicamento de venta patricia que ayuda a disminuir o a detener salomon diarrea. No tome bautista medicamento a menos que salomon médico se lo indique.    Los antibióticospodrían ser administrados para tratar ruby infección causada por bacterias.    Antiparasitariospodrían ser administrados para tratar ruby infección causada por parásitos.    Arboles eugenia medicamentos neftali se le haya indicado.Consulte con salomon médico si usted kely que salomon medicamento no le está ayudando o si presenta efectos secundarios. Infórmele si es alérgico a algún medicamento. Mantenga ruby lista actualizada de los medicamentos, las vitaminas y los productos herbales que nimo. Incluya los siguientes datos de los medicamentos: cantidad, frecuencia y motivo de administración. Traiga con usted la lista o los envases de las píldoras a eugenia citas de seguimiento. Lleve la lista de los medicamentos con usted en sarah de ruby emergencia.  Cuidados personales:    Arboles líquidos neftali se le haya indicado.Los líquidos evitarán la deshidratación que es provocada por la diarrea. Pregunte a salomon médico sobre la cantidad de líquido que necesita paul todos los ann marie y cuáles le recomienda. Es posible que necesite paul ruby solución de rehidratación oral (SRO). Ruby SRO tiene las cantidades exactas de agua, sal y azúcar que usted necesita para reemplazar los líquidos corporales. Se pueden comprar soluciones de rehidratación oral en la mayoría de los supermercados y farmacias.    Coma alimentos que jayde fáciles de digerir.Algunos ejemplos incluyen arroz, lentejas, cereales, plátanos, patatas y pan. También se incluyen algunas frutas (plátano, melón), verduras vicki cocidas y song magras. No coma alimentos ricos en fibra, grasa y azúcar. No bernie alcohol hasta que la diarrea haya desaparecido.  Prevenga la diarrea aguda:    Lávese las saskia frecuentemente.Utilice agua y jabón. Lávese las saskia antes de comer o preparar alimentos. También lávese eugenia saskia después del ir al baño. Utilice alcohol en gel si no tiene agua y jabón.  Lavado de saskia      Mantenga las superficies del baño limpias.[place] ayuda a evitar la propagación de gérmenes que provocan la diarrea aguda.    Lave vicki las frutas y verduras antes de consumirlas.[place] puede ayudar a eliminar los gérmenes causantes de diarrea. Si es posible, retire la piel de frutas y verduras o cocínelas vicki antes de comerlas.    Cocine las song y las aves de torres neftali se indica.La carne incluye la carne de res y de cerdo. Las aves de torres incluyen mikael, pavo y car.  Cocine la carne picada a 160 °F.    Cocine la carne de ave picada, la carne de ave entera o los rios de carne de ave a 165 °F neftali mínimo. Retire la carne del jacquelyn. Deje reposar jackson 3 minutos antes de comerla.    Cocine los rios enteros de carne que no sea de ave a 145 °F neftali mínimo. Retire la carne del jacquelyn. Deje reposar jackson 3 minutos antes de comerla.    Lave los platos que tocaron la carne cruda con [place] con jabón.[place] incluye tablas de cortar, utensilios, platos y recipientes.    Coloque la carne cruda o cocida en el refrigerador tan pronto neftali sea posible.Las bacterias pueden crecer en la carne que permanece a temperatura ambiente jackson mucho tiempo.    No coma ostras, almejas o mejillones crudos o poco cocidos.Estos alimentos pueden estar contaminados y causar infección.    Bernie solamente agua filtrada o tratada solo cuando viaja.No ponga hielo en eugenia bebidas. Bernie agua embotellada siempre que sea posible.